# Patient Record
Sex: FEMALE | Race: WHITE | NOT HISPANIC OR LATINO | Employment: UNEMPLOYED | URBAN - METROPOLITAN AREA
[De-identification: names, ages, dates, MRNs, and addresses within clinical notes are randomized per-mention and may not be internally consistent; named-entity substitution may affect disease eponyms.]

---

## 2021-10-28 ENCOUNTER — TELEPHONE (OUTPATIENT)
Dept: PAIN MEDICINE | Facility: CLINIC | Age: 46
End: 2021-10-28

## 2021-11-01 NOTE — TELEPHONE ENCOUNTER
Patient states that her procedure on 10/21 has been cancelled.    Patient states that she has a surgery on 11/18 and would like the other procedure also cancelled.

## 2024-01-31 PROBLEM — N18.30 BENIGN HYPERTENSION WITH CHRONIC KIDNEY DISEASE, STAGE III (HCC): Status: ACTIVE | Noted: 2024-01-31

## 2024-01-31 PROBLEM — N18.4 CHRONIC RENAL DISEASE, STAGE IV (HCC): Status: RESOLVED | Noted: 2023-02-14 | Resolved: 2024-01-31

## 2024-01-31 PROBLEM — N18.5 CHRONIC RENAL DISEASE, STAGE V (HCC): Status: RESOLVED | Noted: 2023-09-18 | Resolved: 2024-01-31

## 2024-02-15 ENCOUNTER — PROCEDURE VISIT (OUTPATIENT)
Dept: UROLOGY | Facility: CLINIC | Age: 49
End: 2024-02-15
Payer: COMMERCIAL

## 2024-02-15 VITALS
SYSTOLIC BLOOD PRESSURE: 130 MMHG | HEIGHT: 61 IN | OXYGEN SATURATION: 100 % | BODY MASS INDEX: 39.46 KG/M2 | HEART RATE: 80 BPM | WEIGHT: 209 LBS | RESPIRATION RATE: 18 BRPM | DIASTOLIC BLOOD PRESSURE: 80 MMHG

## 2024-02-15 DIAGNOSIS — E11.65 UNCONTROLLED TYPE 2 DIABETES MELLITUS WITH HYPERGLYCEMIA (HCC): ICD-10-CM

## 2024-02-15 DIAGNOSIS — N31.9 NEUROGENIC BLADDER: ICD-10-CM

## 2024-02-15 DIAGNOSIS — R39.15 URGENCY OF URINATION: Primary | ICD-10-CM

## 2024-02-15 DIAGNOSIS — R10.9 ABDOMINAL PAIN, UNSPECIFIED ABDOMINAL LOCATION: ICD-10-CM

## 2024-02-15 DIAGNOSIS — N18.30 STAGE 3 CHRONIC KIDNEY DISEASE, UNSPECIFIED WHETHER STAGE 3A OR 3B CKD (HCC): ICD-10-CM

## 2024-02-15 LAB
SL AMB  POCT GLUCOSE, UA: NORMAL
SL AMB LEUKOCYTE ESTERASE,UA: NORMAL
SL AMB POCT BILIRUBIN,UA: NORMAL
SL AMB POCT BLOOD,UA: NORMAL
SL AMB POCT CLARITY,UA: CLEAR
SL AMB POCT COLOR,UA: YELLOW
SL AMB POCT KETONES,UA: NORMAL
SL AMB POCT NITRITE,UA: NORMAL
SL AMB POCT PH,UA: 5
SL AMB POCT SPECIFIC GRAVITY,UA: 1.01
SL AMB POCT URINE PROTEIN: NORMAL
SL AMB POCT UROBILINOGEN: 0.2

## 2024-02-15 PROCEDURE — 51728 CYSTOMETROGRAM W/VP: CPT

## 2024-02-15 PROCEDURE — 51784 ANAL/URINARY MUSCLE STUDY: CPT

## 2024-02-15 PROCEDURE — 81002 URINALYSIS NONAUTO W/O SCOPE: CPT

## 2024-02-15 PROCEDURE — 51797 INTRAABDOMINAL PRESSURE TEST: CPT

## 2024-02-15 NOTE — PROGRESS NOTES
"CC: \"I rarely have the urge to pee, until I'm really full then I get a pain up my side and that's how I know I have to go.  At times I get an urge to pee but can't go.  I go every 2 hours and it helps.  I also leak when I cough, sneeze and bend over\"    Voided scant amount prior to test, catheterized for <5 ml    CMG:       Position:  sitting           Fill sensation:    10 ml,  pdet -2 cmH2O       First urge:       150  ml,  pdet  -2 cmH2O        Normal urge:   180 ml,  pdet   1 cmH2O        Must urge:        not verbalized        Permission to void:               268  ml,     pdet 9  cmH2O       Max pdet during void     58 cm H2O (prior to voiding catheter out)       Voided volume:  269   ml    Bladder stability:   stable         Compliance:  normal        Sphincter function   Unable to provoke ANGELITO at 100, 200, and 268 ml    EMG activity:       Normal during filling,        normal during voiding    Comments:   Sensory urgency    At 90 ml, patient felt uncomfortable   100 ml c/o pressure pain  4.5 out of 10   Stopped fill at 268 ml due to c/o  pain in supra pubic area and bladder and request to stop fill, pain level was 6 out of 10   Once intructed to void, patient bent forward and was pushing, was unable to void until she pushed the catheter out, and then she was able to empty quickly with a flow time of 32 sec.    Urodynamics    Date/Time: 2/15/2024 1:00 PM    Performed by: Elo Cid RN  Authorized by: JONNY Domínguez  Universal Protocol:  Consent: Verbal consent obtained. Written consent obtained.  Risks and benefits: risks, benefits and alternatives were discussed  Consent given by: patient  Patient understanding: patient states understanding of the procedure being performed  Patient consent: the patient's understanding of the procedure matches consent given  Procedure consent: procedure consent matches procedure scheduled  Patient identity confirmed: verbally with patient  Procedure - " Urodynamics:  Procedure details: CMG and EMG      Voiding Pressure Study: Yes    Intra-abdominal Voiding Pressure Study: Yes    Post-procedure:     Patient tolerance: Patient tolerated procedure well with no immediate complications

## 2024-02-26 ENCOUNTER — OFFICE VISIT (OUTPATIENT)
Dept: UROLOGY | Facility: AMBULATORY SURGERY CENTER | Age: 49
End: 2024-02-26
Payer: COMMERCIAL

## 2024-02-26 VITALS
HEIGHT: 61 IN | BODY MASS INDEX: 39.46 KG/M2 | DIASTOLIC BLOOD PRESSURE: 82 MMHG | WEIGHT: 209 LBS | OXYGEN SATURATION: 100 % | HEART RATE: 80 BPM | SYSTOLIC BLOOD PRESSURE: 126 MMHG

## 2024-02-26 DIAGNOSIS — N31.9 NEUROGENIC BLADDER: Primary | ICD-10-CM

## 2024-02-26 LAB — POST-VOID RESIDUAL VOLUME, ML POC: 11 ML

## 2024-02-26 PROCEDURE — 99214 OFFICE O/P EST MOD 30 MIN: CPT | Performed by: UROLOGY

## 2024-02-26 PROCEDURE — 51798 US URINE CAPACITY MEASURE: CPT | Performed by: UROLOGY

## 2024-02-26 NOTE — PROGRESS NOTES
2/26/2024    Kinza CHAVEZ [facility]  1975  [id number]        Assessment  Neurogenic bladder    Plan  We reviewed her urodynamic testing results and her current symptoms.  She is able to empty her bladder well and she does not have signs of overactivity.  Considering her polyneuropathy, her bladder function is excellent.  She does have decreased bladder sensation, and continued timed voiding every 2 hours as well as double voiding are recommended for her.  We discussed the recommendations and potential progression of symptoms which could include urinary retention. Fortunately at this time she does not require medication or catheterization.    Follow up in 6 months with PVR and annually if stable.        History of Present Illness  Kinza is a 48 y.o. female with history of diabetic polyneuropathy and what she reports as possible ALS.  She has had surgical intervention for cauda equina in 2021. She reports since that time she has had worsening lower urinary tract symptoms of lack of sensory awareness with the need to urinate, suprapubic abdominal pain cramping in nature with radiation of pain down into the bladder as well as into low back. She denies recurrence of urinary tract infections. Patient reports since she has the lack of her urgency she typically has her self on a voiding schedule of every 2 hours.    Urodynamic study was performed.  She feels she is doing well overall.             Review of Systems  Review of Systems   Constitutional: Negative.    HENT: Negative.     Respiratory: Negative.     Cardiovascular: Negative.    Gastrointestinal: Negative.    Genitourinary:         As per HPI   Musculoskeletal: Negative.    Skin: Negative.    Neurological: Negative.    Hematological: Negative.          Past Medical History  Past Medical History:   Diagnosis Date    ADD (attention deficit disorder)     Allergic rhinitis     ALS (amyotrophic lateral sclerosis) ([facility]) 11/22    Per neurological surgeon    Anemia  2021    Anxiety     Arthritis     Bipolar disorder (HCC)     Cervical disc disorder with radiculopathy of cervical region     Chronic depression     Chronic kidney disease     Stage 1    Chronic pain disorder     Cluster headache     Colitis     Last assessed - 14    Colon polyp     Concussion 2018    Condyloma acuminatum     Last assessed - 3/19/14    CTS (carpal tunnel syndrome)     Depression     Diabetes mellitus (HCC)     Last assessed - 14    Diabetic neuropathy (HCC)     Difficulty walking     Fibromyalgia     Fibromyalgia, primary     GERD (gastroesophageal reflux disease)     Head injury 18    Fall    Headache(784.0) 1977    History of transfusion 2021    HTN (hypertension)     Last assessed - 14    Hyperlipidemia 2019    Hypertension     Intervertebral disc disorder with radiculopathy of lumbar region     Migraine     Miscarriage 1988,     Obesity 1980    Obesity, unspecified     Open wound of back 2023    Opioid abuse, in remission ([facility]) 2022    Only while in hospital and for about 10 days once home instead of one week.    Osteoporosis     Peripheral neuropathy     Postoperative wound infection 2021    Stop not healed    Preeclampsia     Last assessed - 14    Rheumatoid arthritis ([facility])     Wears dentures        Past Social History  Past Surgical History:   Procedure Laterality Date    APPENDECTOMY      Last assessed - 14    BARIATRIC SURGERY  2016    CARPAL TUNNEL RELEASE      CATARACT EXTRACTION      CERVICAL FUSION N/A 2019    Procedure: Anterior cervical discectomy w fusion C4-5, C5-6, C6-7; allograft and neuromonitoring;  Surgeon: Jose Gomez MD;  Location: [facility];  Service: Orthopedics     SECTION      Last assessed - 14    COLONOSCOPY      COLONOSCOPY      HEMORRHOID SURGERY      HERNIA REPAIR      Last assessed - 14    LUMBAR FUSION N/A 2021    Procedure: L1/2 laminectomy,  discectomy with L1/2 MIS posterior fixation using neuromonitoring and neuronavigation;  Surgeon: Suhas Akbar MD;  Location: [facility];  Service: Neurosurgery    LUMBAR WOUND EXPLORATION Bilateral 2021    Procedure: Lumbar wound washout, debridement and intraoperative cultures;  Surgeon: Suhas Akbar MD;  Location: [facility];  Service: Neurosurgery    MAMMO (HISTORICAL)  2016    NH DEBRIDEMENT BONE 1ST 20 SQ CM/< N/A 2023    Procedure: SURGICAL PREPARATION OF BACK  WOUND AND LOCAL FLAP, PREVENA PLACEMENT;  Surgeon: Ady Rocha MD;  Location:  MAIN OR;  Service: Plastics    WOOD-EN-Y PROCEDURE      ULNAR TUNNEL RELEASE      VAC DRESSING APPLICATION Bilateral 2021    Procedure: APPLICATION VAC DRESSING;  Surgeon: Elbert Hawley MD;  [facility]: [facility];  Service: General    VAC DRESSING APPLICATION N/A 2021    Procedure: APPLICATION VAC DRESSING ABDOMEN/TRUNK;  Surgeon: Mani Ross DO;  Location: [facility];  Service: General    VENTRAL HERNIA REPAIR         Past Family History  Family History   Problem Relation Age of Onset    Cervical cancer Mother     Kidney disease Mother     Cancer Mother         Cervical    Diabetes Mother     Hypertension Mother             Neuropathy Mother             Suicidality Father     Depression Father     Mental illness Father         Most likely,, but doesat age20 without diagnosis    Anxiety disorder Father     Completed Suicide  Father         1978    Psychiatric Illness Father          age 21    Suicide Attempts Father         4    No Known Problems Sister     Mental illness Daughter         By-polar Disorder    Mental illness Daughter         Borderline Personality Disorder    Colon cancer Maternal Grandmother             No Known Problems Maternal Grandfather     Uterine cancer Paternal Grandmother     No Known Problems Paternal Grandfather     No Known Problems Maternal Aunt     Ovarian cancer  Paternal Aunt     ADD / ADHD Cousin     ADD / ADHD Cousin     No Known Problems Family        Past Social history  Social History     Socioeconomic History    Marital status: /Civil Union     Spouse name: Not on file    Number of children: Not on file    Years of education: Not on file    Highest education level: Not on file   Occupational History    Not on file   Tobacco Use    Smoking status: Never     Passive exposure: Never    Smokeless tobacco: Never    Tobacco comments:     Never used tabaco, don’t care to   Vaping Use    Vaping status: Never Used   Substance and Sexual Activity    Alcohol use: Not Currently    Drug use: Yes     Types: Marijuana     Comment: Medicinal    Sexual activity: Not Currently     Partners: Male     Birth control/protection: I.U.D.     Comment: Mirena 4/5/2017   Other Topics Concern    Not on file   Social History Narrative    Daily caffeinated consumption     Exercise - Walking     Social Determinants of Health     Financial Resource Strain: Not on file   Food Insecurity: Not on file   Transportation Needs: Not on file   Physical Activity: Not on file   Stress: Not on file   Social Connections: Not on file   Intimate Partner Violence: Not on file   Housing Stability: Not on file     Social History     Tobacco Use   Smoking Status Never    Passive exposure: Never   Smokeless Tobacco Never   Tobacco Comments    Never used tabaco, don’t care to       Current Medications  Current Outpatient Medications   Medication Sig Dispense Refill    acetaZOLAMIDE (DIAMOX) 250 mg tablet Take 1 tablet (250 mg total) by mouth 2 (two) times a day 180 tablet 1    Ajovy 225 MG/1.5ML auto-injector INJECT 4.5 ML(675 MG TOTAL) UNDER THE SKIN EVERY 3 MONTHS (Patient taking differently: monthly) 4.5 mL 4    atorvastatin (LIPITOR) 10 mg tablet Take 1 tablet (10 mg total) by mouth daily at bedtime 90 tablet 0    BD Pen Needle Glory U/F 32G X 4 MM MISC Inject under the skin 2 (two) times a day Use as  directed 180 each 1    benzonatate (TESSALON) 200 MG capsule Take 1 capsule (200 mg total) by mouth 3 (three) times a day as needed for cough 20 capsule 0    Blood Glucose Monitoring Suppl (ONE TOUCH ULTRA 2) w/Device KIT Use 1 each 2 (two) times a day Use as instructed 1 kit 0    calcitriol (ROCALTROL) 0.25 mcg capsule TAKE 1 CAPSULE BY MOUTH daily 30 capsule 3    Cholecalciferol (Vitamin D3) 50 MCG (2000 UT) capsule Take 1 capsule (2,000 Units total) by mouth daily 90 capsule 3    clonazePAM (KlonoPIN) 0.5 mg tablet Take 1 tablet (0.5 mg total) by mouth 2 (two) times a day 180 tablet 0    Continuous Blood Gluc  (FreeStyle Filib[address]) LUCIANO Apply 1 application topically every 14 (fourteen) days 1 each 0    Continuous Blood Gluc Sensor (FreeStyle Filiberto 2 Sensor) MISC Use 1 application. every 14 (fourteen) days 6 each 0    cyanocobalamin (VITAMIN B-12) 1000 MCG tablet Take 1,000 mcg by mouth daily       cycloSPORINE, PF, (Cequa) 0.09 % SOLN Apply 1 drop to eye 2 (two) times a day      divalproex sodium (Depakote) 500 mg DR tablet Take 1 tablet (500 mg total) by mouth daily at bedtime 90 tablet 3    docusate sodium (COLACE) 100 mg capsule TAKE 1 CAPSULE BY MOUTH TWICE A DAY (Patient taking differently: Take 100 mg by mouth 2 (two) times a day as needed) 60 capsule 0    dulaglutide (Trulicity) 1.5 MG/0.5ML injection Inject 0.5 mL (1.5 mg total) under the skin every 7 days 6 mL 0    DULoxetine (CYMBALTA) 60 mg delayed release capsule Take 1 capsule (60 mg total) by mouth daily 90 capsule 0    ferrous sulfate 324 (65 Fe) mg Take 1 tablet (324 mg total) by mouth daily before breakfast 90 tablet 3    insulin aspart (NovoLOG FlexPen) 100 UNIT/ML injection pen Inject 10 Units under the skin 3 (three) times a day with meals 15 mL 4    Insulin Glargine Solostar (Lantus SoloStar) 100 UNIT/ML SOPN Inject 0.4 mL (40 Units total) under the skin daily at bedtime 15 mL 0    Lancets MISC Use 1 each 2 (two) times a day Use  as instructed      levonorgestrel (MIRENA) 20 MCG/24HR IUD Mirena 20 mcg/24 hr (5 years) intrauterine device   Vaginally for 5 years.      lisdexamfetamine (VYVANSE) 30 MG capsule Take 1 capsule (30 mg total) by mouth every morning Max Daily Amount: 30 mg 30 capsule 0    losartan-hydrochlorothiazide (HYZAAR) 100-12.5 MG per tablet Take 1 tablet by mouth daily 90 tablet 0    methocarbamol (Robaxin-750) 750 mg tablet Take 1 tablet (750 mg total) by mouth every 6 (six) hours as needed for muscle spasms (back pain) 30 tablet 3    metoprolol tartrate (LOPRESSOR) 100 mg tablet Take 1 tablet (100 mg total) by mouth every 12 (twelve) hours 180 tablet 3    Multiple Vitamins-Minerals (multivitamin with minerals) tablet Take 1 tablet by mouth daily      NON FORMULARY Botox injections for HA every 3months (LD 3/20/23)      ondansetron (ZOFRAN) 4 mg tablet Take 1 tablet (4 mg total) by mouth every 8 (eight) hours as needed for nausea or vomiting 120 tablet 0    Ophthalmic Irrigation Solution (OCUSOFT EYE WASH OP) Apply 1 Application to eye daily at bedtime      Polyethyl Glycol-Propyl Glycol (Systane) 0.4-0.3 % GEL Apply 1 drop to eye 2 (two) times a day Using Ivizia as an alternative      polyethylene glycol (MIRALAX) 17 g packet Take 17 g by mouth daily (Patient taking differently: Take 17 g by mouth daily as needed PRN) 1 each 0    pregabalin (LYRICA) 100 mg capsule Take 1 capsule (100 mg total) by mouth 3 (three) times a day 270 capsule 0    rimegepant sulfate (Nurtec) 75 mg TBDP Place one tab (75mg total) under the tongue as needed for migraine. 16 tablet 11    sodium bicarbonate 650 mg tablet Take 1 tablet (650 mg total) by mouth 2 (two) times a day 180 tablet 3    tiZANidine (ZANAFLEX) 4 mg tablet Take 1 tablet (4 mg total) by mouth 3 (three) times a day 360 tablet 0    verapamil (CALAN-SR) 120 mg CR tablet Take 1 tablet (120 mg total) by mouth daily at bedtime 90 tablet 3    cetirizine (ZyrTEC) oral solution Take 5 mL (5  "mg total) by mouth daily (Patient not taking: Reported on 2/26/2024) 450 mL 1    ciclopirox (PENLAC) 8 % solution Apply topically daily at bedtime (Patient not taking: Reported on 11/30/2023) 6.6 mL 1    cyproheptadine (PERIACTIN) 4 mg tablet Take 1 tablet (4 mg total) by mouth daily at bedtime (Patient not taking: Reported on 11/3/2023) 90 tablet 1    diphenhydrAMINE (BENADRYL) 25 mg tablet Take 1 tablet (25 mg total) by mouth every 6 (six) hours as needed for itching (Patient not taking: Reported on 11/3/2023) 30 tablet 0     No current facility-administered medications for this visit.       Allergies  Allergies   Allergen Reactions    Ace Inhibitors Cough    Pollen Extract Other (See Comments)     SNEEZING, COUGHING    Short Ragweed Pollen Ext Cough    Sodium Hyaluronate (Eron) Other (See Comments)     Edema at injection site  Edema at injection site    Levonorgestrel-Eth Estradiol [Levonorgestrel-Ethinyl Estrad] Other (See Comments)     burning    Latex Rash       Past Medical History, Social History, Family History, medications and allergies were reviewed.    Vitals  Vitals:    02/26/24 0850   BP: 126/82   BP Location: Left arm   Patient Position: Sitting   Cuff Size: Adult   Pulse: 80   SpO2: 100%   Weight: 94.8 kg (209 lb)   Height: 5' 1\" (1.549 m)       Physical Exam  Physical Exam  Vitals reviewed.   Constitutional:       Appearance: She is well-developed.   HENT:      Head: Normocephalic and atraumatic.   Eyes:      Conjunctiva/sclera: Conjunctivae normal.   Cardiovascular:      Rate and Rhythm: Normal rate.   Pulmonary:      Effort: Pulmonary effort is normal.   Abdominal:      Palpations: Abdomen is soft.   Genitourinary:     Comments: No CVAT.  Musculoskeletal:      Comments: Walking with a cane.   Skin:     General: Skin is warm and dry.   Neurological:      Mental Status: She is alert and oriented to person, place, and time.   Psychiatric:         Mood and Affect: Mood normal.           Results  No " "results found for: \"PSA\"  Lab Results   Component Value Date    GLUCOSE 330 (H) 10/28/2014    CALCIUM 8.3 (L) 12/14/2023     (L) 10/28/2014    K 3.5 12/14/2023    CO2 22 12/14/2023     12/14/2023    BUN 27 (H) 12/14/2023    CREATININE 2.17 (H) 12/14/2023     Lab Results   Component Value Date    WBC 2.84 (L) 12/14/2023    HGB 11.4 (L) 12/14/2023    HCT 34.9 12/14/2023    MCV 82 12/14/2023     (L) 12/14/2023           "

## 2024-02-27 DIAGNOSIS — Z00.6 ENCOUNTER FOR EXAMINATION FOR NORMAL COMPARISON OR CONTROL IN CLINICAL RESEARCH PROGRAM: ICD-10-CM

## 2024-03-18 ENCOUNTER — TELEPHONE (OUTPATIENT)
Dept: INTERNAL MEDICINE CLINIC | Facility: CLINIC | Age: 49
End: 2024-03-18

## 2024-03-18 NOTE — TELEPHONE ENCOUNTER
Called and left a voice message with patient to call back the office , she is due for a follow up visit with dr Norwood (diabetic foot exam )

## 2024-03-20 DIAGNOSIS — J04.10 TRACHEITIS: ICD-10-CM

## 2024-03-20 DIAGNOSIS — E11.65 UNCONTROLLED TYPE 2 DIABETES MELLITUS WITH HYPERGLYCEMIA (HCC): ICD-10-CM

## 2024-03-20 DIAGNOSIS — F90.0 ATTENTION DEFICIT HYPERACTIVITY DISORDER (ADHD), PREDOMINANTLY INATTENTIVE TYPE: ICD-10-CM

## 2024-03-20 DIAGNOSIS — F34.1 DYSTHYMIC DISORDER: ICD-10-CM

## 2024-03-20 DIAGNOSIS — M48.062 LUMBAR STENOSIS WITH NEUROGENIC CLAUDICATION: ICD-10-CM

## 2024-03-20 RX ORDER — BENZONATATE 200 MG/1
200 CAPSULE ORAL 3 TIMES DAILY PRN
Qty: 20 CAPSULE | Refills: 0 | Status: CANCELLED | OUTPATIENT
Start: 2024-03-20

## 2024-03-20 RX ORDER — PREGABALIN 100 MG/1
100 CAPSULE ORAL 3 TIMES DAILY
Qty: 270 CAPSULE | Refills: 0 | Status: SHIPPED | OUTPATIENT
Start: 2024-03-20

## 2024-03-20 RX ORDER — LISDEXAMFETAMINE DIMESYLATE CAPSULES 30 MG/1
30 CAPSULE ORAL EVERY MORNING
Qty: 30 CAPSULE | Refills: 0 | Status: SHIPPED | OUTPATIENT
Start: 2024-03-20

## 2024-03-20 RX ORDER — PEN NEEDLE, DIABETIC 32GX 5/32"
NEEDLE, DISPOSABLE MISCELLANEOUS 2 TIMES DAILY
Qty: 180 EACH | Refills: 0 | Status: SHIPPED | OUTPATIENT
Start: 2024-03-20

## 2024-03-20 RX ORDER — INSULIN GLARGINE 100 [IU]/ML
40 INJECTION, SOLUTION SUBCUTANEOUS
Qty: 15 ML | Refills: 0 | Status: SHIPPED | OUTPATIENT
Start: 2024-03-20

## 2024-03-20 RX ORDER — CLONAZEPAM 0.5 MG/1
0.5 TABLET ORAL 2 TIMES DAILY
Qty: 180 TABLET | Refills: 0 | Status: SHIPPED | OUTPATIENT
Start: 2024-03-20

## 2024-03-28 ENCOUNTER — LAB (OUTPATIENT)
Dept: LAB | Facility: CLINIC | Age: 49
End: 2024-03-28
Payer: COMMERCIAL

## 2024-03-28 ENCOUNTER — OFFICE VISIT (OUTPATIENT)
Dept: INTERNAL MEDICINE CLINIC | Facility: CLINIC | Age: 49
End: 2024-03-28
Payer: COMMERCIAL

## 2024-03-28 VITALS
HEIGHT: 61 IN | BODY MASS INDEX: 39.08 KG/M2 | DIASTOLIC BLOOD PRESSURE: 80 MMHG | HEART RATE: 66 BPM | SYSTOLIC BLOOD PRESSURE: 132 MMHG | TEMPERATURE: 98.3 F | WEIGHT: 207 LBS | OXYGEN SATURATION: 99 %

## 2024-03-28 DIAGNOSIS — K21.9 GASTROESOPHAGEAL REFLUX DISEASE WITHOUT ESOPHAGITIS: ICD-10-CM

## 2024-03-28 DIAGNOSIS — N31.9 NEUROGENIC BLADDER: ICD-10-CM

## 2024-03-28 DIAGNOSIS — E08.42 DIABETIC POLYNEUROPATHY ASSOCIATED WITH DIABETES MELLITUS DUE TO UNDERLYING CONDITION (HCC): ICD-10-CM

## 2024-03-28 DIAGNOSIS — Z00.6 ENCOUNTER FOR EXAMINATION FOR NORMAL COMPARISON OR CONTROL IN CLINICAL RESEARCH PROGRAM: ICD-10-CM

## 2024-03-28 DIAGNOSIS — G93.2 IIH (IDIOPATHIC INTRACRANIAL HYPERTENSION): ICD-10-CM

## 2024-03-28 DIAGNOSIS — G43.709 CHRONIC MIGRAINE WITHOUT AURA WITHOUT STATUS MIGRAINOSUS, NOT INTRACTABLE: ICD-10-CM

## 2024-03-28 DIAGNOSIS — M51.16 LUMBAR DISC HERNIATION WITH RADICULOPATHY: ICD-10-CM

## 2024-03-28 DIAGNOSIS — M79.7 FIBROMYALGIA: ICD-10-CM

## 2024-03-28 DIAGNOSIS — N18.32 STAGE 3B CHRONIC KIDNEY DISEASE (HCC): ICD-10-CM

## 2024-03-28 DIAGNOSIS — F11.20 CONTINUOUS OPIOID DEPENDENCE (HCC): ICD-10-CM

## 2024-03-28 DIAGNOSIS — E53.8 B12 DEFICIENCY: ICD-10-CM

## 2024-03-28 DIAGNOSIS — E11.65 UNCONTROLLED TYPE 2 DIABETES MELLITUS WITH HYPERGLYCEMIA (HCC): Primary | ICD-10-CM

## 2024-03-28 DIAGNOSIS — L03.113 CELLULITIS OF RIGHT SHOULDER: ICD-10-CM

## 2024-03-28 DIAGNOSIS — I10 PRIMARY HYPERTENSION: ICD-10-CM

## 2024-03-28 DIAGNOSIS — F90.0 ATTENTION DEFICIT HYPERACTIVITY DISORDER (ADHD), PREDOMINANTLY INATTENTIVE TYPE: ICD-10-CM

## 2024-03-28 DIAGNOSIS — Z01.00 DIABETIC EYE EXAM (HCC): ICD-10-CM

## 2024-03-28 DIAGNOSIS — G83.4 CAUDA EQUINA SYNDROME (HCC): ICD-10-CM

## 2024-03-28 DIAGNOSIS — E11.9 DIABETIC EYE EXAM (HCC): ICD-10-CM

## 2024-03-28 DIAGNOSIS — F31.11 BIPOLAR AFFECTIVE DISORDER, CURRENTLY MANIC, MILD (HCC): ICD-10-CM

## 2024-03-28 DIAGNOSIS — E66.01 MORBID OBESITY WITH BMI OF 40.0-44.9, ADULT (HCC): ICD-10-CM

## 2024-03-28 DIAGNOSIS — E78.2 MIXED HYPERLIPIDEMIA: ICD-10-CM

## 2024-03-28 DIAGNOSIS — M17.0 PRIMARY OSTEOARTHRITIS OF BOTH KNEES: ICD-10-CM

## 2024-03-28 DIAGNOSIS — E55.9 VITAMIN D DEFICIENCY: ICD-10-CM

## 2024-03-28 DIAGNOSIS — F34.1 DYSTHYMIC DISORDER: ICD-10-CM

## 2024-03-28 LAB
ANION GAP SERPL CALCULATED.3IONS-SCNC: 11 MMOL/L (ref 4–13)
BUN SERPL-MCNC: 40 MG/DL (ref 5–25)
CALCIUM SERPL-MCNC: 8.4 MG/DL (ref 8.4–10.2)
CHLORIDE SERPL-SCNC: 111 MMOL/L (ref 96–108)
CO2 SERPL-SCNC: 20 MMOL/L (ref 21–32)
CREAT SERPL-MCNC: 1.92 MG/DL (ref 0.6–1.3)
GFR SERPL CREATININE-BSD FRML MDRD: 29 ML/MIN/1.73SQ M
GLUCOSE SERPL-MCNC: 111 MG/DL (ref 65–140)
POTASSIUM SERPL-SCNC: 3.6 MMOL/L (ref 3.5–5.3)
SODIUM SERPL-SCNC: 142 MMOL/L (ref 135–147)

## 2024-03-28 PROCEDURE — 99214 OFFICE O/P EST MOD 30 MIN: CPT | Performed by: INTERNAL MEDICINE

## 2024-03-28 PROCEDURE — 80048 BASIC METABOLIC PNL TOTAL CA: CPT

## 2024-03-28 PROCEDURE — 36415 COLL VENOUS BLD VENIPUNCTURE: CPT

## 2024-03-28 RX ORDER — BLOOD-GLUCOSE SENSOR
1 EACH MISCELLANEOUS
Qty: 6 EACH | Refills: 3 | Status: SHIPPED | OUTPATIENT
Start: 2024-03-28

## 2024-03-28 RX ORDER — CEPHALEXIN 500 MG/1
500 CAPSULE ORAL EVERY 8 HOURS SCHEDULED
Qty: 30 CAPSULE | Refills: 0 | Status: SHIPPED | OUTPATIENT
Start: 2024-03-28 | End: 2024-04-07

## 2024-03-28 NOTE — PROGRESS NOTES
Diabetic Foot Exam    Patient's shoes and socks removed.    Right Foot/Ankle   Right Foot Inspection  Skin Exam: skin normal and skin intact. No dry skin, no warmth, no callus, no erythema, no maceration, no abnormal color, no pre-ulcer, no ulcer and no callus.     Toe Exam: No swelling, no tenderness, erythema and  no right toe deformity    Sensory   Monofilament testing: diminished    Vascular  Capillary refills: < 3 seconds  The right DP pulse is 2+. The right PT pulse is 2+.     Left Foot/Ankle  Left Foot Inspection  Skin Exam: skin normal and skin intact. No dry skin, no warmth, no erythema, no maceration, normal color, no pre-ulcer, no ulcer and no callus.     Toe Exam: No swelling, no tenderness, no erythema and no left toe deformity.     Sensory   Monofilament testing: diminished    Vascular  Capillary refills: < 3 seconds  The left DP pulse is 2+. The left PT pulse is 2+.     Assign Risk Category  No deformity present  Loss of protective sensation  No weak pulses  Risk: 1    Answers submitted by the patient for this visit:  Diabetes Questionnaire (Submitted on 3/25/2024)  Chief Complaint: Diabetes problem  Diabetes type: type 2  MedicAlert ID: [id number]  Disease duration: 27 Years  blurred vision: Yes  chest pain: Yes  fatigue: Yes  foot paresthesias: Yes  foot ulcerations: Yes  polydipsia: No  polyphagia: No  polyuria: No  visual change: No  weakness: Yes  weight loss: No  Symptom course: worsening  confusion: No  dizziness: No  headaches: Yes  hunger: No  mood changes: Yes  nervous/anxious: Yes  pallor: No  seizures: No  sleepiness: No  speech difficulty: No  sweats: No  tremors: No  blackouts: No  hospitalization: No  nocturnal hypoglycemia: No  required assistance: Yes  required glucagon: No  CVA: No  heart disease: No  impotence: Yes  nephropathy: Yes  peripheral neuropathy: Yes  PVD: Yes  retinopathy: Yes  CAD risks: hypertension, obesity, sedentary lifestyle, stress  Current treatments: diet, insulin  injections  Treatment compliance: most of the time  Dose schedule: pre-breakfast, pre-lunch, pre-dinner, at bedtime  Given by: patient  Injection sites: abdominal wall  Home blood tests: 3-4 x per day  Home urines: <1 x per month  Monitoring compliance: good  Blood glucose trend: increasing steadily  breakfast time: 5-6 am  breakfast glucose level: 70-90  lunch time: 12-1 pm  lunch glucose level: 110-130  dinner time: 5-6 pm  dinner glucose level: 110-130  High score: >200  Overall: 140-180  Weight trend: fluctuating minimally  Current diet: generally unhealthy  Meal planning: avoidance of concentrated sweets, carbohydrate counting  Exercise: intermittently  Dietitian visit: No  Eye exam current: Yes  Sees podiatrist: No

## 2024-03-28 NOTE — PROGRESS NOTES
Answers submitted by the patient for this visit:  Diabetes Questionnaire (Submitted on 3/25/2024)  Chief Complaint: Diabetes problem  Diabetes type: type 2  MedicAlert ID: [id number]  Disease duration: 27 Years  blurred vision: Yes  chest pain: Yes  fatigue: Yes  foot paresthesias: Yes  foot ulcerations: Yes  polydipsia: No  polyphagia: No  polyuria: No  visual change: No  weakness: Yes  weight loss: No  Symptom course: worsening  confusion: No  dizziness: No  headaches: Yes  hunger: No  mood changes: Yes  nervous/anxious: Yes  pallor: No  seizures: No  sleepiness: No  speech difficulty: No  sweats: No  tremors: No  blackouts: No  hospitalization: No  nocturnal hypoglycemia: No  required assistance: Yes  required glucagon: No  CVA: No  heart disease: No  impotence: Yes  nephropathy: Yes  peripheral neuropathy: Yes  PVD: Yes  retinopathy: Yes  CAD risks: hypertension, obesity, sedentary lifestyle, stress  Current treatments: diet, insulin injections  Treatment compliance: most of the time  Dose schedule: pre-breakfast, pre-lunch, pre-dinner, at bedtime  Given by: patient  Injection sites: abdominal wall  Home blood tests: 3-4 x per day  Home urines: <1 x per month  Monitoring compliance: good  Blood glucose trend: increasing steadily  breakfast time: 5-6 am  breakfast glucose level: 70-90  lunch time: 12-1 pm  lunch glucose level: 110-130  dinner time: 5-6 pm  dinner glucose level: 110-130  High score: >200  Overall: 140-180  Weight trend: fluctuating minimally  Current diet: generally unhealthy  Meal planning: avoidance of concentrated sweets, carbohydrate counting  Exercise: intermittently  Dietitian visit: No  Eye exam current: Yes  Sees podiatrist: No

## 2024-03-28 NOTE — PROGRESS NOTES
Assessment/Plan:             1. Uncontrolled type 2 diabetes mellitus with hyperglycemia ([facility])    2. Diabetic eye exam ([facility])    3. Chronic migraine without aura without status migrainosus, not intractable    4. Gastroesophageal reflux disease without esophagitis    5. Diabetic polyneuropathy associated with diabetes mellitus due to underlying condition ([facility])    6. Lumbar disc herniation with radiculopathy    7. IIH (idiopathic intracranial hypertension)    8. Primary osteoarthritis of both knees    9. Stage 3b chronic kidney disease ([facility])    10. Dysthymic disorder    11. Attention deficit hyperactivity disorder (ADHD), predominantly inattentive type    12. Fibromyalgia    13. Vitamin D deficiency    14. Morbid obesity with BMI of 40.0-44.9, adult ([facility])    15. Neurogenic bladder    16. Mixed hyperlipidemia    17. Cellulitis of right shoulder           Subjective:      Patient ID: [id number] Meza is a 48 y.o. female.    Follow-up on multiple medical problems to ensure they are stable on current medications    Diabetes  She has type 2 diabetes mellitus. No MedicAlert identification noted. The initial diagnosis of diabetes was made 27 years ago. Hypoglycemia symptoms include headaches, mood changes and nervousness/anxiousness. Pertinent negatives for hypoglycemia include no confusion, dizziness, hunger, pallor, seizures, sleepiness, speech difficulty, sweats or tremors. Associated symptoms include blurred vision, fatigue, foot paresthesias, foot ulcerations and weakness. Pertinent negatives for diabetes include no chest pain, no polydipsia, no polyphagia, no polyuria, no visual change and no weight loss. Hypoglycemia complications include required assistance. Pertinent negatives for hypoglycemia complications include no blackouts, no hospitalization, no nocturnal hypoglycemia and no required glucagon injection. Symptoms are worsening. Diabetic complications include impotence, nephropathy, peripheral neuropathy, PVD  and retinopathy. Pertinent negatives for diabetic complications include no CVA or heart disease. Risk factors for coronary artery disease include hypertension, obesity, sedentary lifestyle and stress. Current diabetic treatment includes diet and insulin injections. She is compliant with treatment most of the time. She is currently taking insulin pre-breakfast, pre-lunch, pre-dinner and at bedtime. Insulin injections are given by patient. Rotation sites for injection include the abdominal wall. Her weight is fluctuating minimally. She is following a generally unhealthy diet. Meal planning includes avoidance of concentrated sweets and carbohydrate counting. She has not had a previous visit with a dietitian. She participates in exercise intermittently. She monitors blood glucose at home 3-4 x per day. She monitors urine at home <1 x per month. Blood glucose monitoring compliance is good. Her home blood glucose trend is increasing steadily. Her breakfast blood glucose is taken between 5-6 am. Her breakfast blood glucose range is generally 70-90 mg/dl. Her lunch blood glucose is taken between 12-1 pm. Her lunch blood glucose range is generally 110-130 mg/dl. Her dinner blood glucose is taken between 5-6 pm. Her dinner blood glucose range is generally 110-130 mg/dl. Her overall blood glucose range is 140-180 mg/dl. She does not see a podiatrist.Eye exam is current.       The following portions of the patient's history were reviewed and updated as appropriate: She  has a past medical history of ADD (attention deficit disorder), Allergic rhinitis, ALS (amyotrophic lateral sclerosis) ([facility]) (11/22), Anemia (12/2021), Anxiety, Arthritis, Bipolar disorder ([facility]), Cervical disc disorder with radiculopathy of cervical region, Chronic depression, Chronic kidney disease, Chronic pain disorder, Cluster headache, Colitis, Colon polyp, Concussion (2018), Condyloma acuminatum, CTS (carpal tunnel syndrome) (2001), Depression, Diabetes  mellitus (HCC), Diabetic neuropathy ([facility]), Difficulty walking (07/22), Fibromyalgia, Fibromyalgia, primary, GERD (gastroesophageal reflux disease), Head injury (08/13/18), Headache(784.0) (1977), History of transfusion (12/2021), HTN (hypertension), Hyperlipidemia (07/22/2019), Hypertension, Intervertebral disc disorder with radiculopathy of lumbar region, Migraine, Miscarriage (1988), Obesity (1980), Obesity, unspecified, Open wound of back (05/30/2023), Opioid abuse, in remission ([facility]) (01/2022), Osteoporosis, Peripheral neuropathy, Postoperative wound infection (12/2021), Preeclampsia, Rheumatoid arthritis ([facility]), and Wears dentures.  She   Patient Active Problem List    Diagnosis Date Noted    Cellulitis of right shoulder 03/28/2024    Benign hypertension with chronic kidney disease, stage III ([facility]) 01/31/2024    Abnormal blood electrolyte level 10/30/2023    Thrombocytopenia ([facility]) 10/30/2023    Chest pain 10/29/2023    Metabolic acidosis 09/27/2023    Hyperphosphatemia 09/27/2023    Neurogenic bladder 09/26/2023    Bilateral carpal tunnel syndrome     IIH (idiopathic intracranial hypertension) 08/15/2023    Open wound of back 05/30/2023    Maceration of skin 02/14/2023    Cortical age-related cataract of left eye 01/04/2023    Bipolar affective disorder, currently manic, mild ([facility]) 03/30/2022    Seasonal allergic rhinitis 03/30/2022    Surgical wound, non healing 02/08/2022    Diabetic polyneuropathy associated with diabetes mellitus due to underlying condition ([facility]) 02/08/2022    Continuous opioid dependence ([facility]) 01/07/2022    Anemia 12/24/2021    Wound infection 12/09/2021    Cauda equina syndrome ([facility]) 12/08/2021    Depression, recurrent ([facility]) 10/28/2021    Annual physical exam 08/27/2021    Other gastritis without bleeding 08/11/2021    Obesity, unspecified     Diabetes mellitus ([facility])     HTN (hypertension)     Dizziness 12/07/2020    Secondary hyperparathyroidism of renal origin ([facility]) 11/16/2020     Controlled type 2 diabetes mellitus with stage 3 chronic kidney disease, with long-term current use of insulin (HCC)     Intractable migraine with status migrainosus     Type 2 diabetes mellitus with diabetic neuropathy (HCC) 09/11/2020    Ventral hernia without obstruction or gangrene 09/11/2020    Lumbar disc herniation with radiculopathy 09/11/2020    Concussion with moderate (1-24 hours) loss of consciousness 09/11/2020    Primary osteoarthritis of both knees 09/11/2020    Diabetic nephropathy associated with type 2 diabetes mellitus (HCC) 09/11/2020    Dysthymic disorder 09/11/2020    Acute on chronic kidney failure  07/13/2020    Hypokalemia 07/13/2020    Near syncope 07/13/2020    Weakness 07/13/2020    Status post gastric bypass for obesity 07/13/2020    Diarrhea 07/13/2020    Snores     Headache upon awakening     Chronic tension type headache 12/16/2019    Chronic migraine without aura without status migrainosus, not intractable 12/16/2019    Medical marijuana use 12/16/2019    Vitamin D deficiency 08/23/2019    Chronic kidney disease-mineral and bone disorder 08/20/2019    S/P cervical spinal fusion 07/25/2019    Hyperlipidemia, unspecified 07/22/2019    Cervical disc disorder with radiculopathy     Stage 3b chronic kidney disease (HCC) 05/07/2019    Persistent proteinuria 05/07/2019    Hypertensive kidney disease with stage 3b chronic kidney disease (HCC) 05/07/2019    Hyponatremia 05/07/2019    Intervertebral disc disorders with radiculopathy, lumbar region     Sacroiliitis (HCC)     Greater trochanteric bursitis of both hips     Herniated nucleus pulposus, L1-2 12/12/2017    Bilateral chronic knee pain 12/01/2017    Facet arthritis of lumbosacral region 12/01/2017    Fibromyalgia 12/01/2017    Lumbar stenosis with neurogenic claudication 12/01/2017    Attention deficit hyperactivity disorder (ADHD), predominantly inattentive type 08/22/2016    Uncontrolled type 2 diabetes mellitus with hyperglycemia  ([facility]) 2016    Gastroesophageal reflux disease without esophagitis 2016    Morbid obesity with BMI of 40.0-44.9, adult ([facility]) 2016    Chronic renal insufficiency 2014    Endometriosis 2012     She  has a past surgical history that includes Appendectomy;  section; Hernia repair; Cervical fusion (N/A, 2019); Colonoscopy; Carpal tunnel release; Hemorrhoid surgery; Ventral hernia repair; Shelby-en-y procedure; Ulnar tunnel release; Mammo (historical) (); Colonoscopy; Lumbar fusion (N/A, 2021); LUMBAR WOUND EXPLORATION (Bilateral, 2021); VAC DRESSING APPLICATION (Bilateral, 2021); VAC DRESSING APPLICATION (N/A, 2021); Bariatric Surgery (2016); Cataract extraction; and pr debridement bone 1st 20 sq cm/< (N/A, 2023).  Her family history includes ADD / ADHD in her cousin and cousin; Anxiety disorder in her father; Cancer in her mother; Cervical cancer in her mother; Colon cancer in her maternal grandmother; Completed Suicide  in her father; Depression in her father; Diabetes in her mother; Hypertension in her mother; Kidney disease in her mother; Mental illness in her daughter, daughter, and father; Neuropathy in her mother; No Known Problems in her family, maternal aunt, maternal grandfather, paternal grandfather, and sister; Ovarian cancer in her paternal aunt; Psychiatric Illness in her father; Suicidality in her father; Suicide Attempts in her father; Uterine cancer in her paternal grandmother.  She  reports that she has never smoked. She has never been exposed to tobacco smoke. She has never used smokeless tobacco. She reports that she does not currently use alcohol. She reports current drug use. Drug: Marijuana.  Current Outpatient Medications   Medication Sig Dispense Refill    acetaZOLAMIDE (DIAMOX) 250 mg tablet Take 1 tablet (250 mg total) by mouth 2 (two) times a day 180 tablet 1    Ajovy 225 MG/1.5ML auto-injector INJECT 4.5 ML(675 MG  TOTAL) UNDER THE SKIN EVERY 3 MONTHS (Patient taking differently: monthly) 4.5 mL 4    atorvastatin (LIPITOR) 10 mg tablet Take 1 tablet (10 mg total) by mouth daily at bedtime 90 tablet 0    BD Pen Needle Glory U/F 32G X 4 MM MISC Inject under the skin 2 (two) times a day Use as directed 180 each 0    Blood Glucose Monitoring Suppl (ONE TOUCH ULTRA 2) w/Device KIT Use 1 each 2 (two) times a day Use as instructed 1 kit 0    calcitriol (ROCALTROL) 0.25 mcg capsule TAKE 1 CAPSULE BY MOUTH daily 30 capsule 3    Cholecalciferol (Vitamin D3) 50 MCG (2000 UT) capsule Take 1 capsule (2,000 Units total) by mouth daily 90 capsule 3    ciclopirox (PENLAC) 8 % solution Apply topically daily at bedtime 6.6 mL 1    clonazePAM (KlonoPIN) 0.5 mg tablet Take 1 tablet (0.5 mg total) by mouth 2 (two) times a day 180 tablet 0    cyanocobalamin (VITAMIN B-12) 1000 MCG tablet Take 1,000 mcg by mouth daily       cycloSPORINE, PF, (Cequa) 0.09 % SOLN Apply 1 drop to eye 2 (two) times a day      divalproex sodium (Depakote) 500 mg DR tablet Take 1 tablet (500 mg total) by mouth daily at bedtime 90 tablet 3    docusate sodium (COLACE) 100 mg capsule TAKE 1 CAPSULE BY MOUTH TWICE A DAY (Patient taking differently: Take 100 mg by mouth 2 (two) times a day as needed) 60 capsule 0    dulaglutide (Trulicity) 1.5 MG/0.5ML injection Inject 0.5 mL (1.5 mg total) under the skin every 7 days 6 mL 0    DULoxetine (CYMBALTA) 60 mg delayed release capsule Take 1 capsule (60 mg total) by mouth daily 90 capsule 0    ferrous sulfate 324 (65 Fe) mg Take 1 tablet (324 mg total) by mouth daily before breakfast 90 tablet 3    insulin aspart (NovoLOG FlexPen) 100 UNIT/ML injection pen Inject 10 Units under the skin 3 (three) times a day with meals 15 mL 4    Insulin Glargine Solostar (Lantus SoloStar) 100 UNIT/ML SOPN Inject 0.4 mL (40 Units total) under the skin daily at bedtime 15 mL 0    Lancets MISC Use 1 each 2 (two) times a day Use as instructed       levonorgestrel (MIRENA) 20 MCG/24HR IUD Mirena 20 mcg/24 hr (5 years) intrauterine device   Vaginally for 5 years.      lisdexamfetamine (VYVANSE) 30 MG capsule Take 1 capsule (30 mg total) by mouth every morning Max Daily Amount: 30 mg 30 capsule 0    losartan-hydrochlorothiazide (HYZAAR) 100-12.5 MG per tablet Take 1 tablet by mouth daily 90 tablet 0    methocarbamol (Robaxin-750) 750 mg tablet Take 1 tablet (750 mg total) by mouth every 6 (six) hours as needed for muscle spasms (back pain) 30 tablet 3    metoprolol tartrate (LOPRESSOR) 100 mg tablet Take 1 tablet (100 mg total) by mouth every 12 (twelve) hours 180 tablet 3    Multiple Vitamins-Minerals (multivitamin with minerals) tablet Take 1 tablet by mouth daily      NON FORMULARY Botox injections for HA every 3months (LD 3/20/23)      ondansetron (ZOFRAN) 4 mg tablet Take 1 tablet (4 mg total) by mouth every 8 (eight) hours as needed for nausea or vomiting 120 tablet 0    Ophthalmic Irrigation Solution (OCUSOFT EYE WASH OP) Apply 1 Application to eye daily at bedtime      Polyethyl Glycol-Propyl Glycol (Systane) 0.4-0.3 % GEL Apply 1 drop to eye 2 (two) times a day Using Ivizia as an alternative      pregabalin (LYRICA) 100 mg capsule Take 1 capsule (100 mg total) by mouth 3 (three) times a day 270 capsule 0    rimegepant sulfate (Nurtec) 75 mg TBDP Place one tab (75mg total) under the tongue as needed for migraine. 16 tablet 11    sodium bicarbonate 650 mg tablet Take 1 tablet (650 mg total) by mouth 2 (two) times a day 180 tablet 3    tiZANidine (ZANAFLEX) 4 mg tablet Take 1 tablet (4 mg total) by mouth 3 (three) times a day 360 tablet 0    verapamil (CALAN-SR) 120 mg CR tablet Take 1 tablet (120 mg total) by mouth daily at bedtime 90 tablet 3    benzonatate (TESSALON) 200 MG capsule Take 1 capsule (200 mg total) by mouth 3 (three) times a day as needed for cough (Patient not taking: Reported on 3/28/2024) 20 capsule 0    cetirizine (ZyrTEC) oral solution  Take 5 mL (5 mg total) by mouth daily (Patient not taking: Reported on 3/28/2024) 450 mL 1    cyproheptadine (PERIACTIN) 4 mg tablet Take 1 tablet (4 mg total) by mouth daily at bedtime (Patient not taking: Reported on 11/3/2023) 90 tablet 1    diphenhydrAMINE (BENADRYL) 25 mg tablet Take 1 tablet (25 mg total) by mouth every 6 (six) hours as needed for itching (Patient not taking: Reported on 11/3/2023) 30 tablet 0    polyethylene glycol (MIRALAX) 17 g packet Take 17 g by mouth daily (Patient not taking: Reported on 3/28/2024) 1 each 0     No current facility-administered medications for this visit.     Current Outpatient Medications on File Prior to Visit   Medication Sig    acetaZOLAMIDE (DIAMOX) 250 mg tablet Take 1 tablet (250 mg total) by mouth 2 (two) times a day    Ajovy 225 MG/1.5ML auto-injector INJECT 4.5 ML(675 MG TOTAL) UNDER THE SKIN EVERY 3 MONTHS (Patient taking differently: monthly)    atorvastatin (LIPITOR) 10 mg tablet Take 1 tablet (10 mg total) by mouth daily at bedtime    BD Pen Needle Glory U/F 32G X 4 MM MISC Inject under the skin 2 (two) times a day Use as directed    Blood Glucose Monitoring Suppl (ONE TOUCH ULTRA 2) w/Device KIT Use 1 each 2 (two) times a day Use as instructed    calcitriol (ROCALTROL) 0.25 mcg capsule TAKE 1 CAPSULE BY MOUTH daily    Cholecalciferol (Vitamin D3) 50 MCG (2000 UT) capsule Take 1 capsule (2,000 Units total) by mouth daily    ciclopirox (PENLAC) 8 % solution Apply topically daily at bedtime    clonazePAM (KlonoPIN) 0.5 mg tablet Take 1 tablet (0.5 mg total) by mouth 2 (two) times a day    cyanocobalamin (VITAMIN B-12) 1000 MCG tablet Take 1,000 mcg by mouth daily     cycloSPORINE, PF, (Cequa) 0.09 % SOLN Apply 1 drop to eye 2 (two) times a day    divalproex sodium (Depakote) 500 mg DR tablet Take 1 tablet (500 mg total) by mouth daily at bedtime    docusate sodium (COLACE) 100 mg capsule TAKE 1 CAPSULE BY MOUTH TWICE A DAY (Patient taking differently: Take 100  mg by mouth 2 (two) times a day as needed)    dulaglutide (Trulicity) 1.5 MG/0.5ML injection Inject 0.5 mL (1.5 mg total) under the skin every 7 days    DULoxetine (CYMBALTA) 60 mg delayed release capsule Take 1 capsule (60 mg total) by mouth daily    ferrous sulfate 324 (65 Fe) mg Take 1 tablet (324 mg total) by mouth daily before breakfast    insulin aspart (NovoLOG FlexPen) 100 UNIT/ML injection pen Inject 10 Units under the skin 3 (three) times a day with meals    Insulin Glargine Solostar (Lantus SoloStar) 100 UNIT/ML SOPN Inject 0.4 mL (40 Units total) under the skin daily at bedtime    Lancets MISC Use 1 each 2 (two) times a day Use as instructed    levonorgestrel (MIRENA) 20 MCG/24HR IUD Mirena 20 mcg/24 hr (5 years) intrauterine device   Vaginally for 5 years.    lisdexamfetamine (VYVANSE) 30 MG capsule Take 1 capsule (30 mg total) by mouth every morning Max Daily Amount: 30 mg    losartan-hydrochlorothiazide (HYZAAR) 100-12.5 MG per tablet Take 1 tablet by mouth daily    methocarbamol (Robaxin-750) 750 mg tablet Take 1 tablet (750 mg total) by mouth every 6 (six) hours as needed for muscle spasms (back pain)    metoprolol tartrate (LOPRESSOR) 100 mg tablet Take 1 tablet (100 mg total) by mouth every 12 (twelve) hours    Multiple Vitamins-Minerals (multivitamin with minerals) tablet Take 1 tablet by mouth daily    NON FORMULARY Botox injections for HA every 3months (LD 3/20/23)    ondansetron (ZOFRAN) 4 mg tablet Take 1 tablet (4 mg total) by mouth every 8 (eight) hours as needed for nausea or vomiting    Ophthalmic Irrigation Solution (OCUSOFT EYE WASH OP) Apply 1 Application to eye daily at bedtime    Polyethyl Glycol-Propyl Glycol (Systane) 0.4-0.3 % GEL Apply 1 drop to eye 2 (two) times a day Using Ivizia as an alternative    pregabalin (LYRICA) 100 mg capsule Take 1 capsule (100 mg total) by mouth 3 (three) times a day    rimegepant sulfate (Nurtec) 75 mg TBDP Place one tab (75mg total) under the  tongue as needed for migraine.    sodium bicarbonate 650 mg tablet Take 1 tablet (650 mg total) by mouth 2 (two) times a day    tiZANidine (ZANAFLEX) 4 mg tablet Take 1 tablet (4 mg total) by mouth 3 (three) times a day    verapamil (CALAN-SR) 120 mg CR tablet Take 1 tablet (120 mg total) by mouth daily at bedtime    [DISCONTINUED] Continuous Blood Gluc  (FreeStyle Filiberto 2 [place]) LUCIANO Apply 1 application topically every 14 (fourteen) days    [DISCONTINUED] Continuous Blood Gluc Sensor (FreeStyle Filiberto 2 Sensor) MISC Use 1 application. every 14 (fourteen) days    benzonatate (TESSALON) 200 MG capsule Take 1 capsule (200 mg total) by mouth 3 (three) times a day as needed for cough (Patient not taking: Reported on 3/28/2024)    cetirizine (ZyrTEC) oral solution Take 5 mL (5 mg total) by mouth daily (Patient not taking: Reported on 3/28/2024)    cyproheptadine (PERIACTIN) 4 mg tablet Take 1 tablet (4 mg total) by mouth daily at bedtime (Patient not taking: Reported on 11/3/2023)    diphenhydrAMINE (BENADRYL) 25 mg tablet Take 1 tablet (25 mg total) by mouth every 6 (six) hours as needed for itching (Patient not taking: Reported on 11/3/2023)    polyethylene glycol (MIRALAX) 17 g packet Take 17 g by mouth daily (Patient not taking: Reported on 3/28/2024)     No current facility-administered medications on file prior to visit.     She is allergic to ace inhibitors, pollen extract, short ragweed pollen ext, sodium hyaluronate (yoav), levonorgestrel-eth estradiol [levonorgestrel-ethinyl estrad], and latex..    Review of Systems   Constitutional:  Positive for fatigue. Negative for chills, fever and weight loss.   HENT:  Negative for congestion, ear pain and sore throat.    Eyes:  Positive for blurred vision. Negative for pain.   Respiratory:  Negative for cough and shortness of breath.    Cardiovascular:  Negative for chest pain and leg swelling.   Gastrointestinal:  Negative for abdominal pain, nausea and  "vomiting.   Endocrine: Negative for polydipsia, polyphagia and polyuria.   Genitourinary:  Positive for impotence. Negative for difficulty urinating, frequency and urgency.   Musculoskeletal:  Positive for arthralgias and back pain.   Skin:  Negative for pallor and rash.   Neurological:  Positive for weakness and headaches. Negative for dizziness, tremors, seizures and speech difficulty.   Psychiatric/Behavioral:  Negative for confusion and sleep disturbance. The patient is nervous/anxious.          Objective:      /80 (BP Location: Left arm, Patient Position: Sitting, Cuff Size: Standard)   Pulse 66   Temp 98.3 °F (36.8 °C) (Temporal)   Ht 5' 1\" (1.549 m)   Wt 93.9 kg (207 lb)   SpO2 99%   BMI 39.11 kg/m²     Recent Results (from the past 1344 hour(s))   POCT urine dip    Collection Time: 02/15/24  1:17 PM   Result Value Ref Range    LEUKOCYTE ESTERASE,UA -     NITRITE,UA -     SL AMB POCT UROBILINOGEN 0.2     POCT URINE PROTEIN -      PH,UA 5.0     BLOOD,UA -     SPECIFIC GRAVITY,UA 1.015     KETONES,UA -     BILIRUBIN,UA -     GLUCOSE, UA -      COLOR,UA yellow     CLARITY,UA clear    POCT Measure PVR    Collection Time: 02/26/24  8:51 AM   Result Value Ref Range    POST-VOID RESIDUAL VOLUME, ML POC 11 mL        Physical Exam  Constitutional:       Appearance: Normal appearance.   HENT:      Head: Normocephalic.      Right Ear: Tympanic membrane, ear canal and external ear normal.      Left Ear: Tympanic membrane, ear canal and external ear normal.      Nose: Nose normal. No congestion.      Mouth/Throat:      Mouth: Mucous membranes are moist.      Pharynx: Oropharynx is clear. No oropharyngeal exudate or posterior oropharyngeal erythema.   Eyes:      Extraocular Movements: Extraocular movements intact.      Conjunctiva/sclera: Conjunctivae normal.   Cardiovascular:      Rate and Rhythm: Normal rate and regular rhythm.      Heart sounds: Normal heart sounds. No murmur heard.  Pulmonary:      Effort: " Pulmonary effort is normal.      Breath sounds: Normal breath sounds. No wheezing or rales.   Abdominal:      General: Abdomen is flat. There is no distension.      Palpations: Abdomen is soft.      Tenderness: There is no abdominal tenderness.   Musculoskeletal:         General: Normal range of motion.      Cervical back: Normal range of motion and neck supple.      Right lower leg: No edema.      Left lower leg: No edema.   Lymphadenopathy:      Cervical: No cervical adenopathy.   Skin:     General: Skin is warm.      Comments: Right shoulder exam-anteriorly skin-open wound present, 2 x 1 cm, no active discharge, surrounding skin redness present,   Neurological:      General: No focal deficit present.      Mental Status: She is alert and oriented to person, place, and time.

## 2024-03-29 ENCOUNTER — TELEPHONE (OUTPATIENT)
Dept: NEPHROLOGY | Facility: CLINIC | Age: 49
End: 2024-03-29

## 2024-03-29 NOTE — TELEPHONE ENCOUNTER
for Kinza, Per  renal function stable at creatinine 1.92. Bicarbonate level is slightly low at 20.  is asking to increase bicarbonate tablet from 2 times a day to now 3 tomes a day.      - I asked pt to call the office back to verify she go the message and understood medication change.  - if she need a new Rx we can have  call a new script in.

## 2024-03-29 NOTE — RESULT ENCOUNTER NOTE
Please inform patient that renal function is currently stable at creatinine 1.92 mg/dL.  Bicarbonate level slightly low at 20,   please ask her to increase sodium bicarbonate tablet to 3 times a day if she is currently taking it 2 times a day.  Please let me know and I will call in new prescription.  Thank you

## 2024-03-29 NOTE — TELEPHONE ENCOUNTER
----- Message from Garret Palacios MD sent at 3/29/2024 10:56 AM EDT -----  Please inform patient that renal function is currently stable at creatinine 1.92 mg/dL.  Bicarbonate level slightly low at 20,   please ask her to increase sodium bicarbonate tablet to 3 times a day if she is currently taking it 2 times a day.  Please let me know and I will call in new prescription.  Thank you

## 2024-04-01 DIAGNOSIS — E87.20 METABOLIC ACIDOSIS: ICD-10-CM

## 2024-04-01 RX ORDER — SODIUM BICARBONATE 650 MG/1
650 TABLET ORAL 3 TIMES DAILY
Qty: 360 TABLET | Refills: 3 | Status: SHIPPED | OUTPATIENT
Start: 2024-04-01

## 2024-04-01 NOTE — TELEPHONE ENCOUNTER
Patient called to confirm she has been taking the sodium bicarb 3x day. She wanted to know if she can get an updated prescription since she is going to be running out of this medication soon. Thank you.    Med refill   Sodium bicarbonate 650mg tablet

## 2024-04-03 ENCOUNTER — PROCEDURE VISIT (OUTPATIENT)
Dept: NEUROLOGY | Facility: CLINIC | Age: 49
End: 2024-04-03
Payer: COMMERCIAL

## 2024-04-03 VITALS — DIASTOLIC BLOOD PRESSURE: 58 MMHG | TEMPERATURE: 97.6 F | SYSTOLIC BLOOD PRESSURE: 125 MMHG | HEART RATE: 70 BPM

## 2024-04-03 DIAGNOSIS — G43.709 CHRONIC MIGRAINE WITHOUT AURA WITHOUT STATUS MIGRAINOSUS, NOT INTRACTABLE: Primary | ICD-10-CM

## 2024-04-03 PROCEDURE — 64615 CHEMODENERV MUSC MIGRAINE: CPT | Performed by: PHYSICIAN ASSISTANT

## 2024-04-03 NOTE — PROGRESS NOTES
"Universal Protocol   Consent: Verbal consent obtained. Written consent obtained.  Risks and benefits: risks, benefits and alternatives were discussed  Consent given by: patient  Time out: Immediately prior to procedure a \"time out\" was called to verify the correct patient, procedure, equipment, support staff and site/side marked as required.  Patient understanding: patient states understanding of the procedure being performed  Patient consent: the patient's understanding of the procedure matches consent given  Procedure consent: procedure consent matches procedure scheduled  Relevant documents: relevant documents present and verified  Patient identity confirmed: verbally with patient      Chemodenervation     Date/Time  4/3/2024 8:15 AM     Performed by  Desiree Grigsby PA-C   Authorized by  Desiree Grigsby PA-C     Pre-procedure details      Prepped With: Alcohol     Anesthesia  (see MAR for exact dosages):     Anesthesia method:  None   Procedure details      Position:  Upright   Botox      Botox Type:  Type A    Brand:  Botox    mL's of Botulinum Toxin:  200    Final Concentration per CC:  50 units    Needle Gauge:  30 G 2.5 inch   Procedures      Botox Procedures: chronic headache      Indications: migraines     Injection Location      Head / Face:  L superior cervical paraspinal, R superior cervical paraspinal, L , R , L frontalis, R frontalis, L medial occipitalis, R medial occipitalis, procerus, R temporalis, L temporalis, R superior trapezius and L superior trapezius    L  injection amount:  5 unit(s)    R  injection amount:  5 unit(s)    L lateral frontalis:  5 unit(s)    R lateral frontalis:  5 unit(s)    L medial frontalis:  5 unit(s)    R medial frontalis:  5 unit(s)    L temporalis injection amount:  20 unit(s)    R temporalis injection amount:  20 unit(s)    Procerus injection amount:  5 unit(s)    L medial occipitalis injection amount:  15 unit(s)    R medial " occipitalis injection amount:  15 unit(s)    L superior cervical paraspinal injection amount:  10 unit(s)    R superior cervical paraspinal injection amount:  10 unit(s)    L superior trapezius injection amount:  15 unit(s)    R superior trapezius injection amount:  15 unit(s)   Total Units      Total units used:  200    Total units discarded:  0   Post-procedure details      Chemodenervation:  Chronic migraine    Facial Nerve Location::  Bilateral facial nerve    Patient tolerance of procedure:  Tolerated well, no immediate complications   Comments       5 units trapezius bilaterally   5 units splenius capitis bilaterally   25 units occipitalis   All medically necessary

## 2024-04-07 PROCEDURE — 3066F NEPHROPATHY DOC TX: CPT | Performed by: PODIATRIST

## 2024-04-08 DIAGNOSIS — E55.9 VITAMIN D DEFICIENCY: ICD-10-CM

## 2024-04-08 RX ORDER — ACETAMINOPHEN 160 MG
TABLET,DISINTEGRATING ORAL
Qty: 90 CAPSULE | Refills: 1 | Status: SHIPPED | OUTPATIENT
Start: 2024-04-08

## 2024-04-12 ENCOUNTER — APPOINTMENT (OUTPATIENT)
Dept: LAB | Facility: CLINIC | Age: 49
End: 2024-04-12
Payer: COMMERCIAL

## 2024-04-12 DIAGNOSIS — I10 PRIMARY HYPERTENSION: ICD-10-CM

## 2024-04-12 DIAGNOSIS — E11.65 UNCONTROLLED TYPE 2 DIABETES MELLITUS WITH HYPERGLYCEMIA (HCC): ICD-10-CM

## 2024-04-12 DIAGNOSIS — E78.2 MIXED HYPERLIPIDEMIA: ICD-10-CM

## 2024-04-12 DIAGNOSIS — E53.8 B12 DEFICIENCY: ICD-10-CM

## 2024-04-12 LAB
ALBUMIN SERPL BCP-MCNC: 4.1 G/DL (ref 3.5–5)
ALP SERPL-CCNC: 76 U/L (ref 34–104)
ALT SERPL W P-5'-P-CCNC: 6 U/L (ref 7–52)
ANION GAP SERPL CALCULATED.3IONS-SCNC: 11 MMOL/L (ref 4–13)
AST SERPL W P-5'-P-CCNC: 8 U/L (ref 13–39)
BASOPHILS # BLD AUTO: 0.05 THOUSANDS/ÂΜL (ref 0–0.1)
BASOPHILS NFR BLD AUTO: 1 % (ref 0–1)
BILIRUB SERPL-MCNC: 0.45 MG/DL (ref 0.2–1)
BUN SERPL-MCNC: 37 MG/DL (ref 5–25)
CALCIUM SERPL-MCNC: 8.3 MG/DL (ref 8.4–10.2)
CHLORIDE SERPL-SCNC: 113 MMOL/L (ref 96–108)
CHOLEST SERPL-MCNC: 112 MG/DL
CO2 SERPL-SCNC: 20 MMOL/L (ref 21–32)
CREAT SERPL-MCNC: 1.74 MG/DL (ref 0.6–1.3)
EOSINOPHIL # BLD AUTO: 0.18 THOUSAND/ÂΜL (ref 0–0.61)
EOSINOPHIL NFR BLD AUTO: 3 % (ref 0–6)
ERYTHROCYTE [DISTWIDTH] IN BLOOD BY AUTOMATED COUNT: 16.4 % (ref 11.6–15.1)
EST. AVERAGE GLUCOSE BLD GHB EST-MCNC: 131 MG/DL
GFR SERPL CREATININE-BSD FRML MDRD: 33 ML/MIN/1.73SQ M
GLUCOSE P FAST SERPL-MCNC: 70 MG/DL (ref 65–99)
HBA1C MFR BLD: 6.2 %
HCT VFR BLD AUTO: 38.4 % (ref 34.8–46.1)
HDLC SERPL-MCNC: 42 MG/DL
HGB BLD-MCNC: 12 G/DL (ref 11.5–15.4)
IMM GRANULOCYTES # BLD AUTO: 0.03 THOUSAND/UL (ref 0–0.2)
IMM GRANULOCYTES NFR BLD AUTO: 1 % (ref 0–2)
LDLC SERPL CALC-MCNC: 49 MG/DL (ref 0–100)
LYMPHOCYTES # BLD AUTO: 1.65 THOUSANDS/ÂΜL (ref 0.6–4.47)
LYMPHOCYTES NFR BLD AUTO: 26 % (ref 14–44)
MCH RBC QN AUTO: 25.9 PG (ref 26.8–34.3)
MCHC RBC AUTO-ENTMCNC: 31.3 G/DL (ref 31.4–37.4)
MCV RBC AUTO: 83 FL (ref 82–98)
MONOCYTES # BLD AUTO: 0.33 THOUSAND/ÂΜL (ref 0.17–1.22)
MONOCYTES NFR BLD AUTO: 5 % (ref 4–12)
NEUTROPHILS # BLD AUTO: 4.03 THOUSANDS/ÂΜL (ref 1.85–7.62)
NEUTS SEG NFR BLD AUTO: 64 % (ref 43–75)
NRBC BLD AUTO-RTO: 0 /100 WBCS
PLATELET # BLD AUTO: 191 THOUSANDS/UL (ref 149–390)
PMV BLD AUTO: 12.5 FL (ref 8.9–12.7)
POTASSIUM SERPL-SCNC: 4 MMOL/L (ref 3.5–5.3)
PROT SERPL-MCNC: 6.5 G/DL (ref 6.4–8.4)
RBC # BLD AUTO: 4.63 MILLION/UL (ref 3.81–5.12)
SODIUM SERPL-SCNC: 144 MMOL/L (ref 135–147)
TRIGL SERPL-MCNC: 103 MG/DL
TSH SERPL DL<=0.05 MIU/L-ACNC: 2.27 UIU/ML (ref 0.45–4.5)
VIT B12 SERPL-MCNC: 196 PG/ML (ref 180–914)
WBC # BLD AUTO: 6.27 THOUSAND/UL (ref 4.31–10.16)

## 2024-04-12 PROCEDURE — 83036 HEMOGLOBIN GLYCOSYLATED A1C: CPT

## 2024-04-12 PROCEDURE — 85025 COMPLETE CBC W/AUTO DIFF WBC: CPT

## 2024-04-12 PROCEDURE — 80053 COMPREHEN METABOLIC PANEL: CPT

## 2024-04-12 PROCEDURE — 36415 COLL VENOUS BLD VENIPUNCTURE: CPT

## 2024-04-12 PROCEDURE — 84443 ASSAY THYROID STIM HORMONE: CPT

## 2024-04-12 PROCEDURE — 82607 VITAMIN B-12: CPT

## 2024-04-12 PROCEDURE — 80061 LIPID PANEL: CPT

## 2024-04-16 ENCOUNTER — APPOINTMENT (OUTPATIENT)
Dept: LAB | Facility: CLINIC | Age: 49
End: 2024-04-16
Payer: COMMERCIAL

## 2024-04-16 ENCOUNTER — TELEPHONE (OUTPATIENT)
Dept: INTERNAL MEDICINE CLINIC | Facility: CLINIC | Age: 49
End: 2024-04-16

## 2024-04-16 DIAGNOSIS — N18.32 STAGE 3B CHRONIC KIDNEY DISEASE (HCC): ICD-10-CM

## 2024-04-16 DIAGNOSIS — E11.65 UNCONTROLLED TYPE 2 DIABETES MELLITUS WITH HYPERGLYCEMIA (HCC): ICD-10-CM

## 2024-04-16 DIAGNOSIS — R80.1 PERSISTENT PROTEINURIA: ICD-10-CM

## 2024-04-16 LAB
CREAT UR-MCNC: 107.6 MG/DL
CREAT UR-MCNC: 107.6 MG/DL
MICROALBUMIN UR-MCNC: 11.3 MG/L
MICROALBUMIN/CREAT 24H UR: 11 MG/G CREATININE (ref 0–30)
PROT UR-MCNC: 6 MG/DL
PROT/CREAT UR: 0.06 MG/G{CREAT} (ref 0–0.1)

## 2024-04-16 PROCEDURE — 3061F NEG MICROALBUMINURIA REV: CPT | Performed by: PODIATRIST

## 2024-04-16 PROCEDURE — 84156 ASSAY OF PROTEIN URINE: CPT

## 2024-04-16 PROCEDURE — 82043 UR ALBUMIN QUANTITATIVE: CPT

## 2024-04-16 PROCEDURE — 82570 ASSAY OF URINE CREATININE: CPT

## 2024-04-16 NOTE — TELEPHONE ENCOUNTER
Patient called having a hard time getting the Mounjaro in 5mg  is not available.  She can get the Mounjaro, would like to know if the 10mg can be prescribed.     Please let patient know if the 10mg. Will be sent to pharmacy.

## 2024-04-23 ENCOUNTER — RA CDI HCC (OUTPATIENT)
Dept: OTHER | Facility: HOSPITAL | Age: 49
End: 2024-04-23

## 2024-04-23 NOTE — PROGRESS NOTES
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

## 2024-04-25 ENCOUNTER — OFFICE VISIT (OUTPATIENT)
Dept: INTERNAL MEDICINE CLINIC | Facility: CLINIC | Age: 49
End: 2024-04-25
Payer: COMMERCIAL

## 2024-04-25 VITALS
OXYGEN SATURATION: 98 % | BODY MASS INDEX: 37.38 KG/M2 | HEART RATE: 87 BPM | TEMPERATURE: 98.3 F | WEIGHT: 198 LBS | DIASTOLIC BLOOD PRESSURE: 78 MMHG | SYSTOLIC BLOOD PRESSURE: 122 MMHG | HEIGHT: 61 IN

## 2024-04-25 DIAGNOSIS — M17.0 PRIMARY OSTEOARTHRITIS OF BOTH KNEES: ICD-10-CM

## 2024-04-25 DIAGNOSIS — E11.9 DIABETIC EYE EXAM (HCC): ICD-10-CM

## 2024-04-25 DIAGNOSIS — E53.8 B12 DEFICIENCY: ICD-10-CM

## 2024-04-25 DIAGNOSIS — E78.2 MIXED HYPERLIPIDEMIA: ICD-10-CM

## 2024-04-25 DIAGNOSIS — I10 ESSENTIAL HYPERTENSION, BENIGN: ICD-10-CM

## 2024-04-25 DIAGNOSIS — E11.21 DIABETIC NEPHROPATHY ASSOCIATED WITH TYPE 2 DIABETES MELLITUS (HCC): ICD-10-CM

## 2024-04-25 DIAGNOSIS — E11.65 UNCONTROLLED TYPE 2 DIABETES MELLITUS WITH HYPERGLYCEMIA (HCC): Primary | ICD-10-CM

## 2024-04-25 DIAGNOSIS — M50.10 CERVICAL DISC DISORDER WITH RADICULOPATHY: ICD-10-CM

## 2024-04-25 DIAGNOSIS — E55.9 VITAMIN D DEFICIENCY: ICD-10-CM

## 2024-04-25 DIAGNOSIS — N18.32 STAGE 3B CHRONIC KIDNEY DISEASE (HCC): ICD-10-CM

## 2024-04-25 DIAGNOSIS — F34.1 DYSTHYMIC DISORDER: ICD-10-CM

## 2024-04-25 DIAGNOSIS — M51.16 LUMBAR DISC HERNIATION WITH RADICULOPATHY: ICD-10-CM

## 2024-04-25 DIAGNOSIS — Z01.00 DIABETIC EYE EXAM (HCC): ICD-10-CM

## 2024-04-25 DIAGNOSIS — F90.0 ATTENTION DEFICIT HYPERACTIVITY DISORDER (ADHD), PREDOMINANTLY INATTENTIVE TYPE: ICD-10-CM

## 2024-04-25 PROCEDURE — 99214 OFFICE O/P EST MOD 30 MIN: CPT | Performed by: INTERNAL MEDICINE

## 2024-04-25 PROCEDURE — 96372 THER/PROPH/DIAG INJ SC/IM: CPT | Performed by: INTERNAL MEDICINE

## 2024-04-25 RX ORDER — CYANOCOBALAMIN 1000 UG/ML
1000 INJECTION, SOLUTION INTRAMUSCULAR; SUBCUTANEOUS WEEKLY
Status: SHIPPED | OUTPATIENT
Start: 2024-04-25

## 2024-04-25 RX ORDER — MAGNESIUM 200 MG
1000 TABLET ORAL DAILY
Qty: 90 TABLET | Refills: 1 | Status: SHIPPED | OUTPATIENT
Start: 2024-04-25

## 2024-04-25 RX ADMIN — CYANOCOBALAMIN 1000 MCG: 1000 INJECTION, SOLUTION INTRAMUSCULAR; SUBCUTANEOUS at 16:57

## 2024-04-25 NOTE — PROGRESS NOTES
Assessment/Plan:             1. Uncontrolled type 2 diabetes mellitus with hyperglycemia ([facility])  Comments:  Stable, continue same medication    2. Diabetic eye exam ([facility])    3. Diabetic nephropathy associated with type 2 diabetes mellitus ([facility])    4. B12 deficiency  -     Cyanocobalamin (Vitamin B-12) 1000 MCG SUBL; Place 1 tablet (1,000 mcg total) under the tongue in the morning    5. Lumbar disc herniation with radiculopathy    6. Primary osteoarthritis of both knees    7. Cervical disc disorder with radiculopathy    8. Stage 3b chronic kidney disease ([facility])    9. Dysthymic disorder    10. Attention deficit hyperactivity disorder (ADHD), predominantly inattentive type    11. Vitamin D deficiency    12. Essential hypertension, benign  Comments:  Stable, continue same medication    13. Mixed hyperlipidemia           Subjective:      Patient ID: [id number] Meza is a 48 y.o. female.    Follow-up on blood test done on 4/12 and 4/16/2024 test discussed with her        The following portions of the patient's history were reviewed and updated as appropriate: She  has a past medical history of ADD (attention deficit disorder), Allergic rhinitis, ALS (amyotrophic lateral sclerosis) ([facility]) (11/22), Anemia (12/2021), Anxiety, Arthritis, Bipolar disorder ([facility]), Cervical disc disorder with radiculopathy of cervical region, Chronic depression, Chronic kidney disease, Chronic pain disorder, Cluster headache, Colitis, Colon polyp, Concussion (2018), Condyloma acuminatum, CTS (carpal tunnel syndrome) (2001), Depression, Diabetes mellitus ([facility]), Diabetic neuropathy ([facility]), Difficulty walking (07/22), Fibromyalgia, Fibromyalgia, primary, GERD (gastroesophageal reflux disease), Head injury (08/13/18), Headache(784.0) (1977), History of transfusion (12/2021), HTN (hypertension), Hyperlipidemia (07/22/2019), Hypertension, Intervertebral disc disorder with radiculopathy of lumbar region, Migraine, Miscarriage (1988), Obesity (1980),  Obesity, unspecified, Open wound of back (05/30/2023), Opioid abuse, in remission ([facility]) (01/2022), Osteoporosis, Peripheral neuropathy, Postoperative wound infection (12/2021), Preeclampsia, Rheumatoid arthritis ([facility]), and Wears dentures.  She   Patient Active Problem List    Diagnosis Date Noted    Cellulitis of right shoulder 03/28/2024    Benign hypertension with chronic kidney disease, stage III ([facility]) 01/31/2024    Abnormal blood electrolyte level 10/30/2023    Thrombocytopenia ([facility]) 10/30/2023    Chest pain 10/29/2023    Metabolic acidosis 09/27/2023    Hyperphosphatemia 09/27/2023    Neurogenic bladder 09/26/2023    Bilateral carpal tunnel syndrome     IIH (idiopathic intracranial hypertension) 08/15/2023    Open wound of back 05/30/2023    Maceration of skin 02/14/2023    Cortical age-related cataract of left eye 01/04/2023    Bipolar affective disorder, currently manic, mild ([facility]) 03/30/2022    Seasonal allergic rhinitis 03/30/2022    Surgical wound, non healing 02/08/2022    Diabetic polyneuropathy associated with diabetes mellitus due to underlying condition ([facility]) 02/08/2022    Continuous opioid dependence ([facility]) 01/07/2022    Anemia 12/24/2021    Wound infection 12/09/2021    Cauda equina syndrome ([facility]) 12/08/2021    Depression, recurrent ([facility]) 10/28/2021    Annual physical exam 08/27/2021    Other gastritis without bleeding 08/11/2021    Obesity, unspecified     Diabetes mellitus ([facility])     HTN (hypertension)     Dizziness 12/07/2020    Secondary hyperparathyroidism of renal origin ([facility]) 11/16/2020    Controlled type 2 diabetes mellitus with stage 3 chronic kidney disease, with long-term current use of insulin ([facility])     Intractable migraine with status migrainosus     Type 2 diabetes mellitus with diabetic neuropathy ([facility]) 09/11/2020    Ventral hernia without obstruction or gangrene 09/11/2020    Lumbar disc herniation with radiculopathy 09/11/2020    Concussion with moderate (1-24 hours) loss of  consciousness 2020    Primary osteoarthritis of both knees 2020    Diabetic nephropathy associated with type 2 diabetes mellitus ([facility]) 2020    Dysthymic disorder 2020    Acute on chronic kidney failure  ([facility]) 2020    Hypokalemia 2020    Near syncope 2020    Weakness 2020    Status post gastric bypass for obesity 2020    Diarrhea 2020    Snores     Headache upon awakening     Chronic tension type headache 2019    Chronic migraine without aura without status migrainosus, not intractable 2019    Medical marijuana use 2019    Vitamin D deficiency 2019    Chronic kidney disease-mineral and bone disorder 2019    S/P cervical spinal fusion 2019    Hyperlipidemia, unspecified 2019    Cervical disc disorder with radiculopathy     Stage 3b chronic kidney disease ([facility]) 2019    Persistent proteinuria 2019    Hypertensive kidney disease with stage 3b chronic kidney disease ([facility]) 2019    Hyponatremia 2019    Intervertebral disc disorders with radiculopathy, lumbar region     Sacroiliitis ([facility])     Greater trochanteric bursitis of both hips     Herniated nucleus pulposus, L1-2 2017    Bilateral chronic knee pain 2017    Facet arthritis of lumbosacral region 2017    Fibromyalgia 2017    Lumbar stenosis with neurogenic claudication 2017    Attention deficit hyperactivity disorder (ADHD), predominantly inattentive type 2016    Uncontrolled type 2 diabetes mellitus with hyperglycemia ([facility]) 2016    Gastroesophageal reflux disease without esophagitis 2016    Morbid obesity with BMI of 40.0-44.9, adult ([facility]) 2016    Chronic renal insufficiency 2014    Endometriosis 2012     She  has a past surgical history that includes Appendectomy;  section; Hernia repair; Cervical fusion (N/A, 2019); Colonoscopy; Carpal tunnel release; Hemorrhoid  surgery; Ventral hernia repair; Shelby-en-y procedure; Ulnar tunnel release; Mammo (historical) (2016); Colonoscopy; Lumbar fusion (N/A, 11/22/2021); LUMBAR WOUND EXPLORATION (Bilateral, 12/22/2021); VAC DRESSING APPLICATION (Bilateral, 12/22/2021); VAC DRESSING APPLICATION (N/A, 12/25/2021); Bariatric Surgery (08/2016); Cataract extraction; and pr debridement bone 1st 20 sq cm/< (N/A, 5/30/2023).  Her family history includes ADD / ADHD in her cousin and cousin; Anxiety disorder in her father; Cancer in her mother; Cervical cancer in her mother; Colon cancer in her maternal grandmother; Completed Suicide  in her father; Depression in her father; Diabetes in her mother; Hypertension in her mother; Kidney disease in her mother; Mental illness in her daughter, daughter, and father; Neuropathy in her mother; No Known Problems in her family, maternal aunt, maternal grandfather, paternal grandfather, and sister; Ovarian cancer in her paternal aunt; Psychiatric Illness in her father; Suicidality in her father; Suicide Attempts in her father; Uterine cancer in her paternal grandmother.  She  reports that she has never smoked. She has never been exposed to tobacco smoke. She has never used smokeless tobacco. She reports that she does not currently use alcohol. She reports current drug use. Drug: Marijuana.  Current Outpatient Medications   Medication Sig Dispense Refill    acetaZOLAMIDE (DIAMOX) 250 mg tablet Take 1 tablet (250 mg total) by mouth 2 (two) times a day 180 tablet 1    Ajovy 225 MG/1.5ML auto-injector INJECT 4.5 ML(675 MG TOTAL) UNDER THE SKIN EVERY 3 MONTHS (Patient taking differently: monthly) 4.5 mL 4    atorvastatin (LIPITOR) 10 mg tablet Take 1 tablet (10 mg total) by mouth daily at bedtime 90 tablet 0    BD Pen Needle Glory U/F 32G X 4 MM MISC Inject under the skin 2 (two) times a day Use as directed 180 each 0    Blood Glucose Monitoring Suppl (ONE TOUCH ULTRA 2) w/Device KIT Use 1 each 2 (two) times a day  Use as instructed 1 kit 0    calcitriol (ROCALTROL) 0.25 mcg capsule TAKE 1 CAPSULE BY MOUTH daily 30 capsule 3    Cholecalciferol (Vitamin D3) 50 MCG (2000 UT) CAPS TAKE 1 CAPSULE(2,000 UNITS TOTAL) BY MOUTH DAILY 90 capsule 1    clonazePAM (KlonoPIN) 0.5 mg tablet Take 1 tablet (0.5 mg total) by mouth 2 (two) times a day 180 tablet 0    Continuous Blood Gluc Sensor (FreeStyle Filiberto 3 Sensor) MISC Use 1 each every 14 (fourteen) days 6 each 3    Cyanocobalamin (Vitamin B-12) 1000 MCG SUBL Place 1 tablet (1,000 mcg total) under the tongue in the morning 90 tablet 1    cycloSPORINE, PF, (Cequa) 0.09 % SOLN Apply 1 drop to eye 2 (two) times a day      divalproex sodium (Depakote) 500 mg DR tablet Take 1 tablet (500 mg total) by mouth daily at bedtime 90 tablet 3    docusate sodium (COLACE) 100 mg capsule TAKE 1 CAPSULE BY MOUTH TWICE A DAY (Patient taking differently: Take 100 mg by mouth 2 (two) times a day as needed) 60 capsule 0    DULoxetine (CYMBALTA) 60 mg delayed release capsule Take 1 capsule (60 mg total) by mouth daily 90 capsule 0    ferrous sulfate 324 (65 Fe) mg Take 1 tablet (324 mg total) by mouth daily before breakfast 90 tablet 3    insulin aspart (NovoLOG FlexPen) 100 UNIT/ML injection pen Inject 10 Units under the skin 3 (three) times a day with meals 15 mL 4    Insulin Glargine Solostar (Lantus SoloStar) 100 UNIT/ML SOPN Inject 0.4 mL (40 Units total) under the skin daily at bedtime (Patient taking differently: Inject 36 Units under the skin daily at bedtime) 15 mL 0    Lancets MISC Use 1 each 2 (two) times a day Use as instructed      levonorgestrel (MIRENA) 20 MCG/24HR IUD Mirena 20 mcg/24 hr (5 years) intrauterine device   Vaginally for 5 years.      lisdexamfetamine (VYVANSE) 30 MG capsule Take 1 capsule (30 mg total) by mouth every morning Max Daily Amount: 30 mg 30 capsule 0    losartan-hydrochlorothiazide (HYZAAR) 100-12.5 MG per tablet Take 1 tablet by mouth daily 90 tablet 0     methocarbamol (Robaxin-750) 750 mg tablet Take 1 tablet (750 mg total) by mouth every 6 (six) hours as needed for muscle spasms (back pain) 30 tablet 3    metoprolol tartrate (LOPRESSOR) 100 mg tablet Take 1 tablet (100 mg total) by mouth every 12 (twelve) hours 180 tablet 3    Multiple Vitamins-Minerals (multivitamin with minerals) tablet Take 1 tablet by mouth daily      NON FORMULARY Botox injections for HA every 3months (LD 3/20/23)      ondansetron (ZOFRAN) 4 mg tablet Take 1 tablet (4 mg total) by mouth every 8 (eight) hours as needed for nausea or vomiting 120 tablet 0    Ophthalmic Irrigation Solution (OCUSOFT EYE WASH OP) Apply 1 Application to eye daily at bedtime      Polyethyl Glycol-Propyl Glycol (Systane) 0.4-0.3 % GEL Apply 1 drop to eye 2 (two) times a day Using Ivizia as an alternative      polyethylene glycol (MIRALAX) 17 g packet Take 17 g by mouth daily 1 each 0    pregabalin (LYRICA) 100 mg capsule Take 1 capsule (100 mg total) by mouth 3 (three) times a day 270 capsule 0    rimegepant sulfate (Nurtec) 75 mg TBDP Place one tab (75mg total) under the tongue as needed for migraine. 16 tablet 11    sodium bicarbonate 650 mg tablet Take 1 tablet (650 mg total) by mouth 3 (three) times a day 360 tablet 3    tirzepatide (Mounjaro) 10 MG/0.5ML Inject 0.5 mL (10 mg total) under the skin every 7 days 6 mL 0    tiZANidine (ZANAFLEX) 4 mg tablet Take 1 tablet (4 mg total) by mouth 3 (three) times a day 360 tablet 0    benzonatate (TESSALON) 200 MG capsule Take 1 capsule (200 mg total) by mouth 3 (three) times a day as needed for cough (Patient not taking: Reported on 3/28/2024) 20 capsule 0    cetirizine (ZyrTEC) oral solution Take 5 mL (5 mg total) by mouth daily (Patient not taking: Reported on 3/28/2024) 450 mL 1    ciclopirox (PENLAC) 8 % solution Apply topically daily at bedtime (Patient not taking: Reported on 4/25/2024) 6.6 mL 1    cyproheptadine (PERIACTIN) 4 mg tablet Take 1 tablet (4 mg total) by  mouth daily at bedtime (Patient not taking: Reported on 11/3/2023) 90 tablet 1    diphenhydrAMINE (BENADRYL) 25 mg tablet Take 1 tablet (25 mg total) by mouth every 6 (six) hours as needed for itching (Patient not taking: Reported on 11/3/2023) 30 tablet 0    verapamil (CALAN-SR) 120 mg CR tablet Take 1 tablet (120 mg total) by mouth daily at bedtime 90 tablet 3     No current facility-administered medications for this visit.     Current Outpatient Medications on File Prior to Visit   Medication Sig    acetaZOLAMIDE (DIAMOX) 250 mg tablet Take 1 tablet (250 mg total) by mouth 2 (two) times a day    Ajovy 225 MG/1.5ML auto-injector INJECT 4.5 ML(675 MG TOTAL) UNDER THE SKIN EVERY 3 MONTHS (Patient taking differently: monthly)    atorvastatin (LIPITOR) 10 mg tablet Take 1 tablet (10 mg total) by mouth daily at bedtime    BD Pen Needle Glory U/F 32G X 4 MM MISC Inject under the skin 2 (two) times a day Use as directed    Blood Glucose Monitoring Suppl (ONE TOUCH ULTRA 2) w/Device KIT Use 1 each 2 (two) times a day Use as instructed    calcitriol (ROCALTROL) 0.25 mcg capsule TAKE 1 CAPSULE BY MOUTH daily    Cholecalciferol (Vitamin D3) 50 MCG (2000 UT) CAPS TAKE 1 CAPSULE(2,000 UNITS TOTAL) BY MOUTH DAILY    clonazePAM (KlonoPIN) 0.5 mg tablet Take 1 tablet (0.5 mg total) by mouth 2 (two) times a day    Continuous Blood Gluc Sensor (FreeStyle Filiberto 3 Sensor) MISC Use 1 each every 14 (fourteen) days    cycloSPORINE, PF, (Cequa) 0.09 % SOLN Apply 1 drop to eye 2 (two) times a day    divalproex sodium (Depakote) 500 mg DR tablet Take 1 tablet (500 mg total) by mouth daily at bedtime    docusate sodium (COLACE) 100 mg capsule TAKE 1 CAPSULE BY MOUTH TWICE A DAY (Patient taking differently: Take 100 mg by mouth 2 (two) times a day as needed)    DULoxetine (CYMBALTA) 60 mg delayed release capsule Take 1 capsule (60 mg total) by mouth daily    ferrous sulfate 324 (65 Fe) mg Take 1 tablet (324 mg total) by mouth daily before  breakfast    insulin aspart (NovoLOG FlexPen) 100 UNIT/ML injection pen Inject 10 Units under the skin 3 (three) times a day with meals    Insulin Glargine Solostar (Lantus SoloStar) 100 UNIT/ML SOPN Inject 0.4 mL (40 Units total) under the skin daily at bedtime (Patient taking differently: Inject 36 Units under the skin daily at bedtime)    Lancets MISC Use 1 each 2 (two) times a day Use as instructed    levonorgestrel (MIRENA) 20 MCG/24HR IUD Mirena 20 mcg/24 hr (5 years) intrauterine device   Vaginally for 5 years.    lisdexamfetamine (VYVANSE) 30 MG capsule Take 1 capsule (30 mg total) by mouth every morning Max Daily Amount: 30 mg    losartan-hydrochlorothiazide (HYZAAR) 100-12.5 MG per tablet Take 1 tablet by mouth daily    methocarbamol (Robaxin-750) 750 mg tablet Take 1 tablet (750 mg total) by mouth every 6 (six) hours as needed for muscle spasms (back pain)    metoprolol tartrate (LOPRESSOR) 100 mg tablet Take 1 tablet (100 mg total) by mouth every 12 (twelve) hours    Multiple Vitamins-Minerals (multivitamin with minerals) tablet Take 1 tablet by mouth daily    NON FORMULARY Botox injections for HA every 3months (LD 3/20/23)    ondansetron (ZOFRAN) 4 mg tablet Take 1 tablet (4 mg total) by mouth every 8 (eight) hours as needed for nausea or vomiting    Ophthalmic Irrigation Solution (OCUSOFT EYE WASH OP) Apply 1 Application to eye daily at bedtime    Polyethyl Glycol-Propyl Glycol (Systane) 0.4-0.3 % GEL Apply 1 drop to eye 2 (two) times a day Using Ivizia as an alternative    polyethylene glycol (MIRALAX) 17 g packet Take 17 g by mouth daily    pregabalin (LYRICA) 100 mg capsule Take 1 capsule (100 mg total) by mouth 3 (three) times a day    rimegepant sulfate (Nurtec) 75 mg TBDP Place one tab (75mg total) under the tongue as needed for migraine.    sodium bicarbonate 650 mg tablet Take 1 tablet (650 mg total) by mouth 3 (three) times a day    tirzepatide (Mounjaro) 10 MG/0.5ML Inject 0.5 mL (10 mg  total) under the skin every 7 days    tiZANidine (ZANAFLEX) 4 mg tablet Take 1 tablet (4 mg total) by mouth 3 (three) times a day    [DISCONTINUED] cyanocobalamin (VITAMIN B-12) 1000 MCG tablet Take 1,000 mcg by mouth daily     benzonatate (TESSALON) 200 MG capsule Take 1 capsule (200 mg total) by mouth 3 (three) times a day as needed for cough (Patient not taking: Reported on 3/28/2024)    cetirizine (ZyrTEC) oral solution Take 5 mL (5 mg total) by mouth daily (Patient not taking: Reported on 3/28/2024)    ciclopirox (PENLAC) 8 % solution Apply topically daily at bedtime (Patient not taking: Reported on 4/25/2024)    cyproheptadine (PERIACTIN) 4 mg tablet Take 1 tablet (4 mg total) by mouth daily at bedtime (Patient not taking: Reported on 11/3/2023)    diphenhydrAMINE (BENADRYL) 25 mg tablet Take 1 tablet (25 mg total) by mouth every 6 (six) hours as needed for itching (Patient not taking: Reported on 11/3/2023)    verapamil (CALAN-SR) 120 mg CR tablet Take 1 tablet (120 mg total) by mouth daily at bedtime    [DISCONTINUED] dulaglutide (Trulicity) 1.5 MG/0.5ML injection Inject 0.5 mL (1.5 mg total) under the skin every 7 days (Patient not taking: Reported on 4/25/2024)     No current facility-administered medications on file prior to visit.     She is allergic to ace inhibitors, pollen extract, short ragweed pollen ext, sodium hyaluronate (yoav), levonorgestrel-eth estradiol [levonorgestrel-ethinyl estrad], and latex..    Review of Systems   Constitutional:  Negative for chills and fever.   HENT:  Negative for congestion, ear pain and sore throat.    Eyes:  Negative for pain.   Respiratory:  Negative for cough and shortness of breath.    Cardiovascular:  Negative for chest pain and leg swelling.   Gastrointestinal:  Negative for abdominal pain, nausea and vomiting.   Endocrine: Negative for polyuria.   Genitourinary:  Negative for difficulty urinating, frequency and urgency.   Musculoskeletal:  Positive for  "arthralgias and back pain.   Skin:  Negative for rash.   Neurological:  Negative for weakness and headaches.   Psychiatric/Behavioral:  Negative for sleep disturbance. The patient is not nervous/anxious.          Objective:      /78 (BP Location: Left arm, Patient Position: Sitting, Cuff Size: Standard)   Pulse 87   Temp 98.3 °F (36.8 °C) (Temporal)   Ht 5' 1\" (1.549 m)   Wt 89.8 kg (198 lb)   SpO2 98%   BMI 37.41 kg/m²     Recent Results (from the past 1344 hour(s))   Basic metabolic panel    Collection Time: 03/28/24  3:45 PM   Result Value Ref Range    Sodium 142 135 - 147 mmol/L    Potassium 3.6 3.5 - 5.3 mmol/L    Chloride 111 (H) 96 - 108 mmol/L    CO2 20 (L) 21 - 32 mmol/L    ANION GAP 11 4 - 13 mmol/L    BUN 40 (H) 5 - 25 mg/dL    Creatinine 1.92 (H) 0.60 - 1.30 mg/dL    Glucose 111 65 - 140 mg/dL    Calcium 8.4 8.4 - 10.2 mg/dL    eGFR 29 ml/min/1.73sq m   CBC and differential    Collection Time: 04/12/24  9:14 AM   Result Value Ref Range    WBC 6.27 4.31 - 10.16 Thousand/uL    RBC 4.63 3.81 - 5.12 Million/uL    Hemoglobin 12.0 11.5 - 15.4 g/dL    Hematocrit 38.4 34.8 - 46.1 %    MCV 83 82 - 98 fL    MCH 25.9 (L) 26.8 - 34.3 pg    MCHC 31.3 (L) 31.4 - 37.4 g/dL    RDW 16.4 (H) 11.6 - 15.1 %    MPV 12.5 8.9 - 12.7 fL    Platelets 191 149 - 390 Thousands/uL    nRBC 0 /100 WBCs    Segmented % 64 43 - 75 %    Immature Grans % 1 0 - 2 %    Lymphocytes % 26 14 - 44 %    Monocytes % 5 4 - 12 %    Eosinophils Relative 3 0 - 6 %    Basophils Relative 1 0 - 1 %    Absolute Neutrophils 4.03 1.85 - 7.62 Thousands/µL    Absolute Immature Grans 0.03 0.00 - 0.20 Thousand/uL    Absolute Lymphocytes 1.65 0.60 - 4.47 Thousands/µL    Absolute Monocytes 0.33 0.17 - 1.22 Thousand/µL    Eosinophils Absolute 0.18 0.00 - 0.61 Thousand/µL    Basophils Absolute 0.05 0.00 - 0.10 Thousands/µL   Comprehensive metabolic panel    Collection Time: 04/12/24  9:14 AM   Result Value Ref Range    Sodium 144 135 - 147 mmol/L    " Potassium 4.0 3.5 - 5.3 mmol/L    Chloride 113 (H) 96 - 108 mmol/L    CO2 20 (L) 21 - 32 mmol/L    ANION GAP 11 4 - 13 mmol/L    BUN 37 (H) 5 - 25 mg/dL    Creatinine 1.74 (H) 0.60 - 1.30 mg/dL    Glucose, Fasting 70 65 - 99 mg/dL    Calcium 8.3 (L) 8.4 - 10.2 mg/dL    AST 8 (L) 13 - 39 U/L    ALT 6 (L) 7 - 52 U/L    Alkaline Phosphatase 76 34 - 104 U/L    Total Protein 6.5 6.4 - 8.4 g/dL    Albumin 4.1 3.5 - 5.0 g/dL    Total Bilirubin 0.45 0.20 - 1.00 mg/dL    eGFR 33 ml/min/1.73sq m   Lipid Panel with Direct LDL reflex    Collection Time: 04/12/24  9:14 AM   Result Value Ref Range    Cholesterol 112 See Comment mg/dL    Triglycerides 103 See Comment mg/dL    HDL, Direct 42 (L) >=50 mg/dL    LDL Calculated 49 0 - 100 mg/dL   TSH, 3rd generation    Collection Time: 04/12/24  9:14 AM   Result Value Ref Range    TSH 3RD GENERATON 2.269 0.450 - 4.500 uIU/mL   Hemoglobin A1C    Collection Time: 04/12/24  9:14 AM   Result Value Ref Range    Hemoglobin A1C 6.2 (H) Normal 4.0-5.6%; PreDiabetic 5.7-6.4%; Diabetic >=6.5%; Glycemic control for adults with diabetes <7.0% %     mg/dl   Vitamin B12    Collection Time: 04/12/24  9:14 AM   Result Value Ref Range    Vitamin B-12 196 180 - 914 pg/mL   Albumin / creatinine urine ratio    Collection Time: 04/16/24  9:09 AM   Result Value Ref Range    Creatinine, Ur 107.6 Reference range not established. mg/dL    Albumin,U,Random 11.3 <20.0 mg/L    Albumin Creat Ratio 11 0 - 30 mg/g creatinine   Protein / creatinine ratio, urine    Collection Time: 04/16/24  9:09 AM   Result Value Ref Range    Creatinine, Ur 107.6 Reference range not established. mg/dL    Protein Urine Random 6 Reference range not established. mg/dL    Prot/Creat Ratio, Ur 0.06 0.00 - 0.10        Physical Exam  Constitutional:       Appearance: Normal appearance.   HENT:      Head: Normocephalic.      Right Ear: External ear normal.      Left Ear: External ear normal.      Nose: Nose normal. No congestion.       Mouth/Throat:      Mouth: Mucous membranes are moist.      Pharynx: Oropharynx is clear. No oropharyngeal exudate or posterior oropharyngeal erythema.   Eyes:      Extraocular Movements: Extraocular movements intact.      Conjunctiva/sclera: Conjunctivae normal.   Cardiovascular:      Rate and Rhythm: Normal rate and regular rhythm.      Heart sounds: Normal heart sounds. No murmur heard.  Pulmonary:      Effort: Pulmonary effort is normal.      Breath sounds: Normal breath sounds. No wheezing or rales.   Abdominal:      General: Abdomen is flat. There is no distension.      Palpations: Abdomen is soft.      Tenderness: There is no abdominal tenderness.   Musculoskeletal:      Cervical back: Normal range of motion and neck supple.      Right lower leg: No edema.      Left lower leg: No edema.   Lymphadenopathy:      Cervical: No cervical adenopathy.   Skin:     General: Skin is warm.   Neurological:      General: No focal deficit present.      Mental Status: She is alert and oriented to person, place, and time.

## 2024-04-26 DIAGNOSIS — E11.65 UNCONTROLLED TYPE 2 DIABETES MELLITUS WITH HYPERGLYCEMIA (HCC): ICD-10-CM

## 2024-04-26 DIAGNOSIS — F31.11 BIPOLAR AFFECTIVE DISORDER, CURRENTLY MANIC, MILD (HCC): ICD-10-CM

## 2024-04-27 RX ORDER — BLOOD-GLUCOSE SENSOR
1 EACH MISCELLANEOUS
Qty: 6 EACH | Refills: 1 | Status: SHIPPED | OUTPATIENT
Start: 2024-04-27

## 2024-04-27 RX ORDER — DULOXETIN HYDROCHLORIDE 60 MG/1
60 CAPSULE, DELAYED RELEASE ORAL DAILY
Qty: 90 CAPSULE | Refills: 1 | Status: SHIPPED | OUTPATIENT
Start: 2024-04-27

## 2024-05-01 ENCOUNTER — OFFICE VISIT (OUTPATIENT)
Dept: PODIATRY | Facility: CLINIC | Age: 49
End: 2024-05-01
Payer: COMMERCIAL

## 2024-05-01 VITALS
SYSTOLIC BLOOD PRESSURE: 130 MMHG | HEIGHT: 61 IN | WEIGHT: 198 LBS | DIASTOLIC BLOOD PRESSURE: 90 MMHG | BODY MASS INDEX: 37.38 KG/M2

## 2024-05-01 DIAGNOSIS — E11.65 UNCONTROLLED TYPE 2 DIABETES MELLITUS WITH HYPERGLYCEMIA (HCC): Primary | ICD-10-CM

## 2024-05-01 DIAGNOSIS — L60.3 NAIL DYSTROPHY: ICD-10-CM

## 2024-05-01 PROCEDURE — 3075F SYST BP GE 130 - 139MM HG: CPT | Performed by: PODIATRIST

## 2024-05-01 PROCEDURE — 99203 OFFICE O/P NEW LOW 30 MIN: CPT | Performed by: PODIATRIST

## 2024-05-01 PROCEDURE — 3080F DIAST BP >= 90 MM HG: CPT | Performed by: PODIATRIST

## 2024-05-01 NOTE — PROGRESS NOTES
"Assessment/Plan:       Diagnoses and all orders for this visit:    Uncontrolled type 2 diabetes mellitus with hyperglycemia (HCC)  -     Ambulatory Referral to Podiatry    Nail dystrophy        Diagnosis and options discussed with patient  Patient agreeable to the plan as stated below    -DM foot risk is low. Recommend at risk foot care.   -Discussed DM risk to lower extremities, proper shoe gear, and daily monitoring of feet.   -Discussed weight loss and suitable exercise regiment.   -Reviewed most recent PCP visit on 4/25/2024  -Educated on A1C and the risks of poorly controlled Diabetes. Reviewed recent A1C:  Lab Results   Component Value Date    HGBA1C 6.2 (H) 04/12/2024    HGBA1C 6.3 (H) 01/09/2019   .       Subjective:      Patient ID: Kinza Meza is a 48 y.o. female.    PAtient states she needs to have her feet looked at because she is diabetic. Her right great toenail doesn't grow well and often falls off. She recently removed the entire nail at home because it got loose. She reports numbness and tingling in her feet. She is on insulin and oral medication. She was diagnosed with DM2 in her early 20s. She does not smoke.         The following portions of the patient's history were reviewed and updated as appropriate: allergies, current medications, past family history, past medical history, past social history, past surgical history, and problem list.    Review of Systems    As stated in HPI, otherwise normal      Objective:      /90 (BP Location: Left arm, Patient Position: Sitting, Cuff Size: Standard)   Ht 5' 1\" (1.549 m)   Wt 89.8 kg (198 lb)   BMI 37.41 kg/m²          Physical Exam  Vitals reviewed.   Constitutional:       Appearance: She is not ill-appearing or diaphoretic.   Cardiovascular:      Rate and Rhythm: Normal rate.      Pulses: Normal pulses. no weak pulses.           Dorsalis pedis pulses are 2+ on the right side and 2+ on the left side.        Posterior tibial pulses are 2+ " on the right side and 2+ on the left side.   Pulmonary:      Effort: Pulmonary effort is normal. No respiratory distress.   Musculoskeletal:         General: No deformity.      Right foot: Normal range of motion. No deformity.      Left foot: Normal range of motion. No deformity.   Feet:      Right foot:      Protective Sensation: 10 sites tested.        Skin integrity: Skin integrity normal. No ulcer, skin breakdown, erythema, warmth, callus or dry skin.      Toenail Condition: Right toenails are abnormally thick.      Left foot:      Protective Sensation: 10 sites tested.  5 sites sensed.      Skin integrity: Skin integrity normal. No ulcer, skin breakdown, erythema, warmth, callus or dry skin.      Toenail Condition: Left toenails are abnormally thick.   Skin:     Capillary Refill: Capillary refill takes less than 2 seconds.      Findings: No erythema or rash.   Neurological:      Mental Status: She is alert and oriented to person, place, and time.      Sensory: Sensory deficit present.      Gait: Gait normal.   Psychiatric:         Mood and Affect: Mood normal.         Diabetic Foot Exam    Patient's shoes and socks removed.    Right Foot/Ankle   Right Foot Inspection  Skin Exam: skin normal and skin intact. No dry skin, no warmth, no callus, no erythema, no maceration, no abnormal color, no pre-ulcer, no ulcer and no callus.     Toe Exam: ROM and strength within normal limits.  no right toe deformity    Sensory   Vibration: absent  Monofilament testing: absent    Vascular  Capillary refills: < 3 seconds  The right DP pulse is 2+. The right PT pulse is 2+.     Left Foot/Ankle  Left Foot Inspection  Skin Exam: skin normal and skin intact. No dry skin, no warmth, no erythema, no maceration, normal color, no pre-ulcer, no ulcer and no callus.     Toe Exam: ROM and strength within normal limits. No left toe deformity.     Sensory   Vibration: absent  Monofilament testing: absent    Vascular  Capillary refills: < 3  seconds  The left DP pulse is 2+. The left PT pulse is 2+.     Assign Risk Category  No deformity present  Loss of protective sensation  No weak pulses  Risk: 1

## 2024-05-02 LAB
APOB+LDLR+PCSK9 GENE MUT ANL BLD/T: NOT DETECTED
BRCA1+BRCA2 DEL+DUP + FULL MUT ANL BLD/T: NOT DETECTED
MLH1+MSH2+MSH6+PMS2 GN DEL+DUP+FUL M: NOT DETECTED

## 2024-05-03 ENCOUNTER — CLINICAL SUPPORT (OUTPATIENT)
Dept: INTERNAL MEDICINE CLINIC | Facility: CLINIC | Age: 49
End: 2024-05-03
Payer: COMMERCIAL

## 2024-05-03 DIAGNOSIS — E53.9 VITAMIN B DEFICIENCY: Primary | ICD-10-CM

## 2024-05-03 PROCEDURE — 96372 THER/PROPH/DIAG INJ SC/IM: CPT | Performed by: INTERNAL MEDICINE

## 2024-05-03 RX ADMIN — CYANOCOBALAMIN 1000 MCG: 1000 INJECTION, SOLUTION INTRAMUSCULAR; SUBCUTANEOUS at 13:17

## 2024-05-06 ENCOUNTER — OFFICE VISIT (OUTPATIENT)
Age: 49
End: 2024-05-06
Payer: COMMERCIAL

## 2024-05-06 VITALS
WEIGHT: 198 LBS | HEART RATE: 70 BPM | SYSTOLIC BLOOD PRESSURE: 101 MMHG | BODY MASS INDEX: 37.38 KG/M2 | HEIGHT: 61 IN | DIASTOLIC BLOOD PRESSURE: 74 MMHG

## 2024-05-06 DIAGNOSIS — M62.838 MUSCLE SPASM: ICD-10-CM

## 2024-05-06 DIAGNOSIS — M99.04 SEGMENTAL DYSFUNCTION OF SACRAL REGION: ICD-10-CM

## 2024-05-06 DIAGNOSIS — M47.812 CERVICAL SPONDYLOSIS: ICD-10-CM

## 2024-05-06 DIAGNOSIS — M48.062 SPINAL STENOSIS OF LUMBAR REGION WITH NEUROGENIC CLAUDICATION: Primary | ICD-10-CM

## 2024-05-06 DIAGNOSIS — M99.02 SEGMENTAL DYSFUNCTION OF THORACIC REGION: ICD-10-CM

## 2024-05-06 DIAGNOSIS — M99.01 SEGMENTAL DYSFUNCTION OF CERVICAL REGION: ICD-10-CM

## 2024-05-06 DIAGNOSIS — M99.03 SEGMENTAL DYSFUNCTION OF LUMBAR REGION: ICD-10-CM

## 2024-05-06 PROCEDURE — 97110 THERAPEUTIC EXERCISES: CPT | Performed by: CHIROPRACTOR

## 2024-05-06 PROCEDURE — 98941 CHIROPRACT MANJ 3-4 REGIONS: CPT | Performed by: CHIROPRACTOR

## 2024-05-06 PROCEDURE — 99203 OFFICE O/P NEW LOW 30 MIN: CPT | Performed by: CHIROPRACTOR

## 2024-05-06 NOTE — PROGRESS NOTES
Initial date of service: 5/6/24    Diagnoses and all orders for this visit:    Spinal stenosis of lumbar region with neurogenic claudication    Segmental dysfunction of sacral region    Muscle spasm    Segmental dysfunction of lumbar region    Segmental dysfunction of thoracic region    Segmental dysfunction of cervical region    Cervical spondylosis    Pt's symptoms, imaging and exam findings consistent with neck/back pain in the setting of DJD/DDD/s/p fusions in c/s and l/s, exacerbated by postural stressors and core/glute deconditioning.   Pt responded well to flexion biased stretches and manual mobilization of the affected spinal and myofascial tissues with increased ROM; trial of conservative tx recommended consisting of IASTM, stretching, graded mobilization of the affected spinal and myofascial jt dysfunction, postural/ergonomic education and take home stretches/exercises.  If symptoms fail to improve with short trial of conservative care, appropriate imaging and referral will be coordinated.  Spent greater than 30 min c pt discussing hx, pe, ddx, tx options and reviewing notes/imaging    TREATMENT: [id number]  Fear avoidance behavior discussion; encouraged and reassured pt that natural course of condition is to improve over time with adherence to tx plan and home care strategies. Home care recommendations: avoid bed rest, walk (but avoid trails and uneven surfaces), gradual return to activity to tolerance (avoid anything that peripheralizes symptoms), call if symptoms peripheralize, worsen, or neurologic deficit progresses. Ther-ex: IASTM; discussed post procedure soreness and/or ecchymosis for up to 36 hrs, applied to affected mm hypertonicities; supine hamstring stretch, supine gluteal stretch, single knee to chest stretch, upper trap stretch, transitional mvmt education, abdominal bracing; greater than 15 min spent performing above mentioned ther-ex to improve ROM/flexibility. Cervical supine graded  mobilization and traction, Thoracic mobilization/manipulation: prone P-A mob; Lumbar mobilization/manipulation: diversified side laying graded HVLA, flexion-traction; SIJ Manipulation/Mobilization: R/L SIJ HVLA - long axis distraction, mederos drop table maneuver to affected SIJ    HPI:  Kinza Meza is a 48 y.o. female  Chief Complaint   Patient presents with    Neck Pain     Bilateral neck pain, constant but varies in severity, left side is worse, about a 5, tight. Self referral     Back Pain     Low back worse on the left and radiates down her left leg, constant but worse when getting up and walking, feels like it locks up, about a 7      Pt presents for eval and tx for exacerbation of chronic neck/back pain. Pt has had back and neck surgery. Pt was utilizing cane and now using walker occasionally. 2022 MRI demonstrates multilevel degenerative changes of postsurgical lumbar spine status post L1-L2 posterior spinal fixation with varying degrees of canal stenosis (moderate L4-L5) and foraminal narrowing (mild left L1-L2 and left L2-L3 ). 2024 fl/ext xrays unremarkable for instability. 2021 C/S MRI demonstrates s/p ACDF from C4 to C7. Multilevel degenerative spondylosis as described. Possible ossification of the posterior longitudinal ligament at C2-3 and C6-7    Neck Pain   This is a chronic problem. The current episode started more than 1 year ago. The problem occurs constantly. The problem has been waxing and waning. The pain is present in the left side, midline and right side. The quality of the pain is described as aching. The symptoms are aggravated by bending, position, stress and twisting. The pain is Worse during the day.   Back Pain  This is a chronic problem. The current episode started more than 1 year ago. The problem occurs constantly. The problem has been waxing and waning since onset. The pain is present in the gluteal, lumbar spine, sacro-iliac and thoracic spine. The quality of the pain is  described as aching, burning and stabbing. The pain radiates to the left thigh, left knee and right thigh. Pain scale: 5-9/10. The pain is Worse during the day. The symptoms are aggravated by standing, twisting and bending (walking, laying supine, transitional mvmts; palliative includes sitting, massage). Pertinent negatives include no bladder incontinence or bowel incontinence. Risk factors include lack of exercise.     Past Medical History:   Diagnosis Date    ADD (attention deficit disorder)     Allergic rhinitis     ALS (amyotrophic lateral sclerosis) ([facility]) 11/22    Per neurological surgeon    Anemia 12/2021    Anxiety     Arthritis     Bipolar disorder ([facility])     Cervical disc disorder with radiculopathy of cervical region     Chronic depression     Chronic kidney disease     Stage 1    Chronic pain disorder     Cluster headache     Colitis     Last assessed - 11/25/14    Colon polyp     Concussion 2018    Condyloma acuminatum     Last assessed - 3/19/14    CTS (carpal tunnel syndrome) 2001    Depression     Diabetes mellitus ([facility])     Last assessed - 11/25/14    Diabetic neuropathy ([facility])     Difficulty walking 07/22    Fibromyalgia     Fibromyalgia, primary     GERD (gastroesophageal reflux disease)     Head injury 08/13/18    Fall    Headache(784.0) 1977    History of transfusion 12/2021    HTN (hypertension)     Last assessed - 11/25/14    Hyperlipidemia 07/22/2019    Hypertension     Intervertebral disc disorder with radiculopathy of lumbar region     Migraine     Miscarriage 1988 2001, 2004    Obesity 1980    Obesity, unspecified     Open wound of back 05/30/2023    Opioid abuse, in remission ([facility]) 01/2022    Only while in hospital and for about 10 days once home instead of one week.    Osteoporosis     Peripheral neuropathy     Postoperative wound infection 12/2021    Stop not healed    Preeclampsia     Last assessed - 11/25/14    Rheumatoid arthritis ([facility])     Wears dentures       Past Surgical  History:   Procedure Laterality Date    APPENDECTOMY      Last assessed - 14    BARIATRIC SURGERY  2016    CARPAL TUNNEL RELEASE      CATARACT EXTRACTION      CERVICAL FUSION N/A 2019    Procedure: Anterior cervical discectomy w fusion C4-5, C5-6, C6-7; allograft and neuromonitoring;  Surgeon: Jose Gomez MD;  [facility];  Service: Orthopedics     SECTION      Last assessed - 14    COLONOSCOPY      COLONOSCOPY      HEMORRHOID SURGERY      HERNIA REPAIR      Last assessed - 14    LUMBAR FUSION N/A 2021    Procedure: L1/2 laminectomy, discectomy with L1/2 MIS posterior fixation using neuromonitoring and neuronavigation;  Surgeon: Suhas Akbar MD;  [facility];  Service: Neurosurgery    LUMBAR WOUND EXPLORATION Bilateral 2021    Procedure: Lumbar wound washout, debridement and intraoperative cultures;  Surgeon: Suhas Akbar MD;  [facility];  Service: Neurosurgery    MAMMO (HISTORICAL)      NE DEBRIDEMENT BONE 1ST 20 SQ CM/< N/A 2023    Procedure: SURGICAL PREPARATION OF BACK  WOUND AND LOCAL FLAP, PREVENA PLACEMENT;  Surgeon: Ady Rocha MD;  Location:  MAIN OR;  Service: Plastics    WOOD-EN-Y PROCEDURE      ULNAR TUNNEL RELEASE      VAC DRESSING APPLICATION Bilateral 2021    Procedure: APPLICATION VAC DRESSING;  Surgeon: Elbert Hawley MD;  [facility];  Service: General    VAC DRESSING APPLICATION N/A 2021    Procedure: APPLICATION VAC DRESSING ABDOMEN/TRUNK;  Surgeon: Mani Ross DO;  [facility];  Service: General    VENTRAL HERNIA REPAIR       The following portions of the patient's history were reviewed and updated as appropriate: allergies, past family history, past medical history, past social history, past surgical history, and problem list.  Review of Systems   Gastrointestinal:  Negative for bowel incontinence.   Genitourinary:  Negative for bladder incontinence.    Musculoskeletal:  Positive for back pain and neck pain.     Physical Exam  Eyes:      Extraocular Movements: Extraocular movements intact.   Neck:        Comments: Pnful and limited in Brot, Blf  Cardiovascular:      Pulses: Normal pulses.   Abdominal:      General: There is no distension.      Tenderness: There is no abdominal tenderness.   Musculoskeletal:      Cervical back: Pain with movement and muscular tenderness present. Decreased range of motion.      Thoracic back: Spasms and tenderness present. Decreased range of motion.      Lumbar back: Spasms and tenderness present. Decreased range of motion. Negative right straight leg raise test and negative left straight leg raise test.        Back:       Comments: Pnful and limited in Brot, Blf, Ext centralizes    Lymphadenopathy:      Cervical: No cervical adenopathy.   Skin:     General: Skin is warm and dry.   Neurological:      Mental Status: She is alert and oriented to person, place, and time.      Cranial Nerves: Cranial nerves 2-12 are intact.      Sensory: Sensation is intact.      Motor: Motor function is intact.      Coordination: Coordination is intact.      Gait: Gait is intact. Gait and tandem walk normal.      Deep Tendon Reflexes: Reflexes normal. Babinski sign absent on the right side. Babinski sign absent on the left side.      Reflex Scores:       Tricep reflexes are 2+ on the right side and 2+ on the left side.       Bicep reflexes are 2+ on the right side and 2+ on the left side.       Brachioradialis reflexes are 2+ on the right side and 2+ on the left side.       Patellar reflexes are 2+ on the right side and 2+ on the left side.       Achilles reflexes are 2+ on the right side and 2+ on the left side.  Psychiatric:         Mood and Affect: Mood and affect normal.         Behavior: Behavior normal.       SOFT TISSUE ASSESSMENT Hypertonicity and tenderness palpated B upper traps, B SCM, B T10-S1 erector spinae, glute med/min, QL, hamstring  JOINT RESTRICTIONS: C4/5, T1/2, T10-S1 and R/L SIJ ORTHO: SI jt point tenderness: +; Lydia unremarkable for centralization/peripheralization; simba's, iliac compression, thigh thrust elicit lbp in R/L SIJ; prone femoral nerve stretch neg for upper lumbar neural tension, elicits R/L SIJ stiffness; sitting root elicits lbp on R/L; slump test elicits neural tension R/L    Return in about 1 week (around 5/13/2024) for Next scheduled follow up.

## 2024-05-08 DIAGNOSIS — N25.81 SECONDARY HYPERPARATHYROIDISM OF RENAL ORIGIN (HCC): ICD-10-CM

## 2024-05-08 RX ORDER — CALCITRIOL 0.25 UG/1
CAPSULE, LIQUID FILLED ORAL
Qty: 30 CAPSULE | Refills: 5 | Status: SHIPPED | OUTPATIENT
Start: 2024-05-08

## 2024-05-09 ENCOUNTER — CLINICAL SUPPORT (OUTPATIENT)
Dept: INTERNAL MEDICINE CLINIC | Facility: CLINIC | Age: 49
End: 2024-05-09
Payer: COMMERCIAL

## 2024-05-09 DIAGNOSIS — E53.8 B12 DEFICIENCY: Primary | ICD-10-CM

## 2024-05-09 PROCEDURE — 96372 THER/PROPH/DIAG INJ SC/IM: CPT

## 2024-05-09 RX ADMIN — CYANOCOBALAMIN 1000 MCG: 1000 INJECTION, SOLUTION INTRAMUSCULAR; SUBCUTANEOUS at 14:01

## 2024-05-13 ENCOUNTER — PROCEDURE VISIT (OUTPATIENT)
Age: 49
End: 2024-05-13
Payer: COMMERCIAL

## 2024-05-13 VITALS
SYSTOLIC BLOOD PRESSURE: 143 MMHG | BODY MASS INDEX: 37.38 KG/M2 | DIASTOLIC BLOOD PRESSURE: 89 MMHG | HEART RATE: 80 BPM | HEIGHT: 61 IN | WEIGHT: 198 LBS

## 2024-05-13 DIAGNOSIS — M99.04 SEGMENTAL DYSFUNCTION OF SACRAL REGION: ICD-10-CM

## 2024-05-13 DIAGNOSIS — M62.838 MUSCLE SPASM: ICD-10-CM

## 2024-05-13 DIAGNOSIS — M99.01 SEGMENTAL DYSFUNCTION OF CERVICAL REGION: ICD-10-CM

## 2024-05-13 DIAGNOSIS — M47.812 CERVICAL SPONDYLOSIS: ICD-10-CM

## 2024-05-13 DIAGNOSIS — M99.03 SEGMENTAL DYSFUNCTION OF LUMBAR REGION: ICD-10-CM

## 2024-05-13 DIAGNOSIS — M99.02 SEGMENTAL DYSFUNCTION OF THORACIC REGION: ICD-10-CM

## 2024-05-13 DIAGNOSIS — M48.062 SPINAL STENOSIS OF LUMBAR REGION WITH NEUROGENIC CLAUDICATION: Primary | ICD-10-CM

## 2024-05-13 PROCEDURE — 97110 THERAPEUTIC EXERCISES: CPT | Performed by: CHIROPRACTOR

## 2024-05-13 PROCEDURE — 98941 CHIROPRACT MANJ 3-4 REGIONS: CPT | Performed by: CHIROPRACTOR

## 2024-05-13 NOTE — PROGRESS NOTES
Initial date of service: 5/6/24    Diagnoses and all orders for this visit:    Spinal stenosis of lumbar region with neurogenic claudication    Segmental dysfunction of sacral region    Muscle spasm    Segmental dysfunction of lumbar region    Segmental dysfunction of thoracic region    Segmental dysfunction of cervical region    Cervical spondylosis    Pt improved with reduced pain and increased ROM    TREATMENT: [id number]  Ther-ex: IASTM; discussed post procedure soreness and/or ecchymosis for up to 36 hrs, applied to affected mm hypertonicities; supine hamstring stretch, supine gluteal stretch, single knee to chest stretch, upper trap stretch, transitional mvmt education, abdominal bracing; greater than 15 min spent performing above mentioned ther-ex to improve ROM/flexibility. Cervical supine graded mobilization and traction, Thoracic mobilization/manipulation: prone P-A mob; Lumbar mobilization/manipulation: diversified side laying graded HVLA, flexion-traction; SIJ Manipulation/Mobilization: R/L SIJ HVLA - long axis distraction, mederos drop table maneuver to affected SIJ    HPI:  Kinza Meza is a 48 y.o. female  Chief Complaint   Patient presents with    Neck Pain     About a 5 more on right side limited range of motion but has been doing exercises     Back Pain     Low back about a more on the left side and down her leg      Pt presents for tx for exacerbation of chronic neck/back pain. Pt has had back and neck surgery. Pt was utilizing cane and now using walker occasionally. 2022 MRI demonstrates multilevel degenerative changes of postsurgical lumbar spine status post L1-L2 posterior spinal fixation with varying degrees of canal stenosis (moderate L4-L5) and foraminal narrowing (mild left L1-L2 and left L2-L3 ). 2024 fl/ext xrays unremarkable for instability. 2021 C/S MRI demonstrates s/p ACDF from C4 to C7. Multilevel degenerative spondylosis as described. Possible ossification of the posterior  longitudinal ligament at C2-3 and C6-7  5/13: Pt reports neck moving much better since last tx    Neck Pain   This is a chronic problem. The current episode started more than 1 year ago. The problem occurs constantly. The problem has been waxing and waning. The pain is present in the left side, midline and right side. The quality of the pain is described as aching. The symptoms are aggravated by bending, position, stress and twisting. The pain is Worse during the day.   Back Pain  This is a chronic problem. The current episode started more than 1 year ago. The problem occurs constantly. The problem has been waxing and waning since onset. The pain is present in the gluteal, lumbar spine, sacro-iliac and thoracic spine. The quality of the pain is described as aching, burning and stabbing. The pain radiates to the left thigh, left knee and right thigh. Pain scale: 4-9/10. The pain is Worse during the day. The symptoms are aggravated by standing, twisting and bending (walking, laying supine, transitional mvmts; palliative includes sitting, massage). Pertinent negatives include no bladder incontinence or bowel incontinence. Risk factors include lack of exercise.     Past Medical History:   Diagnosis Date    ADD (attention deficit disorder)     Allergic rhinitis     ALS (amyotrophic lateral sclerosis) ([facility]) 11/22    Per neurological surgeon    Anemia 12/2021    Anxiety     Arthritis     Bipolar disorder ([facility])     Cervical disc disorder with radiculopathy of cervical region     Chronic depression     Chronic kidney disease     Stage 1    Chronic pain disorder     Cluster headache     Colitis     Last assessed - 11/25/14    Colon polyp     Concussion 2018    Condyloma acuminatum     Last assessed - 3/19/14    CTS (carpal tunnel syndrome) 2001    Depression     Diabetes mellitus ([facility])     Last assessed - 11/25/14    Diabetic neuropathy ([facility])     Difficulty walking 07/22    Fibromyalgia     Fibromyalgia, primary     GERD  (gastroesophageal reflux disease)     Head injury 18    Fall    Headache(784.0) 1977    History of transfusion 2021    HTN (hypertension)     Last assessed - 14    Hyperlipidemia 2019    Hypertension     Intervertebral disc disorder with radiculopathy of lumbar region     Migraine     Miscarriage 1988    ,     Obesity 1980    Obesity, unspecified     Open wound of back 2023    Opioid abuse, in remission ([facility]) 2022    Only while in hospital and for about 10 days once home instead of one week.    Osteoporosis     Peripheral neuropathy     Postoperative wound infection 2021    Stop not healed    Preeclampsia     Last assessed - 14    Rheumatoid arthritis ([facility])     Wears dentures       Past Surgical History:   Procedure Laterality Date    APPENDECTOMY      Last assessed - 14    BARIATRIC SURGERY  2016    CARPAL TUNNEL RELEASE      CATARACT EXTRACTION      CERVICAL FUSION N/A 2019    Procedure: Anterior cervical discectomy w fusion C4-5, C5-6, C6-7; allograft and neuromonitoring;  Surgeon: Jose Gomez MD;  [facility];  Service: Orthopedics     SECTION      Last assessed - 14    COLONOSCOPY      COLONOSCOPY      HEMORRHOID SURGERY      HERNIA REPAIR      Last assessed - 14    LUMBAR FUSION N/A 2021    Procedure: L1/2 laminectomy, discectomy with L1/2 MIS posterior fixation using neuromonitoring and neuronavigation;  Surgeon: Suhas Akbar MD;  [facility];  Service: Neurosurgery    LUMBAR WOUND EXPLORATION Bilateral 2021    Procedure: Lumbar wound washout, debridement and intraoperative cultures;  Surgeon: Suhas Akbar MD;  [facility];  Service: Neurosurgery    MAMMO (HISTORICAL)  2016    HI DEBRIDEMENT BONE 1ST 20 SQ CM/< N/A 2023    Procedure: SURGICAL PREPARATION OF BACK  WOUND AND LOCAL FLAP, PREVENA PLACEMENT;  Surgeon: Ady Rocha MD;  Location:  MAIN OR;  Service: Plastics     WOOD-EN-Y PROCEDURE      ULNAR TUNNEL RELEASE      VAC DRESSING APPLICATION Bilateral 12/22/2021    Procedure: APPLICATION VAC DRESSING;  Surgeon: Elbert Hawley MD;  [facility];  Service: General    VAC DRESSING APPLICATION N/A 12/25/2021    Procedure: APPLICATION VAC DRESSING ABDOMEN/TRUNK;  Surgeon: Mani Ross DO;  [facility];  Service: General    VENTRAL HERNIA REPAIR       The following portions of the patient's history were reviewed and updated as appropriate: allergies, past family history, past medical history, past social history, past surgical history, and problem list.  Review of Systems   Gastrointestinal:  Negative for bowel incontinence.   Genitourinary:  Negative for bladder incontinence.   Musculoskeletal:  Positive for back pain and neck pain.     Physical Exam  Neck:        Comments: Pnful and limited in Brot, Blf  Musculoskeletal:      Cervical back: Pain with movement and muscular tenderness present. Decreased range of motion.      Thoracic back: Spasms and tenderness present. Decreased range of motion.      Lumbar back: Spasms and tenderness present. Decreased range of motion. Negative right straight leg raise test and negative left straight leg raise test.        Back:       Comments: Pnful and limited in Brot, Blf, Ext centralizes    Lymphadenopathy:      Cervical: No cervical adenopathy.   Skin:     General: Skin is warm and dry.   Neurological:      Mental Status: She is alert and oriented to person, place, and time.      Gait: Gait is intact.   Psychiatric:         Mood and Affect: Mood and affect normal.         Behavior: Behavior normal.       SOFT TISSUE ASSESSMENT Hypertonicity and tenderness palpated B upper traps, B SCM, B T10-S1 erector spinae, glute med/min, QL, hamstring JOINT RESTRICTIONS: C4/5, T1/2, T10-S1 and R/L SIJ    Return in about 1 week (around 5/20/2024) for Next scheduled follow up.

## 2024-05-16 ENCOUNTER — CLINICAL SUPPORT (OUTPATIENT)
Dept: INTERNAL MEDICINE CLINIC | Facility: CLINIC | Age: 49
End: 2024-05-16
Payer: COMMERCIAL

## 2024-05-16 DIAGNOSIS — E53.8 VITAMIN B 12 DEFICIENCY: Primary | ICD-10-CM

## 2024-05-16 PROCEDURE — 96372 THER/PROPH/DIAG INJ SC/IM: CPT

## 2024-05-16 RX ADMIN — CYANOCOBALAMIN 1000 MCG: 1000 INJECTION, SOLUTION INTRAMUSCULAR; SUBCUTANEOUS at 15:45

## 2024-05-19 DIAGNOSIS — F34.1 DYSTHYMIC DISORDER: ICD-10-CM

## 2024-05-19 DIAGNOSIS — M48.062 LUMBAR STENOSIS WITH NEUROGENIC CLAUDICATION: ICD-10-CM

## 2024-05-19 DIAGNOSIS — E11.65 UNCONTROLLED TYPE 2 DIABETES MELLITUS WITH HYPERGLYCEMIA (HCC): ICD-10-CM

## 2024-05-19 DIAGNOSIS — F90.0 ATTENTION DEFICIT HYPERACTIVITY DISORDER (ADHD), PREDOMINANTLY INATTENTIVE TYPE: ICD-10-CM

## 2024-05-20 ENCOUNTER — PROCEDURE VISIT (OUTPATIENT)
Age: 49
End: 2024-05-20
Payer: COMMERCIAL

## 2024-05-20 ENCOUNTER — APPOINTMENT (OUTPATIENT)
Dept: LAB | Facility: CLINIC | Age: 49
End: 2024-05-20
Payer: COMMERCIAL

## 2024-05-20 VITALS
SYSTOLIC BLOOD PRESSURE: 121 MMHG | DIASTOLIC BLOOD PRESSURE: 86 MMHG | RESPIRATION RATE: 17 BRPM | HEIGHT: 61 IN | WEIGHT: 198 LBS | BODY MASS INDEX: 37.38 KG/M2 | HEART RATE: 84 BPM

## 2024-05-20 DIAGNOSIS — E11.22 CONTROLLED TYPE 2 DIABETES MELLITUS WITH STAGE 3 CHRONIC KIDNEY DISEASE, WITH LONG-TERM CURRENT USE OF INSULIN (HCC): ICD-10-CM

## 2024-05-20 DIAGNOSIS — Z79.4 CONTROLLED TYPE 2 DIABETES MELLITUS WITH STAGE 3 CHRONIC KIDNEY DISEASE, WITH LONG-TERM CURRENT USE OF INSULIN (HCC): ICD-10-CM

## 2024-05-20 DIAGNOSIS — M99.03 SEGMENTAL DYSFUNCTION OF LUMBAR REGION: ICD-10-CM

## 2024-05-20 DIAGNOSIS — M62.838 MUSCLE SPASM: ICD-10-CM

## 2024-05-20 DIAGNOSIS — M48.062 SPINAL STENOSIS OF LUMBAR REGION WITH NEUROGENIC CLAUDICATION: Primary | ICD-10-CM

## 2024-05-20 DIAGNOSIS — N25.81 SECONDARY HYPERPARATHYROIDISM OF RENAL ORIGIN (HCC): ICD-10-CM

## 2024-05-20 DIAGNOSIS — I12.9 BENIGN HYPERTENSION WITH CHRONIC KIDNEY DISEASE, STAGE III (HCC): ICD-10-CM

## 2024-05-20 DIAGNOSIS — N18.30 CONTROLLED TYPE 2 DIABETES MELLITUS WITH STAGE 3 CHRONIC KIDNEY DISEASE, WITH LONG-TERM CURRENT USE OF INSULIN (HCC): ICD-10-CM

## 2024-05-20 DIAGNOSIS — M99.04 SEGMENTAL DYSFUNCTION OF SACRAL REGION: ICD-10-CM

## 2024-05-20 DIAGNOSIS — N18.30 BENIGN HYPERTENSION WITH CHRONIC KIDNEY DISEASE, STAGE III (HCC): ICD-10-CM

## 2024-05-20 DIAGNOSIS — D50.9 IRON DEFICIENCY ANEMIA, UNSPECIFIED IRON DEFICIENCY ANEMIA TYPE: ICD-10-CM

## 2024-05-20 DIAGNOSIS — M47.812 CERVICAL SPONDYLOSIS: ICD-10-CM

## 2024-05-20 DIAGNOSIS — M99.01 SEGMENTAL DYSFUNCTION OF CERVICAL REGION: ICD-10-CM

## 2024-05-20 DIAGNOSIS — N18.32 STAGE 3B CHRONIC KIDNEY DISEASE (HCC): ICD-10-CM

## 2024-05-20 DIAGNOSIS — M99.02 SEGMENTAL DYSFUNCTION OF THORACIC REGION: ICD-10-CM

## 2024-05-20 DIAGNOSIS — E87.20 METABOLIC ACIDOSIS: ICD-10-CM

## 2024-05-20 LAB
ANION GAP SERPL CALCULATED.3IONS-SCNC: 11 MMOL/L (ref 4–13)
BUN SERPL-MCNC: 36 MG/DL (ref 5–25)
CALCIUM SERPL-MCNC: 8.7 MG/DL (ref 8.4–10.2)
CHLORIDE SERPL-SCNC: 110 MMOL/L (ref 96–108)
CO2 SERPL-SCNC: 22 MMOL/L (ref 21–32)
CREAT SERPL-MCNC: 1.95 MG/DL (ref 0.6–1.3)
ERYTHROCYTE [DISTWIDTH] IN BLOOD BY AUTOMATED COUNT: 16.5 % (ref 11.6–15.1)
GFR SERPL CREATININE-BSD FRML MDRD: 29 ML/MIN/1.73SQ M
GLUCOSE P FAST SERPL-MCNC: 106 MG/DL (ref 65–99)
HCT VFR BLD AUTO: 40.5 % (ref 34.8–46.1)
HGB BLD-MCNC: 12.6 G/DL (ref 11.5–15.4)
MCH RBC QN AUTO: 26.3 PG (ref 26.8–34.3)
MCHC RBC AUTO-ENTMCNC: 31.1 G/DL (ref 31.4–37.4)
MCV RBC AUTO: 85 FL (ref 82–98)
PHOSPHATE SERPL-MCNC: 4.7 MG/DL (ref 2.7–4.5)
PLATELET # BLD AUTO: 150 THOUSANDS/UL (ref 149–390)
PMV BLD AUTO: 12.9 FL (ref 8.9–12.7)
POTASSIUM SERPL-SCNC: 3.5 MMOL/L (ref 3.5–5.3)
PTH-INTACT SERPL-MCNC: 148.2 PG/ML (ref 12–88)
RBC # BLD AUTO: 4.79 MILLION/UL (ref 3.81–5.12)
SODIUM SERPL-SCNC: 143 MMOL/L (ref 135–147)
WBC # BLD AUTO: 6.03 THOUSAND/UL (ref 4.31–10.16)

## 2024-05-20 PROCEDURE — 84100 ASSAY OF PHOSPHORUS: CPT

## 2024-05-20 PROCEDURE — 83970 ASSAY OF PARATHORMONE: CPT

## 2024-05-20 PROCEDURE — 80048 BASIC METABOLIC PNL TOTAL CA: CPT

## 2024-05-20 PROCEDURE — 85027 COMPLETE CBC AUTOMATED: CPT

## 2024-05-20 PROCEDURE — 97110 THERAPEUTIC EXERCISES: CPT | Performed by: CHIROPRACTOR

## 2024-05-20 PROCEDURE — 98941 CHIROPRACT MANJ 3-4 REGIONS: CPT | Performed by: CHIROPRACTOR

## 2024-05-20 PROCEDURE — 36415 COLL VENOUS BLD VENIPUNCTURE: CPT

## 2024-05-20 RX ORDER — CLONAZEPAM 0.5 MG/1
0.5 TABLET ORAL 2 TIMES DAILY
Qty: 180 TABLET | Refills: 0 | Status: SHIPPED | OUTPATIENT
Start: 2024-05-20

## 2024-05-20 RX ORDER — PREGABALIN 100 MG/1
100 CAPSULE ORAL 3 TIMES DAILY
Qty: 270 CAPSULE | Refills: 0 | Status: SHIPPED | OUTPATIENT
Start: 2024-05-20

## 2024-05-20 RX ORDER — LISDEXAMFETAMINE DIMESYLATE 30 MG/1
30 CAPSULE ORAL EVERY MORNING
Qty: 30 CAPSULE | Refills: 0 | Status: SHIPPED | OUTPATIENT
Start: 2024-05-20

## 2024-05-20 RX ORDER — INSULIN GLARGINE 100 [IU]/ML
40 INJECTION, SOLUTION SUBCUTANEOUS
Qty: 15 ML | Refills: 4 | Status: SHIPPED | OUTPATIENT
Start: 2024-05-20

## 2024-05-20 NOTE — PROGRESS NOTES
Initial date of service: 5/6/24    Diagnoses and all orders for this visit:    Spinal stenosis of lumbar region with neurogenic claudication    Segmental dysfunction of sacral region    Muscle spasm    Segmental dysfunction of lumbar region    Segmental dysfunction of thoracic region    Segmental dysfunction of cervical region    Cervical spondylosis    Pt improved with reduced pain and increased ROM    TREATMENT: [id number]  Ther-ex: IASTM; discussed post procedure soreness and/or ecchymosis for up to 36 hrs, applied to affected mm hypertonicities; supine hamstring stretch, supine gluteal stretch, single knee to chest stretch, upper trap stretch, transitional mvmt education, abdominal bracing; greater than 15 min spent performing above mentioned ther-ex to improve ROM/flexibility. Cervical supine graded mobilization and traction, Thoracic mobilization/manipulation: prone P-A mob; Lumbar mobilization/manipulation: diversified side laying graded HVLA, flexion-traction; SIJ Manipulation/Mobilization: R/L SIJ HVLA - long axis distraction, mederos drop table maneuver to affected SIJ    HPI:  Kinza Meza is a 48 y.o. female  Chief Complaint   Patient presents with    Back Pain     Lower right back   Patient notes her is doing better since last visit, she is able to move around better  Patient rates her pain at a 6    Neck Pain     Patient notes her neck is better since last visit  Patient doing exercises are helping  Patient rates her pain at a 4       Pt presents for tx for exacerbation of chronic neck/back pain. Pt has had back and neck surgery. Pt was utilizing cane and now using walker occasionally. 2022 MRI demonstrates multilevel degenerative changes of postsurgical lumbar spine status post L1-L2 posterior spinal fixation with varying degrees of canal stenosis (moderate L4-L5) and foraminal narrowing (mild left L1-L2 and left L2-L3 ). 2024 fl/ext xrays unremarkable for instability. 2021 C/S MRI demonstrates  s/p ACDF from C4 to C7. Multilevel degenerative spondylosis as described. Possible ossification of the posterior longitudinal ligament at C2-3 and C6-7      Neck Pain   This is a chronic problem. The current episode started more than 1 year ago. The problem occurs constantly. The problem has been waxing and waning. The pain is present in the left side, midline and right side. The quality of the pain is described as aching. The symptoms are aggravated by bending, position, stress and twisting. The pain is Worse during the day.   Back Pain  This is a chronic problem. The current episode started more than 1 year ago. The problem occurs constantly. The problem has been waxing and waning since onset. The pain is present in the gluteal, lumbar spine, sacro-iliac and thoracic spine. The quality of the pain is described as aching, burning and stabbing. The pain radiates to the left thigh, left knee and right thigh. Pain scale: 4-9/10. The pain is Worse during the day. The symptoms are aggravated by standing, twisting and bending (walking, laying supine, transitional mvmts; palliative includes sitting, massage). Pertinent negatives include no bladder incontinence or bowel incontinence. Risk factors include lack of exercise.     Past Medical History:   Diagnosis Date    ADD (attention deficit disorder)     Allergic rhinitis     ALS (amyotrophic lateral sclerosis) ([facility]) 11/22    Per neurological surgeon    Anemia 12/2021    Anxiety     Arthritis     Bipolar disorder ([facility])     Cervical disc disorder with radiculopathy of cervical region     Chronic depression     Chronic kidney disease     Stage 1    Chronic pain disorder     Cluster headache     Colitis     Last assessed - 11/25/14    Colon polyp     Concussion 2018    Condyloma acuminatum     Last assessed - 3/19/14    CTS (carpal tunnel syndrome) 2001    Depression     Diabetes mellitus ([facility])     Last assessed - 11/25/14    Diabetic neuropathy ([facility])     Difficulty walking      Fibromyalgia     Fibromyalgia, primary     GERD (gastroesophageal reflux disease)     Head injury 18    Fall    Headache(784.0) 1977    History of transfusion 2021    HTN (hypertension)     Last assessed - 14    Hyperlipidemia 2019    Hypertension     Intervertebral disc disorder with radiculopathy of lumbar region     Migraine     Miscarriage 1988,     Obesity 1980    Obesity, unspecified     Open wound of back 2023    Opioid abuse, in remission (HCC) 2022    Only while in hospital and for about 10 days once home instead of one week.    Osteoporosis     Peripheral neuropathy     Postoperative wound infection 2021    Stop not healed    Preeclampsia     Last assessed - 14    Rheumatoid arthritis (HCC)     Wears dentures       Past Surgical History:   Procedure Laterality Date    APPENDECTOMY      Last assessed - 14    BARIATRIC SURGERY  2016    CARPAL TUNNEL RELEASE      CATARACT EXTRACTION      CERVICAL FUSION N/A 2019    Procedure: Anterior cervical discectomy w fusion C4-5, C5-6, C6-7; allograft and neuromonitoring;  Surgeon: Jose Gomez MD;  Location: [facility];  Service: Orthopedics     SECTION      Last assessed - 14    COLONOSCOPY      COLONOSCOPY      HEMORRHOID SURGERY      HERNIA REPAIR      Last assessed - 14    LUMBAR FUSION N/A 2021    Procedure: L1/2 laminectomy, discectomy with L1/2 MIS posterior fixation using neuromonitoring and neuronavigation;  Surgeon: Suhas Akbar MD;  Location: [facility];  Service: Neurosurgery    LUMBAR WOUND EXPLORATION Bilateral 2021    Procedure: Lumbar wound washout, debridement and intraoperative cultures;  Surgeon: Suhas Akbar MD;  Location: [facility];  Service: Neurosurgery    MAMMO (HISTORICAL)      UT DEBRIDEMENT BONE 1ST 20 SQ CM/< N/A 2023    Procedure: SURGICAL PREPARATION OF BACK  WOUND AND LOCAL FLAP, PREVENA PLACEMENT;  Surgeon: Ady RYAN  MD Aj;  Location:  MAIN OR;  Service: Plastics    WOOD-EN-Y PROCEDURE      ULNAR TUNNEL RELEASE      VAC DRESSING APPLICATION Bilateral 12/22/2021    Procedure: APPLICATION VAC DRESSING;  Surgeon: Elbert Hawley MD;  Location: [facility];  Service: General    VAC DRESSING APPLICATION N/A 12/25/2021    Procedure: APPLICATION VAC DRESSING ABDOMEN/TRUNK;  Surgeon: Mani Ross DO;  Location: [facility];  Service: General    VENTRAL HERNIA REPAIR       The following portions of the patient's history were reviewed and updated as appropriate: allergies, past family history, past medical history, past social history, past surgical history, and problem list.  Review of Systems   Gastrointestinal:  Negative for bowel incontinence.   Genitourinary:  Negative for bladder incontinence.   Musculoskeletal:  Positive for back pain and neck pain.     Physical Exam  Neck:        Comments: Pnful and limited in Brot, Blf  Musculoskeletal:      Cervical back: Pain with movement and muscular tenderness present. Decreased range of motion.      Thoracic back: Spasms and tenderness present. Decreased range of motion.      Lumbar back: Spasms and tenderness present. Decreased range of motion. Negative right straight leg raise test and negative left straight leg raise test.        Back:       Comments: Pnful and limited in Brot, Blf, Ext centralizes    Lymphadenopathy:      Cervical: No cervical adenopathy.   Skin:     General: Skin is warm and dry.   Neurological:      Mental Status: She is alert and oriented to person, place, and time.      Gait: Gait is intact.   Psychiatric:         Mood and Affect: Mood and affect normal.         Behavior: Behavior normal.       SOFT TISSUE ASSESSMENT Hypertonicity and tenderness palpated B upper traps, B SCM, B T10-S1 erector spinae, glute med/min, QL, hamstring JOINT RESTRICTIONS: C4/5, T1/2, T10-S1 and R/L SIJ    Return in about 1 week (around 5/27/2024) for Next scheduled follow up.

## 2024-05-23 ENCOUNTER — OFFICE VISIT (OUTPATIENT)
Dept: NEPHROLOGY | Facility: CLINIC | Age: 49
End: 2024-05-23
Payer: COMMERCIAL

## 2024-05-23 VITALS
DIASTOLIC BLOOD PRESSURE: 84 MMHG | WEIGHT: 191 LBS | BODY MASS INDEX: 36.06 KG/M2 | SYSTOLIC BLOOD PRESSURE: 128 MMHG | HEIGHT: 61 IN | HEART RATE: 75 BPM

## 2024-05-23 DIAGNOSIS — N18.30 CONTROLLED TYPE 2 DIABETES MELLITUS WITH STAGE 3 CHRONIC KIDNEY DISEASE, WITH LONG-TERM CURRENT USE OF INSULIN (HCC): ICD-10-CM

## 2024-05-23 DIAGNOSIS — Z79.4 CONTROLLED TYPE 2 DIABETES MELLITUS WITH STAGE 3 CHRONIC KIDNEY DISEASE, WITH LONG-TERM CURRENT USE OF INSULIN (HCC): ICD-10-CM

## 2024-05-23 DIAGNOSIS — R80.1 PERSISTENT PROTEINURIA: ICD-10-CM

## 2024-05-23 DIAGNOSIS — E87.20 METABOLIC ACIDOSIS: ICD-10-CM

## 2024-05-23 DIAGNOSIS — D50.9 IRON DEFICIENCY ANEMIA, UNSPECIFIED IRON DEFICIENCY ANEMIA TYPE: ICD-10-CM

## 2024-05-23 DIAGNOSIS — E55.9 VITAMIN D DEFICIENCY: ICD-10-CM

## 2024-05-23 DIAGNOSIS — N18.4 BENIGN HYPERTENSION WITH CKD (CHRONIC KIDNEY DISEASE) STAGE IV (HCC): ICD-10-CM

## 2024-05-23 DIAGNOSIS — I12.9 BENIGN HYPERTENSION WITH CKD (CHRONIC KIDNEY DISEASE) STAGE IV (HCC): ICD-10-CM

## 2024-05-23 DIAGNOSIS — E83.39 HYPERPHOSPHATEMIA: ICD-10-CM

## 2024-05-23 DIAGNOSIS — N25.81 SECONDARY HYPERPARATHYROIDISM OF RENAL ORIGIN (HCC): ICD-10-CM

## 2024-05-23 DIAGNOSIS — N18.4 STAGE 4 CHRONIC KIDNEY DISEASE (HCC): Primary | ICD-10-CM

## 2024-05-23 DIAGNOSIS — E11.22 CONTROLLED TYPE 2 DIABETES MELLITUS WITH STAGE 3 CHRONIC KIDNEY DISEASE, WITH LONG-TERM CURRENT USE OF INSULIN (HCC): ICD-10-CM

## 2024-05-23 PROCEDURE — 99214 OFFICE O/P EST MOD 30 MIN: CPT | Performed by: INTERNAL MEDICINE

## 2024-05-23 NOTE — PROGRESS NOTES
NEPHROLOGY OUTPATIENT PROGRESS NOTE   Kinza Meza 48 y.o. female MRN: [id number]  DATE: 5/23/2024  Reason for visit:   Chief Complaint   Patient presents with    Follow-up    Chronic Kidney Disease       ASSESSMENT and PLAN:  Chronic kidney disease stage 4/3b  -recent baseline creatinine around 1.7 to 2.0 mg/dL with some CKD progression  -Past history of acute kidney injury due to urinary retention and renal function improved  --> Kidney ultrasound from September 2023 did not suggest any hydronephrosis.  Found to have significant residual volume in the urinary bladder, postvoid residual was 107 mL indicating urinary retention, she was referred to urology  -Etiology: Chronic kidney disease likely due to hypertensive nephrosclerosis as well as diabetic nephropathy.  past history of NSAIDs use  -SPEP and UPEP was negative for monoclonal bands, kappa lambda ratio was 1.7 in the setting of CKD.  SPEP was checked again in July 2021 and was found to be negative for monoclonal bands  -Plan:   Renal function stable creatinine 1.95 mg/dL, EGFR of 29 making it chronic kidney disease stage IV.  Continue to monitor renal function  LDL at goal less than 100, currently on statins  Avoid Nephrotoxins like NSAIDs and IV contrast.   Continue oral hydration  [facility] : referred in November 2020 .  Completed on phone in December 2020   Follow up - 5 months with labs      Primary Hypertension with cKD stage 4:   -Current medication:  metoprolol 100 mg bid, losartan-hctz (100-12.5 mg ), verapamil 120 mg qhs  .  -Current blood pressure: Stable and is at goal, continue same medications  -Recommend 2 g sodium diet.    -Recommend daily exercise and weight loss  -Discussed home monitoring of BP and maintaining a log of blood pressure.  -Recommend goal BP less than 130/80.     Persistent proteinuria  -likely due to diabetic nephropathy and hypertensive nephrosclerosis.    -Last UPC ratio indicates no proteinuria as UPC ratio was  0.06, proteinuria resolved  -SPEP/UPEP negative.  -Continue losartan. Continue to monitor last A1c slightly trended up to 6.2 but still at target range     Metabolic acidosis: Bicarb level currently at normal range at 22  -on diamox which could be contribute  -On sodium bicarbonate tablet 650 mg bid.  Continue current dose of sodium bicarbonate      Secondary hyperparathyroidism of renal origin/ Vitamin D deficiency  -PTH level improving gradually to 148.2 from previous level of 161.4.  Currently on 0.25 mcg calcitriol daily, continue same dose and monitor PTH level before the next office visit   -continue vitamin-D 2000 units daily, last level was  55.7     hyperphosphatemia: Phosphorus level again elevated at 4.7, recommend low phosphorus diet  -decrease intake of cheese    Anemia, iron deficiency  -Hemoglobin improved to 12.6 g/dL prior iron saturation from December was around 4%  -CT ab with spleenomegaly  -WBC count improved to normal range.  Continue Oral iron supplement- c/o constipation . Can decrease to EOD    Diabetes Mellitus Type 2 with CKD stage 3:  -recommend goal A1c less than 7.0 if possible  -stressed on diabetic diet and daily exercise as well as weight loss  -continue monitoring of A1c per PCP, management per PCP and follow-up with PCP  -discussed about risk of CKD progression if diabetes is not controlled well.  -currently on:insulin- Mounjaro , insulin  -Last A1c: 6.2    H/o Urinary retention/neurogenic bladder  -CT abdomen in October 2023 showed unremarkable bladder, mild splenomegaly. GB stones  -stressed on frequent bladder emptying  -Reviewed last urology note from February 2024, was found to have decreased bladder sensation and was told to continue timed voiding every 2 hours     Chronic migraine headaches- following with neurology.  Status post Botox injection. Headaches better           Patient Instructions   -Renal Function is stable   -You have Chronic Kidney Disease Stage 4   -Avoid  NSAIDs like Ibuprofen/Motrin, Naproxen/Aleve, Celebrex, meloxicam/Mobic, Diclofenac and other NSAIDs.  -Okay to take Acetaminophen/Tylenol if you do not have any liver problems  -Avoid IV contrast used for CT scan and cardiac catheterization.    -If plan for any study with IV contrast, please let me know so we could hydrate with fluids before and after IV contrast  -Dosage  of certain medications may need to be adjusted depending on Kidney function.     Blood pressure is stable    -Recommend 2 g sodium diet.    -Recommend daily exercise and weight loss  -Discussed home monitoring of BP and maintaining a log of blood pressure.  -Recommend goal BP less than 130/80. Please inform me if SBP below 110 or above 140's persistently.     Follow up: 5  months with repeat Lab work within a week of the scheduled office visit. Will discuss the results of the previsit Labs during the office visit.    Diagnoses and all orders for this visit:    Stage 4 chronic kidney disease (HCC)  -     Basic metabolic panel; Future  -     Protein / creatinine ratio, urine; Future  -     PTH, intact; Future  -     Urinalysis with microscopic; Future  -     Phosphorus; Future  -     Magnesium; Future  -     CBC; Future    Benign hypertension with CKD (chronic kidney disease) stage IV ([facility])  -     Basic metabolic panel; Future    Persistent proteinuria  -     Protein / creatinine ratio, urine; Future    Hyperphosphatemia  -     Phosphorus; Future    Metabolic acidosis  -     Basic metabolic panel; Future    Secondary hyperparathyroidism of renal origin ([facility])  -     PTH, intact; Future    Vitamin D deficiency  -     Vitamin D 25 hydroxy; Future    Iron deficiency anemia, unspecified iron deficiency anemia type  -     CBC; Future  -     Iron Panel (Includes Ferritin, Iron Sat%, Iron, and TIBC); Future    Controlled type 2 diabetes mellitus with stage 3 chronic kidney disease, with long-term current use of insulin ([facility])  -     Basic metabolic panel;  Future  -     Protein / creatinine ratio, urine; Future                SUBJECTIVE / HPI:  Kinza Meza is a 48 y.o.  female with history of diabetes mellitus type 2, hypertension, C6-C7 disc herniation presenting for follow-up of chronic kidney disease stage 3. .  She underwent anterior cervical diskectomy with fusion C4-C5, C5-C6, C6-C7 on January 22nd 2019. SPEP and UPEP negative for monoclonal protein in May 2019 with kappa lambda ratio 1.7      Patient had renal function worsened to creatinine 4.16 on 9/12/2023 but improved to creatinine 1.87 on 9/19/2023 and was stable at creatinine 1.6-1.7 in October 2023      Since December 2023, creatinine has been around 1.7 to 2.0 mg/dL    Last blood work from May 20, 2024 showed renal function at creatinine 1.95 mg/dL with stable electrolytes, bicarb improved to 22, phosphorus is elevated at 4.7.  Hemoglobin 12.6 g/dL PTH is elevated but improved to 148.2, A1c was 6.2.  No proteinuria with UPC ratio 0.06    -> No new complaints.  No chest pain or shortness of breath    Reviewed PCP note from 4/25/24  On and off backache and no UTI symptoms     REVIEW OF SYSTEMS:    Review of Systems   Constitutional:  Negative for activity change, appetite change, chills, diaphoresis, fatigue and fever.   HENT:  Negative for congestion, facial swelling and nosebleeds.    Eyes:  Negative for pain and visual disturbance.   Respiratory:  Negative for cough, chest tightness and shortness of breath.    Cardiovascular:  Negative for chest pain and palpitations.   Gastrointestinal:  Negative for abdominal distention, abdominal pain, diarrhea, nausea and vomiting.   Genitourinary:  Negative for difficulty urinating, dysuria, flank pain, frequency and hematuria.   Musculoskeletal:  Negative for arthralgias, back pain and joint swelling.   Skin:  Negative for rash.   Neurological:  Negative for dizziness, seizures, syncope, weakness and headaches.   Psychiatric/Behavioral:  Negative for  agitation and confusion. The patient is not nervous/anxious.        More than 10 point review of systems were obtained and discussed in length with the patient. Complete review of systems were negative / unremarkable except mentioned above.       PAST MEDICAL HISTORY:  Past Medical History:   Diagnosis Date    ADD (attention deficit disorder)     Allergic rhinitis     ALS (amyotrophic lateral sclerosis) ([facility]) 11/22    Per neurological surgeon    Anemia 12/2021    Anxiety     Arthritis     Bipolar disorder ([facility])     Cervical disc disorder with radiculopathy of cervical region     Chronic depression     Chronic kidney disease     Stage 1    Chronic pain disorder     Cluster headache     Colitis     Last assessed - 11/25/14    Colon polyp     Concussion 2018    Condyloma acuminatum     Last assessed - 3/19/14    CTS (carpal tunnel syndrome) 2001    Depression     Diabetes mellitus ([facility])     Last assessed - 11/25/14    Diabetic nephropathy ([facility]) 2001    Diabetic neuropathy ([facility])     Diabetic retinopathy ([facility]) 2001    Difficulty walking 07/22    Fibromyalgia     Fibromyalgia, primary     GERD (gastroesophageal reflux disease)     Head injury 08/13/18    Fall    Headache(784.0) 1977    History of transfusion 12/2021    HTN (hypertension)     Last assessed - 11/25/14    Hyperlipidemia 07/22/2019    Hypertension     Intervertebral disc disorder with radiculopathy of lumbar region     Lupus ([facility]) 2003    Rhuematologist-Dr Rashid, [place], [place]    Migraine     Miscarriage 1988    2001, 2004    Obesity 1980    Obesity, unspecified     Open wound of back 05/30/2023    Opioid abuse, in remission ([facility]) 01/2022    Only while in hospital and for about 10 days once home instead of one week.    Osteoporosis     Peripheral neuropathy     Postoperative wound infection 12/2021    Stop not healed    Preeclampsia     Last assessed - 11/25/14    Rheumatoid arthritis ([facility])     Wears dentures        PAST SURGICAL HISTORY:  Past Surgical  History:   Procedure Laterality Date    APPENDECTOMY      Last assessed - 14    BARIATRIC SURGERY  2016    CARPAL TUNNEL RELEASE      CATARACT EXTRACTION      CERVICAL FUSION N/A 2019    Procedure: Anterior cervical discectomy w fusion C4-5, C5-6, C6-7; allograft and neuromonitoring;  Surgeon: Jose Gomez MD;  Location: [facility];  Service: Orthopedics     SECTION      Last assessed - 14    COLONOSCOPY      COLONOSCOPY      HEMORRHOID SURGERY      HERNIA REPAIR      Last assessed - 14    LUMBAR FUSION N/A 2021    Procedure: L1/2 laminectomy, discectomy with L1/2 MIS posterior fixation using neuromonitoring and neuronavigation;  Surgeon: Suhas Akbar MD;  Location: [facility];  Service: Neurosurgery    LUMBAR WOUND EXPLORATION Bilateral 2021    Procedure: Lumbar wound washout, debridement and intraoperative cultures;  Surgeon: Suhas Akbar MD;  Location: [facility];  Service: Neurosurgery    MAMMO (HISTORICAL)      CO DEBRIDEMENT BONE 1ST 20 SQ CM/< N/A 2023    Procedure: SURGICAL PREPARATION OF BACK  WOUND AND LOCAL FLAP, PREVENA PLACEMENT;  Surgeon: Ady Rocha MD;  Location:  MAIN OR;  Service: Plastics    WOOD-EN-Y PROCEDURE      ULNAR TUNNEL RELEASE      VAC DRESSING APPLICATION Bilateral 2021    Procedure: APPLICATION VAC DRESSING;  Surgeon: Elbert Hawley MD;  Location: [facility];  Service: General    VAC DRESSING APPLICATION N/A 2021    Procedure: APPLICATION VAC DRESSING ABDOMEN/TRUNK;  Surgeon: Mani Ross DO;  Location: [facility];  Service: General    VENTRAL HERNIA REPAIR         SOCIAL HISTORY:  Social History     Substance and Sexual Activity   Alcohol Use Not Currently     Social History     Substance and Sexual Activity   Drug Use Yes    Types: Marijuana    Comment: Medicinal     Social History     Tobacco Use   Smoking Status Never    Passive exposure: Never   Smokeless Tobacco Never   Tobacco Comments     Never used tabaco, don’t care to       FAMILY HISTORY:  Family History   Problem Relation Age of Onset    Cervical cancer Mother     Kidney disease Mother     Cancer Mother         Cervical    Diabetes Mother     Hypertension Mother             Neuropathy Mother             Suicidality Father     Depression Father     Mental illness Father         Most likely,, but doesat age22 without diagnosis    Anxiety disorder Father     Completed Suicide  Father         1978    Psychiatric Illness Father          age 21    Suicide Attempts Father         4    No Known Problems Sister     Mental illness Daughter         By-polar Disorder    Mental illness Daughter         Borderline Personality Disorder    Colon cancer Maternal Grandmother             Anemia Maternal Grandmother     No Known Problems Maternal Grandfather     Uterine cancer Paternal Grandmother     No Known Problems Paternal Grandfather     No Known Problems Maternal Aunt     Ovarian cancer Paternal Aunt     ADD / ADHD Cousin     ADD / ADHD Cousin     No Known Problems Family        MEDICATIONS:    Current Outpatient Medications:     acetaZOLAMIDE (DIAMOX) 250 mg tablet, Take 1 tablet (250 mg total) by mouth 2 (two) times a day, Disp: 180 tablet, Rfl: 1    Ajovy 225 MG/1.5ML auto-injector, INJECT 4.5 ML(675 MG TOTAL) UNDER THE SKIN EVERY 3 MONTHS (Patient taking differently: monthly), Disp: 4.5 mL, Rfl: 4    atorvastatin (LIPITOR) 10 mg tablet, Take 1 tablet (10 mg total) by mouth daily at bedtime, Disp: 90 tablet, Rfl: 0    BD Pen Needle Glory U/F 32G X 4 MM MISC, Inject under the skin 2 (two) times a day Use as directed, Disp: 180 each, Rfl: 0    Blood Glucose Monitoring Suppl (ONE TOUCH ULTRA 2) w/Device KIT, Use 1 each 2 (two) times a day Use as instructed, Disp: 1 kit, Rfl: 0    calcitriol (ROCALTROL) 0.25 mcg capsule, TAKE 1 CAPSULE BY MOUTH daily, Disp: 30 capsule, Rfl: 5    Cholecalciferol (Vitamin D3) 50 MCG ( UT)  CAPS, TAKE 1 CAPSULE(2,000 UNITS TOTAL) BY MOUTH DAILY, Disp: 90 capsule, Rfl: 1    clonazePAM (KlonoPIN) 0.5 mg tablet, Take 1 tablet (0.5 mg total) by mouth 2 (two) times a day, Disp: 180 tablet, Rfl: 0    Continuous Blood Gluc Sensor (FreeStyle Filiberto 3 Sensor) MISC, Use 1 each every 14 (fourteen) days, Disp: 6 each, Rfl: 1    Cyanocobalamin (Vitamin B-12) 1000 MCG SUBL, Place 1 tablet (1,000 mcg total) under the tongue in the morning, Disp: 90 tablet, Rfl: 1    cycloSPORINE, PF, (Cequa) 0.09 % SOLN, Apply 1 drop to eye 2 (two) times a day, Disp: , Rfl:     divalproex sodium (Depakote) 500 mg DR tablet, Take 1 tablet (500 mg total) by mouth daily at bedtime, Disp: 90 tablet, Rfl: 3    docusate sodium (COLACE) 100 mg capsule, TAKE 1 CAPSULE BY MOUTH TWICE A DAY (Patient taking differently: Take 100 mg by mouth 2 (two) times a day as needed), Disp: 60 capsule, Rfl: 0    DULoxetine (CYMBALTA) 60 mg delayed release capsule, Take 1 capsule (60 mg total) by mouth daily, Disp: 90 capsule, Rfl: 1    ferrous sulfate 324 (65 Fe) mg, Take 1 tablet (324 mg total) by mouth daily before breakfast, Disp: 90 tablet, Rfl: 3    insulin aspart (NovoLOG FlexPen) 100 UNIT/ML injection pen, Inject 10 Units under the skin 3 (three) times a day with meals, Disp: 15 mL, Rfl: 4    Insulin Glargine Solostar (Lantus SoloStar) 100 UNIT/ML SOPN, Inject 0.4 mL (40 Units total) under the skin daily at bedtime, Disp: 15 mL, Rfl: 4    Lancets MISC, Use 1 each 2 (two) times a day Use as instructed, Disp: , Rfl:     levonorgestrel (MIRENA) 20 MCG/24HR IUD, Mirena 20 mcg/24 hr (5 years) intrauterine device  Vaginally for 5 years., Disp: , Rfl:     lisdexamfetamine (VYVANSE) 30 MG capsule, Take 1 capsule (30 mg total) by mouth every morning Max Daily Amount: 30 mg, Disp: 30 capsule, Rfl: 0    losartan-hydrochlorothiazide (HYZAAR) 100-12.5 MG per tablet, Take 1 tablet by mouth daily, Disp: 90 tablet, Rfl: 0    methocarbamol (Robaxin-750) 750 mg tablet,  Take 1 tablet (750 mg total) by mouth every 6 (six) hours as needed for muscle spasms (back pain), Disp: 30 tablet, Rfl: 3    metoprolol tartrate (LOPRESSOR) 100 mg tablet, Take 1 tablet (100 mg total) by mouth every 12 (twelve) hours, Disp: 180 tablet, Rfl: 3    Multiple Vitamins-Minerals (multivitamin with minerals) tablet, Take 1 tablet by mouth daily, Disp: , Rfl:     NON FORMULARY, Botox injections for HA every 3months (LD 3/20/23), Disp: , Rfl:     ondansetron (ZOFRAN) 4 mg tablet, Take 1 tablet (4 mg total) by mouth every 8 (eight) hours as needed for nausea or vomiting, Disp: 120 tablet, Rfl: 0    Ophthalmic Irrigation Solution (OCUSOFT EYE WASH OP), Apply 1 Application to eye daily at bedtime, Disp: , Rfl:     Polyethyl Glycol-Propyl Glycol (Systane) 0.4-0.3 % GEL, Apply 1 drop to eye 2 (two) times a day Using Ivizia as an alternative, Disp: , Rfl:     polyethylene glycol (MIRALAX) 17 g packet, Take 17 g by mouth daily, Disp: 1 each, Rfl: 0    pregabalin (LYRICA) 100 mg capsule, Take 1 capsule (100 mg total) by mouth 3 (three) times a day, Disp: 270 capsule, Rfl: 0    rimegepant sulfate (Nurtec) 75 mg TBDP, Place one tab (75mg total) under the tongue as needed for migraine., Disp: 16 tablet, Rfl: 11    sodium bicarbonate 650 mg tablet, Take 1 tablet (650 mg total) by mouth 3 (three) times a day, Disp: 360 tablet, Rfl: 3    tirzepatide (Mounjaro) 10 MG/0.5ML, Inject 0.5 mL (10 mg total) under the skin every 7 days, Disp: 6 mL, Rfl: 0    tiZANidine (ZANAFLEX) 4 mg tablet, Take 1 tablet (4 mg total) by mouth 3 (three) times a day, Disp: 360 tablet, Rfl: 0    verapamil (CALAN-SR) 120 mg CR tablet, Take 1 tablet (120 mg total) by mouth daily at bedtime, Disp: 90 tablet, Rfl: 3    benzonatate (TESSALON) 200 MG capsule, Take 1 capsule (200 mg total) by mouth 3 (three) times a day as needed for cough (Patient not taking: Reported on 3/28/2024), Disp: 20 capsule, Rfl: 0    cetirizine (ZyrTEC) oral solution, Take 5  "mL (5 mg total) by mouth daily (Patient not taking: Reported on 3/28/2024), Disp: 450 mL, Rfl: 1    ciclopirox (PENLAC) 8 % solution, Apply topically daily at bedtime (Patient not taking: Reported on 4/25/2024), Disp: 6.6 mL, Rfl: 1    cyproheptadine (PERIACTIN) 4 mg tablet, Take 1 tablet (4 mg total) by mouth daily at bedtime (Patient not taking: Reported on 11/3/2023), Disp: 90 tablet, Rfl: 1    diphenhydrAMINE (BENADRYL) 25 mg tablet, Take 1 tablet (25 mg total) by mouth every 6 (six) hours as needed for itching (Patient not taking: Reported on 11/3/2023), Disp: 30 tablet, Rfl: 0    Current Facility-Administered Medications:     cyanocobalamin injection 1,000 mcg, 1,000 mcg, Intramuscular, Weekly, Myla Norwood MD, 1,000 mcg at 05/16/24 1545      PHYSICAL EXAM:  Vitals:    05/23/24 1303   BP: 128/84   BP Location: Left arm   Patient Position: Sitting   Cuff Size: Standard   Pulse: 75   Weight: 86.6 kg (191 lb)   Height: 5' 1\" (1.549 m)         Body mass index is 36.09 kg/m².    Physical Exam  Constitutional:       General: She is not in acute distress.     Appearance: Normal appearance. She is well-developed.   HENT:      Head: Normocephalic and atraumatic.      Nose: Nose normal.      Mouth/Throat:      Mouth: Mucous membranes are moist.   Eyes:      General: No scleral icterus.     Conjunctiva/sclera: Conjunctivae normal.      Pupils: Pupils are equal, round, and reactive to light.   Neck:      Thyroid: No thyromegaly.      Vascular: No JVD.   Cardiovascular:      Rate and Rhythm: Normal rate and regular rhythm.      Heart sounds: Normal heart sounds. No murmur heard.     No friction rub.   Pulmonary:      Effort: Pulmonary effort is normal. No respiratory distress.      Breath sounds: Normal breath sounds. No wheezing or rales.   Abdominal:      General: Bowel sounds are normal. There is no distension.      Palpations: Abdomen is soft.      Tenderness: There is no abdominal tenderness.   Musculoskeletal:       "   General: No deformity.      Cervical back: Neck supple.      Right lower leg: No edema.      Left lower leg: No edema.   Skin:     General: Skin is warm and dry.      Findings: No rash.   Neurological:      Mental Status: She is alert and oriented to person, place, and time.   Psychiatric:         Mood and Affect: Mood normal.         Behavior: Behavior normal.         Thought Content: Thought content normal.         Lab Results:   Results for orders placed or performed in visit on 05/20/24   Basic metabolic panel   Result Value Ref Range    Sodium 143 135 - 147 mmol/L    Potassium 3.5 3.5 - 5.3 mmol/L    Chloride 110 (H) 96 - 108 mmol/L    CO2 22 21 - 32 mmol/L    ANION GAP 11 4 - 13 mmol/L    BUN 36 (H) 5 - 25 mg/dL    Creatinine 1.95 (H) 0.60 - 1.30 mg/dL    Glucose, Fasting 106 (H) 65 - 99 mg/dL    Calcium 8.7 8.4 - 10.2 mg/dL    eGFR 29 ml/min/1.73sq m   CBC   Result Value Ref Range    WBC 6.03 4.31 - 10.16 Thousand/uL    RBC 4.79 3.81 - 5.12 Million/uL    Hemoglobin 12.6 11.5 - 15.4 g/dL    Hematocrit 40.5 34.8 - 46.1 %    MCV 85 82 - 98 fL    MCH 26.3 (L) 26.8 - 34.3 pg    MCHC 31.1 (L) 31.4 - 37.4 g/dL    RDW 16.5 (H) 11.6 - 15.1 %    Platelets 150 149 - 390 Thousands/uL    MPV 12.9 (H) 8.9 - 12.7 fL   Phosphorus   Result Value Ref Range    Phosphorus 4.7 (H) 2.7 - 4.5 mg/dL   PTH, intact   Result Value Ref Range    .2 (H) 12.0 - 88.0 pg/mL     *Note: Due to a large number of results and/or encounters for the requested time period, some results have not been displayed. A complete set of results can be found in Results Review.     Lab Results:   Results from last 7 days   Lab Units 05/20/24  0927   WBC Thousand/uL 6.03   HEMOGLOBIN g/dL 12.6   HEMATOCRIT % 40.5   PLATELETS Thousands/uL 150   POTASSIUM mmol/L 3.5   CHLORIDE mmol/L 110*   CO2 mmol/L 22   BUN mg/dL 36*   CREATININE mg/dL 1.95*   CALCIUM mg/dL 8.7   PHOSPHORUS mg/dL 4.7*       Laboratory Results:  Lab Results   Component Value Date     "WBC 6.03 05/20/2024    HGB 12.6 05/20/2024    HCT 40.5 05/20/2024    MCV 85 05/20/2024     05/20/2024     Lab Results   Component Value Date    SODIUM 143 05/20/2024    K 3.5 05/20/2024     (H) 05/20/2024    CO2 22 05/20/2024    BUN 36 (H) 05/20/2024    CREATININE 1.95 (H) 05/20/2024    GLUC 111 03/28/2024    CALCIUM 8.7 05/20/2024     Lab Results   Component Value Date    CALCIUM 8.7 05/20/2024    PHOS 4.7 (H) 05/20/2024     No results found for: \"LABPROT\"  [unfilled]  Lab Results   Component Value Date    .2 (H) 05/20/2024    CALCIUM 8.7 05/20/2024    PHOS 4.7 (H) 05/20/2024     [unfilled]  "

## 2024-05-23 NOTE — PATIENT INSTRUCTIONS
-Renal Function is stable   -You have Chronic Kidney Disease Stage 4   -Avoid NSAIDs like Ibuprofen/Motrin, Naproxen/Aleve, Celebrex, meloxicam/Mobic, Diclofenac and other NSAIDs.  -Okay to take Acetaminophen/Tylenol if you do not have any liver problems  -Avoid IV contrast used for CT scan and cardiac catheterization.    -If plan for any study with IV contrast, please let me know so we could hydrate with fluids before and after IV contrast  -Dosage  of certain medications may need to be adjusted depending on Kidney function.     Blood pressure is stable    -Recommend 2 g sodium diet.    -Recommend daily exercise and weight loss  -Discussed home monitoring of BP and maintaining a log of blood pressure.  -Recommend goal BP less than 130/80. Please inform me if SBP below 110 or above 140's persistently.     Follow up: 5  months with repeat Lab work within a week of the scheduled office visit. Will discuss the results of the previsit Labs during the office visit.

## 2024-05-29 ENCOUNTER — PROCEDURE VISIT (OUTPATIENT)
Age: 49
End: 2024-05-29
Payer: COMMERCIAL

## 2024-05-29 VITALS
DIASTOLIC BLOOD PRESSURE: 80 MMHG | HEIGHT: 61 IN | SYSTOLIC BLOOD PRESSURE: 112 MMHG | HEART RATE: 68 BPM | BODY MASS INDEX: 36.06 KG/M2 | WEIGHT: 191 LBS

## 2024-05-29 DIAGNOSIS — M99.02 SEGMENTAL DYSFUNCTION OF THORACIC REGION: ICD-10-CM

## 2024-05-29 DIAGNOSIS — M99.03 SEGMENTAL DYSFUNCTION OF LUMBAR REGION: ICD-10-CM

## 2024-05-29 DIAGNOSIS — M99.01 SEGMENTAL DYSFUNCTION OF CERVICAL REGION: ICD-10-CM

## 2024-05-29 DIAGNOSIS — M48.062 SPINAL STENOSIS OF LUMBAR REGION WITH NEUROGENIC CLAUDICATION: Primary | ICD-10-CM

## 2024-05-29 DIAGNOSIS — M62.838 MUSCLE SPASM: ICD-10-CM

## 2024-05-29 DIAGNOSIS — M99.04 SEGMENTAL DYSFUNCTION OF SACRAL REGION: ICD-10-CM

## 2024-05-29 DIAGNOSIS — M47.812 CERVICAL SPONDYLOSIS: ICD-10-CM

## 2024-05-29 PROCEDURE — 98941 CHIROPRACT MANJ 3-4 REGIONS: CPT | Performed by: CHIROPRACTOR

## 2024-05-29 PROCEDURE — 97110 THERAPEUTIC EXERCISES: CPT | Performed by: CHIROPRACTOR

## 2024-05-29 NOTE — PROGRESS NOTES
Initial date of service: 5/6/24    Diagnoses and all orders for this visit:    Spinal stenosis of lumbar region with neurogenic claudication    Segmental dysfunction of sacral region    Muscle spasm    Segmental dysfunction of lumbar region    Segmental dysfunction of thoracic region    Segmental dysfunction of cervical region    Cervical spondylosis    Pt suffered mild exacerbation but responded to tx with reduced pain and increased ROM    TREATMENT: [id number]  Ther-ex: IASTM; discussed post procedure soreness and/or ecchymosis for up to 36 hrs, applied to affected mm hypertonicities; supine hamstring stretch, supine gluteal stretch, single knee to chest stretch, upper trap stretch, transitional mvmt education, abdominal bracing; greater than 15 min spent performing above mentioned ther-ex to improve ROM/flexibility. Cervical supine graded mobilization and traction, Thoracic mobilization/manipulation: prone P-A mob; Lumbar mobilization/manipulation: diversified side laying graded HVLA, flexion-traction; SIJ Manipulation/Mobilization: R/L SIJ HVLA - long axis distraction, mederos drop table maneuver to affected SIJ    HPI:  Kinza Meza is a 48 y.o. female  Chief Complaint   Patient presents with    Neck Pain     Neck pain left side 4     Back Pain     Low back 4 left side      Pt presents for tx for exacerbation of chronic neck/back pain. Pt has had back and neck surgery. Pt was utilizing cane and now using walker occasionally. 2022 MRI demonstrates multilevel degenerative changes of postsurgical lumbar spine status post L1-L2 posterior spinal fixation with varying degrees of canal stenosis (moderate L4-L5) and foraminal narrowing (mild left L1-L2 and left L2-L3 ). 2024 fl/ext xrays unremarkable for instability. 2021 C/S MRI demonstrates s/p ACDF from C4 to C7. Multilevel degenerative spondylosis as described. Possible ossification of the posterior longitudinal ligament at C2-3 and C6-7      Neck Pain    This is a chronic problem. The current episode started more than 1 year ago. The problem occurs constantly. The problem has been waxing and waning. The pain is present in the left side, midline and right side. The quality of the pain is described as aching. The symptoms are aggravated by bending, position, stress and twisting. The pain is Worse during the day.   Back Pain  This is a chronic problem. The current episode started more than 1 year ago. The problem occurs constantly. The problem has been waxing and waning since onset. The pain is present in the gluteal, lumbar spine, sacro-iliac and thoracic spine. The quality of the pain is described as aching, burning and stabbing. The pain radiates to the left thigh, left knee and right thigh. Pain scale: 4-9/10. The pain is Worse during the day. The symptoms are aggravated by standing, twisting and bending (walking, laying supine, transitional mvmts; palliative includes sitting, massage). Pertinent negatives include no bladder incontinence or bowel incontinence. Risk factors include lack of exercise.     Past Medical History:   Diagnosis Date    ADD (attention deficit disorder)     Allergic rhinitis     ALS (amyotrophic lateral sclerosis) ([facility]) 11/22    Per neurological surgeon    Anemia 12/2021    Anxiety     Arthritis     Bipolar disorder ([facility])     Cervical disc disorder with radiculopathy of cervical region     Chronic depression     Chronic kidney disease     Stage 1    Chronic pain disorder     Cluster headache     Colitis     Last assessed - 11/25/14    Colon polyp     Concussion 2018    Condyloma acuminatum     Last assessed - 3/19/14    CTS (carpal tunnel syndrome) 2001    Depression     Diabetes mellitus ([facility])     Last assessed - 11/25/14    Diabetic nephropathy ([facility]) 2001    Diabetic neuropathy ([facility])     Diabetic retinopathy ([facility]) 2001    Difficulty walking 07/22    Fibromyalgia     Fibromyalgia, primary     GERD (gastroesophageal reflux disease)     Head  injury 18    Fall    Headache(784.0) 1977    History of transfusion 2021    HTN (hypertension)     Last assessed - 14    Hyperlipidemia 2019    Hypertension     Intervertebral disc disorder with radiculopathy of lumbar region     Lupus (HCC)     Rhuematologist-Sheela Dasilva PA    Migraine     Miscarriage 1988    ,     Obesity 1980    Obesity, unspecified     Open wound of back 2023    Opioid abuse, in remission (HCC) 2022    Only while in hospital and for about 10 days once home instead of one week.    Osteoporosis     Peripheral neuropathy     Postoperative wound infection 2021    Stop not healed    Preeclampsia     Last assessed - 14    Rheumatoid arthritis (HCC)     Wears dentures       Past Surgical History:   Procedure Laterality Date    APPENDECTOMY      Last assessed - 14    BARIATRIC SURGERY  2016    CARPAL TUNNEL RELEASE      CATARACT EXTRACTION      CERVICAL FUSION N/A 2019    Procedure: Anterior cervical discectomy w fusion C4-5, C5-6, C6-7; allograft and neuromonitoring;  Surgeon: Jose Gomez MD;  [facility];  Service: Orthopedics     SECTION      Last assessed - 14    COLONOSCOPY      COLONOSCOPY      HEMORRHOID SURGERY      HERNIA REPAIR      Last assessed - 14    LUMBAR FUSION N/A 2021    Procedure: L1/2 laminectomy, discectomy with L1/2 MIS posterior fixation using neuromonitoring and neuronavigation;  Surgeon: Suhas Akbar MD;  [facility];  Service: Neurosurgery    LUMBAR WOUND EXPLORATION Bilateral 2021    Procedure: Lumbar wound washout, debridement and intraoperative cultures;  Surgeon: Suhas Akbar MD;  [facility];  Service: Neurosurgery    MAMMO (HISTORICAL)      NH DEBRIDEMENT BONE 1ST 20 SQ CM/< N/A 2023    Procedure: SURGICAL PREPARATION OF BACK  WOUND AND LOCAL FLAP, PREVENA PLACEMENT;  Surgeon: Ady Rocha MD;  Location:  MAIN OR;   Service: Plastics    WOOD-EN-Y PROCEDURE      ULNAR TUNNEL RELEASE      VAC DRESSING APPLICATION Bilateral 12/22/2021    Procedure: APPLICATION VAC DRESSING;  Surgeon: Elbert Hawley MD;  [facility];  Service: General    VAC DRESSING APPLICATION N/A 12/25/2021    Procedure: APPLICATION VAC DRESSING ABDOMEN/TRUNK;  Surgeon: Mani Ross DO;  [facility];  Service: General    VENTRAL HERNIA REPAIR       The following portions of the patient's history were reviewed and updated as appropriate: allergies, past family history, past medical history, past social history, past surgical history, and problem list.  Review of Systems   Gastrointestinal:  Negative for bowel incontinence.   Genitourinary:  Negative for bladder incontinence.   Musculoskeletal:  Positive for back pain and neck pain.     Physical Exam  Neck:        Comments: Pnful and limited in Brot, Blf  Musculoskeletal:      Cervical back: Pain with movement and muscular tenderness present. Decreased range of motion.      Thoracic back: Spasms and tenderness present. Decreased range of motion.      Lumbar back: Spasms and tenderness present. Decreased range of motion. Negative right straight leg raise test and negative left straight leg raise test.        Back:       Comments: Pnful and limited in Brot, Blf, Ext centralizes    Lymphadenopathy:      Cervical: No cervical adenopathy.   Skin:     General: Skin is warm and dry.   Neurological:      Mental Status: She is alert and oriented to person, place, and time.      Gait: Gait is intact.   Psychiatric:         Mood and Affect: Mood and affect normal.         Behavior: Behavior normal.       SOFT TISSUE ASSESSMENT Hypertonicity and tenderness palpated B upper traps, B SCM, B T10-S1 erector spinae, glute med/min, QL, hamstring JOINT RESTRICTIONS: C4/5, T1/2, T10-S1 and R/L SIJ    Return in about 1 week (around 6/5/2024) for Next scheduled follow up.

## 2024-06-03 DIAGNOSIS — E11.65 UNCONTROLLED TYPE 2 DIABETES MELLITUS WITH HYPERGLYCEMIA (HCC): ICD-10-CM

## 2024-06-05 ENCOUNTER — PROCEDURE VISIT (OUTPATIENT)
Age: 49
End: 2024-06-05
Payer: COMMERCIAL

## 2024-06-05 VITALS — BODY MASS INDEX: 36.06 KG/M2 | HEIGHT: 61 IN | WEIGHT: 191 LBS

## 2024-06-05 DIAGNOSIS — M99.02 SEGMENTAL DYSFUNCTION OF THORACIC REGION: ICD-10-CM

## 2024-06-05 DIAGNOSIS — M99.04 SEGMENTAL DYSFUNCTION OF SACRAL REGION: ICD-10-CM

## 2024-06-05 DIAGNOSIS — M48.062 SPINAL STENOSIS OF LUMBAR REGION WITH NEUROGENIC CLAUDICATION: Primary | ICD-10-CM

## 2024-06-05 DIAGNOSIS — M99.01 SEGMENTAL DYSFUNCTION OF CERVICAL REGION: ICD-10-CM

## 2024-06-05 DIAGNOSIS — M62.838 MUSCLE SPASM: ICD-10-CM

## 2024-06-05 DIAGNOSIS — M47.812 CERVICAL SPONDYLOSIS: ICD-10-CM

## 2024-06-05 DIAGNOSIS — M99.03 SEGMENTAL DYSFUNCTION OF LUMBAR REGION: ICD-10-CM

## 2024-06-05 PROCEDURE — 98941 CHIROPRACT MANJ 3-4 REGIONS: CPT | Performed by: CHIROPRACTOR

## 2024-06-05 PROCEDURE — 97110 THERAPEUTIC EXERCISES: CPT | Performed by: CHIROPRACTOR

## 2024-06-06 NOTE — PROGRESS NOTES
Initial date of service: 5/6/24    Diagnoses and all orders for this visit:    Spinal stenosis of lumbar region with neurogenic claudication    Segmental dysfunction of sacral region    Muscle spasm    Segmental dysfunction of lumbar region    Segmental dysfunction of thoracic region    Segmental dysfunction of cervical region    Cervical spondylosis    Pt suffered mild exacerbation but responded to tx with reduced pain and increased ROM    TREATMENT: [id number]  Ther-ex: IASTM; discussed post procedure soreness and/or ecchymosis for up to 36 hrs, applied to affected mm hypertonicities; supine hamstring stretch, supine gluteal stretch, single knee to chest stretch, upper trap stretch, transitional mvmt education, abdominal bracing; greater than 15 min spent performing above mentioned ther-ex to improve ROM/flexibility. Cervical supine graded mobilization and traction, Thoracic mobilization/manipulation: prone P-A mob; Lumbar mobilization/manipulation: diversified side laying graded HVLA, flexion-traction; SIJ Manipulation/Mobilization: R/L SIJ HVLA - long axis distraction, mederos drop table maneuver to affected SIJ    HPI:  Kinza Meza is a 48 y.o. female  Chief Complaint   Patient presents with    Back Pain     Low back hurts the worst today 5 left side     Neck Pain     About a 3 in the center      Pt presents for tx for exacerbation of chronic neck/back pain. Pt has had back and neck surgery. Pt was utilizing cane and now using walker occasionally. 2022 MRI demonstrates multilevel degenerative changes of postsurgical lumbar spine status post L1-L2 posterior spinal fixation with varying degrees of canal stenosis (moderate L4-L5) and foraminal narrowing (mild left L1-L2 and left L2-L3 ). 2024 fl/ext xrays unremarkable for instability. 2021 C/S MRI demonstrates s/p ACDF from C4 to C7. Multilevel degenerative spondylosis as described. Possible ossification of the posterior longitudinal ligament at C2-3 and  C6-7      Neck Pain   This is a chronic problem. The current episode started more than 1 year ago. The problem occurs constantly. The problem has been waxing and waning. The pain is present in the left side, midline and right side. The quality of the pain is described as aching. The symptoms are aggravated by bending, position, stress and twisting. The pain is Worse during the day.   Back Pain  This is a chronic problem. The current episode started more than 1 year ago. The problem occurs constantly. The problem has been waxing and waning since onset. The pain is present in the gluteal, lumbar spine, sacro-iliac and thoracic spine. The quality of the pain is described as aching, burning and stabbing. The pain radiates to the left thigh, left knee and right thigh. Pain scale: 4-9/10. The pain is Worse during the day. The symptoms are aggravated by standing, twisting and bending (walking, laying supine, transitional mvmts; palliative includes sitting, massage). Pertinent negatives include no bladder incontinence or bowel incontinence. Risk factors include lack of exercise.     Past Medical History:   Diagnosis Date    ADD (attention deficit disorder)     Allergic rhinitis     ALS (amyotrophic lateral sclerosis) ([facility]) 11/22    Per neurological surgeon    Anemia 12/2021    Anxiety     Arthritis     Bipolar disorder ([facility])     Cervical disc disorder with radiculopathy of cervical region     Chronic depression     Chronic kidney disease     Stage 1    Chronic pain disorder     Cluster headache     Colitis     Last assessed - 11/25/14    Colon polyp     Concussion 2018    Condyloma acuminatum     Last assessed - 3/19/14    CTS (carpal tunnel syndrome) 2001    Depression     Diabetes mellitus ([facility])     Last assessed - 11/25/14    Diabetic nephropathy ([facility]) 2001    Diabetic neuropathy ([facility])     Diabetic retinopathy ([facility]) 2001    Difficulty walking 07/22    Fibromyalgia     Fibromyalgia, primary     GERD (gastroesophageal  reflux disease)     Head injury 18    Fall    Headache(784.0) 1977    History of transfusion 2021    HTN (hypertension)     Last assessed - 14    Hyperlipidemia 2019    Hypertension     Intervertebral disc disorder with radiculopathy of lumbar region     Lupus (HCC)     Rhuematologist-Sheela Dasilva PA    Migraine     Miscarriage 1988    ,     Obesity 1980    Obesity, unspecified     Open wound of back 2023    Opioid abuse, in remission (HCC) 2022    Only while in hospital and for about 10 days once home instead of one week.    Osteoporosis     Peripheral neuropathy     Postoperative wound infection 2021    Stop not healed    Preeclampsia     Last assessed - 14    Rheumatoid arthritis (HCC)     Wears dentures       Past Surgical History:   Procedure Laterality Date    APPENDECTOMY      Last assessed - 14    BARIATRIC SURGERY  2016    CARPAL TUNNEL RELEASE      CATARACT EXTRACTION      CERVICAL FUSION N/A 2019    Procedure: Anterior cervical discectomy w fusion C4-5, C5-6, C6-7; allograft and neuromonitoring;  Surgeon: Jose Gomez MD;  [facility];  Service: Orthopedics     SECTION      Last assessed - 14    COLONOSCOPY      COLONOSCOPY      HEMORRHOID SURGERY      HERNIA REPAIR      Last assessed - 14    LUMBAR FUSION N/A 2021    Procedure: L1/2 laminectomy, discectomy with L1/2 MIS posterior fixation using neuromonitoring and neuronavigation;  Surgeon: Suhas Akbar MD;  [facility];  Service: Neurosurgery    LUMBAR WOUND EXPLORATION Bilateral 2021    Procedure: Lumbar wound washout, debridement and intraoperative cultures;  Surgeon: Suhas Akbar MD;  [facility];  Service: Neurosurgery    MAMMO (HISTORICAL)      NC DEBRIDEMENT BONE 1ST 20 SQ CM/< N/A 2023    Procedure: SURGICAL PREPARATION OF BACK  WOUND AND LOCAL FLAP, PREVENA PLACEMENT;  Surgeon: Ady Rocha MD;   [facility];  Service: Plastics    WOOD-EN-Y PROCEDURE      ULNAR TUNNEL RELEASE      VAC DRESSING APPLICATION Bilateral 12/22/2021    Procedure: APPLICATION VAC DRESSING;  Surgeon: Elbert Hawley MD;  [facility];  Service: General    VAC DRESSING APPLICATION N/A 12/25/2021    Procedure: APPLICATION VAC DRESSING ABDOMEN/TRUNK;  Surgeon: Mani Ross DO;  [facility];  Service: General    VENTRAL HERNIA REPAIR       The following portions of the patient's history were reviewed and updated as appropriate: allergies, past family history, past medical history, past social history, past surgical history, and problem list.  Review of Systems   Gastrointestinal:  Negative for bowel incontinence.   Genitourinary:  Negative for bladder incontinence.   Musculoskeletal:  Positive for back pain and neck pain.     Physical Exam  Neck:        Comments: Pnful and limited in Brot, Blf  Musculoskeletal:      Cervical back: Pain with movement and muscular tenderness present. Decreased range of motion.      Thoracic back: Spasms and tenderness present. Decreased range of motion.      Lumbar back: Spasms and tenderness present. Decreased range of motion. Negative right straight leg raise test and negative left straight leg raise test.        Back:       Comments: Pnful and limited in Brot, Blf, Ext centralizes    Lymphadenopathy:      Cervical: No cervical adenopathy.   Skin:     General: Skin is warm and dry.   Neurological:      Mental Status: She is alert and oriented to person, place, and time.      Gait: Gait is intact.   Psychiatric:         Mood and Affect: Mood and affect normal.         Behavior: Behavior normal.       SOFT TISSUE ASSESSMENT Hypertonicity and tenderness palpated B upper traps, B SCM, B T10-S1 erector spinae, glute med/min, QL, hamstring JOINT RESTRICTIONS: C4/5, T1/2, T10-S1 and R/L SIJ    Return in about 1 week (around 6/12/2024) for Next scheduled follow up.

## 2024-06-08 DIAGNOSIS — G93.2 IIH (IDIOPATHIC INTRACRANIAL HYPERTENSION): ICD-10-CM

## 2024-06-10 RX ORDER — ACETAZOLAMIDE 250 MG/1
TABLET ORAL
Qty: 180 TABLET | Refills: 1 | Status: SHIPPED | OUTPATIENT
Start: 2024-06-10

## 2024-06-12 ENCOUNTER — PROCEDURE VISIT (OUTPATIENT)
Age: 49
End: 2024-06-12
Payer: COMMERCIAL

## 2024-06-12 VITALS
DIASTOLIC BLOOD PRESSURE: 83 MMHG | HEART RATE: 80 BPM | HEIGHT: 61 IN | BODY MASS INDEX: 36.06 KG/M2 | SYSTOLIC BLOOD PRESSURE: 123 MMHG | WEIGHT: 191 LBS

## 2024-06-12 DIAGNOSIS — M62.838 MUSCLE SPASM: ICD-10-CM

## 2024-06-12 DIAGNOSIS — M48.062 SPINAL STENOSIS OF LUMBAR REGION WITH NEUROGENIC CLAUDICATION: Primary | ICD-10-CM

## 2024-06-12 DIAGNOSIS — M99.03 SEGMENTAL DYSFUNCTION OF LUMBAR REGION: ICD-10-CM

## 2024-06-12 DIAGNOSIS — M99.04 SEGMENTAL DYSFUNCTION OF SACRAL REGION: ICD-10-CM

## 2024-06-12 DIAGNOSIS — M99.01 SEGMENTAL DYSFUNCTION OF CERVICAL REGION: ICD-10-CM

## 2024-06-12 DIAGNOSIS — M99.02 SEGMENTAL DYSFUNCTION OF THORACIC REGION: ICD-10-CM

## 2024-06-12 DIAGNOSIS — M47.812 CERVICAL SPONDYLOSIS: ICD-10-CM

## 2024-06-12 PROCEDURE — 98941 CHIROPRACT MANJ 3-4 REGIONS: CPT | Performed by: CHIROPRACTOR

## 2024-06-12 PROCEDURE — 97110 THERAPEUTIC EXERCISES: CPT | Performed by: CHIROPRACTOR

## 2024-06-13 DIAGNOSIS — I12.9 BENIGN HYPERTENSION WITH CHRONIC KIDNEY DISEASE, STAGE III (HCC): ICD-10-CM

## 2024-06-13 DIAGNOSIS — G43.009 MIGRAINE WITHOUT AURA AND WITHOUT STATUS MIGRAINOSUS, NOT INTRACTABLE: ICD-10-CM

## 2024-06-13 DIAGNOSIS — E78.5 HYPERLIPIDEMIA, UNSPECIFIED: ICD-10-CM

## 2024-06-13 DIAGNOSIS — I10 ESSENTIAL HYPERTENSION, BENIGN: ICD-10-CM

## 2024-06-13 DIAGNOSIS — N18.30 BENIGN HYPERTENSION WITH CHRONIC KIDNEY DISEASE, STAGE III (HCC): ICD-10-CM

## 2024-06-13 RX ORDER — ATORVASTATIN CALCIUM 10 MG/1
10 TABLET, FILM COATED ORAL
Qty: 90 TABLET | Refills: 0 | Status: SHIPPED | OUTPATIENT
Start: 2024-06-13

## 2024-06-13 RX ORDER — RIMEGEPANT SULFATE 75 MG/75MG
TABLET, ORALLY DISINTEGRATING ORAL
Qty: 16 TABLET | Refills: 11 | Status: SHIPPED | OUTPATIENT
Start: 2024-06-13

## 2024-06-13 RX ORDER — METOPROLOL TARTRATE 100 MG/1
100 TABLET ORAL EVERY 12 HOURS SCHEDULED
Qty: 180 TABLET | Refills: 0 | Status: SHIPPED | OUTPATIENT
Start: 2024-06-13

## 2024-06-14 RX ORDER — LOSARTAN POTASSIUM AND HYDROCHLOROTHIAZIDE 12.5; 1 MG/1; MG/1
1 TABLET ORAL DAILY
Qty: 90 TABLET | Refills: 0 | Status: SHIPPED | OUTPATIENT
Start: 2024-06-14

## 2024-06-24 ENCOUNTER — PROCEDURE VISIT (OUTPATIENT)
Age: 49
End: 2024-06-24
Payer: COMMERCIAL

## 2024-06-24 VITALS
DIASTOLIC BLOOD PRESSURE: 78 MMHG | HEIGHT: 61 IN | WEIGHT: 191 LBS | SYSTOLIC BLOOD PRESSURE: 109 MMHG | BODY MASS INDEX: 36.06 KG/M2 | HEART RATE: 73 BPM

## 2024-06-24 DIAGNOSIS — M99.01 SEGMENTAL DYSFUNCTION OF CERVICAL REGION: ICD-10-CM

## 2024-06-24 DIAGNOSIS — M99.04 SEGMENTAL DYSFUNCTION OF SACRAL REGION: ICD-10-CM

## 2024-06-24 DIAGNOSIS — M99.02 SEGMENTAL DYSFUNCTION OF THORACIC REGION: ICD-10-CM

## 2024-06-24 DIAGNOSIS — M47.812 CERVICAL SPONDYLOSIS: ICD-10-CM

## 2024-06-24 DIAGNOSIS — M99.03 SEGMENTAL DYSFUNCTION OF LUMBAR REGION: ICD-10-CM

## 2024-06-24 DIAGNOSIS — M48.062 SPINAL STENOSIS OF LUMBAR REGION WITH NEUROGENIC CLAUDICATION: Primary | ICD-10-CM

## 2024-06-24 DIAGNOSIS — M62.838 MUSCLE SPASM: ICD-10-CM

## 2024-06-24 PROCEDURE — 98941 CHIROPRACT MANJ 3-4 REGIONS: CPT | Performed by: CHIROPRACTOR

## 2024-06-24 PROCEDURE — 97110 THERAPEUTIC EXERCISES: CPT | Performed by: CHIROPRACTOR

## 2024-06-24 NOTE — PROGRESS NOTES
Initial date of service: 5/6/24    Diagnoses and all orders for this visit:    Spinal stenosis of lumbar region with neurogenic claudication    Segmental dysfunction of sacral region    Muscle spasm    Cervical spondylosis    Segmental dysfunction of lumbar region    Segmental dysfunction of thoracic region    Segmental dysfunction of cervical region    Pt suffered mild exacerbation but responded to tx with reduced pain and increased ROM. Overall improving with more mobility    TREATMENT: [id number]  Ther-ex: IASTM; discussed post procedure soreness and/or ecchymosis for up to 36 hrs, applied to affected mm hypertonicities; supine hamstring stretch, supine gluteal stretch, single knee to chest stretch, upper trap stretch, transitional mvmt education, abdominal bracing; greater than 15 min spent performing above mentioned ther-ex to improve ROM/flexibility. Cervical supine graded mobilization and traction, Thoracic mobilization/manipulation: prone P-A mob; Lumbar mobilization/manipulation: diversified side laying graded HVLA, flexion-traction; SIJ Manipulation/Mobilization: R/L SIJ HVLA - long axis distraction, mederos drop table maneuver to affected SIJ    HPI:  Kinza Meza is a 48 y.o. female  Chief Complaint   Patient presents with    Neck Pain     About a 4 bilateral     Back Pain     Low back about a 3 left side is worse      Pt presents for tx for exacerbation of chronic neck/back pain. Pt has had back and neck surgery. Pt was utilizing cane and now using walker occasionally. 2022 MRI demonstrates multilevel degenerative changes of postsurgical lumbar spine status post L1-L2 posterior spinal fixation with varying degrees of canal stenosis (moderate L4-L5) and foraminal narrowing (mild left L1-L2 and left L2-L3 ). 2024 fl/ext xrays unremarkable for instability. 2021 C/S MRI demonstrates s/p ACDF from C4 to C7. Multilevel degenerative spondylosis as described. Possible ossification of the posterior  longitudinal ligament at C2-3 and C6-7      Neck Pain   This is a chronic problem. The current episode started more than 1 year ago. The problem occurs constantly. The problem has been waxing and waning. The pain is present in the left side, midline and right side. The quality of the pain is described as aching. The symptoms are aggravated by bending, position, stress and twisting. The pain is Worse during the day.   Back Pain  This is a chronic problem. The current episode started more than 1 year ago. The problem occurs constantly. The problem has been waxing and waning since onset. The pain is present in the gluteal, lumbar spine, sacro-iliac and thoracic spine. The quality of the pain is described as aching, burning and stabbing. The pain radiates to the left thigh, left knee and right thigh. Pain scale: 4-9/10. The pain is Worse during the day. The symptoms are aggravated by standing, twisting and bending (walking, laying supine, transitional mvmts; palliative includes sitting, massage). Pertinent negatives include no bladder incontinence or bowel incontinence. Risk factors include lack of exercise.     Past Medical History:   Diagnosis Date    ADD (attention deficit disorder)     Allergic rhinitis     ALS (amyotrophic lateral sclerosis) ([facility]) 11/22    Per neurological surgeon    Anemia 12/2021    Anxiety     Arthritis     Bipolar disorder ([facility])     Cervical disc disorder with radiculopathy of cervical region     Chronic depression     Chronic kidney disease     Stage 1    Chronic pain disorder     Cluster headache     Colitis     Last assessed - 11/25/14    Colon polyp     Concussion 2018    Condyloma acuminatum     Last assessed - 3/19/14    CTS (carpal tunnel syndrome) 2001    Depression     Diabetes mellitus ([facility])     Last assessed - 11/25/14    Diabetic nephropathy ([facility]) 2001    Diabetic neuropathy ([facility])     Diabetic retinopathy ([facility]) 2001    Difficulty walking 07/22    Fibromyalgia     Fibromyalgia,  primary     GERD (gastroesophageal reflux disease)     Head injury 18    Fall    Headache(784.0) 1977    History of transfusion 2021    HTN (hypertension)     Last assessed - 14    Hyperlipidemia 2019    Hypertension     Intervertebral disc disorder with radiculopathy of lumbar region     Lupus (HCC)     Rhuematologist-Sheela Dasilva, PA    Migraine     Miscarriage 1988    ,     Obesity 1980    Obesity, unspecified     Open wound of back 2023    Opioid abuse, in remission (HCC) 2022    Only while in hospital and for about 10 days once home instead of one week.    Osteoporosis     Peripheral neuropathy     Postoperative wound infection 2021    Stop not healed    Preeclampsia     Last assessed - 14    Rheumatoid arthritis (HCC)     Wears dentures       Past Surgical History:   Procedure Laterality Date    APPENDECTOMY      Last assessed - 14    BARIATRIC SURGERY  2016    CARPAL TUNNEL RELEASE      CATARACT EXTRACTION      CERVICAL FUSION N/A 2019    Procedure: Anterior cervical discectomy w fusion C4-5, C5-6, C6-7; allograft and neuromonitoring;  Surgeon: Jose Gomez MD;  [facility];  Service: Orthopedics     SECTION      Last assessed - 14    COLONOSCOPY      COLONOSCOPY      HEMORRHOID SURGERY      HERNIA REPAIR      Last assessed - 14    LUMBAR FUSION N/A 2021    Procedure: L1/2 laminectomy, discectomy with L1/2 MIS posterior fixation using neuromonitoring and neuronavigation;  Surgeon: Suhas Akbar MD;  [facility];  Service: Neurosurgery    LUMBAR WOUND EXPLORATION Bilateral 2021    Procedure: Lumbar wound washout, debridement and intraoperative cultures;  Surgeon: Suhas Akbar MD;  [facility];  Service: Neurosurgery    MAMMO (HISTORICAL)  2016    MO DEBRIDEMENT BONE 1ST 20 SQ CM/< N/A 2023    Procedure: SURGICAL PREPARATION OF BACK  WOUND AND LOCAL FLAP, PREVENA PLACEMENT;   Surgeon: Ady Rocha MD;  Location:  MAIN OR;  Service: Plastics    WOOD-EN-Y PROCEDURE      ULNAR TUNNEL RELEASE      VAC DRESSING APPLICATION Bilateral 12/22/2021    Procedure: APPLICATION VAC DRESSING;  Surgeon: Elbert Hawley MD;  [facility];  Service: General    VAC DRESSING APPLICATION N/A 12/25/2021    Procedure: APPLICATION VAC DRESSING ABDOMEN/TRUNK;  Surgeon: Mani Ross DO;  [facility];  Service: General    VENTRAL HERNIA REPAIR       The following portions of the patient's history were reviewed and updated as appropriate: allergies, past family history, past medical history, past social history, past surgical history, and problem list.  Review of Systems   Gastrointestinal:  Negative for bowel incontinence.   Genitourinary:  Negative for bladder incontinence.   Musculoskeletal:  Positive for back pain and neck pain.     Physical Exam  Neck:        Comments: Pnful and limited in Brot, Blf  Musculoskeletal:      Cervical back: Pain with movement and muscular tenderness present. Decreased range of motion.      Thoracic back: Spasms and tenderness present. Decreased range of motion.      Lumbar back: Spasms and tenderness present. Decreased range of motion. Negative right straight leg raise test and negative left straight leg raise test.        Back:       Comments: Pnful and limited in Brot, Blf, Ext centralizes    Lymphadenopathy:      Cervical: No cervical adenopathy.   Skin:     General: Skin is warm and dry.   Neurological:      Mental Status: She is alert and oriented to person, place, and time.      Gait: Gait is intact.   Psychiatric:         Mood and Affect: Mood and affect normal.         Behavior: Behavior normal.       SOFT TISSUE ASSESSMENT Hypertonicity and tenderness palpated B upper traps, B SCM, B T10-S1 erector spinae, glute med/min, QL, hamstring JOINT RESTRICTIONS: C4/5, T1/2, T10-S1 and R/L SIJ    Return in about 1 week (around 7/1/2024) for Next  scheduled follow up.

## 2024-07-01 ENCOUNTER — OFFICE VISIT (OUTPATIENT)
Dept: INTERNAL MEDICINE CLINIC | Facility: CLINIC | Age: 49
End: 2024-07-01
Payer: COMMERCIAL

## 2024-07-01 VITALS
TEMPERATURE: 98.1 F | BODY MASS INDEX: 33.99 KG/M2 | DIASTOLIC BLOOD PRESSURE: 82 MMHG | SYSTOLIC BLOOD PRESSURE: 122 MMHG | HEART RATE: 85 BPM | OXYGEN SATURATION: 99 % | WEIGHT: 180 LBS | HEIGHT: 61 IN

## 2024-07-01 DIAGNOSIS — F11.20 CONTINUOUS OPIOID DEPENDENCE (HCC): ICD-10-CM

## 2024-07-01 DIAGNOSIS — M51.16 INTERVERTEBRAL DISC DISORDERS WITH RADICULOPATHY, LUMBAR REGION: ICD-10-CM

## 2024-07-01 DIAGNOSIS — N18.32 STAGE 3B CHRONIC KIDNEY DISEASE (HCC): ICD-10-CM

## 2024-07-01 DIAGNOSIS — F90.0 ATTENTION DEFICIT HYPERACTIVITY DISORDER (ADHD), PREDOMINANTLY INATTENTIVE TYPE: ICD-10-CM

## 2024-07-01 DIAGNOSIS — E78.2 MIXED HYPERLIPIDEMIA: ICD-10-CM

## 2024-07-01 DIAGNOSIS — N25.81 SECONDARY HYPERPARATHYROIDISM OF RENAL ORIGIN (HCC): ICD-10-CM

## 2024-07-01 DIAGNOSIS — E53.8 B12 DEFICIENCY: ICD-10-CM

## 2024-07-01 DIAGNOSIS — F34.1 DYSTHYMIC DISORDER: ICD-10-CM

## 2024-07-01 DIAGNOSIS — E11.65 UNCONTROLLED TYPE 2 DIABETES MELLITUS WITH HYPERGLYCEMIA (HCC): Primary | ICD-10-CM

## 2024-07-01 DIAGNOSIS — Z01.00 DIABETIC EYE EXAM (HCC): ICD-10-CM

## 2024-07-01 DIAGNOSIS — Z00.00 ROUTINE GENERAL MEDICAL EXAMINATION AT A HEALTH CARE FACILITY: ICD-10-CM

## 2024-07-01 DIAGNOSIS — E11.21 DIABETIC NEPHROPATHY ASSOCIATED WITH TYPE 2 DIABETES MELLITUS (HCC): ICD-10-CM

## 2024-07-01 DIAGNOSIS — F33.9 DEPRESSION, RECURRENT (HCC): ICD-10-CM

## 2024-07-01 DIAGNOSIS — E11.9 DIABETIC EYE EXAM (HCC): ICD-10-CM

## 2024-07-01 DIAGNOSIS — G43.709 CHRONIC MIGRAINE WITHOUT AURA WITHOUT STATUS MIGRAINOSUS, NOT INTRACTABLE: ICD-10-CM

## 2024-07-01 DIAGNOSIS — S06.0X9A CONCUSSION WITH MODERATE (1-24 HOURS) LOSS OF CONSCIOUSNESS: ICD-10-CM

## 2024-07-01 DIAGNOSIS — L03.031 CELLULITIS OF RIGHT TOE: ICD-10-CM

## 2024-07-01 DIAGNOSIS — G83.4 CAUDA EQUINA SYNDROME (HCC): ICD-10-CM

## 2024-07-01 DIAGNOSIS — I10 PRIMARY HYPERTENSION: ICD-10-CM

## 2024-07-01 DIAGNOSIS — M50.10 CERVICAL DISC DISORDER WITH RADICULOPATHY: ICD-10-CM

## 2024-07-01 PROCEDURE — 99396 PREV VISIT EST AGE 40-64: CPT | Performed by: INTERNAL MEDICINE

## 2024-07-01 PROCEDURE — 99214 OFFICE O/P EST MOD 30 MIN: CPT | Performed by: INTERNAL MEDICINE

## 2024-07-01 PROCEDURE — 96372 THER/PROPH/DIAG INJ SC/IM: CPT

## 2024-07-01 RX ORDER — CYANOCOBALAMIN 1000 UG/ML
1000 INJECTION, SOLUTION INTRAMUSCULAR; SUBCUTANEOUS WEEKLY
Qty: 4 ML | Refills: 3 | Status: SHIPPED | OUTPATIENT
Start: 2024-07-01

## 2024-07-01 RX ORDER — CEPHALEXIN 500 MG/1
500 CAPSULE ORAL EVERY 8 HOURS SCHEDULED
Qty: 21 CAPSULE | Refills: 0 | Status: SHIPPED | OUTPATIENT
Start: 2024-07-01 | End: 2024-07-08

## 2024-07-01 RX ADMIN — CYANOCOBALAMIN 1000 MCG: 1000 INJECTION, SOLUTION INTRAMUSCULAR; SUBCUTANEOUS at 10:07

## 2024-07-01 NOTE — PROGRESS NOTES
"Adult Annual Physical  Name: Kinza Meza      : 1975      MRN: [id number]  Encounter Provider: Myla Norwood MD  Encounter Date: 2024   Encounter department: [facility] INTERNAL MEDICINE [facility]    Assessment & Plan   1. Uncontrolled type 2 diabetes mellitus with hyperglycemia (HCC)  Comments:  Continue same medication  Orders:  -     Albumin / creatinine urine ratio; Future  -     CBC and differential; Future  -     Comprehensive metabolic panel; Future  -     Lipid Panel with Direct LDL reflex; Future  -     TSH, 3rd generation; Future  -     Hemoglobin A1C; Future  2. Cellulitis of right toe  -     cephalexin (KEFLEX) 500 mg capsule; Take 1 capsule (500 mg total) by mouth every 8 (eight) hours for 7 days  3. Chronic migraine without aura without status migrainosus, not intractable  Comments:  Continue same medication  4. Primary hypertension  Comments:  Continue same medication  5. Diabetic nephropathy associated with type 2 diabetes mellitus (HCC)  6. Diabetic eye exam (HCC)  -      Diabetes Eye Exam  7. Secondary hyperparathyroidism of renal origin (HCC)  8. Intervertebral disc disorders with radiculopathy, lumbar region  9. Concussion with moderate (1-24 hours) loss of consciousness  10. Cervical disc disorder with radiculopathy  11. Stage 3b chronic kidney disease (HCC)  12. Dysthymic disorder  13. Depression, recurrent (HCC)  14. Attention deficit hyperactivity disorder (ADHD), predominantly inattentive type  15. Mixed hyperlipidemia  16. Cauda equina syndrome (HCC)  17. Continuous opioid dependence (HCC)  18. Routine general medical examination at a health care facility  19. B12 deficiency  -     Vitamin B12; Future  -     cyanocobalamin 1,000 mcg/mL; Inject 1 mL (1,000 mcg total) into a muscle once a week  -     Syringe/Needle, Disp, (SYRINGE 3CC/23MJ0-4/2\") 25G X 1-1/2\" 3 ML MISC; Use once a week    Immunizations and preventive care screenings were discussed with " patient today. Appropriate education was printed on patient's after visit summary.    Counseling:  Exercise: the importance of regular exercise/physical activity was discussed. Recommend exercise 3-5 times per week for at least 30 minutes.          History of Present Illness     Adult Annual Physical:  Patient presents for annual physical. physical.     Diet and Physical Activity:  - Diet/Nutrition: diabetic diet.  - Exercise: moderate cardiovascular exercise.    General Health:  - Sleep: sleeps well.  - Hearing: normal hearing bilateral ears.  - Vision: vision problems. seeing eye dr  - Dental: regular dental visits.    /GYN Health:  - Follows with GYN: yes.   - History of STDs: no    Advanced Care Planning:  - Has an advanced directive?: yes    - Has a durable medical POA?: yes    - ACP document given to patient?: no      Review of Systems   Constitutional:  Positive for fatigue. Negative for chills and fever.   HENT:  Negative for congestion, ear pain and sore throat.    Eyes:  Negative for pain.   Respiratory:  Negative for cough and shortness of breath.    Cardiovascular:  Negative for chest pain and leg swelling.   Gastrointestinal:  Negative for abdominal pain, nausea and vomiting.   Endocrine: Negative for polydipsia, polyphagia and polyuria.   Genitourinary:  Negative for difficulty urinating, frequency and urgency.   Musculoskeletal:  Negative for arthralgias and back pain.   Skin:  Positive for rash. Negative for pallor.   Neurological:  Positive for weakness and headaches. Negative for dizziness, tremors, seizures and speech difficulty.   Psychiatric/Behavioral:  Negative for confusion and sleep disturbance. The patient is not nervous/anxious.    Answers submitted by the patient for this visit:  Diabetes Questionnaire (Submitted on 7/1/2024)  Chief Complaint: Diabetes problem  Diabetes type: type 2  MedicAlert ID: No  Disease duration: 27 Years  blurred vision: No  foot paresthesias: Yes  foot  ulcerations: Yes  visual change: No  weight loss: No  Symptom course: stable  hunger: No  mood changes: No  sleepiness: No  sweats: No  blackouts: No  hospitalization: No  nocturnal hypoglycemia: No  required assistance: No  required glucagon: No  CVA: No  heart disease: No  impotence: No  nephropathy: Yes  peripheral neuropathy: Yes  PVD: Yes  retinopathy: Yes  CAD risks: family history, hypertension, obesity, sedentary lifestyle, stress  Current treatments: insulin injections  Treatment compliance: most of the time  Dose schedule: pre-breakfast, pre-lunch, pre-dinner, at bedtime  Given by: patient  Injection sites: abdominal wall  Home blood tests: 5+ x per day  Home urines: <1 x per month  Monitoring compliance: good  Blood glucose trend: no change  breakfast time: 7-8 am  breakfast glucose level: 70-90  lunch time: 11-12 pm  lunch glucose level:   dinner time: 6-7 pm  dinner glucose level:   Bedtime: 9-10 pm  Bedtime glucose level: 110-130  High score: 180-200  Overall:   Weight trend: fluctuating minimally  Current diet: diabetic  Meal planning: carbohydrate counting  Exercise: intermittently  Dietitian visit: No  Eye exam current: Yes  Sees podiatrist: Yes    Medical History Reviewed by provider this encounter:  Meds       Past Medical History   Past Medical History:   Diagnosis Date    ADD (attention deficit disorder)     Allergic rhinitis     ALS (amyotrophic lateral sclerosis) ([facility]) 11/22    Per neurological surgeon    Anemia 12/2021    Anxiety     Arthritis     Bipolar disorder ([facility])     Cervical disc disorder with radiculopathy of cervical region     Chronic depression     Chronic kidney disease     Stage 1    Chronic pain disorder     Cluster headache     Colitis     Last assessed - 11/25/14    Colon polyp     Concussion 2018    Condyloma acuminatum     Last assessed - 3/19/14    CTS (carpal tunnel syndrome) 2001    Depression     Diabetes mellitus ([facility])     Last assessed - 11/25/14     Diabetic nephropathy (HCC)     Diabetic neuropathy ([facility])     Diabetic retinopathy ([facility])     Difficulty walking     Fibromyalgia     Fibromyalgia, primary     GERD (gastroesophageal reflux disease)     Head injury 18    Fall    Headache(784.0) 1977    History of transfusion 2021    HTN (hypertension)     Last assessed - 14    Hyperlipidemia 2019    Hypertension     Intervertebral disc disorder with radiculopathy of lumbar region     Lupus ([facility])     Rhuematologist-Sheela Dasilva, PA    Migraine     Miscarriage 1988    ,     Obesity 1980    Obesity, unspecified     Open wound of back 2023    Opioid abuse, in remission ([facility]) 2022    Only while in hospital and for about 10 days once home instead of one week.    Osteoporosis     Peripheral neuropathy     Postoperative wound infection 2021    Stop not healed    Preeclampsia     Last assessed - 14    Rheumatoid arthritis ([facility])     Wears dentures      Past Surgical History:   Procedure Laterality Date    APPENDECTOMY      Last assessed - 14    BARIATRIC SURGERY  2016    CARPAL TUNNEL RELEASE      CATARACT EXTRACTION      CERVICAL FUSION N/A 2019    Procedure: Anterior cervical discectomy w fusion C4-5, C5-6, C6-7; allograft and neuromonitoring;  Surgeon: Jose Gomez MD;  Location: [facility];  Service: Orthopedics     SECTION      Last assessed - 14    COLONOSCOPY      COLONOSCOPY      HEMORRHOID SURGERY      HERNIA REPAIR      Last assessed - 14    LUMBAR FUSION N/A 2021    Procedure: L1/2 laminectomy, discectomy with L1/2 MIS posterior fixation using neuromonitoring and neuronavigation;  Surgeon: Suhas Akbar MD;  Location: [facility];  Service: Neurosurgery    LUMBAR WOUND EXPLORATION Bilateral 2021    Procedure: Lumbar wound washout, debridement and intraoperative cultures;  Surgeon: Suhas Akbar MD;  Location: [facility];  Service: Neurosurgery     MAMMO (HISTORICAL)  2016    UT DEBRIDEMENT BONE 1ST 20 SQ CM/< N/A 2023    Procedure: SURGICAL PREPARATION OF BACK  WOUND AND LOCAL FLAP, PREVENA PLACEMENT;  Surgeon: Ady Rocha MD;  Location: [facility];  Service: Plastics    WOOD-EN-Y PROCEDURE      ULNAR TUNNEL RELEASE      VAC DRESSING APPLICATION Bilateral 2021    Procedure: APPLICATION VAC DRESSING;  Surgeon: Elbert Hawley MD;  Location: [facility];  Service: General    VAC DRESSING APPLICATION N/A 2021    Procedure: APPLICATION VAC DRESSING ABDOMEN/TRUNK;  Surgeon: Mani Ross DO;  Location: [facility];  Service: General    VENTRAL HERNIA REPAIR       Family History   Problem Relation Age of Onset    Cervical cancer Mother     Kidney disease Mother     Cancer Mother         Cervical    Diabetes Mother     Hypertension Mother             Neuropathy Mother             Suicidality Father     Depression Father     Mental illness Father         Most likely,, but doesat age20 without diagnosis    Anxiety disorder Father     Completed Suicide  Father         1978    Psychiatric Illness Father          age 21    Suicide Attempts Father         4    No Known Problems Sister     Mental illness Daughter         By-polar Disorder    Mental illness Daughter         Borderline Personality Disorder    Colon cancer Maternal Grandmother             Anemia Maternal Grandmother     No Known Problems Maternal Grandfather     Uterine cancer Paternal Grandmother     No Known Problems Paternal Grandfather     No Known Problems Maternal Aunt     Ovarian cancer Paternal Aunt     ADD / ADHD Cousin     ADD / ADHD Cousin     No Known Problems Family      Current Outpatient Medications on File Prior to Visit   Medication Sig Dispense Refill    acetaZOLAMIDE (DIAMOX) 250 mg tablet TAKE 1 TABLET(250 MG) BY MOUTH TWICE DAILY 180 tablet 1    Ajovy 225 MG/1.5ML auto-injector INJECT 4.5 ML(675 MG TOTAL) UNDER THE SKIN EVERY  3 MONTHS (Patient taking differently: monthly) 4.5 mL 4    atorvastatin (LIPITOR) 10 mg tablet Take 1 tablet (10 mg total) by mouth daily at bedtime 90 tablet 0    BD Pen Needle Glory U/F 32G X 4 MM MISC Inject under the skin 2 (two) times a day Use as directed 180 each 0    Blood Glucose Monitoring Suppl (ONE TOUCH ULTRA 2) w/Device KIT Use 1 each 2 (two) times a day Use as instructed 1 kit 0    calcitriol (ROCALTROL) 0.25 mcg capsule TAKE 1 CAPSULE BY MOUTH daily 30 capsule 5    Cholecalciferol (Vitamin D3) 50 MCG (2000 UT) CAPS TAKE 1 CAPSULE(2,000 UNITS TOTAL) BY MOUTH DAILY 90 capsule 1    clonazePAM (KlonoPIN) 0.5 mg tablet Take 1 tablet (0.5 mg total) by mouth 2 (two) times a day 180 tablet 0    Continuous Blood Gluc Sensor (FreeStyle Filiberto 3 Sensor) MISC Use 1 each every 14 (fourteen) days 6 each 1    Cyanocobalamin (Vitamin B-12) 1000 MCG SUBL Place 1 tablet (1,000 mcg total) under the tongue in the morning 90 tablet 1    cycloSPORINE, PF, (Cequa) 0.09 % SOLN Apply 1 drop to eye 2 (two) times a day      divalproex sodium (Depakote) 500 mg DR tablet Take 1 tablet (500 mg total) by mouth daily at bedtime 90 tablet 3    docusate sodium (COLACE) 100 mg capsule TAKE 1 CAPSULE BY MOUTH TWICE A DAY (Patient taking differently: Take 100 mg by mouth 2 (two) times a day as needed) 60 capsule 0    DULoxetine (CYMBALTA) 60 mg delayed release capsule Take 1 capsule (60 mg total) by mouth daily 90 capsule 1    ferrous sulfate 324 (65 Fe) mg Take 1 tablet (324 mg total) by mouth daily before breakfast 90 tablet 3    insulin aspart (NovoLOG FlexPen) 100 UNIT/ML injection pen Inject 10 Units under the skin 3 (three) times a day with meals 15 mL 4    Insulin Glargine Solostar (Lantus SoloStar) 100 UNIT/ML SOPN Inject 0.4 mL (40 Units total) under the skin daily at bedtime 15 mL 4    Lancets MISC Use 1 each 2 (two) times a day Use as instructed      levonorgestrel (MIRENA) 20 MCG/24HR IUD Mirena 20 mcg/24 hr (5 years)  intrauterine device   Vaginally for 5 years.      lisdexamfetamine (VYVANSE) 30 MG capsule Take 1 capsule (30 mg total) by mouth every morning Max Daily Amount: 30 mg 30 capsule 0    losartan-hydrochlorothiazide (HYZAAR) 100-12.5 MG per tablet TAKE ONE TABLET BY MOUTH ONE TIME DAILY 90 tablet 0    methocarbamol (Robaxin-750) 750 mg tablet Take 1 tablet (750 mg total) by mouth every 6 (six) hours as needed for muscle spasms (back pain) 30 tablet 3    metoprolol tartrate (LOPRESSOR) 100 mg tablet Take 1 tablet (100 mg total) by mouth every 12 (twelve) hours 180 tablet 0    Multiple Vitamins-Minerals (multivitamin with minerals) tablet Take 1 tablet by mouth daily      NON FORMULARY Botox injections for HA every 3months (LD 3/20/23)      ondansetron (ZOFRAN) 4 mg tablet Take 1 tablet (4 mg total) by mouth every 8 (eight) hours as needed for nausea or vomiting 120 tablet 0    Ophthalmic Irrigation Solution (OCUSOFT EYE WASH OP) Apply 1 Application to eye daily at bedtime      Polyethyl Glycol-Propyl Glycol (Systane) 0.4-0.3 % GEL Apply 1 drop to eye 2 (two) times a day Using Ivizia as an alternative      polyethylene glycol (MIRALAX) 17 g packet Take 17 g by mouth daily 1 each 0    pregabalin (LYRICA) 100 mg capsule Take 1 capsule (100 mg total) by mouth 3 (three) times a day 270 capsule 0    rimegepant sulfate (Nurtec) 75 mg TBDP Place one tab (75mg total) under the tongue as needed for migraine. 16 tablet 11    sodium bicarbonate 650 mg tablet Take 1 tablet (650 mg total) by mouth 3 (three) times a day (Patient taking differently: Take 650 mg by mouth 2 (two) times a day) 360 tablet 3    tirzepatide (Mounjaro) 15 MG/0.5ML Inject 0.5 mL (15 mg total) under the skin every 7 days 6 mL 0    tiZANidine (ZANAFLEX) 4 mg tablet Take 1 tablet (4 mg total) by mouth 3 (three) times a day 360 tablet 0    verapamil (CALAN-SR) 120 mg CR tablet Take 1 tablet (120 mg total) by mouth daily at bedtime 90 tablet 3    benzonatate  (TESSALON) 200 MG capsule Take 1 capsule (200 mg total) by mouth 3 (three) times a day as needed for cough (Patient not taking: Reported on 3/28/2024) 20 capsule 0    cetirizine (ZyrTEC) oral solution Take 5 mL (5 mg total) by mouth daily (Patient not taking: Reported on 3/28/2024) 450 mL 1    ciclopirox (PENLAC) 8 % solution Apply topically daily at bedtime (Patient not taking: Reported on 4/25/2024) 6.6 mL 1    cyproheptadine (PERIACTIN) 4 mg tablet Take 1 tablet (4 mg total) by mouth daily at bedtime (Patient not taking: Reported on 11/3/2023) 90 tablet 1    diphenhydrAMINE (BENADRYL) 25 mg tablet Take 1 tablet (25 mg total) by mouth every 6 (six) hours as needed for itching (Patient not taking: Reported on 11/3/2023) 30 tablet 0     Current Facility-Administered Medications on File Prior to Visit   Medication Dose Route Frequency Provider Last Rate Last Admin    cyanocobalamin injection 1,000 mcg  1,000 mcg Intramuscular Weekly Myla Norwood MD   1,000 mcg at 07/01/24 1007     Allergies   Allergen Reactions    Ace Inhibitors Cough    Pollen Extract Other (See Comments)     SNEEZING, COUGHING    Short Ragweed Pollen Ext Cough    Sodium Hyaluronate (Eron) Other (See Comments)     Edema at injection site  Edema at injection site    Levonorgestrel-Eth Estradiol [Levonorgestrel-Ethinyl Estrad] Other (See Comments)     burning    Latex Rash      Current Outpatient Medications on File Prior to Visit   Medication Sig Dispense Refill    acetaZOLAMIDE (DIAMOX) 250 mg tablet TAKE 1 TABLET(250 MG) BY MOUTH TWICE DAILY 180 tablet 1    Ajovy 225 MG/1.5ML auto-injector INJECT 4.5 ML(675 MG TOTAL) UNDER THE SKIN EVERY 3 MONTHS (Patient taking differently: monthly) 4.5 mL 4    atorvastatin (LIPITOR) 10 mg tablet Take 1 tablet (10 mg total) by mouth daily at bedtime 90 tablet 0    BD Pen Needle Glory U/F 32G X 4 MM MISC Inject under the skin 2 (two) times a day Use as directed 180 each 0    Blood Glucose Monitoring Suppl (ONE  TOUCH ULTRA 2) w/Device KIT Use 1 each 2 (two) times a day Use as instructed 1 kit 0    calcitriol (ROCALTROL) 0.25 mcg capsule TAKE 1 CAPSULE BY MOUTH daily 30 capsule 5    Cholecalciferol (Vitamin D3) 50 MCG (2000 UT) CAPS TAKE 1 CAPSULE(2,000 UNITS TOTAL) BY MOUTH DAILY 90 capsule 1    clonazePAM (KlonoPIN) 0.5 mg tablet Take 1 tablet (0.5 mg total) by mouth 2 (two) times a day 180 tablet 0    Continuous Blood Gluc Sensor (FreeStyle Filiberto 3 Sensor) MISC Use 1 each every 14 (fourteen) days 6 each 1    Cyanocobalamin (Vitamin B-12) 1000 MCG SUBL Place 1 tablet (1,000 mcg total) under the tongue in the morning 90 tablet 1    cycloSPORINE, PF, (Cequa) 0.09 % SOLN Apply 1 drop to eye 2 (two) times a day      divalproex sodium (Depakote) 500 mg DR tablet Take 1 tablet (500 mg total) by mouth daily at bedtime 90 tablet 3    docusate sodium (COLACE) 100 mg capsule TAKE 1 CAPSULE BY MOUTH TWICE A DAY (Patient taking differently: Take 100 mg by mouth 2 (two) times a day as needed) 60 capsule 0    DULoxetine (CYMBALTA) 60 mg delayed release capsule Take 1 capsule (60 mg total) by mouth daily 90 capsule 1    ferrous sulfate 324 (65 Fe) mg Take 1 tablet (324 mg total) by mouth daily before breakfast 90 tablet 3    insulin aspart (NovoLOG FlexPen) 100 UNIT/ML injection pen Inject 10 Units under the skin 3 (three) times a day with meals 15 mL 4    Insulin Glargine Solostar (Lantus SoloStar) 100 UNIT/ML SOPN Inject 0.4 mL (40 Units total) under the skin daily at bedtime 15 mL 4    Lancets MISC Use 1 each 2 (two) times a day Use as instructed      levonorgestrel (MIRENA) 20 MCG/24HR IUD Mirena 20 mcg/24 hr (5 years) intrauterine device   Vaginally for 5 years.      lisdexamfetamine (VYVANSE) 30 MG capsule Take 1 capsule (30 mg total) by mouth every morning Max Daily Amount: 30 mg 30 capsule 0    losartan-hydrochlorothiazide (HYZAAR) 100-12.5 MG per tablet TAKE ONE TABLET BY MOUTH ONE TIME DAILY 90 tablet 0    methocarbamol  (Robaxin-750) 750 mg tablet Take 1 tablet (750 mg total) by mouth every 6 (six) hours as needed for muscle spasms (back pain) 30 tablet 3    metoprolol tartrate (LOPRESSOR) 100 mg tablet Take 1 tablet (100 mg total) by mouth every 12 (twelve) hours 180 tablet 0    Multiple Vitamins-Minerals (multivitamin with minerals) tablet Take 1 tablet by mouth daily      NON FORMULARY Botox injections for HA every 3months (LD 3/20/23)      ondansetron (ZOFRAN) 4 mg tablet Take 1 tablet (4 mg total) by mouth every 8 (eight) hours as needed for nausea or vomiting 120 tablet 0    Ophthalmic Irrigation Solution (OCUSOFT EYE WASH OP) Apply 1 Application to eye daily at bedtime      Polyethyl Glycol-Propyl Glycol (Systane) 0.4-0.3 % GEL Apply 1 drop to eye 2 (two) times a day Using Ivizia as an alternative      polyethylene glycol (MIRALAX) 17 g packet Take 17 g by mouth daily 1 each 0    pregabalin (LYRICA) 100 mg capsule Take 1 capsule (100 mg total) by mouth 3 (three) times a day 270 capsule 0    rimegepant sulfate (Nurtec) 75 mg TBDP Place one tab (75mg total) under the tongue as needed for migraine. 16 tablet 11    sodium bicarbonate 650 mg tablet Take 1 tablet (650 mg total) by mouth 3 (three) times a day (Patient taking differently: Take 650 mg by mouth 2 (two) times a day) 360 tablet 3    tirzepatide (Mounjaro) 15 MG/0.5ML Inject 0.5 mL (15 mg total) under the skin every 7 days 6 mL 0    tiZANidine (ZANAFLEX) 4 mg tablet Take 1 tablet (4 mg total) by mouth 3 (three) times a day 360 tablet 0    verapamil (CALAN-SR) 120 mg CR tablet Take 1 tablet (120 mg total) by mouth daily at bedtime 90 tablet 3    benzonatate (TESSALON) 200 MG capsule Take 1 capsule (200 mg total) by mouth 3 (three) times a day as needed for cough (Patient not taking: Reported on 3/28/2024) 20 capsule 0    cetirizine (ZyrTEC) oral solution Take 5 mL (5 mg total) by mouth daily (Patient not taking: Reported on 3/28/2024) 450 mL 1    ciclopirox (PENLAC) 8 %  "solution Apply topically daily at bedtime (Patient not taking: Reported on 4/25/2024) 6.6 mL 1    cyproheptadine (PERIACTIN) 4 mg tablet Take 1 tablet (4 mg total) by mouth daily at bedtime (Patient not taking: Reported on 11/3/2023) 90 tablet 1    diphenhydrAMINE (BENADRYL) 25 mg tablet Take 1 tablet (25 mg total) by mouth every 6 (six) hours as needed for itching (Patient not taking: Reported on 11/3/2023) 30 tablet 0     Current Facility-Administered Medications on File Prior to Visit   Medication Dose Route Frequency Provider Last Rate Last Admin    cyanocobalamin injection 1,000 mcg  1,000 mcg Intramuscular Weekly Myla Norwood MD   1,000 mcg at 07/01/24 1007      Social History     Tobacco Use    Smoking status: Never     Passive exposure: Never    Smokeless tobacco: Never    Tobacco comments:     Never used tabaco, don’t care to   Vaping Use    Vaping status: Never Used   Substance and Sexual Activity    Alcohol use: Not Currently    Drug use: Yes     Types: Marijuana     Comment: Medicinal    Sexual activity: Yes     Partners: Male     Birth control/protection: I.U.D.     Comment: Mirena 4/5/2017       Objective     /82 (BP Location: Left arm, Patient Position: Sitting, Cuff Size: Standard)   Pulse 85   Temp 98.1 °F (36.7 °C) (Temporal)   Ht 5' 1\" (1.549 m)   Wt 81.6 kg (180 lb)   SpO2 99%   BMI 34.01 kg/m²     Physical Exam  Vitals and nursing note reviewed.   Constitutional:       General: She is not in acute distress.     Appearance: She is well-developed.   HENT:      Head: Normocephalic and atraumatic.   Eyes:      Conjunctiva/sclera: Conjunctivae normal.   Cardiovascular:      Rate and Rhythm: Normal rate and regular rhythm.      Heart sounds: No murmur heard.  Pulmonary:      Effort: Pulmonary effort is normal. No respiratory distress.      Breath sounds: Normal breath sounds.   Abdominal:      Palpations: Abdomen is soft.      Tenderness: There is no abdominal tenderness. "   Musculoskeletal:         General: No swelling.      Cervical back: Neck supple.   Skin:     General: Skin is warm and dry.      Capillary Refill: Capillary refill takes less than 2 seconds.   Neurological:      Mental Status: She is alert.   Psychiatric:         Mood and Affect: Mood normal.       Administrative Statements   I have spent a total time of 20 minutes in caring for this patient on the day of the visit/encounter including Risks and benefits of tx options, Instructions for management, Patient and family education, Importance of tx compliance, Risk factor reductions, Impressions, Counseling / Coordination of care, Documenting in the medical record, Reviewing / ordering tests, medicine, procedures  , and Obtaining or reviewing history  . Topics discussed with the patient

## 2024-07-01 NOTE — PROGRESS NOTES
Assessment/Plan:             1. Uncontrolled type 2 diabetes mellitus with hyperglycemia (HCC)  Comments:  Continue same medication  2. Cellulitis of right toe  -     cephalexin (KEFLEX) 500 mg capsule; Take 1 capsule (500 mg total) by mouth every 8 (eight) hours for 7 days  3. Chronic migraine without aura without status migrainosus, not intractable  Comments:  Continue same medication  4. Primary hypertension  Comments:  Continue same medication  5. Diabetic nephropathy associated with type 2 diabetes mellitus ([facility])  6. Diabetic eye exam ([facility])  -      Diabetes Eye Exam  7. Secondary hyperparathyroidism of renal origin ([facility])  8. Intervertebral disc disorders with radiculopathy, lumbar region  9. Concussion with moderate (1-24 hours) loss of consciousness  10. Cervical disc disorder with radiculopathy  11. Stage 3b chronic kidney disease ([facility])  12. Dysthymic disorder  13. Depression, recurrent ([facility])  14. Attention deficit hyperactivity disorder (ADHD), predominantly inattentive type  15. Mixed hyperlipidemia  16. Cauda equina syndrome (HCC)  17. Continuous opioid dependence ([facility])  18. Routine general medical examination at a health care facility         Subjective:      Patient ID: [id number] Meza is a 48 y.o. female.    Follow-up on multiple medical problems to ensure they are stable on current medication, also physical    Toe Pain     Diabetes  She has type 2 diabetes mellitus. No MedicAlert identification noted. The initial diagnosis of diabetes was made 27 years ago. Hypoglycemia symptoms include headaches. Pertinent negatives for hypoglycemia include no confusion, dizziness, hunger, mood changes, nervousness/anxiousness, pallor, seizures, sleepiness, speech difficulty, sweats or tremors. Associated symptoms include fatigue, foot paresthesias, foot ulcerations and weakness. Pertinent negatives for diabetes include no blurred vision, no chest pain, no polydipsia, no polyphagia, no polyuria, no visual change  and no weight loss. Pertinent negatives for hypoglycemia complications include no blackouts, no hospitalization, no nocturnal hypoglycemia, no required assistance and no required glucagon injection. Symptoms are stable. Diabetic complications include nephropathy, peripheral neuropathy, PVD and retinopathy. Pertinent negatives for diabetic complications include no CVA, heart disease or impotence. Risk factors for coronary artery disease include family history, hypertension, obesity, sedentary lifestyle and stress. Current diabetic treatment includes insulin injections. She is compliant with treatment most of the time. She is currently taking insulin pre-breakfast, pre-lunch, pre-dinner and at bedtime. Insulin injections are given by patient. Rotation sites for injection include the abdominal wall. Her weight is fluctuating minimally. She is following a diabetic diet. Meal planning includes carbohydrate counting. She has not had a previous visit with a dietitian. She participates in exercise intermittently. She monitors blood glucose at home 5+ x per day. She monitors urine at home <1 x per month. Blood glucose monitoring compliance is good. There is no change in her home blood glucose trend. Her breakfast blood glucose is taken between 7-8 am. Her breakfast blood glucose range is generally 70-90 mg/dl. Her lunch blood glucose is taken between 11-12 pm. Her lunch blood glucose range is generally  mg/dl. Her dinner blood glucose is taken between 6-7 pm. Her dinner blood glucose range is generally  mg/dl. Her bedtime blood glucose is taken between 9-10 pm. Her bedtime blood glucose range is generally 110-130 mg/dl. Her overall blood glucose range is  mg/dl. She sees a podiatrist.Eye exam is current.       The following portions of the patient's history were reviewed and updated as appropriate: She  has a past medical history of ADD (attention deficit disorder), Allergic rhinitis, ALS (amyotrophic  lateral sclerosis) ([facility]) (11/22), Anemia (12/2021), Anxiety, Arthritis, Bipolar disorder ([facility]), Cervical disc disorder with radiculopathy of cervical region, Chronic depression, Chronic kidney disease, Chronic pain disorder, Cluster headache, Colitis, Colon polyp, Concussion (2018), Condyloma acuminatum, CTS (carpal tunnel syndrome) (2001), Depression, Diabetes mellitus ([facility]), Diabetic nephropathy ([facility]) (2001), Diabetic neuropathy ([facility]), Diabetic retinopathy ([facility]) (2001), Difficulty walking (07/22), Fibromyalgia, Fibromyalgia, primary, GERD (gastroesophageal reflux disease), Head injury (08/13/18), Headache(784.0) (1977), History of transfusion (12/2021), HTN (hypertension), Hyperlipidemia (07/22/2019), Hypertension, Intervertebral disc disorder with radiculopathy of lumbar region, Lupus ([facility]) (2003), Migraine, Miscarriage (1988), Obesity (1980), Obesity, unspecified, Open wound of back (05/30/2023), Opioid abuse, in remission ([facility]) (01/2022), Osteoporosis, Peripheral neuropathy, Postoperative wound infection (12/2021), Preeclampsia, Rheumatoid arthritis ([facility]), and Wears dentures.  She   Patient Active Problem List    Diagnosis Date Noted    Cellulitis of right toe 07/01/2024    Cellulitis of right shoulder 03/28/2024    Benign hypertension with chronic kidney disease, stage III ([facility]) 01/31/2024    Abnormal blood electrolyte level 10/30/2023    Thrombocytopenia ([facility]) 10/30/2023    Chest pain 10/29/2023    Metabolic acidosis 09/27/2023    Hyperphosphatemia 09/27/2023    Neurogenic bladder 09/26/2023    Bilateral carpal tunnel syndrome     IIH (idiopathic intracranial hypertension) 08/15/2023    Open wound of back 05/30/2023    Benign hypertension with CKD (chronic kidney disease) stage IV ([facility]) 02/14/2023    Maceration of skin 02/14/2023    Cortical age-related cataract of left eye 01/04/2023    Bipolar affective disorder, currently manic, mild ([facility]) 03/30/2022    Seasonal allergic rhinitis 03/30/2022    Surgical  wound, non healing 02/08/2022    Diabetic polyneuropathy associated with diabetes mellitus due to underlying condition ([facility]) 02/08/2022    Continuous opioid dependence ([facility]) 01/07/2022    Anemia 12/24/2021    Wound infection 12/09/2021    Cauda equina syndrome ([facility]) 12/08/2021    Depression, recurrent ([facility]) 10/28/2021    Annual physical exam 08/27/2021    Other gastritis without bleeding 08/11/2021    Obesity, unspecified     Diabetes mellitus ([facility])     Primary hypertension     Dizziness 12/07/2020    Secondary hyperparathyroidism of renal origin ([facility]) 11/16/2020    Controlled type 2 diabetes mellitus with stage 3 chronic kidney disease, with long-term current use of insulin ([facility])     Intractable migraine with status migrainosus     Type 2 diabetes mellitus with diabetic neuropathy ([facility]) 09/11/2020    Ventral hernia without obstruction or gangrene 09/11/2020    Lumbar disc herniation with radiculopathy 09/11/2020    Concussion with moderate (1-24 hours) loss of consciousness 09/11/2020    Primary osteoarthritis of both knees 09/11/2020    Diabetic nephropathy associated with type 2 diabetes mellitus ([facility]) 09/11/2020    Dysthymic disorder 09/11/2020    Acute on chronic kidney failure  ([facility]) 07/13/2020    Hypokalemia 07/13/2020    Near syncope 07/13/2020    Weakness 07/13/2020    Status post gastric bypass for obesity 07/13/2020    Diarrhea 07/13/2020    Snores     Headache upon awakening     Chronic tension type headache 12/16/2019    Chronic migraine without aura without status migrainosus, not intractable 12/16/2019    Medical marijuana use 12/16/2019    Vitamin D deficiency 08/23/2019    Chronic kidney disease-mineral and bone disorder 08/20/2019    S/P cervical spinal fusion 07/25/2019    Mixed hyperlipidemia 07/22/2019    Cervical disc disorder with radiculopathy     Stage 3b chronic kidney disease (HCC) 05/07/2019    Persistent proteinuria 05/07/2019    Hypertensive kidney disease with stage 3b chronic kidney  disease ([facility]) 2019    Hyponatremia 2019    Intervertebral disc disorders with radiculopathy, lumbar region     Sacroiliitis ([facility])     Greater trochanteric bursitis of both hips     Herniated nucleus pulposus, L1-2 2017    Bilateral chronic knee pain 2017    Facet arthritis of lumbosacral region 2017    Fibromyalgia 2017    Lumbar stenosis with neurogenic claudication 2017    Attention deficit hyperactivity disorder (ADHD), predominantly inattentive type 2016    Uncontrolled type 2 diabetes mellitus with hyperglycemia ([facility]) 2016    Gastroesophageal reflux disease without esophagitis 2016    Morbid obesity with BMI of 40.0-44.9, adult ([facility]) 2016    Chronic renal insufficiency 2014    Endometriosis 2012     She  has a past surgical history that includes Appendectomy;  section; Hernia repair; Cervical fusion (N/A, 2019); Colonoscopy; Carpal tunnel release; Hemorrhoid surgery; Ventral hernia repair; Shelby-en-y procedure; Ulnar tunnel release; Mammo (historical) (); Colonoscopy; Lumbar fusion (N/A, 2021); LUMBAR WOUND EXPLORATION (Bilateral, 2021); VAC DRESSING APPLICATION (Bilateral, 2021); VAC DRESSING APPLICATION (N/A, 2021); Bariatric Surgery (2016); Cataract extraction; and pr debridement bone 1st 20 sq cm/< (N/A, 2023).  Her family history includes ADD / ADHD in her cousin and cousin; Anemia in her maternal grandmother; Anxiety disorder in her father; Cancer in her mother; Cervical cancer in her mother; Colon cancer in her maternal grandmother; Completed Suicide  in her father; Depression in her father; Diabetes in her mother; Hypertension in her mother; Kidney disease in her mother; Mental illness in her daughter, daughter, and father; Neuropathy in her mother; No Known Problems in her family, maternal aunt, maternal grandfather, paternal grandfather, and sister; Ovarian cancer in her  paternal aunt; Psychiatric Illness in her father; Suicidality in her father; Suicide Attempts in her father; Uterine cancer in her paternal grandmother.  She  reports that she has never smoked. She has never been exposed to tobacco smoke. She has never used smokeless tobacco. She reports that she does not currently use alcohol. She reports current drug use. Drug: Marijuana.  Current Outpatient Medications   Medication Sig Dispense Refill    acetaZOLAMIDE (DIAMOX) 250 mg tablet TAKE 1 TABLET(250 MG) BY MOUTH TWICE DAILY 180 tablet 1    Ajovy 225 MG/1.5ML auto-injector INJECT 4.5 ML(675 MG TOTAL) UNDER THE SKIN EVERY 3 MONTHS (Patient taking differently: monthly) 4.5 mL 4    atorvastatin (LIPITOR) 10 mg tablet Take 1 tablet (10 mg total) by mouth daily at bedtime 90 tablet 0    BD Pen Needle Glory U/F 32G X 4 MM MISC Inject under the skin 2 (two) times a day Use as directed 180 each 0    Blood Glucose Monitoring Suppl (ONE TOUCH ULTRA 2) w/Device KIT Use 1 each 2 (two) times a day Use as instructed 1 kit 0    calcitriol (ROCALTROL) 0.25 mcg capsule TAKE 1 CAPSULE BY MOUTH daily 30 capsule 5    cephalexin (KEFLEX) 500 mg capsule Take 1 capsule (500 mg total) by mouth every 8 (eight) hours for 7 days 21 capsule 0    Cholecalciferol (Vitamin D3) 50 MCG (2000 UT) CAPS TAKE 1 CAPSULE(2,000 UNITS TOTAL) BY MOUTH DAILY 90 capsule 1    clonazePAM (KlonoPIN) 0.5 mg tablet Take 1 tablet (0.5 mg total) by mouth 2 (two) times a day 180 tablet 0    Continuous Blood Gluc Sensor (FreeStyle Filiberto 3 Sensor) MISC Use 1 each every 14 (fourteen) days 6 each 1    Cyanocobalamin (Vitamin B-12) 1000 MCG SUBL Place 1 tablet (1,000 mcg total) under the tongue in the morning 90 tablet 1    cycloSPORINE, PF, (Cequa) 0.09 % SOLN Apply 1 drop to eye 2 (two) times a day      divalproex sodium (Depakote) 500 mg DR tablet Take 1 tablet (500 mg total) by mouth daily at bedtime 90 tablet 3    docusate sodium (COLACE) 100 mg capsule TAKE 1 CAPSULE BY  MOUTH TWICE A DAY (Patient taking differently: Take 100 mg by mouth 2 (two) times a day as needed) 60 capsule 0    DULoxetine (CYMBALTA) 60 mg delayed release capsule Take 1 capsule (60 mg total) by mouth daily 90 capsule 1    ferrous sulfate 324 (65 Fe) mg Take 1 tablet (324 mg total) by mouth daily before breakfast 90 tablet 3    insulin aspart (NovoLOG FlexPen) 100 UNIT/ML injection pen Inject 10 Units under the skin 3 (three) times a day with meals 15 mL 4    Insulin Glargine Solostar (Lantus SoloStar) 100 UNIT/ML SOPN Inject 0.4 mL (40 Units total) under the skin daily at bedtime 15 mL 4    Lancets MISC Use 1 each 2 (two) times a day Use as instructed      levonorgestrel (MIRENA) 20 MCG/24HR IUD Mirena 20 mcg/24 hr (5 years) intrauterine device   Vaginally for 5 years.      lisdexamfetamine (VYVANSE) 30 MG capsule Take 1 capsule (30 mg total) by mouth every morning Max Daily Amount: 30 mg 30 capsule 0    losartan-hydrochlorothiazide (HYZAAR) 100-12.5 MG per tablet TAKE ONE TABLET BY MOUTH ONE TIME DAILY 90 tablet 0    methocarbamol (Robaxin-750) 750 mg tablet Take 1 tablet (750 mg total) by mouth every 6 (six) hours as needed for muscle spasms (back pain) 30 tablet 3    metoprolol tartrate (LOPRESSOR) 100 mg tablet Take 1 tablet (100 mg total) by mouth every 12 (twelve) hours 180 tablet 0    Multiple Vitamins-Minerals (multivitamin with minerals) tablet Take 1 tablet by mouth daily      NON FORMULARY Botox injections for HA every 3months (LD 3/20/23)      ondansetron (ZOFRAN) 4 mg tablet Take 1 tablet (4 mg total) by mouth every 8 (eight) hours as needed for nausea or vomiting 120 tablet 0    Ophthalmic Irrigation Solution (OCUSOFT EYE WASH OP) Apply 1 Application to eye daily at bedtime      Polyethyl Glycol-Propyl Glycol (Systane) 0.4-0.3 % GEL Apply 1 drop to eye 2 (two) times a day Using Ivizia as an alternative      polyethylene glycol (MIRALAX) 17 g packet Take 17 g by mouth daily 1 each 0    pregabalin  (LYRICA) 100 mg capsule Take 1 capsule (100 mg total) by mouth 3 (three) times a day 270 capsule 0    rimegepant sulfate (Nurtec) 75 mg TBDP Place one tab (75mg total) under the tongue as needed for migraine. 16 tablet 11    sodium bicarbonate 650 mg tablet Take 1 tablet (650 mg total) by mouth 3 (three) times a day (Patient taking differently: Take 650 mg by mouth 2 (two) times a day) 360 tablet 3    tirzepatide (Mounjaro) 15 MG/0.5ML Inject 0.5 mL (15 mg total) under the skin every 7 days 6 mL 0    tiZANidine (ZANAFLEX) 4 mg tablet Take 1 tablet (4 mg total) by mouth 3 (three) times a day 360 tablet 0    verapamil (CALAN-SR) 120 mg CR tablet Take 1 tablet (120 mg total) by mouth daily at bedtime 90 tablet 3    benzonatate (TESSALON) 200 MG capsule Take 1 capsule (200 mg total) by mouth 3 (three) times a day as needed for cough (Patient not taking: Reported on 3/28/2024) 20 capsule 0    cetirizine (ZyrTEC) oral solution Take 5 mL (5 mg total) by mouth daily (Patient not taking: Reported on 3/28/2024) 450 mL 1    ciclopirox (PENLAC) 8 % solution Apply topically daily at bedtime (Patient not taking: Reported on 4/25/2024) 6.6 mL 1    cyproheptadine (PERIACTIN) 4 mg tablet Take 1 tablet (4 mg total) by mouth daily at bedtime (Patient not taking: Reported on 11/3/2023) 90 tablet 1    diphenhydrAMINE (BENADRYL) 25 mg tablet Take 1 tablet (25 mg total) by mouth every 6 (six) hours as needed for itching (Patient not taking: Reported on 11/3/2023) 30 tablet 0     Current Facility-Administered Medications   Medication Dose Route Frequency Provider Last Rate Last Admin    cyanocobalamin injection 1,000 mcg  1,000 mcg Intramuscular Weekly Myla Norwood MD   1,000 mcg at 07/01/24 1007     Current Outpatient Medications on File Prior to Visit   Medication Sig    acetaZOLAMIDE (DIAMOX) 250 mg tablet TAKE 1 TABLET(250 MG) BY MOUTH TWICE DAILY    Ajovy 225 MG/1.5ML auto-injector INJECT 4.5 ML(675 MG TOTAL) UNDER THE SKIN EVERY 3  MONTHS (Patient taking differently: monthly)    atorvastatin (LIPITOR) 10 mg tablet Take 1 tablet (10 mg total) by mouth daily at bedtime    BD Pen Needle Glory U/F 32G X 4 MM MISC Inject under the skin 2 (two) times a day Use as directed    Blood Glucose Monitoring Suppl (ONE TOUCH ULTRA 2) w/Device KIT Use 1 each 2 (two) times a day Use as instructed    calcitriol (ROCALTROL) 0.25 mcg capsule TAKE 1 CAPSULE BY MOUTH daily    Cholecalciferol (Vitamin D3) 50 MCG (2000 UT) CAPS TAKE 1 CAPSULE(2,000 UNITS TOTAL) BY MOUTH DAILY    clonazePAM (KlonoPIN) 0.5 mg tablet Take 1 tablet (0.5 mg total) by mouth 2 (two) times a day    Continuous Blood Gluc Sensor (FreeStyle Filiberto 3 Sensor) MISC Use 1 each every 14 (fourteen) days    Cyanocobalamin (Vitamin B-12) 1000 MCG SUBL Place 1 tablet (1,000 mcg total) under the tongue in the morning    cycloSPORINE, PF, (Cequa) 0.09 % SOLN Apply 1 drop to eye 2 (two) times a day    divalproex sodium (Depakote) 500 mg DR tablet Take 1 tablet (500 mg total) by mouth daily at bedtime    docusate sodium (COLACE) 100 mg capsule TAKE 1 CAPSULE BY MOUTH TWICE A DAY (Patient taking differently: Take 100 mg by mouth 2 (two) times a day as needed)    DULoxetine (CYMBALTA) 60 mg delayed release capsule Take 1 capsule (60 mg total) by mouth daily    ferrous sulfate 324 (65 Fe) mg Take 1 tablet (324 mg total) by mouth daily before breakfast    insulin aspart (NovoLOG FlexPen) 100 UNIT/ML injection pen Inject 10 Units under the skin 3 (three) times a day with meals    Insulin Glargine Solostar (Lantus SoloStar) 100 UNIT/ML SOPN Inject 0.4 mL (40 Units total) under the skin daily at bedtime    Lancets MISC Use 1 each 2 (two) times a day Use as instructed    levonorgestrel (MIRENA) 20 MCG/24HR IUD Mirena 20 mcg/24 hr (5 years) intrauterine device   Vaginally for 5 years.    lisdexamfetamine (VYVANSE) 30 MG capsule Take 1 capsule (30 mg total) by mouth every morning Max Daily Amount: 30 mg     losartan-hydrochlorothiazide (HYZAAR) 100-12.5 MG per tablet TAKE ONE TABLET BY MOUTH ONE TIME DAILY    methocarbamol (Robaxin-750) 750 mg tablet Take 1 tablet (750 mg total) by mouth every 6 (six) hours as needed for muscle spasms (back pain)    metoprolol tartrate (LOPRESSOR) 100 mg tablet Take 1 tablet (100 mg total) by mouth every 12 (twelve) hours    Multiple Vitamins-Minerals (multivitamin with minerals) tablet Take 1 tablet by mouth daily    NON FORMULARY Botox injections for HA every 3months (LD 3/20/23)    ondansetron (ZOFRAN) 4 mg tablet Take 1 tablet (4 mg total) by mouth every 8 (eight) hours as needed for nausea or vomiting    Ophthalmic Irrigation Solution (OCUSOFT EYE WASH OP) Apply 1 Application to eye daily at bedtime    Polyethyl Glycol-Propyl Glycol (Systane) 0.4-0.3 % GEL Apply 1 drop to eye 2 (two) times a day Using Ivizia as an alternative    polyethylene glycol (MIRALAX) 17 g packet Take 17 g by mouth daily    pregabalin (LYRICA) 100 mg capsule Take 1 capsule (100 mg total) by mouth 3 (three) times a day    rimegepant sulfate (Nurtec) 75 mg TBDP Place one tab (75mg total) under the tongue as needed for migraine.    sodium bicarbonate 650 mg tablet Take 1 tablet (650 mg total) by mouth 3 (three) times a day (Patient taking differently: Take 650 mg by mouth 2 (two) times a day)    tirzepatide (Mounjaro) 15 MG/0.5ML Inject 0.5 mL (15 mg total) under the skin every 7 days    tiZANidine (ZANAFLEX) 4 mg tablet Take 1 tablet (4 mg total) by mouth 3 (three) times a day    verapamil (CALAN-SR) 120 mg CR tablet Take 1 tablet (120 mg total) by mouth daily at bedtime    benzonatate (TESSALON) 200 MG capsule Take 1 capsule (200 mg total) by mouth 3 (three) times a day as needed for cough (Patient not taking: Reported on 3/28/2024)    cetirizine (ZyrTEC) oral solution Take 5 mL (5 mg total) by mouth daily (Patient not taking: Reported on 3/28/2024)    ciclopirox (PENLAC) 8 % solution Apply topically daily  "at bedtime (Patient not taking: Reported on 4/25/2024)    cyproheptadine (PERIACTIN) 4 mg tablet Take 1 tablet (4 mg total) by mouth daily at bedtime (Patient not taking: Reported on 11/3/2023)    diphenhydrAMINE (BENADRYL) 25 mg tablet Take 1 tablet (25 mg total) by mouth every 6 (six) hours as needed for itching (Patient not taking: Reported on 11/3/2023)     Current Facility-Administered Medications on File Prior to Visit   Medication    cyanocobalamin injection 1,000 mcg     She is allergic to ace inhibitors, pollen extract, short ragweed pollen ext, sodium hyaluronate (yoav), levonorgestrel-eth estradiol [levonorgestrel-ethinyl estrad], and latex..    Review of Systems   Constitutional:  Positive for fatigue. Negative for chills, fever and weight loss.   HENT:  Negative for congestion, ear pain and sore throat.    Eyes:  Negative for blurred vision and pain.   Respiratory:  Negative for cough and shortness of breath.    Cardiovascular:  Negative for chest pain and leg swelling.   Gastrointestinal:  Negative for abdominal pain, nausea and vomiting.   Endocrine: Negative for polydipsia, polyphagia and polyuria.   Genitourinary:  Negative for difficulty urinating, frequency, impotence and urgency.   Musculoskeletal:  Negative for arthralgias and back pain.   Skin:  Positive for rash. Negative for pallor.   Neurological:  Positive for weakness and headaches. Negative for dizziness, tremors, seizures and speech difficulty.   Psychiatric/Behavioral:  Negative for confusion and sleep disturbance. The patient is not nervous/anxious.          Objective:      /82 (BP Location: Left arm, Patient Position: Sitting, Cuff Size: Standard)   Pulse 85   Temp 98.1 °F (36.7 °C) (Temporal)   Ht 5' 1\" (1.549 m)   Wt 81.6 kg (180 lb)   SpO2 99%   BMI 34.01 kg/m²     Recent Results (from the past 1344 hour(s))   Basic metabolic panel    Collection Time: 05/20/24  9:27 AM   Result Value Ref Range    Sodium 143 135 - 147 " mmol/L    Potassium 3.5 3.5 - 5.3 mmol/L    Chloride 110 (H) 96 - 108 mmol/L    CO2 22 21 - 32 mmol/L    ANION GAP 11 4 - 13 mmol/L    BUN 36 (H) 5 - 25 mg/dL    Creatinine 1.95 (H) 0.60 - 1.30 mg/dL    Glucose, Fasting 106 (H) 65 - 99 mg/dL    Calcium 8.7 8.4 - 10.2 mg/dL    eGFR 29 ml/min/1.73sq m   CBC    Collection Time: 05/20/24  9:27 AM   Result Value Ref Range    WBC 6.03 4.31 - 10.16 Thousand/uL    RBC 4.79 3.81 - 5.12 Million/uL    Hemoglobin 12.6 11.5 - 15.4 g/dL    Hematocrit 40.5 34.8 - 46.1 %    MCV 85 82 - 98 fL    MCH 26.3 (L) 26.8 - 34.3 pg    MCHC 31.1 (L) 31.4 - 37.4 g/dL    RDW 16.5 (H) 11.6 - 15.1 %    Platelets 150 149 - 390 Thousands/uL    MPV 12.9 (H) 8.9 - 12.7 fL   Phosphorus    Collection Time: 05/20/24  9:27 AM   Result Value Ref Range    Phosphorus 4.7 (H) 2.7 - 4.5 mg/dL   PTH, intact    Collection Time: 05/20/24  9:27 AM   Result Value Ref Range    .2 (H) 12.0 - 88.0 pg/mL        Physical Exam  Constitutional:       Appearance: Normal appearance.   HENT:      Head: Normocephalic.      Right Ear: External ear normal.      Left Ear: External ear normal.      Nose: Nose normal. No congestion.      Mouth/Throat:      Mouth: Mucous membranes are moist.      Pharynx: Oropharynx is clear. No oropharyngeal exudate or posterior oropharyngeal erythema.   Eyes:      Extraocular Movements: Extraocular movements intact.      Conjunctiva/sclera: Conjunctivae normal.   Cardiovascular:      Rate and Rhythm: Normal rate and regular rhythm.      Heart sounds: Normal heart sounds. No murmur heard.  Pulmonary:      Effort: Pulmonary effort is normal.      Breath sounds: Normal breath sounds. No wheezing or rales.   Abdominal:      General: Abdomen is flat. Bowel sounds are normal. There is no distension.      Palpations: Abdomen is soft.      Tenderness: There is no abdominal tenderness.   Musculoskeletal:         General: Normal range of motion.      Cervical back: Normal range of motion and neck  supple.      Right lower leg: No edema.      Left lower leg: No edema.   Lymphadenopathy:      Cervical: No cervical adenopathy.   Skin:     Comments: Right second toe skin redness present, swelling present, no active discharge,   Neurological:      General: No focal deficit present.      Mental Status: She is alert and oriented to person, place, and time.

## 2024-07-03 ENCOUNTER — PROCEDURE VISIT (OUTPATIENT)
Dept: NEUROLOGY | Facility: CLINIC | Age: 49
End: 2024-07-03
Payer: COMMERCIAL

## 2024-07-03 VITALS — DIASTOLIC BLOOD PRESSURE: 76 MMHG | SYSTOLIC BLOOD PRESSURE: 118 MMHG | TEMPERATURE: 98.1 F | HEART RATE: 82 BPM

## 2024-07-03 DIAGNOSIS — G43.709 CHRONIC MIGRAINE WITHOUT AURA WITHOUT STATUS MIGRAINOSUS, NOT INTRACTABLE: Primary | ICD-10-CM

## 2024-07-03 PROCEDURE — 64615 CHEMODENERV MUSC MIGRAINE: CPT | Performed by: PHYSICIAN ASSISTANT

## 2024-07-03 NOTE — PROGRESS NOTES
"Universal Protocol   Consent: Verbal consent obtained. Written consent obtained.  Risks and benefits: risks, benefits and alternatives were discussed  Consent given by: patient  Time out: Immediately prior to procedure a \"time out\" was called to verify the correct patient, procedure, equipment, support staff and site/side marked as required.  Patient understanding: patient states understanding of the procedure being performed  Patient consent: the patient's understanding of the procedure matches consent given  Procedure consent: procedure consent matches procedure scheduled  Relevant documents: relevant documents present and verified  Patient identity confirmed: verbally with patient      Chemodenervation     Date/Time  7/3/2024 3:30 PM     Performed by  Desiree Grigsby PA-C   Authorized by  Desiree Grigsby PA-C     Pre-procedure details      Prepped With: Alcohol     Anesthesia  (see MAR for exact dosages):     Anesthesia method:  None   Procedure details      Position:  Upright   Botox      Botox Type:  Type A    Brand:  Botox    mL's of Botulinum Toxin:  200    Final Concentration per CC:  50 units    Needle Gauge:  30 G 2.5 inch   Procedures      Botox Procedures: chronic headache      Indications: migraines     Injection Location      Head / Face:  L superior cervical paraspinal, R superior cervical paraspinal, L , R , L frontalis, R frontalis, L medial occipitalis, R medial occipitalis, procerus, R temporalis, L temporalis, R superior trapezius and L superior trapezius    L  injection amount:  5 unit(s)    R  injection amount:  5 unit(s)    L lateral frontalis:  5 unit(s)    R lateral frontalis:  5 unit(s)    L medial frontalis:  5 unit(s)    R medial frontalis:  5 unit(s)    L temporalis injection amount:  20 unit(s)    R temporalis injection amount:  20 unit(s)    Procerus injection amount:  5 unit(s)    L medial occipitalis injection amount:  15 unit(s)    R medial " occipitalis injection amount:  15 unit(s)    L superior cervical paraspinal injection amount:  10 unit(s)    R superior cervical paraspinal injection amount:  10 unit(s)    L superior trapezius injection amount:  15 unit(s)    R superior trapezius injection amount:  15 unit(s)   Total Units      Total units used:  200    Total units discarded:  0   Post-procedure details      Chemodenervation:  Chronic migraine    Facial Nerve Location::  Bilateral facial nerve    Patient tolerance of procedure:  Tolerated well, no immediate complications   Comments       5 units trapezius bilaterally   5 units splenius capitis bilaterally   25 units occipitalis   All medically necessary

## 2024-07-10 ENCOUNTER — PROCEDURE VISIT (OUTPATIENT)
Age: 49
End: 2024-07-10
Payer: COMMERCIAL

## 2024-07-10 VITALS
WEIGHT: 180 LBS | HEART RATE: 82 BPM | HEIGHT: 61 IN | DIASTOLIC BLOOD PRESSURE: 76 MMHG | SYSTOLIC BLOOD PRESSURE: 98 MMHG | BODY MASS INDEX: 33.99 KG/M2

## 2024-07-10 DIAGNOSIS — M99.01 SEGMENTAL DYSFUNCTION OF CERVICAL REGION: ICD-10-CM

## 2024-07-10 DIAGNOSIS — M62.838 MUSCLE SPASM: ICD-10-CM

## 2024-07-10 DIAGNOSIS — M48.062 SPINAL STENOSIS OF LUMBAR REGION WITH NEUROGENIC CLAUDICATION: Primary | ICD-10-CM

## 2024-07-10 DIAGNOSIS — M99.04 SEGMENTAL DYSFUNCTION OF SACRAL REGION: ICD-10-CM

## 2024-07-10 DIAGNOSIS — M47.812 CERVICAL SPONDYLOSIS: ICD-10-CM

## 2024-07-10 DIAGNOSIS — M99.02 SEGMENTAL DYSFUNCTION OF THORACIC REGION: ICD-10-CM

## 2024-07-10 DIAGNOSIS — M99.03 SEGMENTAL DYSFUNCTION OF LUMBAR REGION: ICD-10-CM

## 2024-07-10 PROCEDURE — 98941 CHIROPRACT MANJ 3-4 REGIONS: CPT | Performed by: CHIROPRACTOR

## 2024-07-10 PROCEDURE — 97110 THERAPEUTIC EXERCISES: CPT | Performed by: CHIROPRACTOR

## 2024-07-10 NOTE — PROGRESS NOTES
Initial date of service: 5/6/24    Diagnoses and all orders for this visit:    Spinal stenosis of lumbar region with neurogenic claudication    Segmental dysfunction of sacral region    Muscle spasm    Cervical spondylosis    Segmental dysfunction of lumbar region    Segmental dysfunction of thoracic region    Segmental dysfunction of cervical region    Pt suffered mild exacerbation but responded to tx with reduced pain and increased ROM. Overall improving with more mobility    TREATMENT: [id number]  Ther-ex: IASTM; discussed post procedure soreness and/or ecchymosis for up to 36 hrs, applied to affected mm hypertonicities; supine hamstring stretch, supine gluteal stretch, single knee to chest stretch, upper trap stretch, transitional mvmt education, abdominal bracing; greater than 15 min spent performing above mentioned ther-ex to improve ROM/flexibility. Cervical supine graded mobilization and traction, Thoracic mobilization/manipulation: prone P-A mob; Lumbar mobilization/manipulation: diversified side laying graded HVLA, flexion-traction; SIJ Manipulation/Mobilization: R/L SIJ HVLA - long axis distraction, mederos drop table maneuver to affected SIJ    HPI:  Kinza Meza is a 48 y.o. female  Chief Complaint   Patient presents with    Neck Pain     About a 4 left side     Back Pain     Low back left side 3      Pt presents for tx for exacerbation of chronic neck/back pain. Pt has had back and neck surgery. Pt was utilizing cane and now using walker occasionally. 2022 MRI demonstrates multilevel degenerative changes of postsurgical lumbar spine status post L1-L2 posterior spinal fixation with varying degrees of canal stenosis (moderate L4-L5) and foraminal narrowing (mild left L1-L2 and left L2-L3 ). 2024 fl/ext xrays unremarkable for instability. 2021 C/S MRI demonstrates s/p ACDF from C4 to C7. Multilevel degenerative spondylosis as described. Possible ossification of the posterior longitudinal ligament  at C2-3 and C6-7      Neck Pain   This is a chronic problem. The current episode started more than 1 year ago. The problem occurs constantly. The problem has been waxing and waning. The pain is present in the left side, midline and right side. The quality of the pain is described as aching. The symptoms are aggravated by bending, position, stress and twisting. The pain is Worse during the day.   Back Pain  This is a chronic problem. The current episode started more than 1 year ago. The problem occurs constantly. The problem has been waxing and waning since onset. The pain is present in the gluteal, lumbar spine, sacro-iliac and thoracic spine. The quality of the pain is described as aching, burning and stabbing. The pain radiates to the left thigh, left knee and right thigh. Pain scale: 4-9/10. The pain is Worse during the day. The symptoms are aggravated by standing, twisting and bending (walking, laying supine, transitional mvmts; palliative includes sitting, massage). Pertinent negatives include no bladder incontinence or bowel incontinence. Risk factors include lack of exercise.     Past Medical History:   Diagnosis Date    ADD (attention deficit disorder)     Allergic rhinitis     ALS (amyotrophic lateral sclerosis) ([facility]) 11/22    Per neurological surgeon    Anemia 12/2021    Anxiety     Arthritis     Bipolar disorder ([facility])     Cervical disc disorder with radiculopathy of cervical region     Chronic depression     Chronic kidney disease     Stage 1    Chronic pain disorder     Cluster headache     Colitis     Last assessed - 11/25/14    Colon polyp     Concussion 2018    Condyloma acuminatum     Last assessed - 3/19/14    CTS (carpal tunnel syndrome) 2001    Depression     Diabetes mellitus ([facility])     Last assessed - 11/25/14    Diabetic nephropathy ([facility]) 2001    Diabetic neuropathy ([facility])     Diabetic retinopathy ([facility]) 2001    Difficulty walking 07/22    Fibromyalgia     Fibromyalgia, primary     GERD  (gastroesophageal reflux disease)     Head injury 18    Fall    Headache(784.0) 1977    History of transfusion 2021    HTN (hypertension)     Last assessed - 14    Hyperlipidemia 2019    Hypertension     Intervertebral disc disorder with radiculopathy of lumbar region     Lupus (HCC)     Rhuematologist-Sheela Dasilva, PA    Migraine     Miscarriage 1988    ,     Obesity 1980    Obesity, unspecified     Open wound of back 2023    Opioid abuse, in remission (HCC) 2022    Only while in hospital and for about 10 days once home instead of one week.    Osteoporosis     Peripheral neuropathy     Postoperative wound infection 2021    Stop not healed    Preeclampsia     Last assessed - 14    Rheumatoid arthritis ([facility])     Wears dentures       Past Surgical History:   Procedure Laterality Date    APPENDECTOMY      Last assessed - 14    BARIATRIC SURGERY  2016    CARPAL TUNNEL RELEASE      CATARACT EXTRACTION      CERVICAL FUSION N/A 2019    Procedure: Anterior cervical discectomy w fusion C4-5, C5-6, C6-7; allograft and neuromonitoring;  Surgeon: Jose Gomez MD;  Location: [facility];  Service: Orthopedics     SECTION      Last assessed - 14    COLONOSCOPY      COLONOSCOPY      HEMORRHOID SURGERY      HERNIA REPAIR      Last assessed - 14    LUMBAR FUSION N/A 2021    Procedure: L1/2 laminectomy, discectomy with L1/2 MIS posterior fixation using neuromonitoring and neuronavigation;  Surgeon: Suhas Akbar MD;  Location: [facility];  Service: Neurosurgery    LUMBAR WOUND EXPLORATION Bilateral 2021    Procedure: Lumbar wound washout, debridement and intraoperative cultures;  Surgeon: Suhas Akbar MD;  Location: [facility];  Service: Neurosurgery    MAMMO (HISTORICAL)      ME DEBRIDEMENT BONE 1ST 20 SQ CM/< N/A 2023    Procedure: SURGICAL PREPARATION OF BACK  WOUND AND LOCAL FLAP, PREVENA PLACEMENT;  Surgeon:  Ady Rocha MD;  Location:  MAIN OR;  Service: Plastics    WOOD-EN-Y PROCEDURE      ULNAR TUNNEL RELEASE      VAC DRESSING APPLICATION Bilateral 12/22/2021    Procedure: APPLICATION VAC DRESSING;  Surgeon: Elbert Hawley MD;  Location: [facility];  Service: General    VAC DRESSING APPLICATION N/A 12/25/2021    Procedure: APPLICATION VAC DRESSING ABDOMEN/TRUNK;  Surgeon: Mani Ross DO;  Location: [facility];  Service: General    VENTRAL HERNIA REPAIR       The following portions of the patient's history were reviewed and updated as appropriate: allergies, past family history, past medical history, past social history, past surgical history, and problem list.  Review of Systems   Gastrointestinal:  Negative for bowel incontinence.   Genitourinary:  Negative for bladder incontinence.   Musculoskeletal:  Positive for back pain and neck pain.     Physical Exam  Neck:        Comments: Pnful and limited in Brot, Blf  Musculoskeletal:      Cervical back: Pain with movement and muscular tenderness present. Decreased range of motion.      Thoracic back: Spasms and tenderness present. Decreased range of motion.      Lumbar back: Spasms and tenderness present. Decreased range of motion. Negative right straight leg raise test and negative left straight leg raise test.        Back:       Comments: Pnful and limited in Brot, Blf, Ext centralizes    Lymphadenopathy:      Cervical: No cervical adenopathy.   Skin:     General: Skin is warm and dry.   Neurological:      Mental Status: She is alert and oriented to person, place, and time.      Gait: Gait is intact.   Psychiatric:         Mood and Affect: Mood and affect normal.         Behavior: Behavior normal.       SOFT TISSUE ASSESSMENT Hypertonicity and tenderness palpated B upper traps, B SCM, B T10-S1 erector spinae, glute med/min, QL, hamstring JOINT RESTRICTIONS: C4/5, T1/2, T10-S1 and R/L SIJ    Return in about 2 weeks (around 7/24/2024) for Next scheduled  follow up.

## 2024-07-21 DIAGNOSIS — J30.2 SEASONAL ALLERGIC RHINITIS, UNSPECIFIED TRIGGER: ICD-10-CM

## 2024-07-21 RX ORDER — MONTELUKAST SODIUM 10 MG/1
10 TABLET ORAL EVERY EVENING
Qty: 90 TABLET | Refills: 1 | Status: SHIPPED | OUTPATIENT
Start: 2024-07-21

## 2024-07-26 ENCOUNTER — APPOINTMENT (OUTPATIENT)
Dept: LAB | Facility: CLINIC | Age: 49
DRG: 638 | End: 2024-07-26
Payer: COMMERCIAL

## 2024-07-26 ENCOUNTER — NURSE TRIAGE (OUTPATIENT)
Age: 49
End: 2024-07-26

## 2024-07-26 ENCOUNTER — HOSPITAL ENCOUNTER (INPATIENT)
Facility: HOSPITAL | Age: 49
LOS: 5 days | Discharge: HOME HEALTH | DRG: 638 | End: 2024-07-31
Attending: EMERGENCY MEDICINE | Admitting: INTERNAL MEDICINE
Payer: COMMERCIAL

## 2024-07-26 ENCOUNTER — OFFICE VISIT (OUTPATIENT)
Dept: INTERNAL MEDICINE CLINIC | Facility: CLINIC | Age: 49
End: 2024-07-26
Payer: COMMERCIAL

## 2024-07-26 VITALS
BODY MASS INDEX: 31.53 KG/M2 | WEIGHT: 167 LBS | HEIGHT: 61 IN | OXYGEN SATURATION: 98 % | DIASTOLIC BLOOD PRESSURE: 98 MMHG | SYSTOLIC BLOOD PRESSURE: 126 MMHG | TEMPERATURE: 97.5 F | HEART RATE: 95 BPM

## 2024-07-26 DIAGNOSIS — E11.65 UNCONTROLLED TYPE 2 DIABETES MELLITUS WITH HYPERGLYCEMIA (HCC): ICD-10-CM

## 2024-07-26 DIAGNOSIS — E11.21 TYPE 2 DIABETES MELLITUS WITH NEPHROPATHY (HCC): ICD-10-CM

## 2024-07-26 DIAGNOSIS — R19.7 DIARRHEA: ICD-10-CM

## 2024-07-26 DIAGNOSIS — E83.9 CHRONIC KIDNEY DISEASE-MINERAL AND BONE DISORDER: ICD-10-CM

## 2024-07-26 DIAGNOSIS — N18.32 STAGE 3B CHRONIC KIDNEY DISEASE (HCC): Primary | ICD-10-CM

## 2024-07-26 DIAGNOSIS — R19.7 DIARRHEA, UNSPECIFIED TYPE: ICD-10-CM

## 2024-07-26 DIAGNOSIS — E86.0 DEHYDRATION: ICD-10-CM

## 2024-07-26 DIAGNOSIS — N18.32 STAGE 3B CHRONIC KIDNEY DISEASE (HCC): ICD-10-CM

## 2024-07-26 DIAGNOSIS — M89.9 CHRONIC KIDNEY DISEASE-MINERAL AND BONE DISORDER: ICD-10-CM

## 2024-07-26 DIAGNOSIS — E11.9 DM (DIABETES MELLITUS) (HCC): ICD-10-CM

## 2024-07-26 DIAGNOSIS — N18.9 CHRONIC KIDNEY DISEASE-MINERAL AND BONE DISORDER: ICD-10-CM

## 2024-07-26 DIAGNOSIS — R10.9 FLANK PAIN: Primary | ICD-10-CM

## 2024-07-26 DIAGNOSIS — R11.2 NAUSEA & VOMITING: ICD-10-CM

## 2024-07-26 DIAGNOSIS — E11.10 DIABETIC KETOACIDOSIS WITHOUT COMA ASSOCIATED WITH TYPE 2 DIABETES MELLITUS (HCC): ICD-10-CM

## 2024-07-26 DIAGNOSIS — E11.10 DKA (DIABETIC KETOACIDOSIS) (HCC): ICD-10-CM

## 2024-07-26 DIAGNOSIS — K29.60 OTHER GASTRITIS WITHOUT BLEEDING: ICD-10-CM

## 2024-07-26 PROBLEM — F32.A DEPRESSION: Status: ACTIVE | Noted: 2021-10-28

## 2024-07-26 LAB
ALBUMIN SERPL BCG-MCNC: 4.4 G/DL (ref 3.5–5)
ALP SERPL-CCNC: 68 U/L (ref 34–104)
ALT SERPL W P-5'-P-CCNC: 6 U/L (ref 7–52)
ANION GAP SERPL CALCULATED.3IONS-SCNC: 17 MMOL/L (ref 4–13)
ANION GAP SERPL CALCULATED.3IONS-SCNC: 18 MMOL/L (ref 4–13)
ANION GAP SERPL CALCULATED.3IONS-SCNC: 20 MMOL/L (ref 4–13)
AST SERPL W P-5'-P-CCNC: 10 U/L (ref 13–39)
B-OH-BUTYR SERPL-MCNC: 3.45 MMOL/L (ref 0.02–0.27)
B-OH-BUTYR SERPL-MCNC: 4.84 MMOL/L (ref 0.02–0.27)
BASE EX.OXY STD BLDV CALC-SCNC: 34.5 % (ref 60–80)
BASE EXCESS BLDV CALC-SCNC: -6.8 MMOL/L
BASOPHILS # BLD AUTO: 0.06 THOUSANDS/ÂΜL (ref 0–0.1)
BASOPHILS NFR BLD AUTO: 1 % (ref 0–1)
BILIRUB SERPL-MCNC: 0.82 MG/DL (ref 0.2–1)
BUN SERPL-MCNC: 24 MG/DL (ref 5–25)
BUN SERPL-MCNC: 26 MG/DL (ref 5–25)
BUN SERPL-MCNC: 27 MG/DL (ref 5–25)
CALCIUM SERPL-MCNC: 8.8 MG/DL (ref 8.4–10.2)
CALCIUM SERPL-MCNC: 9.4 MG/DL (ref 8.4–10.2)
CALCIUM SERPL-MCNC: 9.4 MG/DL (ref 8.4–10.2)
CHLORIDE SERPL-SCNC: 104 MMOL/L (ref 96–108)
CHLORIDE SERPL-SCNC: 105 MMOL/L (ref 96–108)
CHLORIDE SERPL-SCNC: 105 MMOL/L (ref 96–108)
CO2 SERPL-SCNC: 18 MMOL/L (ref 21–32)
CO2 SERPL-SCNC: 19 MMOL/L (ref 21–32)
CO2 SERPL-SCNC: 20 MMOL/L (ref 21–32)
CREAT SERPL-MCNC: 1.85 MG/DL (ref 0.6–1.3)
CREAT SERPL-MCNC: 1.89 MG/DL (ref 0.6–1.3)
CREAT SERPL-MCNC: 1.91 MG/DL (ref 0.6–1.3)
EOSINOPHIL # BLD AUTO: 0.01 THOUSAND/ÂΜL (ref 0–0.61)
EOSINOPHIL NFR BLD AUTO: 0 % (ref 0–6)
ERYTHROCYTE [DISTWIDTH] IN BLOOD BY AUTOMATED COUNT: 14.4 % (ref 11.6–15.1)
GFR SERPL CREATININE-BSD FRML MDRD: 30 ML/MIN/1.73SQ M
GFR SERPL CREATININE-BSD FRML MDRD: 30 ML/MIN/1.73SQ M
GFR SERPL CREATININE-BSD FRML MDRD: 31 ML/MIN/1.73SQ M
GLUCOSE P FAST SERPL-MCNC: 81 MG/DL (ref 65–99)
GLUCOSE SERPL-MCNC: 113 MG/DL (ref 65–140)
GLUCOSE SERPL-MCNC: 118 MG/DL (ref 65–140)
GLUCOSE SERPL-MCNC: 129 MG/DL (ref 65–140)
GLUCOSE SERPL-MCNC: 81 MG/DL (ref 65–140)
GLUCOSE SERPL-MCNC: 86 MG/DL (ref 65–140)
HCO3 BLDV-SCNC: 19.6 MMOL/L (ref 24–30)
HCT VFR BLD AUTO: 43.6 % (ref 34.8–46.1)
HGB BLD-MCNC: 14.3 G/DL (ref 11.5–15.4)
IMM GRANULOCYTES # BLD AUTO: 0.06 THOUSAND/UL (ref 0–0.2)
IMM GRANULOCYTES NFR BLD AUTO: 1 % (ref 0–2)
LACTATE SERPL-SCNC: 1 MMOL/L (ref 0.5–2)
LIPASE SERPL-CCNC: 30 U/L (ref 11–82)
LYMPHOCYTES # BLD AUTO: 1.18 THOUSANDS/ÂΜL (ref 0.6–4.47)
LYMPHOCYTES NFR BLD AUTO: 17 % (ref 14–44)
MAGNESIUM SERPL-MCNC: 1.8 MG/DL (ref 1.9–2.7)
MCH RBC QN AUTO: 28 PG (ref 26.8–34.3)
MCHC RBC AUTO-ENTMCNC: 32.8 G/DL (ref 31.4–37.4)
MCV RBC AUTO: 86 FL (ref 82–98)
MONOCYTES # BLD AUTO: 0.35 THOUSAND/ÂΜL (ref 0.17–1.22)
MONOCYTES NFR BLD AUTO: 5 % (ref 4–12)
NEUTROPHILS # BLD AUTO: 5.25 THOUSANDS/ÂΜL (ref 1.85–7.62)
NEUTS SEG NFR BLD AUTO: 76 % (ref 43–75)
NRBC BLD AUTO-RTO: 0 /100 WBCS
O2 CT BLDV-SCNC: 7.2 ML/DL
PCO2 BLDV: 42.2 MM HG (ref 42–50)
PH BLDV: 7.28 [PH] (ref 7.3–7.4)
PHOSPHATE SERPL-MCNC: 4 MG/DL (ref 2.7–4.5)
PLATELET # BLD AUTO: 204 THOUSANDS/UL (ref 149–390)
PMV BLD AUTO: 11.1 FL (ref 8.9–12.7)
PO2 BLDV: 20 MM HG (ref 35–45)
POTASSIUM SERPL-SCNC: 3.3 MMOL/L (ref 3.5–5.3)
POTASSIUM SERPL-SCNC: 3.4 MMOL/L (ref 3.5–5.3)
POTASSIUM SERPL-SCNC: 3.9 MMOL/L (ref 3.5–5.3)
PROT SERPL-MCNC: 7.2 G/DL (ref 6.4–8.4)
RBC # BLD AUTO: 5.1 MILLION/UL (ref 3.81–5.12)
SL AMB  POCT GLUCOSE, UA: NORMAL
SL AMB LEUKOCYTE ESTERASE,UA: 15
SL AMB POCT BILIRUBIN,UA: NORMAL
SL AMB POCT CLARITY,UA: NORMAL
SL AMB POCT COLOR,UA: YELLOW
SL AMB POCT KETONES,UA: NORMAL
SL AMB POCT NITRITE,UA: NORMAL
SL AMB POCT PH,UA: NORMAL
SL AMB POCT SPECIFIC GRAVITY,UA: 1.03
SL AMB POCT URINE PROTEIN: NORMAL
SL AMB POCT UROBILINOGEN: NORMAL
SODIUM SERPL-SCNC: 141 MMOL/L (ref 135–147)
SODIUM SERPL-SCNC: 142 MMOL/L (ref 135–147)
SODIUM SERPL-SCNC: 143 MMOL/L (ref 135–147)
WBC # BLD AUTO: 6.91 THOUSAND/UL (ref 4.31–10.16)

## 2024-07-26 PROCEDURE — 83735 ASSAY OF MAGNESIUM: CPT

## 2024-07-26 PROCEDURE — 99213 OFFICE O/P EST LOW 20 MIN: CPT | Performed by: INTERNAL MEDICINE

## 2024-07-26 PROCEDURE — 96375 TX/PRO/DX INJ NEW DRUG ADDON: CPT

## 2024-07-26 PROCEDURE — 82010 KETONE BODYS QUAN: CPT

## 2024-07-26 PROCEDURE — 84100 ASSAY OF PHOSPHORUS: CPT

## 2024-07-26 PROCEDURE — 82805 BLOOD GASES W/O2 SATURATION: CPT

## 2024-07-26 PROCEDURE — 80053 COMPREHEN METABOLIC PANEL: CPT

## 2024-07-26 PROCEDURE — 82948 REAGENT STRIP/BLOOD GLUCOSE: CPT

## 2024-07-26 PROCEDURE — 80048 BASIC METABOLIC PNL TOTAL CA: CPT

## 2024-07-26 PROCEDURE — 81003 URINALYSIS AUTO W/O SCOPE: CPT | Performed by: INTERNAL MEDICINE

## 2024-07-26 PROCEDURE — 83690 ASSAY OF LIPASE: CPT

## 2024-07-26 PROCEDURE — 85025 COMPLETE CBC W/AUTO DIFF WBC: CPT

## 2024-07-26 PROCEDURE — 83605 ASSAY OF LACTIC ACID: CPT

## 2024-07-26 PROCEDURE — 99223 1ST HOSP IP/OBS HIGH 75: CPT | Performed by: INTERNAL MEDICINE

## 2024-07-26 PROCEDURE — 99285 EMERGENCY DEPT VISIT HI MDM: CPT | Performed by: EMERGENCY MEDICINE

## 2024-07-26 PROCEDURE — 96365 THER/PROPH/DIAG IV INF INIT: CPT

## 2024-07-26 PROCEDURE — 93005 ELECTROCARDIOGRAM TRACING: CPT

## 2024-07-26 PROCEDURE — 36415 COLL VENOUS BLD VENIPUNCTURE: CPT

## 2024-07-26 PROCEDURE — 99284 EMERGENCY DEPT VISIT MOD MDM: CPT

## 2024-07-26 RX ORDER — CYANOCOBALAMIN 1000 UG/ML
1000 INJECTION, SOLUTION INTRAMUSCULAR; SUBCUTANEOUS WEEKLY
Status: DISCONTINUED | OUTPATIENT
Start: 2024-07-28 | End: 2024-07-31 | Stop reason: HOSPADM

## 2024-07-26 RX ORDER — LOSARTAN POTASSIUM 50 MG/1
100 TABLET ORAL DAILY
Status: DISCONTINUED | OUTPATIENT
Start: 2024-07-27 | End: 2024-07-27

## 2024-07-26 RX ORDER — TIZANIDINE 4 MG/1
4 TABLET ORAL 3 TIMES DAILY
Status: DISCONTINUED | OUTPATIENT
Start: 2024-07-26 | End: 2024-07-27

## 2024-07-26 RX ORDER — HYDROCHLOROTHIAZIDE 12.5 MG/1
12.5 TABLET ORAL DAILY
Status: DISCONTINUED | OUTPATIENT
Start: 2024-07-27 | End: 2024-07-27

## 2024-07-26 RX ORDER — ONDANSETRON 4 MG/1
4 TABLET, FILM COATED ORAL EVERY 8 HOURS PRN
Qty: 120 TABLET | Refills: 0 | Status: SHIPPED | OUTPATIENT
Start: 2024-07-26

## 2024-07-26 RX ORDER — SODIUM BICARBONATE 650 MG/1
650 TABLET ORAL 2 TIMES DAILY
Status: DISCONTINUED | OUTPATIENT
Start: 2024-07-27 | End: 2024-07-27

## 2024-07-26 RX ORDER — ACETAZOLAMIDE 250 MG/1
250 TABLET ORAL 2 TIMES DAILY
Status: DISCONTINUED | OUTPATIENT
Start: 2024-07-27 | End: 2024-07-27

## 2024-07-26 RX ORDER — FERROUS SULFATE 325(65) MG
325 TABLET ORAL
Status: DISCONTINUED | OUTPATIENT
Start: 2024-07-27 | End: 2024-07-31 | Stop reason: HOSPADM

## 2024-07-26 RX ORDER — ATORVASTATIN CALCIUM 10 MG/1
10 TABLET, FILM COATED ORAL
Status: DISCONTINUED | OUTPATIENT
Start: 2024-07-26 | End: 2024-07-31 | Stop reason: HOSPADM

## 2024-07-26 RX ORDER — POTASSIUM CHLORIDE 20 MEQ/1
80 TABLET, EXTENDED RELEASE ORAL ONCE
Status: COMPLETED | OUTPATIENT
Start: 2024-07-26 | End: 2024-07-27

## 2024-07-26 RX ORDER — CALCITRIOL 0.25 UG/1
0.25 CAPSULE, LIQUID FILLED ORAL DAILY
Status: DISCONTINUED | OUTPATIENT
Start: 2024-07-27 | End: 2024-07-31 | Stop reason: HOSPADM

## 2024-07-26 RX ORDER — MAGNESIUM SULFATE HEPTAHYDRATE 40 MG/ML
2 INJECTION, SOLUTION INTRAVENOUS ONCE
Status: COMPLETED | OUTPATIENT
Start: 2024-07-26 | End: 2024-07-27

## 2024-07-26 RX ORDER — DIVALPROEX SODIUM 500 MG/1
500 TABLET, DELAYED RELEASE ORAL
Status: DISCONTINUED | OUTPATIENT
Start: 2024-07-26 | End: 2024-07-31 | Stop reason: HOSPADM

## 2024-07-26 RX ORDER — PREGABALIN 100 MG/1
100 CAPSULE ORAL 3 TIMES DAILY
Status: DISCONTINUED | OUTPATIENT
Start: 2024-07-26 | End: 2024-07-31 | Stop reason: HOSPADM

## 2024-07-26 RX ORDER — METHOCARBAMOL 750 MG/1
750 TABLET, FILM COATED ORAL EVERY 6 HOURS PRN
Status: DISCONTINUED | OUTPATIENT
Start: 2024-07-26 | End: 2024-07-27

## 2024-07-26 RX ORDER — DULOXETIN HYDROCHLORIDE 60 MG/1
60 CAPSULE, DELAYED RELEASE ORAL DAILY
Status: DISCONTINUED | OUTPATIENT
Start: 2024-07-27 | End: 2024-07-31 | Stop reason: HOSPADM

## 2024-07-26 RX ORDER — METOPROLOL TARTRATE 50 MG/1
100 TABLET, FILM COATED ORAL EVERY 12 HOURS SCHEDULED
Status: DISCONTINUED | OUTPATIENT
Start: 2024-07-26 | End: 2024-07-27

## 2024-07-26 RX ORDER — ONDANSETRON 4 MG/1
4 TABLET, ORALLY DISINTEGRATING ORAL EVERY 8 HOURS PRN
Status: DISCONTINUED | OUTPATIENT
Start: 2024-07-26 | End: 2024-07-31 | Stop reason: HOSPADM

## 2024-07-26 RX ORDER — CLONAZEPAM 0.5 MG/1
0.5 TABLET ORAL 2 TIMES DAILY
Status: DISCONTINUED | OUTPATIENT
Start: 2024-07-27 | End: 2024-07-27

## 2024-07-26 RX ORDER — ONDANSETRON 2 MG/ML
4 INJECTION INTRAMUSCULAR; INTRAVENOUS ONCE
Status: COMPLETED | OUTPATIENT
Start: 2024-07-26 | End: 2024-07-26

## 2024-07-26 RX ORDER — ENOXAPARIN SODIUM 100 MG/ML
40 INJECTION SUBCUTANEOUS DAILY
Status: DISCONTINUED | OUTPATIENT
Start: 2024-07-27 | End: 2024-07-27

## 2024-07-26 RX ORDER — DEXTROSE MONOHYDRATE AND SODIUM CHLORIDE 5; .45 G/100ML; G/100ML
500 INJECTION, SOLUTION INTRAVENOUS CONTINUOUS
Status: DISCONTINUED | OUTPATIENT
Start: 2024-07-26 | End: 2024-07-27

## 2024-07-26 RX ADMIN — ONDANSETRON 4 MG: 2 INJECTION INTRAMUSCULAR; INTRAVENOUS at 20:29

## 2024-07-26 RX ADMIN — SODIUM CHLORIDE, SODIUM LACTATE, POTASSIUM CHLORIDE, AND CALCIUM CHLORIDE 1000 ML: .6; .31; .03; .02 INJECTION, SOLUTION INTRAVENOUS at 20:29

## 2024-07-26 NOTE — TELEPHONE ENCOUNTER
"Pt has been dealing with diarrhea since this morning, abdominal cramping since Tuesday off and on and nausea. Pt states the cramping is a 7/10. Pt is worried about dehydration but does report still voiding and trying to increase water intake.     Recommended to be seen in office. Pt agreed. Scheduled for today.           Reason for Disposition   MODERATE diarrhea (e.g., 4-6 times / day more than normal) and present > 48 hours (2 days)    Answer Assessment - Initial Assessment Questions  1. DIARRHEA SEVERITY: \"How bad is the diarrhea?\" \"How many extra stools have you had in the past 24 hours than normal?\"     - NO DIARRHEA (SCALE 0)    - MILD (SCALE 1-3): Few loose or mushy BMs; increase of 1-3 stools over normal daily number of stools; mild increase in ostomy output.    -  MODERATE (SCALE 4-7): Increase of 4-6 stools daily over normal; moderate increase in ostomy output.  * SEVERE (SCALE 8-10; OR 'WORST POSSIBLE'): Increase of 7 or more stools daily over normal; moderate increase in ostomy output; incontinence.      Moderate 4-7   2. ONSET: \"When did the diarrhea begin?\"       Early this morning   3. BM CONSISTENCY: \"How loose or watery is the diarrhea?\"       Now its loose but tarry. Pt takes an iron supplement. Tarry part is normal pt said. But its very loose.   4. VOMITING: \"Are you also vomiting?\" If Yes, ask: \"How many times in the past 24 hours?\"       Has not vomited in the last 24 hours but just feels nauseous   5. ABDOMINAL PAIN: \"Are you having any abdominal pain?\" If Yes, ask: \"What does it feel like?\" (e.g., crampy, dull, intermittent, constant)       Top of stomach near chest or near bottom when have to go to the bathroom. Has a lot of gas. Cramping,   6. ABDOMINAL PAIN SEVERITY: If present, ask: \"How bad is the pain?\"  (e.g., Scale 1-10; mild, moderate, or severe)    - MILD (1-3): doesn't interfere with normal activities, abdomen soft and not tender to touch     - MODERATE (4-7): interferes with normal " "activities or awakens from sleep, tender to touch     - SEVERE (8-10): excruciating pain, doubled over, unable to do any normal activities        7/10   7. ORAL INTAKE: If vomiting, \"Have you been able to drink liquids?\" \"How much fluids have you had in the past 24 hours?\"      Drinking water and Gatorade to balance sugar.   8. HYDRATION: \"Any signs of dehydration?\" (e.g., dry mouth [not just dry lips], too weak to stand, dizziness, new weight loss) \"When did you last urinate?\"      Nothing that makes pt concerned right now. She is still voiding.   9. EXPOSURE: \"Have you traveled to a foreign country recently?\" \"Have you been exposed to anyone with diarrhea?\" \"Could you have eaten any food that was spoiled?\"      Denies   10. ANTIBIOTIC USE: \"Are you taking antibiotics now or have you taken antibiotics in the past 2 months?\"        Yes pt was on something for a toe infection   11. OTHER SYMPTOMS: \"Do you have any other symptoms?\" (e.g., fever, blood in stool)        Denies    Protocols used: Diarrhea-ADULT-[place]    "

## 2024-07-26 NOTE — ED PROVIDER NOTES
History  Chief Complaint   Patient presents with    Flank Pain     Pt was sent over for kidney disease. Pt states she thinks she could be in DKA. Pt has been having abdominal pain     Patient is a 48-year-old female past medical history diabetes CKD presenting for evaluation of flank pain abdominal pain nausea vomiting diarrhea.  Patient reports her symptoms been ongoing for several days.  Has had multiple episodes of nonbloody nonbilious vomiting and nonbloody diarrhea.  States that she feels dehydrated like she is not been able to drink enough to keep up with her fluid losses.  States that the flank pain is bilateral low back pain when she has generalized cramping abdominal pain that is associated with diarrhea.  No fevers recent antibiotics recent travel.  States that she had outpatient lab work today and was called by PCP and told to present to the hospital for abnormal kidney function.  Patient also concerned that she is in DKA although she is never been in DKA before.  Denying any other complaints at this time          Prior to Admission Medications   Prescriptions Last Dose Informant Patient Reported? Taking?   Ajovy 225 MG/1.5ML auto-injector  Self No No   Sig: INJECT 4.5 ML(675 MG TOTAL) UNDER THE SKIN EVERY 3 MONTHS   Patient taking differently: monthly   BD Pen Needle Glory U/F 32G X 4 MM MISC  Self No No   Sig: Inject under the skin 2 (two) times a day Use as directed   Blood Glucose Monitoring Suppl (ONE TOUCH ULTRA 2) w/Device KIT  Self No No   Sig: Use 1 each 2 (two) times a day Use as instructed   Cholecalciferol (Vitamin D3) 50 MCG (2000 UT) CAPS  Self No No   Sig: TAKE 1 CAPSULE(2,000 UNITS TOTAL) BY MOUTH DAILY   Continuous Blood Gluc Sensor (FreeStyle Filiberot 3 Sensor) MISC  Self No No   Sig: Use 1 each every 14 (fourteen) days   Cyanocobalamin (Vitamin B-12) 1000 MCG SUBL  Self No No   Sig: Place 1 tablet (1,000 mcg total) under the tongue in the morning   DULoxetine (CYMBALTA) 60 mg delayed  "release capsule  Self No No   Sig: Take 1 capsule (60 mg total) by mouth daily   Insulin Glargine Solostar (Lantus SoloStar) 100 UNIT/ML SOPN  Self No No   Sig: Inject 0.4 mL (40 Units total) under the skin daily at bedtime   Lancets MISC  Self Yes No   Sig: Use 1 each 2 (two) times a day Use as instructed   Multiple Vitamins-Minerals (multivitamin with minerals) tablet  Self Yes No   Sig: Take 1 tablet by mouth daily   NON FORMULARY  Self Yes No   Sig: Botox injections for HA every 3months (LD 3/20/23)   Ophthalmic Irrigation Solution (OCUSOFT EYE WASH OP)  Self Yes No   Sig: Apply 1 Application to eye daily at bedtime   Polyethyl Glycol-Propyl Glycol (Systane) 0.4-0.3 % GEL  Self Yes No   Sig: Apply 1 drop to eye 2 (two) times a day Using Ivizia as an alternative   Syringe/Needle, Disp, (SYRINGE 3CC/89XQ3-6/2\") 25G X 1-1/2\" 3 ML MISC   No No   Sig: Use once a week   acetaZOLAMIDE (DIAMOX) 250 mg tablet   No No   Sig: TAKE 1 TABLET(250 MG) BY MOUTH TWICE DAILY   atorvastatin (LIPITOR) 10 mg tablet   No No   Sig: Take 1 tablet (10 mg total) by mouth daily at bedtime   benzonatate (TESSALON) 200 MG capsule  Self No No   Sig: Take 1 capsule (200 mg total) by mouth 3 (three) times a day as needed for cough   Patient not taking: Reported on 3/28/2024   calcitriol (ROCALTROL) 0.25 mcg capsule  Self No No   Sig: TAKE 1 CAPSULE BY MOUTH daily   cetirizine (ZyrTEC) oral solution  Self No No   Sig: Take 5 mL (5 mg total) by mouth daily   Patient not taking: Reported on 3/28/2024   ciclopirox (PENLAC) 8 % solution  Self No No   Sig: Apply topically daily at bedtime   Patient not taking: Reported on 4/25/2024   clonazePAM (KlonoPIN) 0.5 mg tablet   No No   Sig: Take 1 tablet (0.5 mg total) by mouth 2 (two) times a day   cyanocobalamin 1,000 mcg/mL   No No   Sig: Inject 1 mL (1,000 mcg total) into a muscle once a week   cycloSPORINE, PF, (Cequa) 0.09 % SOLN  Self Yes No   Sig: Apply 1 drop to eye 2 (two) times a day "   cyproheptadine (PERIACTIN) 4 mg tablet  Self No No   Sig: Take 1 tablet (4 mg total) by mouth daily at bedtime   Patient not taking: Reported on 11/3/2023   diphenhydrAMINE (BENADRYL) 25 mg tablet  Self No No   Sig: Take 1 tablet (25 mg total) by mouth every 6 (six) hours as needed for itching   Patient not taking: Reported on 11/3/2023   divalproex sodium (Depakote) 500 mg DR tablet  Self No No   Sig: Take 1 tablet (500 mg total) by mouth daily at bedtime   docusate sodium (COLACE) 100 mg capsule  Self No No   Sig: TAKE 1 CAPSULE BY MOUTH TWICE A DAY   Patient taking differently: Take 100 mg by mouth 2 (two) times a day as needed   ferrous sulfate 324 (65 Fe) mg  Self No No   Sig: Take 1 tablet (324 mg total) by mouth daily before breakfast   insulin aspart (NovoLOG FlexPen) 100 UNIT/ML injection pen  Self No No   Sig: Inject 10 Units under the skin 3 (three) times a day with meals   levonorgestrel (MIRENA) 20 MCG/24HR IUD  Self Yes No   Sig: Mirena 20 mcg/24 hr (5 years) intrauterine device   Vaginally for 5 years.   lisdexamfetamine (VYVANSE) 30 MG capsule   No No   Sig: Take 1 capsule (30 mg total) by mouth every morning Max Daily Amount: 30 mg   losartan-hydrochlorothiazide (HYZAAR) 100-12.5 MG per tablet   No No   Sig: TAKE ONE TABLET BY MOUTH ONE TIME DAILY   methocarbamol (Robaxin-750) 750 mg tablet  Self No No   Sig: Take 1 tablet (750 mg total) by mouth every 6 (six) hours as needed for muscle spasms (back pain)   metoprolol tartrate (LOPRESSOR) 100 mg tablet   No No   Sig: Take 1 tablet (100 mg total) by mouth every 12 (twelve) hours   montelukast (SINGULAIR) 10 mg tablet   No No   Sig: TAKE ONE TABLET BY MOUTH NIGHTLY   ondansetron (ZOFRAN) 4 mg tablet   No No   Sig: Take 1 tablet (4 mg total) by mouth every 8 (eight) hours as needed for nausea or vomiting   polyethylene glycol (MIRALAX) 17 g packet  Self No No   Sig: Take 17 g by mouth daily   pregabalin (LYRICA) 100 mg capsule   No No   Sig: Take 1  capsule (100 mg total) by mouth 3 (three) times a day   rimegepant sulfate (Nurtec) 75 mg TBDP   No No   Sig: Place one tab (75mg total) under the tongue as needed for migraine.   sodium bicarbonate 650 mg tablet  Self No No   Sig: Take 1 tablet (650 mg total) by mouth 3 (three) times a day   Patient taking differently: Take 650 mg by mouth 2 (two) times a day   tiZANidine (ZANAFLEX) 4 mg tablet  Self No No   Sig: Take 1 tablet (4 mg total) by mouth 3 (three) times a day   tirzepatide (Mounjaro) 15 MG/0.5ML   No No   Sig: Inject 0.5 mL (15 mg total) under the skin every 7 days   verapamil (CALAN-SR) 120 mg CR tablet  Self No No   Sig: Take 1 tablet (120 mg total) by mouth daily at bedtime      Facility-Administered Medications Last Administration Doses Remaining   cyanocobalamin injection 1,000 mcg 7/1/2024 10:07 AM           Past Medical History:   Diagnosis Date    ADD (attention deficit disorder)     Allergic rhinitis     ALS (amyotrophic lateral sclerosis) ([facility]) 11/22    Per neurological surgeon    Anemia 12/2021    Anxiety     Arthritis     Bipolar disorder ([facility])     Cervical disc disorder with radiculopathy of cervical region     Chronic depression     Chronic kidney disease     Stage 1    Chronic pain disorder     Cluster headache     Colitis     Last assessed - 11/25/14    Colon polyp     Concussion 2018    Condyloma acuminatum     Last assessed - 3/19/14    CTS (carpal tunnel syndrome) 2001    Depression     Diabetes mellitus (HCC)     Last assessed - 11/25/14    Diabetic nephropathy ([facility]) 2001    Diabetic neuropathy ([facility])     Diabetic retinopathy ([facility]) 2001    Difficulty walking 07/22    Fibromyalgia     Fibromyalgia, primary     GERD (gastroesophageal reflux disease)     Head injury 08/13/18    Fall    Headache(784.0) 1977    History of transfusion 12/2021    HTN (hypertension)     Last assessed - 11/25/14    Hyperlipidemia 07/22/2019    Hypertension     Intervertebral disc disorder with radiculopathy of  lumbar region     Lupus (HCC)     Rhuematologist-Sheela Dasilva, PA    Migraine     Miscarriage 1988    ,     Obesity 1980    Obesity, unspecified     Open wound of back 2023    Opioid abuse, in remission (HCC) 2022    Only while in hospital and for about 10 days once home instead of one week.    Osteoporosis     Peripheral neuropathy     Postoperative wound infection 2021    Stop not healed    Preeclampsia     Last assessed - 14    Rheumatoid arthritis (HCC)     Wears dentures        Past Surgical History:   Procedure Laterality Date    APPENDECTOMY      Last assessed - 14    BARIATRIC SURGERY  2016    CARPAL TUNNEL RELEASE      CATARACT EXTRACTION      CERVICAL FUSION N/A 2019    Procedure: Anterior cervical discectomy w fusion C4-5, C5-6, C6-7; allograft and neuromonitoring;  Surgeon: Jose Gomez MD;  Location:  MAIN OR;  Service: Orthopedics     SECTION      Last assessed - 14    COLONOSCOPY      COLONOSCOPY      HEMORRHOID SURGERY      HERNIA REPAIR      Last assessed - 14    LUMBAR FUSION N/A 2021    Procedure: L1/2 laminectomy, discectomy with L1/2 MIS posterior fixation using neuromonitoring and neuronavigation;  Surgeon: Suhas Akbar MD;  Location: [facility];  Service: Neurosurgery    LUMBAR WOUND EXPLORATION Bilateral 2021    Procedure: Lumbar wound washout, debridement and intraoperative cultures;  Surgeon: Suhas Akbar MD;  Location:  MAIN OR;  Service: Neurosurgery    MAMMO (HISTORICAL)      MD DEBRIDEMENT BONE 1ST 20 SQ CM/< N/A 2023    Procedure: SURGICAL PREPARATION OF BACK  WOUND AND LOCAL FLAP, PREVENA PLACEMENT;  Surgeon: Ady Rocha MD;  Location:  MAIN OR;  Service: Plastics    WOOD-EN-Y PROCEDURE      ULNAR TUNNEL RELEASE      VAC DRESSING APPLICATION Bilateral 2021    Procedure: APPLICATION VAC DRESSING;  Surgeon: Elbert Hawley MD;  Location: [facility];  Service: General    VAC  DRESSING APPLICATION N/A 2021    Procedure: APPLICATION VAC DRESSING ABDOMEN/TRUNK;  Surgeon: Mani Ross DO;  Location: [facility];  Service: General    VENTRAL HERNIA REPAIR         Family History   Problem Relation Age of Onset    Cervical cancer Mother     Kidney disease Mother     Cancer Mother         Cervical    Diabetes Mother     Hypertension Mother             Neuropathy Mother             Suicidality Father     Depression Father     Mental illness Father         Most likely,, but doesat age22 without diagnosis    Anxiety disorder Father     Completed Suicide  Father         1978    Psychiatric Illness Father          age 21    Suicide Attempts Father         4    No Known Problems Sister     Mental illness Daughter         By-polar Disorder    Mental illness Daughter         Borderline Personality Disorder    Colon cancer Maternal Grandmother             Anemia Maternal Grandmother     No Known Problems Maternal Grandfather     Uterine cancer Paternal Grandmother     No Known Problems Paternal Grandfather     No Known Problems Maternal Aunt     Ovarian cancer Paternal Aunt     ADD / ADHD Cousin     ADD / ADHD Cousin     No Known Problems Family      I have reviewed and agree with the history as documented.    E-Cigarette/Vaping    E-Cigarette Use Never User      E-Cigarette/Vaping Substances    Nicotine No     THC No     CBD No     Flavoring No     Other No     Unknown No      Social History     Tobacco Use    Smoking status: Never     Passive exposure: Never    Smokeless tobacco: Never    Tobacco comments:     Never used tabaco, don’t care to   Vaping Use    Vaping status: Never Used   Substance Use Topics    Alcohol use: Not Currently    Drug use: Yes     Types: Marijuana     Comment: Medicinal        Review of Systems   Constitutional:  Negative for chills, fatigue and fever.   HENT:  Negative for ear pain and sore throat.    Eyes:  Negative for  pain and visual disturbance.   Respiratory:  Negative for cough and shortness of breath.    Cardiovascular:  Negative for chest pain and palpitations.   Gastrointestinal:  Positive for abdominal pain, diarrhea, nausea and vomiting. Negative for blood in stool and constipation.   Genitourinary:  Positive for flank pain. Negative for decreased urine volume, difficulty urinating, dysuria, frequency, hematuria, vaginal bleeding and vaginal discharge.   Musculoskeletal:  Negative for arthralgias, back pain and neck pain.   Skin:  Negative for color change and rash.   Neurological:  Negative for dizziness, seizures, syncope, weakness, light-headedness, numbness and headaches.   All other systems reviewed and are negative.      Physical Exam  ED Triage Vitals [07/26/24 1747]   Temperature Pulse Respirations Blood Pressure SpO2   97.6 °F (36.4 °C) 93 20 (!) 159/104 98 %      Temp Source Heart Rate Source Patient Position - Orthostatic VS BP Location FiO2 (%)   Temporal Monitor -- Left arm --      Pain Score       --             Orthostatic Vital Signs  Vitals:    07/26/24 1747   BP: (!) 159/104   Pulse: 93       Physical Exam  Vitals and nursing note reviewed.   Constitutional:       General: She is not in acute distress.     Appearance: She is well-developed. She is not ill-appearing or diaphoretic.   HENT:      Head: Normocephalic and atraumatic.      Mouth/Throat:      Mouth: Mucous membranes are dry.   Eyes:      Conjunctiva/sclera: Conjunctivae normal.   Cardiovascular:      Rate and Rhythm: Normal rate and regular rhythm.      Heart sounds: No murmur heard.  Pulmonary:      Effort: Pulmonary effort is normal. No respiratory distress.      Breath sounds: Normal breath sounds.   Abdominal:      Palpations: Abdomen is soft.      Tenderness: There is abdominal tenderness (Mild, generalized). There is no right CVA tenderness, left CVA tenderness, guarding or rebound.   Musculoskeletal:         General: Normal range of  motion.      Cervical back: Normal range of motion and neck supple.      Right lower leg: No edema.      Left lower leg: No edema.   Skin:     General: Skin is warm and dry.      Capillary Refill: Capillary refill takes less than 2 seconds.   Neurological:      General: No focal deficit present.      Mental Status: She is alert.   Psychiatric:         Mood and Affect: Mood normal.         ED Medications  Medications   lactated ringers bolus 1,000 mL (1,000 mL Intravenous New Bag 7/26/24 2029)   ondansetron (ZOFRAN) injection 4 mg (4 mg Intravenous Given 7/26/24 2029)       Diagnostic Studies  Results Reviewed       Procedure Component Value Units Date/Time    Blood gas, venous [[id number]]  (Abnormal) Collected: 07/26/24 2001    Lab Status: Final result Specimen: Blood from Arm, Left Updated: 07/26/24 2049     pH, Berry 7.284     pCO2, Berry 42.2 mm Hg      pO2, Berry 20.0 mm Hg      HCO3, Berry 19.6 mmol/L      Base Excess, Berry -6.8 mmol/L      O2 Content, Berry 7.2 ml/dL      O2 HGB, VENOUS 34.5 %     Comprehensive metabolic panel [[id number]]  (Abnormal) Collected: 07/26/24 2001    Lab Status: Final result Specimen: Blood from Arm, Left Updated: 07/26/24 2047     Sodium 143 mmol/L      Potassium 3.9 mmol/L      Chloride 105 mmol/L      CO2 20 mmol/L      ANION GAP 18 mmol/L      BUN 27 mg/dL      Creatinine 1.91 mg/dL      Glucose 118 mg/dL      Calcium 9.4 mg/dL      AST 10 U/L      ALT 6 U/L      Alkaline Phosphatase 68 U/L      Total Protein 7.2 g/dL      Albumin 4.4 g/dL      Total Bilirubin 0.82 mg/dL      eGFR 30 ml/min/1.73sq m     Narrative:      National Kid[employer] Disease Foundation guidelines for Chronic Kidney Disease (CKD):     Stage 1 with normal or high GFR (GFR > 90 mL/min/1.73 square meters)    Stage 2 Mild CKD (GFR = 60-89 mL/min/1.73 square meters)    Stage 3A Moderate CKD (GFR = 45-59 mL/min/1.73 square meters)    Stage 3B Moderate CKD (GFR = 30-44 mL/min/1.73 square meters)    Stage 4 Severe CKD (GFR = 15-29  mL/min/1.73 square meters)    Stage 5 End Stage CKD (GFR <15 mL/min/1.73 square meters)  Note: GFR calculation is accurate only with a steady state creatinine    Beta Hydroxybutyrate [[id number]]  (Abnormal) Collected: 07/26/24 2001    Lab Status: Final result Specimen: Blood from Arm, Left Updated: 07/26/24 2047     Beta- Hydroxybutyrate 3.45 mmol/L     Magnesium [[id number]]  (Abnormal) Collected: 07/26/24 2001    Lab Status: Final result Specimen: Blood from Arm, Left Updated: 07/26/24 2047     Magnesium 1.8 mg/dL     Phosphorus [[id number]]  (Normal) Collected: 07/26/24 2001    Lab Status: Final result Specimen: Blood from Arm, Left Updated: 07/26/24 2047     Phosphorus 4.0 mg/dL     Lactic acid, plasma (w/reflex if result > 2.0) [[id number]]  (Normal) Collected: 07/26/24 2001    Lab Status: Final result Specimen: Blood from Arm, Left Updated: 07/26/24 2045     LACTIC ACID 1.0 mmol/L     Narrative:      Result may be elevated if tourniquet was used during collection.    CBC and differential [[id number]]  (Abnormal) Collected: 07/26/24 2001    Lab Status: Final result Specimen: Blood from Arm, Left Updated: 07/26/24 2026     WBC 6.91 Thousand/uL      RBC 5.10 Million/uL      Hemoglobin 14.3 g/dL      Hematocrit 43.6 %      MCV 86 fL      MCH 28.0 pg      MCHC 32.8 g/dL      RDW 14.4 %      MPV 11.1 fL      Platelets 204 Thousands/uL      nRBC 0 /100 WBCs      Segmented % 76 %      Immature Grans % 1 %      Lymphocytes % 17 %      Monocytes % 5 %      Eosinophils Relative 0 %      Basophils Relative 1 %      Absolute Neutrophils 5.25 Thousands/µL      Absolute Immature Grans 0.06 Thousand/uL      Absolute Lymphocytes 1.18 Thousands/µL      Absolute Monocytes 0.35 Thousand/µL      Eosinophils Absolute 0.01 Thousand/µL      Basophils Absolute 0.06 Thousands/µL     POCT urinalysis dipstick [[id number]]     Lab Status: No result Specimen: Urine     Fingerstick Glucose (POCT) [[id number]]  (Normal) Collected: 07/26/24 3444     Lab Status: Final result Specimen: Blood Updated: 07/26/24 1750     POC Glucose 113 mg/dl                    No orders to display         Procedures  Procedures      ED Course  ED Course as of 07/26/24 2146 Fri Jul 26, 2024 2053 ECG 12 lead  EKG NSR 92 no LINDA STD or ischemic changes                                        Medical Decision Making  Patient is a 48-year-old female presenting for evaluation of abdominal pain nausea vomiting diarrhea    Differential: GI versus GERD versus pancreatitis versus cholecystitis.  Doubt nephrolithiasis or pyelonephritis.  Could be UTI.  Doubt cardiac cause, ACS    Plan: Labs fluids antiemetics, monitor and reassess.  Benign abdominal exam no need for imaging at this time. Final dispo pending    See ED course for EKG. Labs notable for mild elevation of beta hydroxybutyrate 3.45.  Mildly acidotic on metabolic panel bicarb of 20 with a gap of 18.  This is likely starvation ketoacidosis secondary to patient's dehydration from fluid losses.  Electrolytes within normal limits.  Sugar is normal not on any SGLT2 inhibitors to suggest euglycemic DKA.  Patient receiving fluids.  Given comorbidities and ongoing fluid losses will admit.  Discussed with medicine service who accepts patient for admission.  Patient hemodynamically stable at the time of admission    Amount and/or Complexity of Data Reviewed  Labs: ordered.  ECG/medicine tests:  Decision-making details documented in ED Course.    Risk  Prescription drug management.  Decision regarding hospitalization.          Disposition  Final diagnoses:   Flank pain   Nausea & vomiting   Diarrhea   DM (diabetes mellitus) (HCC)   Dehydration     Time reflects when diagnosis was documented in both MDM as applicable and the Disposition within this note       Time User Action Codes Description Comment    7/26/2024  8:50 PM Berhane Park Add [R10.9] Flank pain     7/26/2024  8:50 PM Berhane Park Add [R11.2] Nausea & vomiting      7/26/2024  8:50 PM Berhane Park [R19.7] Diarrhea     7/26/2024  8:51 PM Berhane Park [E11.9] DM (diabetes mellitus) ([facility])     7/26/2024  8:51 PM Berhane Park [E86.0] Dehydration           ED Disposition       ED Disposition   Admit    Condition   Stable    Date/Time   Fri Jul 26, 2024  8:50 PM    Comment                  Follow-up Information    None         Patient's Medications   Discharge Prescriptions    No medications on file     No discharge procedures on file.    PDMP Review         Value Time User    PDMP Reviewed  Yes 5/20/2024 12:15 PM Myla Norwood MD             ED Provider  Attending physically available and evaluated Kinza Meza. I managed the patient along with the ED Attending.    Electronically Signed by           Berhane Park DO  07/27/24 5630

## 2024-07-26 NOTE — PATIENT INSTRUCTIONS
1.  Skip your mealtime insulin until you can establish adequate p.o. intake.  2.  You have been normally prescribed Lantus 40 units at bedtime.  Recommended that you take half the dose which is 20 units at bedtime, until you establish good p.o. intake.  3.  Watch your blood sugars 3-4 times per day.  Goal blood sugars between 100-200 at any time.  4.  Drink plenty of fluids, take small frequent liquid to soft food through the day.

## 2024-07-26 NOTE — PROGRESS NOTES
Assessment/Plan:    Diagnoses and all orders for this visit:    Stage 3b chronic kidney disease (HCC)  -     Cancel: Basic metabolic panel; Future  -     Basic metabolic panel; Future  -     Beta Hydroxybutyrate; Future    Other gastritis without bleeding  -     ondansetron (ZOFRAN) 4 mg tablet; Take 1 tablet (4 mg total) by mouth every 8 (eight) hours as needed for nausea or vomiting    Uncontrolled type 2 diabetes mellitus with hyperglycemia (HCC)  -     Basic metabolic panel; Future  -     Beta Hydroxybutyrate; Future    Diarrhea, unspecified type  -     Basic metabolic panel; Future  -     Beta Hydroxybutyrate; Future       Complains of abdominal cramping since her Tuesday, 4-6 loose bowel movements since this a.m., nausea without vomiting, poor p.o. intake, but able to hydrate herself with good urine output so far.  Denies any fever chills  Patient just returned from a beachside vacation    Reports a.m. blood sugars of 50 today without any associated symptoms, has not been wearing CGM for the last few days.    His random blood sugar in the office was 130, urine with 1+ leukocytes negative nitrite, 2+ ketones, negative glucose.  Will check a stat BMP and beta hydroxybutyrate.  Patient may need to go to ER pending above blood work.  Stat labs  received, patient seems to be in diabetic ketoacidosis.  Patient was called and requested to go to the ER, she is agreeable                 Patient Instructions   1.  Skip your mealtime insulin until you can establish adequate p.o. intake.  2.  You have been normally prescribed Lantus 40 units at bedtime.  Recommended that you take half the dose which is 20 units at bedtime, until you establish good p.o. intake.  3.  Watch your blood sugars 3-4 times per day.  Goal blood sugars between 100-200 at any time.  4.  Drink plenty of fluids, take small frequent liquid to soft food through the day.    Subjective:      Patient ID: Kinza Meza is a 48 y.o.  female    HPI      Current Outpatient Medications:     acetaZOLAMIDE (DIAMOX) 250 mg tablet, TAKE 1 TABLET(250 MG) BY MOUTH TWICE DAILY, Disp: 180 tablet, Rfl: 1    Ajovy 225 MG/1.5ML auto-injector, INJECT 4.5 ML(675 MG TOTAL) UNDER THE SKIN EVERY 3 MONTHS (Patient taking differently: monthly), Disp: 4.5 mL, Rfl: 4    atorvastatin (LIPITOR) 10 mg tablet, Take 1 tablet (10 mg total) by mouth daily at bedtime, Disp: 90 tablet, Rfl: 0    BD Pen Needle Glory U/F 32G X 4 MM MISC, Inject under the skin 2 (two) times a day Use as directed, Disp: 180 each, Rfl: 0    Blood Glucose Monitoring Suppl (ONE TOUCH ULTRA 2) w/Device KIT, Use 1 each 2 (two) times a day Use as instructed, Disp: 1 kit, Rfl: 0    calcitriol (ROCALTROL) 0.25 mcg capsule, TAKE 1 CAPSULE BY MOUTH daily, Disp: 30 capsule, Rfl: 5    Cholecalciferol (Vitamin D3) 50 MCG (2000 UT) CAPS, TAKE 1 CAPSULE(2,000 UNITS TOTAL) BY MOUTH DAILY, Disp: 90 capsule, Rfl: 1    clonazePAM (KlonoPIN) 0.5 mg tablet, Take 1 tablet (0.5 mg total) by mouth 2 (two) times a day, Disp: 180 tablet, Rfl: 0    Continuous Blood Gluc Sensor (FreeStyle Filiberto 3 Sensor) MISC, Use 1 each every 14 (fourteen) days, Disp: 6 each, Rfl: 1    Cyanocobalamin (Vitamin B-12) 1000 MCG SUBL, Place 1 tablet (1,000 mcg total) under the tongue in the morning, Disp: 90 tablet, Rfl: 1    cyanocobalamin 1,000 mcg/mL, Inject 1 mL (1,000 mcg total) into a muscle once a week, Disp: 4 mL, Rfl: 3    cycloSPORINE, PF, (Cequa) 0.09 % SOLN, Apply 1 drop to eye 2 (two) times a day, Disp: , Rfl:     divalproex sodium (Depakote) 500 mg DR tablet, Take 1 tablet (500 mg total) by mouth daily at bedtime, Disp: 90 tablet, Rfl: 3    docusate sodium (COLACE) 100 mg capsule, TAKE 1 CAPSULE BY MOUTH TWICE A DAY (Patient taking differently: Take 100 mg by mouth 2 (two) times a day as needed), Disp: 60 capsule, Rfl: 0    DULoxetine (CYMBALTA) 60 mg delayed release capsule, Take 1 capsule (60 mg total) by mouth daily, Disp: 90  capsule, Rfl: 1    ferrous sulfate 324 (65 Fe) mg, Take 1 tablet (324 mg total) by mouth daily before breakfast, Disp: 90 tablet, Rfl: 3    insulin aspart (NovoLOG FlexPen) 100 UNIT/ML injection pen, Inject 10 Units under the skin 3 (three) times a day with meals, Disp: 15 mL, Rfl: 4    Insulin Glargine Solostar (Lantus SoloStar) 100 UNIT/ML SOPN, Inject 0.4 mL (40 Units total) under the skin daily at bedtime, Disp: 15 mL, Rfl: 4    Lancets MISC, Use 1 each 2 (two) times a day Use as instructed, Disp: , Rfl:     levonorgestrel (MIRENA) 20 MCG/24HR IUD, Mirena 20 mcg/24 hr (5 years) intrauterine device  Vaginally for 5 years., Disp: , Rfl:     lisdexamfetamine (VYVANSE) 30 MG capsule, Take 1 capsule (30 mg total) by mouth every morning Max Daily Amount: 30 mg, Disp: 30 capsule, Rfl: 0    losartan-hydrochlorothiazide (HYZAAR) 100-12.5 MG per tablet, TAKE ONE TABLET BY MOUTH ONE TIME DAILY, Disp: 90 tablet, Rfl: 0    methocarbamol (Robaxin-750) 750 mg tablet, Take 1 tablet (750 mg total) by mouth every 6 (six) hours as needed for muscle spasms (back pain), Disp: 30 tablet, Rfl: 3    metoprolol tartrate (LOPRESSOR) 100 mg tablet, Take 1 tablet (100 mg total) by mouth every 12 (twelve) hours, Disp: 180 tablet, Rfl: 0    montelukast (SINGULAIR) 10 mg tablet, TAKE ONE TABLET BY MOUTH NIGHTLY, Disp: 90 tablet, Rfl: 1    Multiple Vitamins-Minerals (multivitamin with minerals) tablet, Take 1 tablet by mouth daily, Disp: , Rfl:     NON FORMULARY, Botox injections for HA every 3months (LD 3/20/23), Disp: , Rfl:     ondansetron (ZOFRAN) 4 mg tablet, Take 1 tablet (4 mg total) by mouth every 8 (eight) hours as needed for nausea or vomiting, Disp: 120 tablet, Rfl: 0    Ophthalmic Irrigation Solution (OCUSOFT EYE WASH OP), Apply 1 Application to eye daily at bedtime, Disp: , Rfl:     Polyethyl Glycol-Propyl Glycol (Systane) 0.4-0.3 % GEL, Apply 1 drop to eye 2 (two) times a day Using Ivizia as an alternative, Disp: , Rfl:      "polyethylene glycol (MIRALAX) 17 g packet, Take 17 g by mouth daily, Disp: 1 each, Rfl: 0    pregabalin (LYRICA) 100 mg capsule, Take 1 capsule (100 mg total) by mouth 3 (three) times a day, Disp: 270 capsule, Rfl: 0    rimegepant sulfate (Nurtec) 75 mg TBDP, Place one tab (75mg total) under the tongue as needed for migraine., Disp: 16 tablet, Rfl: 11    sodium bicarbonate 650 mg tablet, Take 1 tablet (650 mg total) by mouth 3 (three) times a day (Patient taking differently: Take 650 mg by mouth 2 (two) times a day), Disp: 360 tablet, Rfl: 3    Syringe/Needle, Disp, (SYRINGE 3CC/73BT6-8/2\") 25G X 1-1/2\" 3 ML MISC, Use once a week, Disp: 4 each, Rfl: 3    tirzepatide (Mounjaro) 15 MG/0.5ML, Inject 0.5 mL (15 mg total) under the skin every 7 days, Disp: 6 mL, Rfl: 0    tiZANidine (ZANAFLEX) 4 mg tablet, Take 1 tablet (4 mg total) by mouth 3 (three) times a day, Disp: 360 tablet, Rfl: 0    verapamil (CALAN-SR) 120 mg CR tablet, Take 1 tablet (120 mg total) by mouth daily at bedtime, Disp: 90 tablet, Rfl: 3    benzonatate (TESSALON) 200 MG capsule, Take 1 capsule (200 mg total) by mouth 3 (three) times a day as needed for cough (Patient not taking: Reported on 3/28/2024), Disp: 20 capsule, Rfl: 0    cetirizine (ZyrTEC) oral solution, Take 5 mL (5 mg total) by mouth daily (Patient not taking: Reported on 3/28/2024), Disp: 450 mL, Rfl: 1    ciclopirox (PENLAC) 8 % solution, Apply topically daily at bedtime (Patient not taking: Reported on 4/25/2024), Disp: 6.6 mL, Rfl: 1    cyproheptadine (PERIACTIN) 4 mg tablet, Take 1 tablet (4 mg total) by mouth daily at bedtime (Patient not taking: Reported on 11/3/2023), Disp: 90 tablet, Rfl: 1    diphenhydrAMINE (BENADRYL) 25 mg tablet, Take 1 tablet (25 mg total) by mouth every 6 (six) hours as needed for itching (Patient not taking: Reported on 11/3/2023), Disp: 30 tablet, Rfl: 0    Current Facility-Administered Medications:     cyanocobalamin injection 1,000 mcg, 1,000 mcg, " Intramuscular, Weekly, Myla Norwood MD, 1,000 mcg at 07/01/24 1007     Past Medical History:   Diagnosis Date    ADD (attention deficit disorder)     Allergic rhinitis     ALS (amyotrophic lateral sclerosis) ([facility]) 11/22    Per neurological surgeon    Anemia 12/2021    Anxiety     Arthritis     Bipolar disorder ([facility])     Cervical disc disorder with radiculopathy of cervical region     Chronic depression     Chronic kidney disease     Stage 1    Chronic pain disorder     Cluster headache     Colitis     Last assessed - 11/25/14    Colon polyp     Concussion 2018    Condyloma acuminatum     Last assessed - 3/19/14    CTS (carpal tunnel syndrome) 2001    Depression     Diabetes mellitus ([facility])     Last assessed - 11/25/14    Diabetic nephropathy ([facility]) 2001    Diabetic neuropathy ([facility])     Diabetic retinopathy ([facility]) 2001    Difficulty walking 07/22    Fibromyalgia     Fibromyalgia, primary     GERD (gastroesophageal reflux disease)     Head injury 08/13/18    Fall    Headache(784.0) 1977    History of transfusion 12/2021    HTN (hypertension)     Last assessed - 11/25/14    Hyperlipidemia 07/22/2019    Hypertension     Intervertebral disc disorder with radiculopathy of lumbar region     Lupus ([facility]) 2003    Rhuematologist-Sheela Dasilva, [place]    Migraine     Miscarriage 1988    2001, 2004    Obesity 1980    Obesity, unspecified     Open wound of back 05/30/2023    Opioid abuse, in remission ([facility]) 01/2022    Only while in hospital and for about 10 days once home instead of one week.    Osteoporosis     Peripheral neuropathy     Postoperative wound infection 12/2021    Stop not healed    Preeclampsia     Last assessed - 11/25/14    Rheumatoid arthritis ([facility])     Wears dentures          Past Surgical History:   Procedure Laterality Date    APPENDECTOMY      Last assessed - 11/25/14    BARIATRIC SURGERY  08/2016    CARPAL TUNNEL RELEASE      CATARACT EXTRACTION      CERVICAL FUSION N/A 07/23/2019    Procedure: Anterior  cervical discectomy w fusion C4-5, C5-6, C6-7; allograft and neuromonitoring;  Surgeon: Jose Gomez MD;  Location: [facility];  Service: Orthopedics     SECTION      Last assessed - 14    COLONOSCOPY      COLONOSCOPY      HEMORRHOID SURGERY      HERNIA REPAIR      Last assessed - 14    LUMBAR FUSION N/A 2021    Procedure: L1/2 laminectomy, discectomy with L1/2 MIS posterior fixation using neuromonitoring and neuronavigation;  Surgeon: Suhas Akbar MD;  Location: [facility];  Service: Neurosurgery    LUMBAR WOUND EXPLORATION Bilateral 2021    Procedure: Lumbar wound washout, debridement and intraoperative cultures;  Surgeon: Suhas Akbar MD;  Location: [facility];  Service: Neurosurgery    MAMMO (HISTORICAL)  2016    AZ DEBRIDEMENT BONE 1ST 20 SQ CM/< N/A 2023    Procedure: SURGICAL PREPARATION OF BACK  WOUND AND LOCAL FLAP, PREVENA PLACEMENT;  Surgeon: Ady Rocha MD;  Location:  MAIN OR;  Service: Plastics    WOOD-EN-Y PROCEDURE      ULNAR TUNNEL RELEASE      VAC DRESSING APPLICATION Bilateral 2021    Procedure: APPLICATION VAC DRESSING;  Surgeon: Elbert Hawley MD;  Location: [facility];  Service: General    VAC DRESSING APPLICATION N/A 2021    Procedure: APPLICATION VAC DRESSING ABDOMEN/TRUNK;  Surgeon: Mani Ross DO;  Location: [facility];  Service: General    VENTRAL HERNIA REPAIR           Allergies   Allergen Reactions    Ace Inhibitors Cough    Pollen Extract Other (See Comments)     SNEEZING, COUGHING    Short Ragweed Pollen Ext Cough    Sodium Hyaluronate (Eron) Other (See Comments)     Edema at injection site  Edema at injection site    Levonorgestrel-Eth Estradiol [Levonorgestrel-Ethinyl Estrad] Other (See Comments)     burning    Latex Rash       No results found for this or any previous visit (from the past 1008 hour(s)).    The following portions of the patient's history were reviewed and updated as appropriate: allergies,  current medications, past family history, past medical history, past social history, past surgical history and problem list.     Review of Systems   Constitutional:  Negative for appetite change, chills, diaphoresis, fatigue, fever and unexpected weight change.   Respiratory:  Negative for apnea, cough, choking, chest tightness, shortness of breath, wheezing and stridor.    Cardiovascular:  Negative for chest pain, palpitations and leg swelling.   Gastrointestinal:  Positive for nausea and vomiting. Negative for abdominal distention, abdominal pain, anal bleeding, blood in stool, constipation and diarrhea.   Genitourinary:  Negative for decreased urine volume, difficulty urinating, dysuria, enuresis, flank pain, frequency and urgency.   Musculoskeletal:  Negative for arthralgias, back pain and myalgias.   Neurological:  Negative for dizziness, light-headedness, numbness and headaches.         Objective:      Vitals:    07/26/24 1346   BP: 126/98   Pulse: 95   Temp: 97.5 °F (36.4 °C)   SpO2: 98%          Physical Exam  Vitals reviewed.   Constitutional:       General: She is not in acute distress.     Appearance: Normal appearance. She is not ill-appearing, toxic-appearing or diaphoretic.   HENT:      Mouth/Throat:      Mouth: Mucous membranes are dry.   Cardiovascular:      Rate and Rhythm: Normal rate and regular rhythm.      Pulses: Normal pulses.      Heart sounds: Normal heart sounds. No murmur heard.     No friction rub. No gallop.   Pulmonary:      Effort: Pulmonary effort is normal. No respiratory distress.      Breath sounds: Normal breath sounds. No stridor. No wheezing, rhonchi or rales.   Chest:      Chest wall: No tenderness.   Abdominal:      General: There is no distension.      Palpations: Abdomen is soft.      Tenderness: There is no abdominal tenderness. There is no guarding.   Musculoskeletal:      Right lower leg: No edema.      Left lower leg: No edema.   Skin:     General: Skin is warm and dry.       Findings: No lesion or rash.   Neurological:      Mental Status: She is alert and oriented to person, place, and time.

## 2024-07-26 NOTE — ED ATTENDING ATTESTATION
7/26/2024  I, Phani Coon MD, saw and evaluated the patient. I have discussed the patient with the resident/non-physician practitioner and agree with the resident's/non-physician practitioner's findings, Plan of Care, and MDM as documented in the resident's/non-physician practitioner's note, except where noted. All available labs and Radiology studies were reviewed.  I was present for key portions of any procedure(s) performed by the resident/non-physician practitioner and I was immediately available to provide assistance.       At this point I agree with the current assessment done in the Emergency Department.  I have conducted an independent evaluation of this patient a history and physical is as follows:  Patient presents for evaluation of nonbilious nonbloody vomiting with a lot of nausea she is also had multiple loose stools with no blood  And came back from a vacation at the beach no unusual food intake had been on Keflex for toe infection about 3 weeks ago  She has abdominal cramping  Seen by her family doctor today had outpatient lab workup which showed a bicarb of 18 with an anion gap of 20 her blood sugar was normal at 123 hydroxybutyrate was elevated  Past surgical history appendectomy  Gastric bypass in 2016  Exam the patient's mucous membranes are dry sclera are anicteric lungs clear heart regular abdomen soft nondistended positive bowel sounds no tenderness  Extremities no edema  Impression dehydration   secondary to gastrointestinal illness  Mildly increased creatinine from baseline  Will repeat labs will give lactated Ringer's bolus give Zofran check EKG  Likely admission  ED Course         Critical Care Time  Procedures

## 2024-07-27 ENCOUNTER — APPOINTMENT (INPATIENT)
Dept: RADIOLOGY | Facility: HOSPITAL | Age: 49
DRG: 638 | End: 2024-07-27
Payer: COMMERCIAL

## 2024-07-27 LAB
2HR DELTA HS TROPONIN: -2 NG/L
4HR DELTA HS TROPONIN: 11 NG/L
ALBUMIN SERPL BCG-MCNC: 3.4 G/DL (ref 3.5–5)
ALP SERPL-CCNC: 54 U/L (ref 34–104)
ALT SERPL W P-5'-P-CCNC: 7 U/L (ref 7–52)
AMPHETAMINES SERPL QL SCN: POSITIVE
ANION GAP SERPL CALCULATED.3IONS-SCNC: 10 MMOL/L (ref 4–13)
ANION GAP SERPL CALCULATED.3IONS-SCNC: 14 MMOL/L (ref 4–13)
ANION GAP SERPL CALCULATED.3IONS-SCNC: 19 MMOL/L (ref 4–13)
ANION GAP SERPL CALCULATED.3IONS-SCNC: 6 MMOL/L (ref 4–13)
ANION GAP SERPL CALCULATED.3IONS-SCNC: 7 MMOL/L (ref 4–13)
ANION GAP SERPL CALCULATED.3IONS-SCNC: 7 MMOL/L (ref 4–13)
ANION GAP SERPL CALCULATED.3IONS-SCNC: 8 MMOL/L (ref 4–13)
ARTERIAL PATENCY WRIST A: NO
ARTERIAL PATENCY WRIST A: NO
AST SERPL W P-5'-P-CCNC: 10 U/L (ref 13–39)
BACTERIA UR QL AUTO: ABNORMAL /HPF
BARBITURATES UR QL: NEGATIVE
BASE EX.OXY STD BLDV CALC-SCNC: 26.6 % (ref 60–80)
BASE EX.OXY STD BLDV CALC-SCNC: 46.9 % (ref 60–80)
BASE EX.OXY STD BLDV CALC-SCNC: 53.5 % (ref 60–80)
BASE EX.OXY STD BLDV CALC-SCNC: 71 % (ref 60–80)
BASE EXCESS BLDA CALC-SCNC: -7 MMOL/L (ref -2–3)
BASE EXCESS BLDA CALC-SCNC: -8 MMOL/L (ref -2–3)
BASE EXCESS BLDV CALC-SCNC: -16.7 MMOL/L
BASE EXCESS BLDV CALC-SCNC: -5.6 MMOL/L
BASE EXCESS BLDV CALC-SCNC: -5.8 MMOL/L
BASE EXCESS BLDV CALC-SCNC: -6.9 MMOL/L
BENZODIAZ UR QL: NEGATIVE
BILIRUB DIRECT SERPL-MCNC: 0.18 MG/DL (ref 0–0.2)
BILIRUB SERPL-MCNC: 0.6 MG/DL (ref 0.2–1)
BILIRUB UR QL STRIP: ABNORMAL
BUN SERPL-MCNC: 16 MG/DL (ref 5–25)
BUN SERPL-MCNC: 16 MG/DL (ref 5–25)
BUN SERPL-MCNC: 20 MG/DL (ref 5–25)
BUN SERPL-MCNC: 21 MG/DL (ref 5–25)
BUN SERPL-MCNC: 23 MG/DL (ref 5–25)
BUN SERPL-MCNC: 25 MG/DL (ref 5–25)
BUN SERPL-MCNC: 25 MG/DL (ref 5–25)
CA-I BLD-SCNC: 0.56 MMOL/L (ref 1.12–1.32)
CA-I BLD-SCNC: 1.15 MMOL/L (ref 1.12–1.32)
CA-I BLD-SCNC: 1.18 MMOL/L (ref 1.12–1.32)
CA-I BLD-SCNC: 1.26 MMOL/L (ref 1.12–1.32)
CALCIUM SERPL-MCNC: 7.4 MG/DL (ref 8.4–10.2)
CALCIUM SERPL-MCNC: 7.7 MG/DL (ref 8.4–10.2)
CALCIUM SERPL-MCNC: 7.8 MG/DL (ref 8.4–10.2)
CALCIUM SERPL-MCNC: 8 MG/DL (ref 8.4–10.2)
CALCIUM SERPL-MCNC: 8.1 MG/DL (ref 8.4–10.2)
CALCIUM SERPL-MCNC: 8.3 MG/DL (ref 8.4–10.2)
CALCIUM SERPL-MCNC: 9 MG/DL (ref 8.4–10.2)
CARDIAC TROPONIN I PNL SERPL HS: 21 NG/L
CARDIAC TROPONIN I PNL SERPL HS: 23 NG/L
CARDIAC TROPONIN I PNL SERPL HS: 34 NG/L
CHLORIDE SERPL-SCNC: 104 MMOL/L (ref 96–108)
CHLORIDE SERPL-SCNC: 106 MMOL/L (ref 96–108)
CHLORIDE SERPL-SCNC: 109 MMOL/L (ref 96–108)
CHLORIDE SERPL-SCNC: 111 MMOL/L (ref 96–108)
CHLORIDE SERPL-SCNC: 112 MMOL/L (ref 96–108)
CHLORIDE SERPL-SCNC: 114 MMOL/L (ref 96–108)
CHLORIDE SERPL-SCNC: 115 MMOL/L (ref 96–108)
CLARITY UR: CLEAR
CO2 SERPL-SCNC: 18 MMOL/L (ref 21–32)
CO2 SERPL-SCNC: 18 MMOL/L (ref 21–32)
CO2 SERPL-SCNC: 19 MMOL/L (ref 21–32)
CO2 SERPL-SCNC: 20 MMOL/L (ref 21–32)
CO2 SERPL-SCNC: 21 MMOL/L (ref 21–32)
CO2 SERPL-SCNC: 21 MMOL/L (ref 21–32)
CO2 SERPL-SCNC: 23 MMOL/L (ref 21–32)
COCAINE UR QL: NEGATIVE
COLOR UR: ABNORMAL
CREAT SERPL-MCNC: 1.61 MG/DL (ref 0.6–1.3)
CREAT SERPL-MCNC: 1.62 MG/DL (ref 0.6–1.3)
CREAT SERPL-MCNC: 1.86 MG/DL (ref 0.6–1.3)
CREAT SERPL-MCNC: 1.88 MG/DL (ref 0.6–1.3)
CREAT SERPL-MCNC: 1.92 MG/DL (ref 0.6–1.3)
CREAT SERPL-MCNC: 1.96 MG/DL (ref 0.6–1.3)
CREAT SERPL-MCNC: 2.06 MG/DL (ref 0.6–1.3)
ERYTHROCYTE [DISTWIDTH] IN BLOOD BY AUTOMATED COUNT: 14.6 % (ref 11.6–15.1)
ERYTHROCYTE [DISTWIDTH] IN BLOOD BY AUTOMATED COUNT: 15.1 % (ref 11.6–15.1)
EST. AVERAGE GLUCOSE BLD GHB EST-MCNC: 100 MG/DL
FENTANYL UR QL SCN: NEGATIVE
FIO2 GAS DIL.REBREATH: 21 L
GFR SERPL CREATININE-BSD FRML MDRD: 27 ML/MIN/1.73SQ M
GFR SERPL CREATININE-BSD FRML MDRD: 29 ML/MIN/1.73SQ M
GFR SERPL CREATININE-BSD FRML MDRD: 29 ML/MIN/1.73SQ M
GFR SERPL CREATININE-BSD FRML MDRD: 30 ML/MIN/1.73SQ M
GFR SERPL CREATININE-BSD FRML MDRD: 31 ML/MIN/1.73SQ M
GFR SERPL CREATININE-BSD FRML MDRD: 36 ML/MIN/1.73SQ M
GFR SERPL CREATININE-BSD FRML MDRD: 37 ML/MIN/1.73SQ M
GLUCOSE SERPL-MCNC: 103 MG/DL (ref 65–140)
GLUCOSE SERPL-MCNC: 103 MG/DL (ref 65–140)
GLUCOSE SERPL-MCNC: 109 MG/DL (ref 65–140)
GLUCOSE SERPL-MCNC: 109 MG/DL (ref 65–140)
GLUCOSE SERPL-MCNC: 122 MG/DL (ref 65–140)
GLUCOSE SERPL-MCNC: 125 MG/DL (ref 65–140)
GLUCOSE SERPL-MCNC: 129 MG/DL (ref 65–140)
GLUCOSE SERPL-MCNC: 132 MG/DL (ref 65–140)
GLUCOSE SERPL-MCNC: 138 MG/DL (ref 65–140)
GLUCOSE SERPL-MCNC: 140 MG/DL (ref 65–140)
GLUCOSE SERPL-MCNC: 140 MG/DL (ref 65–140)
GLUCOSE SERPL-MCNC: 145 MG/DL (ref 65–140)
GLUCOSE SERPL-MCNC: 158 MG/DL (ref 65–140)
GLUCOSE SERPL-MCNC: 163 MG/DL (ref 65–140)
GLUCOSE SERPL-MCNC: 170 MG/DL (ref 65–140)
GLUCOSE SERPL-MCNC: 177 MG/DL (ref 65–140)
GLUCOSE SERPL-MCNC: 202 MG/DL (ref 65–140)
GLUCOSE SERPL-MCNC: 203 MG/DL (ref 65–140)
GLUCOSE SERPL-MCNC: 234 MG/DL (ref 65–140)
GLUCOSE SERPL-MCNC: 271 MG/DL (ref 65–140)
GLUCOSE SERPL-MCNC: 276 MG/DL (ref 65–140)
GLUCOSE SERPL-MCNC: 38 MG/DL (ref 65–140)
GLUCOSE SERPL-MCNC: 53 MG/DL (ref 65–140)
GLUCOSE SERPL-MCNC: 64 MG/DL (ref 65–140)
GLUCOSE SERPL-MCNC: 69 MG/DL (ref 65–140)
GLUCOSE SERPL-MCNC: 72 MG/DL (ref 65–140)
GLUCOSE SERPL-MCNC: 72 MG/DL (ref 65–140)
GLUCOSE SERPL-MCNC: 75 MG/DL (ref 65–140)
GLUCOSE SERPL-MCNC: 77 MG/DL (ref 65–140)
GLUCOSE SERPL-MCNC: 80 MG/DL (ref 65–140)
GLUCOSE SERPL-MCNC: 90 MG/DL (ref 65–140)
GLUCOSE SERPL-MCNC: 94 MG/DL (ref 65–140)
GLUCOSE SERPL-MCNC: 97 MG/DL (ref 65–140)
GLUCOSE UR STRIP-MCNC: NEGATIVE MG/DL
HBA1C MFR BLD: 5.1 %
HCO3 BLDA-SCNC: 16.3 MMOL/L (ref 22–28)
HCO3 BLDA-SCNC: 20.2 MMOL/L (ref 24–30)
HCO3 BLDV-SCNC: 19.9 MMOL/L (ref 24–30)
HCO3 BLDV-SCNC: 20.4 MMOL/L (ref 24–30)
HCO3 BLDV-SCNC: 21 MMOL/L (ref 24–30)
HCO3 BLDV-SCNC: 9.3 MMOL/L (ref 24–30)
HCT VFR BLD AUTO: 31.8 % (ref 34.8–46.1)
HCT VFR BLD AUTO: 32 % (ref 34.8–46.1)
HCT VFR BLD CALC: 23 % (ref 34.8–46.1)
HCT VFR BLD CALC: 33 % (ref 34.8–46.1)
HCT VFR BLD CALC: <15 % (ref 34.8–46.1)
HGB BLD-MCNC: 10 G/DL (ref 11.5–15.4)
HGB BLD-MCNC: 10.4 G/DL (ref 11.5–15.4)
HGB BLDA-MCNC: 11.2 G/DL (ref 11.5–15.4)
HGB BLDA-MCNC: 7.8 G/DL (ref 11.5–15.4)
HGB UR QL STRIP.AUTO: NEGATIVE
HYALINE CASTS #/AREA URNS LPF: ABNORMAL /LPF
HYDROCODONE UR QL SCN: NEGATIVE
IPAP: 12
KETONES UR STRIP-MCNC: ABNORMAL MG/DL
LACTATE SERPL-SCNC: 1.9 MMOL/L (ref 0.5–2)
LACTATE SERPL-SCNC: 2.4 MMOL/L (ref 0.5–2)
LEUKOCYTE ESTERASE UR QL STRIP: ABNORMAL
MAGNESIUM SERPL-MCNC: 1.7 MG/DL (ref 1.9–2.7)
MAGNESIUM SERPL-MCNC: 2.4 MG/DL (ref 1.9–2.7)
MAGNESIUM SERPL-MCNC: 2.5 MG/DL (ref 1.9–2.7)
MAGNESIUM SERPL-MCNC: 2.6 MG/DL (ref 1.9–2.7)
MAGNESIUM SERPL-MCNC: 2.8 MG/DL (ref 1.9–2.7)
MCH RBC QN AUTO: 28 PG (ref 26.8–34.3)
MCH RBC QN AUTO: 28.3 PG (ref 26.8–34.3)
MCHC RBC AUTO-ENTMCNC: 31.4 G/DL (ref 31.4–37.4)
MCHC RBC AUTO-ENTMCNC: 32.5 G/DL (ref 31.4–37.4)
MCV RBC AUTO: 87 FL (ref 82–98)
MCV RBC AUTO: 89 FL (ref 82–98)
METHADONE UR QL: NEGATIVE
MUCOUS THREADS UR QL AUTO: ABNORMAL
NITRITE UR QL STRIP: NEGATIVE
NON VENT- BIPAP: ABNORMAL
NON VENT- BIPAP: ABNORMAL
NON-SQ EPI CELLS URNS QL MICRO: ABNORMAL /HPF
O2 CT BLDV-SCNC: 12.4 ML/DL
O2 CT BLDV-SCNC: 4 ML/DL
O2 CT BLDV-SCNC: 4.6 ML/DL
O2 CT BLDV-SCNC: 7.1 ML/DL
OPIATES UR QL SCN: NEGATIVE
OSMOLALITY UR/SERPL-RTO: 349 MMOL/KG (ref 282–298)
OXYCODONE+OXYMORPHONE UR QL SCN: NEGATIVE
PCO2 BLD: 17 MMOL/L (ref 21–32)
PCO2 BLD: 22 MMOL/L (ref 21–32)
PCO2 BLD: 29.2 MM HG (ref 36–44)
PCO2 BLD: 45.9 MM HG (ref 42–50)
PCO2 BLD: <17 MM HG (ref 42–50)
PCO2 BLDV: 22.2 MM HG (ref 42–50)
PCO2 BLDV: 42.9 MM HG (ref 42–50)
PCO2 BLDV: 45 MM HG (ref 42–50)
PCO2 BLDV: 45.4 MM HG (ref 42–50)
PCP UR QL: NEGATIVE
PEEP MAX SETTING VENT: 6 CM[H2O]
PH BLD: 7.25 [PH] (ref 7.3–7.4)
PH BLD: 7.33 [PH] (ref 7.3–7.4)
PH BLD: 7.36 [PH] (ref 7.35–7.45)
PH BLDV: 7.24 [PH] (ref 7.3–7.4)
PH BLDV: 7.26 [PH] (ref 7.3–7.4)
PH BLDV: 7.29 [PH] (ref 7.3–7.4)
PH BLDV: 7.3 [PH] (ref 7.3–7.4)
PH UR STRIP.AUTO: 5.5 [PH] (ref 4.5–8)
PHOSPHATE SERPL-MCNC: 1.2 MG/DL (ref 2.7–4.5)
PHOSPHATE SERPL-MCNC: 1.5 MG/DL (ref 2.7–4.5)
PHOSPHATE SERPL-MCNC: 1.6 MG/DL (ref 2.7–4.5)
PHOSPHATE SERPL-MCNC: 1.7 MG/DL (ref 2.7–4.5)
PLATELET # BLD AUTO: 160 THOUSANDS/UL (ref 149–390)
PLATELET # BLD AUTO: 162 THOUSANDS/UL (ref 149–390)
PMV BLD AUTO: 11.4 FL (ref 8.9–12.7)
PMV BLD AUTO: 12.6 FL (ref 8.9–12.7)
PO2 BLD: 16 MM HG (ref 35–45)
PO2 BLD: 64 MM HG (ref 35–45)
PO2 BLD: 91 MM HG (ref 75–129)
PO2 BLDV: 16.9 MM HG (ref 35–45)
PO2 BLDV: 26 MM HG (ref 35–45)
PO2 BLDV: 27.7 MM HG (ref 35–45)
PO2 BLDV: 36.2 MM HG (ref 35–45)
POTASSIUM BLD-SCNC: 2.6 MMOL/L (ref 3.5–5.3)
POTASSIUM BLD-SCNC: 3.8 MMOL/L (ref 3.5–5.3)
POTASSIUM BLD-SCNC: 4.1 MMOL/L (ref 3.5–5.3)
POTASSIUM SERPL-SCNC: 3.5 MMOL/L (ref 3.5–5.3)
POTASSIUM SERPL-SCNC: 3.7 MMOL/L (ref 3.5–5.3)
POTASSIUM SERPL-SCNC: 3.7 MMOL/L (ref 3.5–5.3)
POTASSIUM SERPL-SCNC: 4.2 MMOL/L (ref 3.5–5.3)
POTASSIUM SERPL-SCNC: 4.3 MMOL/L (ref 3.5–5.3)
POTASSIUM SERPL-SCNC: 4.8 MMOL/L (ref 3.5–5.3)
POTASSIUM SERPL-SCNC: 4.8 MMOL/L (ref 3.5–5.3)
PROT SERPL-MCNC: 5.6 G/DL (ref 6.4–8.4)
PROT UR STRIP-MCNC: ABNORMAL MG/DL
RBC # BLD AUTO: 3.57 MILLION/UL (ref 3.81–5.12)
RBC # BLD AUTO: 3.67 MILLION/UL (ref 3.81–5.12)
RBC #/AREA URNS AUTO: ABNORMAL /HPF
SAO2 % BLD FROM PO2: 16 % (ref 60–85)
SAO2 % BLD FROM PO2: 97 % (ref 60–85)
SODIUM BLD-SCNC: 140 MMOL/L (ref 136–145)
SODIUM BLD-SCNC: 142 MMOL/L (ref 136–145)
SODIUM BLD-SCNC: 149 MMOL/L (ref 136–145)
SODIUM SERPL-SCNC: 139 MMOL/L (ref 135–147)
SODIUM SERPL-SCNC: 140 MMOL/L (ref 135–147)
SODIUM SERPL-SCNC: 141 MMOL/L (ref 135–147)
SP GR UR STRIP.AUTO: 1.02 (ref 1–1.03)
SPECIMEN SOURCE: ABNORMAL
THC UR QL: POSITIVE
TSH SERPL DL<=0.05 MIU/L-ACNC: 0.47 UIU/ML (ref 0.45–4.5)
UROBILINOGEN UR QL STRIP.AUTO: 2 E.U./DL
VENT BIPAP FIO2: 21 %
WBC # BLD AUTO: 4 THOUSAND/UL (ref 4.31–10.16)
WBC # BLD AUTO: 5.5 THOUSAND/UL (ref 4.31–10.16)
WBC #/AREA URNS AUTO: ABNORMAL /HPF

## 2024-07-27 PROCEDURE — 84132 ASSAY OF SERUM POTASSIUM: CPT

## 2024-07-27 PROCEDURE — 83735 ASSAY OF MAGNESIUM: CPT

## 2024-07-27 PROCEDURE — 94760 N-INVAS EAR/PLS OXIMETRY 1: CPT

## 2024-07-27 PROCEDURE — 82803 BLOOD GASES ANY COMBINATION: CPT

## 2024-07-27 PROCEDURE — 83605 ASSAY OF LACTIC ACID: CPT

## 2024-07-27 PROCEDURE — 93005 ELECTROCARDIOGRAM TRACING: CPT

## 2024-07-27 PROCEDURE — 82948 REAGENT STRIP/BLOOD GLUCOSE: CPT

## 2024-07-27 PROCEDURE — 81001 URINALYSIS AUTO W/SCOPE: CPT

## 2024-07-27 PROCEDURE — 84100 ASSAY OF PHOSPHORUS: CPT | Performed by: PHYSICIAN ASSISTANT

## 2024-07-27 PROCEDURE — 99291 CRITICAL CARE FIRST HOUR: CPT | Performed by: INTERNAL MEDICINE

## 2024-07-27 PROCEDURE — 83930 ASSAY OF BLOOD OSMOLALITY: CPT

## 2024-07-27 PROCEDURE — 84100 ASSAY OF PHOSPHORUS: CPT | Performed by: STUDENT IN AN ORGANIZED HEALTH CARE EDUCATION/TRAINING PROGRAM

## 2024-07-27 PROCEDURE — 82805 BLOOD GASES W/O2 SATURATION: CPT

## 2024-07-27 PROCEDURE — 80076 HEPATIC FUNCTION PANEL: CPT

## 2024-07-27 PROCEDURE — 82805 BLOOD GASES W/O2 SATURATION: CPT | Performed by: INTERNAL MEDICINE

## 2024-07-27 PROCEDURE — 85014 HEMATOCRIT: CPT

## 2024-07-27 PROCEDURE — 84484 ASSAY OF TROPONIN QUANT: CPT | Performed by: PHYSICIAN ASSISTANT

## 2024-07-27 PROCEDURE — 84295 ASSAY OF SERUM SODIUM: CPT

## 2024-07-27 PROCEDURE — 99232 SBSQ HOSP IP/OBS MODERATE 35: CPT | Performed by: INTERNAL MEDICINE

## 2024-07-27 PROCEDURE — 83036 HEMOGLOBIN GLYCOSYLATED A1C: CPT | Performed by: STUDENT IN AN ORGANIZED HEALTH CARE EDUCATION/TRAINING PROGRAM

## 2024-07-27 PROCEDURE — 85027 COMPLETE CBC AUTOMATED: CPT

## 2024-07-27 PROCEDURE — 82330 ASSAY OF CALCIUM: CPT

## 2024-07-27 PROCEDURE — 83735 ASSAY OF MAGNESIUM: CPT | Performed by: PHYSICIAN ASSISTANT

## 2024-07-27 PROCEDURE — 82330 ASSAY OF CALCIUM: CPT | Performed by: INTERNAL MEDICINE

## 2024-07-27 PROCEDURE — NC001 PR NO CHARGE: Performed by: INTERNAL MEDICINE

## 2024-07-27 PROCEDURE — 70450 CT HEAD/BRAIN W/O DYE: CPT

## 2024-07-27 PROCEDURE — 82947 ASSAY GLUCOSE BLOOD QUANT: CPT

## 2024-07-27 PROCEDURE — 82330 ASSAY OF CALCIUM: CPT | Performed by: STUDENT IN AN ORGANIZED HEALTH CARE EDUCATION/TRAINING PROGRAM

## 2024-07-27 PROCEDURE — 83519 RIA NONANTIBODY: CPT

## 2024-07-27 PROCEDURE — 85027 COMPLETE CBC AUTOMATED: CPT | Performed by: PHYSICIAN ASSISTANT

## 2024-07-27 PROCEDURE — 71045 X-RAY EXAM CHEST 1 VIEW: CPT

## 2024-07-27 PROCEDURE — 84443 ASSAY THYROID STIM HORMONE: CPT | Performed by: STUDENT IN AN ORGANIZED HEALTH CARE EDUCATION/TRAINING PROGRAM

## 2024-07-27 PROCEDURE — 83735 ASSAY OF MAGNESIUM: CPT | Performed by: STUDENT IN AN ORGANIZED HEALTH CARE EDUCATION/TRAINING PROGRAM

## 2024-07-27 PROCEDURE — 80307 DRUG TEST PRSMV CHEM ANLYZR: CPT | Performed by: STUDENT IN AN ORGANIZED HEALTH CARE EDUCATION/TRAINING PROGRAM

## 2024-07-27 PROCEDURE — 87040 BLOOD CULTURE FOR BACTERIA: CPT | Performed by: STUDENT IN AN ORGANIZED HEALTH CARE EDUCATION/TRAINING PROGRAM

## 2024-07-27 PROCEDURE — 80048 BASIC METABOLIC PNL TOTAL CA: CPT | Performed by: STUDENT IN AN ORGANIZED HEALTH CARE EDUCATION/TRAINING PROGRAM

## 2024-07-27 PROCEDURE — 3044F HG A1C LEVEL LT 7.0%: CPT | Performed by: INTERNAL MEDICINE

## 2024-07-27 PROCEDURE — 36415 COLL VENOUS BLD VENIPUNCTURE: CPT

## 2024-07-27 PROCEDURE — 83605 ASSAY OF LACTIC ACID: CPT | Performed by: PHYSICIAN ASSISTANT

## 2024-07-27 PROCEDURE — 80048 BASIC METABOLIC PNL TOTAL CA: CPT | Performed by: PHYSICIAN ASSISTANT

## 2024-07-27 PROCEDURE — 94002 VENT MGMT INPAT INIT DAY: CPT

## 2024-07-27 PROCEDURE — 80048 BASIC METABOLIC PNL TOTAL CA: CPT

## 2024-07-27 RX ORDER — DEXTROSE MONOHYDRATE AND SODIUM CHLORIDE 5; .9 G/100ML; G/100ML
250 INJECTION, SOLUTION INTRAVENOUS CONTINUOUS
Status: DISCONTINUED | OUTPATIENT
Start: 2024-07-27 | End: 2024-07-28

## 2024-07-27 RX ORDER — SODIUM CHLORIDE, SODIUM GLUCONATE, SODIUM ACETATE, POTASSIUM CHLORIDE, MAGNESIUM CHLORIDE, SODIUM PHOSPHATE, DIBASIC, AND POTASSIUM PHOSPHATE .53; .5; .37; .037; .03; .012; .00082 G/100ML; G/100ML; G/100ML; G/100ML; G/100ML; G/100ML; G/100ML
250 INJECTION, SOLUTION INTRAVENOUS CONTINUOUS
Status: DISPENSED | OUTPATIENT
Start: 2024-07-27 | End: 2024-07-27

## 2024-07-27 RX ORDER — INSULIN LISPRO 100 [IU]/ML
23 INJECTION, SOLUTION INTRAVENOUS; SUBCUTANEOUS ONCE
Status: DISCONTINUED | OUTPATIENT
Start: 2024-07-27 | End: 2024-07-27

## 2024-07-27 RX ORDER — DEXTROSE MONOHYDRATE 25 G/50ML
INJECTION, SOLUTION INTRAVENOUS
Status: COMPLETED
Start: 2024-07-27 | End: 2024-07-27

## 2024-07-27 RX ORDER — NALOXONE HYDROCHLORIDE 0.4 MG/ML
INJECTION, SOLUTION INTRAMUSCULAR; INTRAVENOUS; SUBCUTANEOUS
Status: DISPENSED
Start: 2024-07-27 | End: 2024-07-27

## 2024-07-27 RX ORDER — SODIUM CHLORIDE, SODIUM GLUCONATE, SODIUM ACETATE, POTASSIUM CHLORIDE, MAGNESIUM CHLORIDE, SODIUM PHOSPHATE, DIBASIC, AND POTASSIUM PHOSPHATE .53; .5; .37; .037; .03; .012; .00082 G/100ML; G/100ML; G/100ML; G/100ML; G/100ML; G/100ML; G/100ML
125 INJECTION, SOLUTION INTRAVENOUS CONTINUOUS
Status: DISCONTINUED | OUTPATIENT
Start: 2024-07-27 | End: 2024-07-27

## 2024-07-27 RX ORDER — HYDROCHLOROTHIAZIDE 12.5 MG/1
12.5 TABLET ORAL DAILY
Status: DISCONTINUED | OUTPATIENT
Start: 2024-07-27 | End: 2024-07-27

## 2024-07-27 RX ORDER — SODIUM CHLORIDE, SODIUM GLUCONATE, SODIUM ACETATE, POTASSIUM CHLORIDE, MAGNESIUM CHLORIDE, SODIUM PHOSPHATE, DIBASIC, AND POTASSIUM PHOSPHATE .53; .5; .37; .037; .03; .012; .00082 G/100ML; G/100ML; G/100ML; G/100ML; G/100ML; G/100ML; G/100ML
1000 INJECTION, SOLUTION INTRAVENOUS ONCE
Status: COMPLETED | OUTPATIENT
Start: 2024-07-27 | End: 2024-07-27

## 2024-07-27 RX ORDER — ACETAMINOPHEN 325 MG/1
650 TABLET ORAL EVERY 6 HOURS PRN
Status: DISCONTINUED | OUTPATIENT
Start: 2024-07-27 | End: 2024-07-31 | Stop reason: HOSPADM

## 2024-07-27 RX ORDER — POTASSIUM CHLORIDE 14.9 MG/ML
20 INJECTION INTRAVENOUS
Status: COMPLETED | OUTPATIENT
Start: 2024-07-27 | End: 2024-07-27

## 2024-07-27 RX ORDER — SODIUM CHLORIDE, SODIUM GLUCONATE, SODIUM ACETATE, POTASSIUM CHLORIDE, MAGNESIUM CHLORIDE, SODIUM PHOSPHATE, DIBASIC, AND POTASSIUM PHOSPHATE .53; .5; .37; .037; .03; .012; .00082 G/100ML; G/100ML; G/100ML; G/100ML; G/100ML; G/100ML; G/100ML
500 INJECTION, SOLUTION INTRAVENOUS CONTINUOUS
Status: DISPENSED | OUTPATIENT
Start: 2024-07-27 | End: 2024-07-27

## 2024-07-27 RX ORDER — POTASSIUM CHLORIDE 20 MEQ/1
40 TABLET, EXTENDED RELEASE ORAL
Status: DISCONTINUED | OUTPATIENT
Start: 2024-07-27 | End: 2024-07-27

## 2024-07-27 RX ORDER — DEXTROSE MONOHYDRATE 25 G/50ML
50 INJECTION, SOLUTION INTRAVENOUS ONCE
Status: COMPLETED | OUTPATIENT
Start: 2024-07-27 | End: 2024-07-27

## 2024-07-27 RX ORDER — INSULIN LISPRO 100 [IU]/ML
8 INJECTION, SOLUTION INTRAVENOUS; SUBCUTANEOUS ONCE
Status: COMPLETED | OUTPATIENT
Start: 2024-07-27 | End: 2024-07-27

## 2024-07-27 RX ORDER — INSULIN LISPRO 100 [IU]/ML
15 INJECTION, SOLUTION INTRAVENOUS; SUBCUTANEOUS ONCE
Status: COMPLETED | OUTPATIENT
Start: 2024-07-27 | End: 2024-07-27

## 2024-07-27 RX ORDER — INSULIN LISPRO 100 [IU]/ML
1-6 INJECTION, SOLUTION INTRAVENOUS; SUBCUTANEOUS
Status: DISCONTINUED | OUTPATIENT
Start: 2024-07-27 | End: 2024-07-27

## 2024-07-27 RX ORDER — POTASSIUM CHLORIDE 29.8 MG/ML
40 INJECTION INTRAVENOUS ONCE
Status: DISCONTINUED | OUTPATIENT
Start: 2024-07-27 | End: 2024-07-27 | Stop reason: SDUPTHER

## 2024-07-27 RX ORDER — HEPARIN SODIUM 5000 [USP'U]/ML
5000 INJECTION, SOLUTION INTRAVENOUS; SUBCUTANEOUS EVERY 8 HOURS SCHEDULED
Status: DISCONTINUED | OUTPATIENT
Start: 2024-07-27 | End: 2024-07-31 | Stop reason: HOSPADM

## 2024-07-27 RX ORDER — POTASSIUM CHLORIDE 14.9 MG/ML
20 INJECTION INTRAVENOUS ONCE
Status: COMPLETED | OUTPATIENT
Start: 2024-07-27 | End: 2024-07-27

## 2024-07-27 RX ORDER — POTASSIUM CHLORIDE 20 MEQ/1
40 TABLET, EXTENDED RELEASE ORAL ONCE
Status: COMPLETED | OUTPATIENT
Start: 2024-07-27 | End: 2024-07-27

## 2024-07-27 RX ORDER — CLONAZEPAM 0.5 MG/1
0.25 TABLET ORAL 2 TIMES DAILY
Status: DISCONTINUED | OUTPATIENT
Start: 2024-07-27 | End: 2024-07-31 | Stop reason: HOSPADM

## 2024-07-27 RX ORDER — POTASSIUM CHLORIDE 20 MEQ/1
20 TABLET, EXTENDED RELEASE ORAL ONCE
Status: COMPLETED | OUTPATIENT
Start: 2024-07-27 | End: 2024-07-28

## 2024-07-27 RX ORDER — LOSARTAN POTASSIUM 50 MG/1
100 TABLET ORAL DAILY
Status: DISCONTINUED | OUTPATIENT
Start: 2024-07-27 | End: 2024-07-27

## 2024-07-27 RX ORDER — DEXTROSE MONOHYDRATE AND SODIUM CHLORIDE 5; .9 G/100ML; G/100ML
1000 INJECTION, SOLUTION INTRAVENOUS CONTINUOUS
Status: DISCONTINUED | OUTPATIENT
Start: 2024-07-27 | End: 2024-07-27

## 2024-07-27 RX ORDER — DEXTROSE, SODIUM CHLORIDE, SODIUM LACTATE, POTASSIUM CHLORIDE, AND CALCIUM CHLORIDE 5; .6; .31; .03; .02 G/100ML; G/100ML; G/100ML; G/100ML; G/100ML
75 INJECTION, SOLUTION INTRAVENOUS CONTINUOUS
Status: DISCONTINUED | OUTPATIENT
Start: 2024-07-27 | End: 2024-07-28

## 2024-07-27 RX ORDER — INSULIN GLARGINE 100 [IU]/ML
20 INJECTION, SOLUTION SUBCUTANEOUS
Status: DISCONTINUED | OUTPATIENT
Start: 2024-07-27 | End: 2024-07-27

## 2024-07-27 RX ORDER — MAGNESIUM SULFATE HEPTAHYDRATE 40 MG/ML
2 INJECTION, SOLUTION INTRAVENOUS ONCE
Status: COMPLETED | OUTPATIENT
Start: 2024-07-27 | End: 2024-07-27

## 2024-07-27 RX ADMIN — INSULIN LISPRO 15 UNITS: 100 INJECTION, SOLUTION INTRAVENOUS; SUBCUTANEOUS at 04:52

## 2024-07-27 RX ADMIN — MAGNESIUM SULFATE HEPTAHYDRATE 2 G: 40 INJECTION, SOLUTION INTRAVENOUS at 00:21

## 2024-07-27 RX ADMIN — METOPROLOL TARTRATE 100 MG: 50 TABLET, FILM COATED ORAL at 00:08

## 2024-07-27 RX ADMIN — DEXTROSE AND SODIUM CHLORIDE 250 ML/HR: 5; .45 INJECTION, SOLUTION INTRAVENOUS at 01:50

## 2024-07-27 RX ADMIN — DEXTROSE MONOHYDRATE 50 ML: 25 INJECTION, SOLUTION INTRAVENOUS at 11:55

## 2024-07-27 RX ADMIN — TIZANIDINE 4 MG: 4 TABLET ORAL at 00:12

## 2024-07-27 RX ADMIN — GLYCERIN 1 DROP: .002; .002; .01 SOLUTION/ DROPS OPHTHALMIC at 18:26

## 2024-07-27 RX ADMIN — POTASSIUM CHLORIDE 20 MEQ: 14.9 INJECTION, SOLUTION INTRAVENOUS at 15:36

## 2024-07-27 RX ADMIN — POTASSIUM CHLORIDE 20 MEQ: 14.9 INJECTION, SOLUTION INTRAVENOUS at 13:14

## 2024-07-27 RX ADMIN — SODIUM CHLORIDE 0.5 UNITS/HR: 9 INJECTION, SOLUTION INTRAVENOUS at 13:34

## 2024-07-27 RX ADMIN — DEXTROSE MONOHYDRATE 50 ML: 25 INJECTION, SOLUTION INTRAVENOUS at 15:10

## 2024-07-27 RX ADMIN — DEXTROSE, SODIUM CHLORIDE, SODIUM LACTATE, POTASSIUM CHLORIDE, AND CALCIUM CHLORIDE 75 ML/HR: 5; .6; .31; .03; .02 INJECTION, SOLUTION INTRAVENOUS at 16:03

## 2024-07-27 RX ADMIN — SODIUM CHLORIDE, SODIUM GLUCONATE, SODIUM ACETATE, POTASSIUM CHLORIDE, MAGNESIUM CHLORIDE, SODIUM PHOSPHATE, DIBASIC, AND POTASSIUM PHOSPHATE 250 ML/HR: .53; .5; .37; .037; .03; .012; .00082 INJECTION, SOLUTION INTRAVENOUS at 13:14

## 2024-07-27 RX ADMIN — GLYCERIN 1 DROP: .002; .002; .01 SOLUTION/ DROPS OPHTHALMIC at 22:26

## 2024-07-27 RX ADMIN — POTASSIUM CHLORIDE 20 MEQ: 14.9 INJECTION, SOLUTION INTRAVENOUS at 01:22

## 2024-07-27 RX ADMIN — POTASSIUM CHLORIDE 40 MEQ: 1500 TABLET, EXTENDED RELEASE ORAL at 05:44

## 2024-07-27 RX ADMIN — NOREPINEPHRINE BITARTRATE 6 MCG/MIN: 1 INJECTION, SOLUTION, CONCENTRATE INTRAVENOUS at 11:02

## 2024-07-27 RX ADMIN — DIVALPROEX SODIUM 500 MG: 500 TABLET, DELAYED RELEASE ORAL at 22:26

## 2024-07-27 RX ADMIN — ONDANSETRON 4 MG: 4 TABLET, ORALLY DISINTEGRATING ORAL at 09:26

## 2024-07-27 RX ADMIN — ATORVASTATIN CALCIUM 10 MG: 10 TABLET, FILM COATED ORAL at 22:26

## 2024-07-27 RX ADMIN — DEXTROSE AND SODIUM CHLORIDE 1000 ML/HR: 5; .9 INJECTION, SOLUTION INTRAVENOUS at 04:45

## 2024-07-27 RX ADMIN — METHOCARBAMOL 750 MG: 750 TABLET ORAL at 01:33

## 2024-07-27 RX ADMIN — Medication 2000 UNITS: at 09:18

## 2024-07-27 RX ADMIN — PREGABALIN 100 MG: 100 CAPSULE ORAL at 09:17

## 2024-07-27 RX ADMIN — VERAPAMIL HYDROCHLORIDE 120 MG: 120 TABLET, FILM COATED, EXTENDED RELEASE ORAL at 00:12

## 2024-07-27 RX ADMIN — HEPARIN SODIUM 5000 UNITS: 5000 INJECTION INTRAVENOUS; SUBCUTANEOUS at 22:26

## 2024-07-27 RX ADMIN — DEXTROSE MONOHYDRATE 50 ML: 25 INJECTION, SOLUTION INTRAVENOUS at 11:59

## 2024-07-27 RX ADMIN — DIVALPROEX SODIUM 500 MG: 500 TABLET, DELAYED RELEASE ORAL at 00:12

## 2024-07-27 RX ADMIN — FERROUS SULFATE TAB 325 MG (65 MG ELEMENTAL FE) 325 MG: 325 (65 FE) TAB at 09:27

## 2024-07-27 RX ADMIN — CLONAZEPAM 0.25 MG: 0.5 TABLET ORAL at 17:10

## 2024-07-27 RX ADMIN — CALCITRIOL CAPSULES 0.25 MCG 0.25 MCG: 0.25 CAPSULE ORAL at 09:17

## 2024-07-27 RX ADMIN — SODIUM CHLORIDE, SODIUM LACTATE, POTASSIUM CHLORIDE, AND CALCIUM CHLORIDE 1000 ML: .6; .31; .03; .02 INJECTION, SOLUTION INTRAVENOUS at 04:28

## 2024-07-27 RX ADMIN — POTASSIUM CHLORIDE 40 MEQ: 1500 TABLET, EXTENDED RELEASE ORAL at 09:22

## 2024-07-27 RX ADMIN — CLONAZEPAM 0.5 MG: 0.5 TABLET ORAL at 09:17

## 2024-07-27 RX ADMIN — PREGABALIN 100 MG: 100 CAPSULE ORAL at 22:26

## 2024-07-27 RX ADMIN — DULOXETINE HYDROCHLORIDE 60 MG: 60 CAPSULE, DELAYED RELEASE ORAL at 09:17

## 2024-07-27 RX ADMIN — Medication 0.04 MG: at 11:20

## 2024-07-27 RX ADMIN — SODIUM CHLORIDE, SODIUM GLUCONATE, SODIUM ACETATE, POTASSIUM CHLORIDE, MAGNESIUM CHLORIDE, SODIUM PHOSPHATE, DIBASIC, AND POTASSIUM PHOSPHATE 1000 ML: .53; .5; .37; .037; .03; .012; .00082 INJECTION, SOLUTION INTRAVENOUS at 14:22

## 2024-07-27 RX ADMIN — NOREPINEPHRINE BITARTRATE 2 MCG/MIN: 1 SOLUTION INTRAVENOUS at 12:00

## 2024-07-27 RX ADMIN — INSULIN LISPRO 15 UNITS: 100 INJECTION, SOLUTION INTRAVENOUS; SUBCUTANEOUS at 06:44

## 2024-07-27 RX ADMIN — SODIUM CHLORIDE, SODIUM GLUCONATE, SODIUM ACETATE, POTASSIUM CHLORIDE, MAGNESIUM CHLORIDE, SODIUM PHOSPHATE, DIBASIC, AND POTASSIUM PHOSPHATE 125 ML/HR: .53; .5; .37; .037; .03; .012; .00082 INJECTION, SOLUTION INTRAVENOUS at 09:29

## 2024-07-27 RX ADMIN — INSULIN LISPRO 8 UNITS: 100 INJECTION, SOLUTION INTRAVENOUS; SUBCUTANEOUS at 05:15

## 2024-07-27 RX ADMIN — DEXTROSE AND SODIUM CHLORIDE 250 ML/HR: 5; .9 INJECTION, SOLUTION INTRAVENOUS at 06:45

## 2024-07-27 RX ADMIN — ATORVASTATIN CALCIUM 10 MG: 10 TABLET, FILM COATED ORAL at 00:08

## 2024-07-27 RX ADMIN — SODIUM BICARBONATE 650 MG TABLET 650 MG: at 09:17

## 2024-07-27 RX ADMIN — SODIUM CHLORIDE, SODIUM GLUCONATE, SODIUM ACETATE, POTASSIUM CHLORIDE, MAGNESIUM CHLORIDE, SODIUM PHOSPHATE, DIBASIC, AND POTASSIUM PHOSPHATE 500 ML/HR: .53; .5; .37; .037; .03; .012; .00082 INJECTION, SOLUTION INTRAVENOUS at 11:58

## 2024-07-27 RX ADMIN — POTASSIUM CHLORIDE 80 MEQ: 1500 TABLET, EXTENDED RELEASE ORAL at 00:08

## 2024-07-27 RX ADMIN — PREGABALIN 100 MG: 100 CAPSULE ORAL at 00:09

## 2024-07-27 RX ADMIN — SODIUM CHLORIDE, SODIUM LACTATE, POTASSIUM CHLORIDE, AND CALCIUM CHLORIDE 1000 ML: .6; .31; .03; .02 INJECTION, SOLUTION INTRAVENOUS at 03:33

## 2024-07-27 RX ADMIN — HEPARIN SODIUM 5000 UNITS: 5000 INJECTION INTRAVENOUS; SUBCUTANEOUS at 13:14

## 2024-07-27 RX ADMIN — HEPARIN SODIUM 5000 UNITS: 5000 INJECTION INTRAVENOUS; SUBCUTANEOUS at 01:30

## 2024-07-27 RX ADMIN — MAGNESIUM SULFATE HEPTAHYDRATE 2 G: 40 INJECTION, SOLUTION INTRAVENOUS at 03:51

## 2024-07-27 RX ADMIN — TIZANIDINE 4 MG: 4 TABLET ORAL at 09:18

## 2024-07-27 NOTE — H&P
INTERNAL MEDICINE RESIDENCY ADMISSION H&P     Name: Kinza Meza   Age & Sex: 48 y.o. female   MRN: [id number]  Unit/Bed#: [facility] 525-01   Encounter: [id number]  Primary Care Provider: Myla Norwood MD    Code Status: Level 1 - Full Code  Admission Status: INPATIENT   Disposition: Patient requires Level 2 Step Down     Admit to team: SOD Team B     ASSESSMENT/PLAN     Principal Problem:    DKA (diabetic ketoacidosis) ([facility])  Active Problems:    ADHD    Stage 3b chronic kidney disease (HCC)    Vitamin D deficiency    Chronic migraine w/o aura w/o status migrainosus, not intractable    HTN (hypertension)    Depression      * DKA (diabetic ketoacidosis) ([facility])  Assessment & Plan  Lab Results   Component Value Date    HGBA1C 6.2 (H) 04/12/2024       Recent Labs     07/26/24  1749 07/26/24  2313 07/27/24  0026   POCGLU 113 86 109       Blood Sugar Average: Last 72 hrs:  (P) [id number]    Insulin-dependent diabetes.  She takes 26 units of Lantus and 2-4 units of mealtime insulin at home.   Upon admission: elevated AG of 18, normal potassium 3.9 low bicarb 20, normal glucose 118, and elevated B-hydroxy 3.45; VBG pH 7.28. Euglycemic metabolic acidosis most likely due to DKA 2/2 stopping basal insulin on Tues and mealtime insulin 2 weeks ago. Patient has been having epigastric abdominal pain with N/V/D since Tuesday; but also consider the role of Diamox which she takes for migraine, her last dose was Friday.        Plan:  - Currently NPO  - D5 1/2 NS  - Insulin gtt  - DKA protocol  - Continue to monitor K, MG, and Phos; replete as needed  - Continue home med Lyrica for diabetic neuropathy    Depression  Assessment & Plan  History of depression anxiety.  Take Cymbalta 60 mg daily and Klonopin 0.5 mg twice daily    Plan:  -Continue Cymbalta and Klonopin    HTN (hypertension)  Assessment & Plan  Home meds include HCTZ 12.5 mg, Cozaar 100 mg daily, Lopressor 100 mg twice daily, and verapamil 120 mg daily at  bedtime.  Blood pressure 159/104 upon arrival to the ED. denies headache, dizziness, shortness of breath, chest pain or chest discomfort, and heart palpitations.    Plan:  -Continue Cozaar, HCTZ, Lopressor, and verapamil  -Continue to monitor blood pressure    Chronic migraine w/o aura w/o status migrainosus, not intractable  Assessment & Plan  Chronic history of migraine.  She takes Depakote and acetazolamide at home.     Plan:  - Continue Depakote  - Hold acetazolamide due to concern for metabolic acidosis    Vitamin D deficiency  Assessment & Plan  CKD stage IIIb with vitamin D deficiency.  She takes vitamin D3 2000 units at home.    Plan:  -Continue vitamin D3     Stage 3b chronic kidney disease (HCC)  Assessment & Plan  Lab Results   Component Value Date    EGFR 31 07/26/2024    EGFR 30 07/26/2024    EGFR 30 07/26/2024    CREATININE 1.85 (H) 07/26/2024    CREATININE 1.91 (H) 07/26/2024    CREATININE 1.89 (H) 07/26/2024   recent baseline creatinine around 1.6-1.9  Cr on admission 1.85; GFR 31    Plan  - Continue IVF for hydration  - Continue sodium bicarb 650 twice daily home med  - Continue home med calcitriol 0.25 mcg daily  - Monitor electrolytes and renal function daily  - Avoid NSAID and nephrotoxic agents    ADHD  Assessment & Plan  Patient has a history of ADHD, and she takes Vyvanse at home.  He has stopped taking Vyvanse since Tuesday because low p.o. intake.    Plan:  - Hold Vyvanse for now        VTE Pharmacologic Prophylaxis: Heparin  VTE Mechanical Prophylaxis: sequential compression device    CHIEF COMPLAINT     Chief Complaint   Patient presents with    Flank Pain     Pt was sent over for kidney disease. Pt states she thinks she could be in DKA. Pt has been having abdominal pain      HISTORY OF PRESENT ILLNESS     This is a 48 y.o. female with a past medical history of insulin dependent DM, HTN, CKD 3, ADHD, Vitamin D deficiency, chronic migraine, and depression presenting to the ED due to  abnormal outpatient lab work ketosuria, elevated AG and decreased GFR at PCP appointment.  Patient states she has been having low p.o. intake for 2 weeks due to nausea and dry heaves. She has been sipping water and Gatorade to keep as hydrated. She states she started having abdominal pain, nausea, and vomiting on Tuesday. About 6-8 times of vomiting with the clear fluid vomitus. She rates her abdominal pain as 3/10 at present but at worst it was 7/10 with sharp pain in epigastric and no radiation. Thursday night moving to Friday morning, she states she started having nonblooody diarrhea, about 4-6 bouts of watery stools. She states her last antibiotic use was 1.5 weeks ago. She took Keflex for R second toe infection. Also mentions that she just returned from a beach vacation last weekend. Endorses positive sick contact of a friend who has viral symptoms.     Due to low p.o. intake, she stopped taking her long-acting insulin on Tuesday and her last dose of mealtime insulin was 2 weeks ago. Denies fever, chills, lightheadedness, shortness of breath, chest pain/chest pressure, heart palpitation, and urinary symptoms.  Endorses epigastric abdominal pain, nausea, vomiting, and diarrhea.     Patient seen and examined in the ED. Afebrile, HR 93, /104 O2 98% on RA. Elevated AG of 18, Cr 1.91, low bicarb 20, normal glucose 118. VBG pH 7.28. Elevated B-hydroxy 3.45     Patient is Full code and admitted under SOD B.    REVIEW OF SYSTEMS     Review of Systems   Constitutional:  Negative for chills, diaphoresis and fever.   HENT:  Negative for ear pain and sore throat.    Eyes:  Negative for pain and visual disturbance.   Respiratory:  Negative for cough, chest tightness and shortness of breath.    Cardiovascular:  Negative for chest pain and palpitations.   Gastrointestinal:  Positive for abdominal pain, diarrhea, nausea and vomiting.        Decreased PO intake   Genitourinary:  Negative for dysuria and hematuria.    Musculoskeletal:  Negative for arthralgias and back pain.   Skin:  Negative for color change and rash.   Neurological:  Negative for dizziness, seizures, syncope and light-headedness.   All other systems reviewed and are negative.    OBJECTIVE     Vitals:    24 1747 24 0008 24 0015 24 0046   BP: (!) 159/104 133/83 133/83 152/79   BP Location: Left arm  Left arm    Pulse: 93 102 94    Resp: 20  19    Temp: 97.6 °F (36.4 °C)   98.2 °F (36.8 °C)   TempSrc: Temporal      SpO2: 98%  99% 100%      Temperature:   Temp (24hrs), Av.8 °F (36.6 °C), Min:97.5 °F (36.4 °C), Max:98.2 °F (36.8 °C)    Temperature: 98.2 °F (36.8 °C)  Intake & Output:  I/O       None          Weights:        There is no height or weight on file to calculate BMI.  Weight (last 2 days)       None            Physical Exam  Constitutional:       Appearance: Normal appearance.   HENT:      Mouth/Throat:      Mouth: Mucous membranes are dry.   Eyes:      Extraocular Movements: Extraocular movements intact.      Conjunctiva/sclera: Conjunctivae normal.      Pupils: Pupils are equal, round, and reactive to light.   Cardiovascular:      Rate and Rhythm: Normal rate and regular rhythm.      Pulses: Normal pulses.      Heart sounds: Normal heart sounds.   Pulmonary:      Effort: Pulmonary effort is normal. No respiratory distress.      Breath sounds: Normal breath sounds. No wheezing or rales.   Abdominal:      General: Bowel sounds are normal. There is no distension.      Palpations: Abdomen is soft. There is no mass.      Tenderness: There is abdominal tenderness (epigastric). There is no guarding.   Musculoskeletal:         General: No swelling.      Right lower leg: No edema.      Left lower leg: No edema.   Skin:     General: Skin is warm and dry.      Capillary Refill: Capillary refill takes less than 2 seconds.      Coloration: Skin is not jaundiced.      Findings: No bruising or erythema.   Neurological:      Mental Status:  She is alert and oriented to person, place, and time. Mental status is at baseline.           PAST MEDICAL HISTORY     Past Medical History:   Diagnosis Date    ADD (attention deficit disorder)     Allergic rhinitis     ALS (amyotrophic lateral sclerosis) ([facility]) 11/22    Per neurological surgeon    Anemia 12/2021    Anxiety     Arthritis     Bipolar disorder ([facility])     Cervical disc disorder with radiculopathy of cervical region     Chronic depression     Chronic kidney disease     Stage 1    Chronic pain disorder     Cluster headache     Colitis     Last assessed - 11/25/14    Colon polyp     Concussion 2018    Condyloma acuminatum     Last assessed - 3/19/14    CTS (carpal tunnel syndrome) 2001    Depression     Diabetes mellitus ([facility])     Last assessed - 11/25/14    Diabetic nephropathy ([facility]) 2001    Diabetic neuropathy ([facility])     Diabetic retinopathy ([facility]) 2001    Difficulty walking 07/22    Fibromyalgia     Fibromyalgia, primary     GERD (gastroesophageal reflux disease)     Head injury 08/13/18    Fall    Headache(784.0) 1977    History of transfusion 12/2021    HTN (hypertension)     Last assessed - 11/25/14    Hyperlipidemia 07/22/2019    Hypertension     Intervertebral disc disorder with radiculopathy of lumbar region     Lupus ([facility]) 2003    Rhuematologist-Sheela Dasilva, [place]    Migraine     Miscarriage 1988    2001, 2004    Obesity 1980    Obesity, unspecified     Open wound of back 05/30/2023    Opioid abuse, in remission ([facility]) 01/2022    Only while in hospital and for about 10 days once home instead of one week.    Osteoporosis     Peripheral neuropathy     Postoperative wound infection 12/2021    Stop not healed    Preeclampsia     Last assessed - 11/25/14    Rheumatoid arthritis ([facility])     Wears dentures      PAST SURGICAL HISTORY     Past Surgical History:   Procedure Laterality Date    APPENDECTOMY      Last assessed - 11/25/14    BARIATRIC SURGERY  08/2016    CARPAL TUNNEL RELEASE      CATARACT  EXTRACTION      CERVICAL FUSION N/A 2019    Procedure: Anterior cervical discectomy w fusion C4-5, C5-6, C6-7; allograft and neuromonitoring;  Surgeon: Jose Gomez MD;  Location: [facility];  Service: Orthopedics     SECTION      Last assessed - 14    COLONOSCOPY      COLONOSCOPY      HEMORRHOID SURGERY      HERNIA REPAIR      Last assessed - 14    LUMBAR FUSION N/A 2021    Procedure: L1/2 laminectomy, discectomy with L1/2 MIS posterior fixation using neuromonitoring and neuronavigation;  Surgeon: Suhas Akbar MD;  Location: [facility];  Service: Neurosurgery    LUMBAR WOUND EXPLORATION Bilateral 2021    Procedure: Lumbar wound washout, debridement and intraoperative cultures;  Surgeon: Suhas Akbar MD;  Location: [facility];  Service: Neurosurgery    MAMMO (HISTORICAL)  2016    CA DEBRIDEMENT BONE 1ST 20 SQ CM/< N/A 2023    Procedure: SURGICAL PREPARATION OF BACK  WOUND AND LOCAL FLAP, PREVENA PLACEMENT;  Surgeon: Ady Rocha MD;  Location:  MAIN OR;  Service: Plastics    WOOD-EN-Y PROCEDURE      ULNAR TUNNEL RELEASE      VAC DRESSING APPLICATION Bilateral 2021    Procedure: APPLICATION VAC DRESSING;  Surgeon: Elbert Hawley MD;  Location: [facility];  Service: General    VAC DRESSING APPLICATION N/A 2021    Procedure: APPLICATION VAC DRESSING ABDOMEN/TRUNK;  Surgeon: Mani Ross DO;  Location: [facility];  Service: General    VENTRAL HERNIA REPAIR       SOCIAL & FAMILY HISTORY     Social History     Substance and Sexual Activity   Alcohol Use Not Currently       Social History     Substance and Sexual Activity   Drug Use Yes    Types: Marijuana    Comment: Medicinal     Social History     Tobacco Use   Smoking Status Never    Passive exposure: Never   Smokeless Tobacco Never   Tobacco Comments    Never used tabaco, don’t care to     Family History   Problem Relation Age of Onset    Cervical cancer Mother     Kidney disease Mother      Cancer Mother         Cervical    Diabetes Mother     Hypertension Mother             Neuropathy Mother             Suicidality Father     Depression Father     Mental illness Father         Most likely,, but doesat age20 without diagnosis    Anxiety disorder Father     Completed Suicide  Father         1978    Psychiatric Illness Father          age 21    Suicide Attempts Father         4    No Known Problems Sister     Mental illness Daughter         By-polar Disorder    Mental illness Daughter         Borderline Personality Disorder    Colon cancer Maternal Grandmother             Anemia Maternal Grandmother     No Known Problems Maternal Grandfather     Uterine cancer Paternal Grandmother     No Known Problems Paternal Grandfather     No Known Problems Maternal Aunt     Ovarian cancer Paternal Aunt     ADD / ADHD Cousin     ADD / ADHD Cousin     No Known Problems Family      LABORATORY DATA     Labs: I have personally reviewed pertinent reports.    Results from last 7 days   Lab Units 24   WBC Thousand/uL 6.91   HEMOGLOBIN g/dL 14.3   HEMATOCRIT % 43.6   PLATELETS Thousands/uL 204   SEGS PCT % 76*   MONO PCT % 5   EOS PCT % 0      Results from last 7 days   Lab Units 24  0021 24  2315 24   POTASSIUM mmol/L 3.5 3.3* 3.9   CHLORIDE mmol/L 104 105 105   CO2 mmol/L 18* 19* 20*   BUN mg/dL 25 24 27*   CREATININE mg/dL 1.92* 1.85* 1.91*   CALCIUM mg/dL 9.0 8.8 9.4   ALK PHOS U/L  --   --  68   ALT U/L  --   --  6*   AST U/L  --   --  10*     Results from last 7 days   Lab Units 24   MAGNESIUM mg/dL 1.8*     Results from last 7 days   Lab Units 24   PHOSPHORUS mg/dL 4.0          Results from last 7 days   Lab Units 24   LACTIC ACID mmol/L 1.0         Micro:  Lab Results   Component Value Date    BLOODCX No Growth After 5 Days. 2021    BLOODCX No Growth After 5 Days. 2021    BLOODCX No Growth After 5  Days. 12/09/2021    BLOODCX No Growth After 5 Days. 12/09/2021    URINECX <10,000 cfu/ml 09/19/2023    URINECX <10,000 cfu/ml 02/08/2023    URINECX 50,000-59,000 cfu/ml Lactobacillus species (A) 01/20/2021    WOUNDCULT No growth 05/30/2023    WOUNDCULT 2+ Growth of 12/07/2022     IMAGING & DIAGNOSTIC TESTS     Imaging: I have personally reviewed pertinent reports.    No results found.  EKG, Pathology, and Other Studies: I have personally reviewed pertinent reports.     ALLERGIES     Allergies   Allergen Reactions    Ace Inhibitors Cough    Pollen Extract Other (See Comments)     SNEEZING, COUGHING    Short Ragweed Pollen Ext Cough    Sodium Hyaluronate (Eron) Other (See Comments)     Edema at injection site  Edema at injection site    Levonorgestrel-Eth Estradiol [Levonorgestrel-Ethinyl Estrad] Other (See Comments)     burning    Latex Rash     MEDICATIONS PRIOR TO ARRIVAL     Prior to Admission medications    Medication Sig Start Date End Date Taking? Authorizing Provider   acetaZOLAMIDE (DIAMOX) 250 mg tablet TAKE 1 TABLET(250 MG) BY MOUTH TWICE DAILY 6/10/24  Yes Desiree Grigsby PA-C   atorvastatin (LIPITOR) 10 mg tablet Take 1 tablet (10 mg total) by mouth daily at bedtime 6/13/24  Yes Myla Norwood MD   calcitriol (ROCALTROL) 0.25 mcg capsule TAKE 1 CAPSULE BY MOUTH daily 5/8/24  Yes Myla Norwood MD   Cholecalciferol (Vitamin D3) 50 MCG (2000 UT) CAPS TAKE 1 CAPSULE(2,000 UNITS TOTAL) BY MOUTH DAILY 4/8/24  Yes Garret Palacios MD   clonazePAM (KlonoPIN) 0.5 mg tablet Take 1 tablet (0.5 mg total) by mouth 2 (two) times a day 5/20/24  Yes Myla Norwood MD   Continuous Blood Gluc Sensor (FreeStyle Filiberto 3 Sensor) [facility] Use 1 each every 14 (fourteen) days 4/27/24  Yes Myla Norwood MD   cycloSPORINE, PF, (Cequa) 0.09 % SOLN Apply 1 drop to eye 2 (two) times a day   Yes Historical Provider, MD   divalproex sodium (Depakote) 500 mg DR tablet Take 1 tablet (500 mg total) by mouth daily at bedtime 5/9/23  Yes eDsiree  EMILI Grigsby   DULoxetine (CYMBALTA) 60 mg delayed release capsule Take 1 capsule (60 mg total) by mouth daily 4/27/24  Yes Myla Norwood MD   ferrous sulfate 324 (65 Fe) mg Take 1 tablet (324 mg total) by mouth daily before breakfast 12/15/23  Yes Garret Palacios MD   insulin aspart (NovoLOG FlexPen) 100 UNIT/ML injection pen Inject 10 Units under the skin 3 (three) times a day with meals 8/21/23  Yes Myla Norwood MD   losartan-hydrochlorothiazide (HYZAAR) 100-12.5 MG per tablet TAKE ONE TABLET BY MOUTH ONE TIME DAILY 6/14/24  Yes Myla Norwood MD   methocarbamol (Robaxin-750) 750 mg tablet Take 1 tablet (750 mg total) by mouth every 6 (six) hours as needed for muscle spasms (back pain) 11/30/23  Yes Suhas Akbar MD   metoprolol tartrate (LOPRESSOR) 100 mg tablet Take 1 tablet (100 mg total) by mouth every 12 (twelve) hours 6/13/24  Yes Myla Norwood MD   ondansetron (ZOFRAN) 4 mg tablet Take 1 tablet (4 mg total) by mouth every 8 (eight) hours as needed for nausea or vomiting 7/26/24  Yes Gutierrez Horowitz MD   pregabalin (LYRICA) 100 mg capsule Take 1 capsule (100 mg total) by mouth 3 (three) times a day 5/20/24  Yes Myla Norwood MD   rimegepant sulfate (Nurtec) 75 mg TBDP Place one tab (75mg total) under the tongue as needed for migraine. 6/13/24  Yes Desiree Grigsby PA-C   sodium bicarbonate 650 mg tablet Take 1 tablet (650 mg total) by mouth 3 (three) times a day  Patient taking differently: Take 650 mg by mouth 2 (two) times a day 4/1/24  Yes Garret Palacios MD   tiZANidine (ZANAFLEX) 4 mg tablet Take 1 tablet (4 mg total) by mouth 3 (three) times a day 12/7/23  Yes Myla Norwood MD   verapamil (CALAN-SR) 120 mg CR tablet Take 1 tablet (120 mg total) by mouth daily at bedtime 4/12/23  Yes MD Cee Mallory 225 MG/1.5ML auto-injector INJECT 4.5 ML(675 MG TOTAL) UNDER THE SKIN EVERY 3 MONTHS 9/11/23   Desiree Grigsby PA-C   BD Pen Needle Glory U/F 32G X 4 MM MISC Inject under the skin 2 (two) times a day Use as  directed 3/20/24   Myla Norwood MD   benzonatate (TESSALON) 200 MG capsule Take 1 capsule (200 mg total) by mouth 3 (three) times a day as needed for cough  Patient not taking: Reported on 3/28/2024 12/15/23   Myla Norwood MD   Blood Glucose Monitoring Suppl (ONE TOUCH ULTRA 2) w/Device KIT Use 1 each 2 (two) times a day Use as instructed 5/4/22   Myla Norwood MD   cetirizine (ZyrTEC) oral solution Take 5 mL (5 mg total) by mouth daily  Patient not taking: Reported on 3/28/2024 4/12/23   Myla Norwood MD   ciclopirox (PENLAC) 8 % solution Apply topically daily at bedtime  Patient not taking: Reported on 4/25/2024 4/12/23   Myla Norwood MD   Cyanocobalamin (Vitamin B-12) 1000 MCG SUBL Place 1 tablet (1,000 mcg total) under the tongue in the morning  Patient not taking: Reported on 7/26/2024 4/25/24   Myla Norwood MD   cyanocobalamin 1,000 mcg/mL Inject 1 mL (1,000 mcg total) into a muscle once a week 7/1/24   Myla Norwood MD   cyproheptadine (PERIACTIN) 4 mg tablet Take 1 tablet (4 mg total) by mouth daily at bedtime  Patient not taking: Reported on 11/3/2023 4/12/23   Myla Norwood MD   diphenhydrAMINE (BENADRYL) 25 mg tablet Take 1 tablet (25 mg total) by mouth every 6 (six) hours as needed for itching  Patient not taking: Reported on 11/3/2023 11/29/21   Jyoti Christian PA-C   docusate sodium (COLACE) 100 mg capsule TAKE 1 CAPSULE BY MOUTH TWICE A DAY  Patient not taking: Reported on 7/26/2024 2/21/22   Myla Norwood MD   Insulin Glargine Solostar (Lantus SoloStar) 100 UNIT/ML SOPN Inject 0.4 mL (40 Units total) under the skin daily at bedtime 5/20/24   Myla Norwood MD   Lancets MISC Use 1 each 2 (two) times a day Use as instructed    Historical Provider, MD   levonorgestrel (MIRENA) 20 MCG/24HR IUD Mirena 20 mcg/24 hr (5 years) intrauterine device   Vaginally for 5 years.    Historical Provider, MD   lisdexamfetamine (VYVANSE) 30 MG capsule Take 1 capsule (30 mg total) by mouth every morning Max Daily  "Amount: 30 mg 5/20/24   Myla Norwood MD   montelukast (SINGULAIR) 10 mg tablet TAKE ONE TABLET BY MOUTH NIGHTLY  Patient not taking: Reported on 7/26/2024 7/21/24   Myla Norwood MD   Multiple Vitamins-Minerals (multivitamin with minerals) tablet Take 1 tablet by mouth daily  Patient not taking: Reported on 7/26/2024    Historical Provider, MD   NON FORMULARY Botox injections for HA every 3months (LD 3/20/23)    Historical Provider, MD   Ophthalmic Irrigation Solution (OCUSOFT EYE WASH OP) Apply 1 Application to eye daily at bedtime    Historical Provider, MD   Polyethyl Glycol-Propyl Glycol (Systane) 0.4-0.3 % GEL Apply 1 drop to eye 2 (two) times a day Using Ivizia as an alternative    Historical Provider, MD   polyethylene glycol (MIRALAX) 17 g packet Take 17 g by mouth daily 11/30/21   Jyoti Christian PA-C   Syringe/Needle, Disp, (SYRINGE 3CC/[id number]/2\") 25G X 1-1/2\" 3 ML MISC Use once a week 7/1/24   Myla Norwood MD   tirzepatide (Mounjaro) 15 MG/0.5ML Inject 0.5 mL (15 mg total) under the skin every 7 days 6/5/24   Myla Norwood MD   ondansetron (ZOFRAN) 4 mg tablet Take 1 tablet (4 mg total) by mouth every 8 (eight) hours as needed for nausea or vomiting 12/7/23 7/26/24  Myla Norwood MD     MEDICATIONS ADMINISTERED IN LAST 24 HOURS     Medication Administration - last 24 hours from 07/26/2024 0157 to 07/27/2024 0157         Date/Time Order Dose Route Action Action by     07/26/2024 2227 EDT lactated ringers bolus 1,000 mL 0 mL Intravenous Stopped Marina Khanna RN     07/26/2024 2029 EDT lactated ringers bolus 1,000 mL 1,000 mL Intravenous New Bag Marina Khanna RN     07/26/2024 2029 EDT ondansetron (ZOFRAN) injection 4 mg 4 mg Intravenous Given Marina Khanna RN     07/27/2024 0008 EDT atorvastatin (LIPITOR) tablet 10 mg 10 mg Oral Given Adrianna Lau RN     07/27/2024 0012 EDT divalproex sodium (DEPAKOTE) DR tablet 500 mg 500 mg Oral Given Adrianna Lau RN     07/27/2024 0133 EDT methocarbamol " (ROBAXIN) tablet 750 mg 750 mg Oral Given Asuncion Layton     07/27/2024 0008 EDT metoprolol tartrate (LOPRESSOR) tablet 100 mg 100 mg Oral Given Adrianna Lau RN     07/27/2024 0009 EDT pregabalin (LYRICA) capsule 100 mg 100 mg Oral Given Adrianna Lau RN     07/27/2024 0012 EDT tiZANidine (ZANAFLEX) tablet 4 mg 4 mg Oral Given Adrianna Lau RN     07/27/2024 0012 EDT verapamil (CALAN-SR) CR tablet 120 mg 120 mg Oral Given Adrianna Lau RN     07/27/2024 0150 EDT dextrose 5 % and sodium chloride 0.45 % infusion 250 mL/hr Intravenous New Bag Asuncion Layton     07/27/2024 0021 EDT magnesium sulfate 2 g/50 mL IVPB (premix) 2 g 2 g Intravenous New Bag Adrianna Lau RN     07/27/2024 0008 EDT potassium chloride (Klor-Con M20) CR tablet 80 mEq 80 mEq Oral Given Adrianna Lau RN     07/27/2024 0015 EDT insulin regular (HumuLIN R,NovoLIN R) 1 Units/mL in sodium chloride 0.9 % 100 mL infusion 0 Units/hr Intravenous Hold Olive Joseph     07/27/2024 0122 EDT potassium chloride 20 mEq IVPB (premix) 20 mEq Intravenous New Bag Asuncion Layton     07/27/2024 0130 EDT heparin (porcine) subcutaneous injection 5,000 Units 5,000 Units Subcutaneous Given Asuncion Layton          CURRENT MEDICATIONS     Current Facility-Administered Medications   Medication Dose Route Frequency Provider Last Rate    atorvastatin  10 mg Oral HS Ced Morales DO      calcitriol  0.25 mcg Oral Daily Ced Morales DO      Cholecalciferol  2,000 Units Oral Daily Ced Morales DO      clonazePAM  0.5 mg Oral BID Ced Morales DO      [START ON 7/28/2024] cyanocobalamin  1,000 mcg Intramuscular Weekly Ced Morales DO      cycloSPORINE (PF)  1 drop Ophthalmic BID Ced Morales DO      dextrose 5 % and sodium chloride 0.45 %  250 mL/hr Intravenous Continuous Ced Morales  mL/hr (07/27/24 0150)    divalproex sodium  500 mg Oral HS Ced Morales DO      DULoxetine  60 mg Oral Daily Ced Morales,       ferrous sulfate  325 mg Oral  "Daily With Breakfast Ced Morales, DO      heparin (porcine)  5,000 Units Subcutaneous Q8H [facility] John Paul Islas, DO      hydroCHLOROthiazide  12.5 mg Oral Daily John Paul Islas, DO      insulin regular (HumuLIN R,NovoLIN R) 1 Units/mL in sodium chloride 0.9 % 100 mL infusion  0.1-30 Units/hr Intravenous Continuous Ang Phani Islas, DO Stopped (07/27/24 0015)    losartan  100 mg Oral Daily John Paul Islas, DO      magnesium sulfate  2 g Intravenous Once John Paul [place], DO 2 g (07/27/24 0021)    methocarbamol  750 mg Oral Q6H PRN [facility] Darren Abarca, DO      metoprolol tartrate  100 mg Oral Q12H DIANELYS Ced Darren Abarca, DO      ondansetron  4 mg Oral Q8H PRN [facility] Tevin, DO      potassium chloride  20 mEq Intravenous Once Ced Darren Morales, DO 20 mEq (07/27/24 0122)    pregabalin  100 mg Oral TID [facility] Darren Abarca, DO      sodium bicarbonate  650 mg Oral BID [facility] Tevin, DO      tiZANidine  4 mg Oral TID [facility] Tevin, DO      verapamil  120 mg Oral HS [facility] Tevin, DO       dextrose 5 % and sodium chloride 0.45 %, 250 mL/hr, Last Rate: 250 mL/hr (07/27/24 0150)  insulin regular (HumuLIN R,NovoLIN R) 1 Units/mL in sodium chloride 0.9 % 100 mL infusion, 0.1-30 Units/hr, Last Rate: Stopped (07/27/24 0015)      methocarbamol, 750 mg, Q6H PRN  ondansetron, 4 mg, Q8H PRN        Admission Time  I spent 45 minutes admitting the patient.  This involved direct patient contact where I performed a full history and physical, reviewing previous records, and reviewing laboratory and other diagnostic studies.    Portions of the record may have been created with voice recognition software.  Occasional wrong word or \"sound a like\" substitutions may have occurred due to the inherent limitations of voice recognition software.  Read the chart carefully and recognize, using context, where substitutions have occurred.    ==  Ced Morales, DO  [facility]  Internal Medicine Residency PGY-I  "

## 2024-07-27 NOTE — ASSESSMENT & PLAN NOTE
CKD stage IIIb with vitamin D deficiency.  She takes vitamin D3 2000 units at home.    Plan:  -Continue vitamin D3

## 2024-07-27 NOTE — ASSESSMENT & PLAN NOTE
Lab Results   Component Value Date    EGFR 48 07/29/2024    EGFR 47 07/28/2024    EGFR 41 07/28/2024    CREATININE 1.29 07/29/2024    CREATININE 1.32 (H) 07/28/2024    CREATININE 1.47 (H) 07/28/2024   recent baseline creatinine around 1.6-1.9  Cr on admission 1.85; GFR 31    Plan  - D/c IVF, insulin gtt  - Creatinine improving  - Continue home med calcitriol 0.25 mcg daily  - Monitor electrolytes and renal function daily  - Avoid NSAID and nephrotoxic agents

## 2024-07-27 NOTE — PROGRESS NOTES
[facility]  Progress Note: Critical Care  Name: Kinza Meza 48 y.o. female I MRN: [id number]  Unit/Bed#: MICU 08 I Date of Admission: 7/26/2024   Date of Service: 7/27/2024 I Hospital Day: 1    Assessment & Plan   Depression  ADHD  Migraines  Toxic metabolic encephalopathy  Hypertension  Hypertension  Anion gap metabolic acidosis with concomitant metabolic alkalosis with respiratory acidosis  JOVANA  T2DM  Chronic Back pain    Neuro:   Diagnosis: Depression/ADHD  Diagnosis: Migraines  Diagnosis: Toxic metabolic encephalopathy vs delirium vs less likely seizures  PDMP reviewed and patient on Klonopin 0.5 mg twice daily  Plan:   Continue home Klonopin at half the dose 0.25 mg twice daily  Hold home Vyvanse  Continue Cymbalta  Continue depakote  Hold acetazolamide  CAM-ICU  Delirium precautions    CV:   Diagnosis: HTN  Diagnosis: Hypotension  Plan:   Hold home antihypertensives  Wean levophed to MAP>65    Pulm:  Diagnosis: AGMA Metabolic alkalosis with respiratory acidosis  Respiratory acidosis likely secondary to respiratory depression from Klonopin  Plan:  Continue Bipap to remove pCO2    GI:   Diagnosis: Nausea  Has been having abdominal cramping, nausea, vomiting and 4-6 loose bowel movements since Tuesday. Not complaining of nausea/vomiting at this time with abdominal tenderness   Plan:   Keep NPO  DKA protocol    :   Diagnosis: Anion gap metabolic acidosis with concomitant metabolic alkalosis with respiratory acidosis  Diagnosis: CKD with secondary hyperparathyroidism  AGMA with hypercholremic metabolic acidosis.   Plan:   DKA protocol  Continue home calcitriol  Bipap as above    F/E/N:   F: Isolyte 500 cc/hr for 4 hours followed by isolyte 250 cc/hr for 8 hours  E: replete per DKA protocol  N: NPO    Heme/Onc:   Diagnosis: JOVANA  Plan:   Resume Iron supplementation when appropriate    Endo:   Diagnosis: Type 2 diabetes in DKA  A1c on admission 5.1 with recent A1c of 6.2.   On Lantus 40 units nightly with lispro 10 units 3 times daily with meals as well as mounjaro.  Has not been taking basal bolus insulin since Tuesday due to poor p.o. intake  Plan:   DKA protocol    ID:   No active issues    MSK/Skin:   Diagnosis: Back pain  Plan:   Hold home muscle relaxers  Continue home Lyrica  PT/OT when appropriate    Disposition: Critical care    ICU Core Measures     A: Assess, Prevent, and Manage Pain Has pain been assessed? Yes  Need for changes to pain regimen? No   B: Both SAT/SAT  N/A   C: Choice of Sedation RASS Goal: 0 Alert and Calm  Need for changes to sedation or analgesia regimen? No   D: Delirium CAM-ICU: Negative   E: Early Mobility  Plan for early mobility? Yes   F: Family Engagement Plan for family engagement today? Yes         Prophylaxis:  VTE VTE covered by:  heparin (porcine), Subcutaneous, 5,000 Units at 07/27/24 0130       Stress Ulcer  not ordered        Significant 24hr Events     24hr events: Rapid response called for hypotension as well as change in mental status this morning.  Was bolused with fluids as well as started on low-dose levo.  Admitted overnight.  Patient found to be DKA     Subjective 48-year-old female with past medical history of type 2 diabetes on insulin, hypertension, CKD 3, ADHD, depression, migraine, depression, JOVANA.  She reports for the past 2 weeks having nausea, vomiting, dry heaving for the past 2 weeks with poor p.o. intake.  She reports she has not been taking any basal/bolus insulin since Tuesday due to poor p.o. intake.  She went to her PCP on Friday for the symptoms which her PCP was concerned that she may be in DKA and sent to the emergency room. Patient initially presented in the ED Afebrile, HR 93, /104 O2 98% on RA. Elevated AG of 18, Cr 1.91, low bicarb 20, normal glucose 118. VBG pH 7.28. Elevated B-hydroxy 3.45.  Rapid response was called for hypotension and AMS. Hypotension improved with fluid bolus and needed low dose  levo.    Review of Systems: See HPI for Review of Systems     Objective                            Vitals I/O      Most Recent Min/Max in 24hrs   Temp (!) 97.4 °F (36.3 °C) Temp  Min: 97.4 °F (36.3 °C)  Max: 98.3 °F (36.8 °C)   Pulse 59 Pulse  Min: 52  Max: 102   Resp 20 Resp  Min: 19  Max: 20   /91 BP  Min: 57/30  Max: 167/91   O2 Sat 100 % SpO2  Min: 96 %  Max: 100 %      Intake/Output Summary (Last 24 hours) at 7/27/2024 1228  Last data filed at 7/27/2024 1157  Gross per 24 hour   Intake 3004.17 ml   Output 200 ml   Net 2804.17 ml       Diet NPO; Sips with meds    Invasive Monitoring           Physical Exam   Physical Exam  Eyes:      Conjunctiva/sclera: Conjunctivae normal.   Skin:     General: Skin is warm and dry.   HENT:      Head: Normocephalic and atraumatic.      Mouth/Throat:      Mouth: Mucous membranes are dry.   Cardiovascular:      Rate and Rhythm: Normal rate and regular rhythm.   Musculoskeletal:      Right lower leg: No edema.      Left lower leg: No edema.   Abdominal: General: There is no distension.      Palpations: Abdomen is soft.      Tenderness: There is no abdominal tenderness.   Constitutional:       General: She is not in acute distress.     Appearance: She is not ill-appearing.   Pulmonary:      Effort: Pulmonary effort is normal.      Breath sounds: No wheezing or rhonchi.      Comments: BIPAP   Neurological:      General: No focal deficit present.      Mental Status: She is alert and oriented to person, place and time. She is somnolent.            Diagnostic Studies      EKG:   Imaging:  I have personally reviewed pertinent reports.       Medications:  Scheduled PRN   atorvastatin, 10 mg, HS  calcitriol, 0.25 mcg, Daily  Cholecalciferol, 2,000 Units, Daily  clonazePAM, 0.25 mg, BID  [START ON 7/28/2024] cyanocobalamin, 1,000 mcg, Weekly  cycloSPORINE (PF), 1 drop, BID  divalproex sodium, 500 mg, HS  DULoxetine, 60 mg, Daily  ferrous sulfate, 325 mg, Daily With Breakfast  heparin  (porcine), 5,000 Units, Q8H DIANELYS  metoprolol tartrate, 100 mg, Q12H DIANELYS  naloxone, ,   naloxone, ,   potassium chloride, 20 mEq, Q2H  potassium phosphate, 30 mmol, Once  pregabalin, 100 mg, TID  sodium bicarbonate, 650 mg, BID  tiZANidine, 4 mg, TID  verapamil, 120 mg, HS      acetaminophen, 650 mg, Q6H PRN  methocarbamol, 750 mg, Q6H PRN  naloxone, ,   naloxone, ,   ondansetron, 4 mg, Q8H PRN       Continuous    dextrose 5 % and sodium chloride 0.9 %, 250 mL/hr, Last Rate: Stopped (07/27/24 0920)  insulin regular (HumuLIN R,NovoLIN R) 1 Units/mL in sodium chloride 0.9 % 100 mL infusion, 0.1-30 Units/hr  multi-electrolyte, 500 mL/hr, Last Rate: 500 mL/hr (07/27/24 1158)   Followed by  multi-electrolyte, 250 mL/hr  norepinephrine, 1-30 mcg/min         Labs:    CBC    Recent Labs     07/27/24  0617 07/27/24  1059 07/27/24  1104 07/27/24  1148 07/27/24  1154   WBC 4.00*  --  5.50  --   --    HGB 10.4*   < > 10.0*  --  11.2*   HCT 32.0*   < > 31.8* <15* 33*     --  160  --   --     < > = values in this interval not displayed.     BMP    Recent Labs     07/27/24  0828 07/27/24  1059 07/27/24  1104 07/27/24  1154   SODIUM 141  --  141  --    K 3.7  --  4.3  --    *  --  115*  --    CO2 20*   < > 18* 22   AGAP 7  --  8  --    BUN 21  --  20  --    CREATININE 1.88*  --  1.86*  --    CALCIUM 8.0*  --  7.4*  --     < > = values in this interval not displayed.       Coags    No recent results     Additional Electrolytes  Recent Labs     07/27/24  0828 07/27/24  1059 07/27/24  1104 07/27/24  1148 07/27/24  1154   MG 2.6  --  2.5  --   --    PHOS 1.7*  --  1.6*  --   --    CAIONIZED  --    < >  --  0.56* 1.26    < > = values in this interval not displayed.          Blood Gas    No recent results  Recent Labs     07/27/24  0642   PHVEN 7.260*   [id number] 45.4   PO2VEN 26.0*   NWP0EDN 19.9*   BEVEN -6.9   [id number] 46.9*    LFTs  Recent Labs     07/26/24 2001   ALT 6*   AST 10*   ALKPHOS 68   ALB 4.4   TBILI 0.82        Infectious  No recent results  Glucose  Recent Labs     07/27/24  0208 07/27/24  0432 07/27/24  0828 07/27/24  1104   GLUC 138 202* 125 145*               Radha Corey, DO

## 2024-07-27 NOTE — ASSESSMENT & PLAN NOTE
Home meds include HCTZ 12.5 mg, Cozaar 100 mg daily, Lopressor 100 mg twice daily, and verapamil 120 mg daily at bedtime.  Blood pressure 159/104 upon arrival to the ED. denies headache, dizziness, shortness of breath, chest pain or chest discomfort, and heart palpitations.    Patient became hypotensive, rapid was called and now in critical care    Plan:  -Holding home Cozaar, HCTZ, and verapamil due to hypotension  -Will restart Lopressor at 100 mg Q12H  -Continue to monitor blood pressure    Discharge instructions:  - Will restart all antihypertensive medications  - Counseled patient to monitor blood pressure at home.

## 2024-07-27 NOTE — ASSESSMENT & PLAN NOTE
Lab Results   Component Value Date    HGBA1C 5.1 07/27/2024       Recent Labs     07/29/24  0418 07/29/24  0609 07/29/24  0751 07/29/24  1035   POCGLU 82 93 85 93       Blood Sugar Average: Last 72 hrs:  (P) [id number]    Insulin-dependent diabetes.  She takes 40 units of Lantus and 5 units of mealtime insulin at home.   Upon admission: elevated AG of 18, normal potassium 3.9 low bicarb 20, normal glucose 118, and elevated B-hydroxy 3.45; VBG pH 7.28. Euglycemic metabolic acidosis most likely due to DKA 2/2 stopping basal insulin on Tues and mealtime insulin 2 weeks ago.Gap closed, most recent AG 6.    Plan:  - Advanced diet to carb-controlled 2 from clear liquids, tolerating well   - D/c insulin gtt and dextrose 7/29 AM  - Will transition to 12 units Lantus  - Postprandial glucose checks, adjust insulin regimen accordingly   - Continue to monitor K, MG, and Phos; replete as needed  - Rapid response on 7/27 due to hypotension and hypoglycemia, escalated to critical care. Patient now stable for transfer to Med Surg.  - Will order a urinalysis for potential UTI    Discharge Instructions:  - Lantus 15 units and lispro PRN  - Continue CGM monitoring  - F/u with PCP in 1 week

## 2024-07-27 NOTE — RAPID RESPONSE
Rapid Response Note  Kinza Meza 48 y.o. female MRN: [id number]  Unit/Bed#: MICU 08 Encounter: [id number]    Rapid Response Notification(s):   Response called date/time:  7/27/2024 10:48 AM  Response team arrival date/time:  7/27/2024 10:50 AM  Response end date/time:  7/27/2024 11:19 AM  Level of care:  Stepdown 2  Primary reason for rapid response call:  Acute change in BP and acute change in neuro status    Rapid Response Intervention(s):   Airway:  None  Breathing:  None  Circulation:  Electrocardiogram and other (comment)  Fluids administered:  Normal saline  Medications administered:  Naloxone  Comments:  Given bolus fluids and Levophed at 10       Assessment:     Likely secondary to DKA hypovolemia/hypoglycemia  Less likely sepsis, no recent echo to assess heart function, Hgb stable,     Plan:   Upgrade to ICU level of care  Continue fluids and pressors as needed to maintain pressures  Basic blood work and ABG  Called  and updated  Electrolyte repletion       Rapid Response Outcome:   Transfer:  Transfer to ICU  Primary service notified of transfer: Yes    Code Status: Level 1 (Full Code)      Family notified: Yes, Name of Family member contacted Phani spouse          Background/Situation:   Kinza Meza is a 48 y.o. female with past medical history of CKD3b, ADHD, chronic migraine, hypertension, insulin-dependent diabetes who presented with nausea and abdominal pain and not feeling well.  Was in euglycemic DKA with pH 7.2, elevated beta hydroxybutyrate and bicarb 20 and anion gap of 20.   Patient was transitioned from insulin drip to sliding scale and given D5 dextrose fluids after anion gap closed x 2.  It was then this morning that patient became unresponsive and rapid was called.     Rapid response was called due to acute blood pressure dropped to 57/30, and glucose was 64, patient was very sleepy and unarousable, said she wanted to sleep.  Noted to be bradycardic to 52 bpm however AM  metoprolol was not given.      Review of Systems   Unable to perform ROS: Mental status change       Objective:   Vitals:    07/27/24 1054 07/27/24 1059 07/27/24 1100 07/27/24 1107   BP:   126/78 167/91   BP Location:       Pulse: (!) 52  (!) 54 59   Resp:       Temp:  (!) 97.4 °F (36.3 °C)     TempSrc:       SpO2: 97%  100% 100%   Weight:       Height:         Physical Exam  Vitals reviewed.   Constitutional:       Appearance: She is obese.   Cardiovascular:      Rate and Rhythm: Regular rhythm. Bradycardia present.   Pulmonary:      Effort: Pulmonary effort is normal.      Breath sounds: Normal breath sounds.   Abdominal:      Palpations: Abdomen is soft.      Tenderness: There is no abdominal tenderness.   Skin:     General: Skin is warm.   Neurological:      Comments: Drowsy lethargic         Oj Redd MD  Internal Medicine Residency, PGY-3  [facility]

## 2024-07-27 NOTE — PROGRESS NOTES
INTERNAL MEDICINE RESIDENCY PROGRESS NOTE     Name: Kinza Meza   Age & Sex: 48 y.o. female   MRN: [id number]  Unit/Bed#: MICU 08   Encounter: [id number]  Team: SOD Team B     PATIENT INFORMATION     Name: Kinza Meza   Age & Sex: 48 y.o. female   MRN: [id number]  Hospital Stay Days: 1    ASSESSMENT/PLAN     Principal Problem:    DKA (diabetic ketoacidosis) ([facility])  Active Problems:    ADHD    Stage 3b chronic kidney disease (HCC)    Vitamin D deficiency    Chronic migraine w/o aura w/o status migrainosus, not intractable    HTN (hypertension)    Depression      Depression  Assessment & Plan  History of depression anxiety.  Take Cymbalta 60 mg daily and Klonopin 0.5 mg twice daily    Plan:  -Continue Cymbalta and Klonopin    HTN (hypertension)  Assessment & Plan  Home meds include HCTZ 12.5 mg, Cozaar 100 mg daily, Lopressor 100 mg twice daily, and verapamil 120 mg daily at bedtime.  Blood pressure 159/104 upon arrival to the ED. denies headache, dizziness, shortness of breath, chest pain or chest discomfort, and heart palpitations.    Patient became hypotensive, rapid was called and now in critical care    Plan:  -Continue Cozaar, HCTZ, Lopressor, and verapamil  -Continue to monitor blood pressure    Chronic migraine w/o aura w/o status migrainosus, not intractable  Assessment & Plan  Chronic history of migraine.  She takes Depakote and acetazolamide at home.     Plan:  - Continue Depakote  - Hold acetazolamide due to concern for metabolic acidosis    Vitamin D deficiency  Assessment & Plan  CKD stage IIIb with vitamin D deficiency.  She takes vitamin D3 2000 units at home.    Plan:  -Continue vitamin D3     Stage 3b chronic kidney disease (HCC)  Assessment & Plan  Lab Results   Component Value Date    EGFR 30 07/27/2024    EGFR 29 07/27/2024    EGFR 27 07/27/2024    CREATININE 1.88 (H) 07/27/2024    CREATININE 1.96 (H) 07/27/2024    CREATININE 2.06 (H) 07/27/2024   recent baseline creatinine around  1.6-1.9  Cr on admission 1.85; GFR 31    Plan  - Continue IVF for hydration  - Continue sodium bicarb 650 twice daily home med  - Continue home med calcitriol 0.25 mcg daily  - Monitor electrolytes and renal function daily  - Avoid NSAID and nephrotoxic agents    ADHD  Assessment & Plan  Patient has a history of ADHD, and she takes Vyvanse at home.  He has stopped taking Vyvanse since Tuesday because low p.o. intake.    Plan:  - Hold Vyvanse for now    * DKA (diabetic ketoacidosis) ([facility])  Assessment & Plan  Lab Results   Component Value Date    HGBA1C 5.1 07/27/2024       Recent Labs     07/27/24  0757 07/27/24  1049 07/27/24  1057 07/27/24  1128   POCGLU 140 64* 163* 103       Blood Sugar Average: Last 72 hrs:  (P) [id number]    Insulin-dependent diabetes.  She takes 26 units of Lantus and 2-4 units of mealtime insulin at home.   Upon admission: elevated AG of 18, normal potassium 3.9 low bicarb 20, normal glucose 118, and elevated B-hydroxy 3.45; VBG pH 7.28. Euglycemic metabolic acidosis most likely due to DKA 2/2 stopping basal insulin on Tues and mealtime insulin 2 weeks ago. Patient has been having epigastric abdominal pain with N/V/D since Tuesday; but also consider the role of Diamox which she takes for migraine, her last dose was Friday.      Gap closed x2     Plan:  - Currently NPO  - D5 1/2 NS, plan was to transition to Isolyte with diabetic diet and sliding scale  - Insulin gtt  - DKA protocol  - Continue to monitor K, MG, and Phos; replete as needed  -Rapid response on 7/27 due to hypotension and hypoglycemia, escalated to critical care        Disposition: Upgrade to Critical Care     SUBJECTIVE     Patient seen and examined. No acute events overnight.  Patient states she was nauseous but hungry and wanted to eat some food this morning.  Patient this morning was alert and oriented fully, she denies chest pain or shortness of breath, fever or chills.  IV access was established by  nursing.    OBJECTIVE     Vitals:    24 1054 24 1059 24 1100 24 1107   BP:   126/78 167/91   BP Location:       Pulse: (!) 52  (!) 54 59   Resp:       Temp:  (!) 97.4 °F (36.3 °C)     TempSrc:       SpO2: 97%  100% 100%   Weight:       Height:          Temperature:   Temp (24hrs), Av.8 °F (36.6 °C), Min:97.4 °F (36.3 °C), Max:98.3 °F (36.8 °C)    Temperature: (!) 97.4 °F (36.3 °C)  Intake & Output:  I/O          07 07 07 07 07 0700    I.V. (mL/kg)  1000 712.5 (9.4)    IV Piggyback  1000 50    Total Intake(mL/kg)  2000 762.5 (10.1)    Urine (mL/kg/hr)  200     Total Output  200     Net  +1800 +762.5                 Weights:   IBW (Ideal Body Weight): 47.8 kg    Body mass index is 31.55 kg/m².  Weight (last 2 days)       Date/Time Weight    24 1019 75.8 (167)          Physical Exam  Vitals reviewed.   Constitutional:       General: She is not in acute distress.     Appearance: She is obese.   HENT:      Head: Normocephalic and atraumatic.   Eyes:      Conjunctiva/sclera: Conjunctivae normal.   Cardiovascular:      Rate and Rhythm: Normal rate and regular rhythm.      Pulses: Normal pulses.      Heart sounds: Normal heart sounds.   Pulmonary:      Effort: Pulmonary effort is normal.      Breath sounds: Normal breath sounds.   Abdominal:      Palpations: Abdomen is soft.      Tenderness: There is no abdominal tenderness.   Musculoskeletal:         General: Normal range of motion.   Neurological:      Mental Status: She is alert and oriented to person, place, and time.       LABORATORY DATA     Labs: I have personally reviewed pertinent reports.  Results from last 7 days   Lab Units 24  1104 24  1059 24  0617 24   WBC Thousand/uL 5.50  --  4.00* 6.91   HEMOGLOBIN g/dL 10.0*  --  10.4* 14.3   I STAT HEMOGLOBIN g/dl  --  7.8*  --   --    HEMATOCRIT % 31.8*  --  32.0* 43.6   HEMATOCRIT, ISTAT %  --  23*  --   --     PLATELETS Thousands/uL 160  --  162 204   SEGS PCT %  --   --   --  76*   MONO PCT %  --   --   --  5   EOS PCT %  --   --   --  0      Results from last 7 days   Lab Units 07/27/24  1104 07/27/24  1059 07/27/24  0828 07/27/24  0432 07/26/24  2315 07/26/24 2001   POTASSIUM mmol/L 4.3  --  3.7 3.7   < > 3.9   CHLORIDE mmol/L 115*  --  114* 109*   < > 105   CO2 mmol/L 18*  --  20* 21   < > 20*   CO2, I-STAT mmol/L  --  17*  --   --   --   --    BUN mg/dL 20  --  21 23   < > 27*   CREATININE mg/dL 1.86*  --  1.88* 1.96*   < > 1.91*   CALCIUM mg/dL 7.4*  --  8.0* 7.7*   < > 9.4   ALK PHOS U/L  --   --   --   --   --  68   ALT U/L  --   --   --   --   --  6*   AST U/L  --   --   --   --   --  10*   GLUCOSE, ISTAT mg/dl  --  271*  --   --   --   --     < > = values in this interval not displayed.     Results from last 7 days   Lab Units 07/27/24  1104 07/27/24  0828 07/27/24  0021   MAGNESIUM mg/dL 2.5 2.6 1.7*     Results from last 7 days   Lab Units 07/27/24  1104 07/27/24  0828 07/26/24 2001   PHOSPHORUS mg/dL 1.6* 1.7* 4.0          Results from last 7 days   Lab Units 07/27/24  1104   LACTIC ACID mmol/L 2.4*           IMAGING & DIAGNOSTIC TESTING     Radiology Results: I have personally reviewed pertinent reports.  No results found.  Other Diagnostic Testing: I have personally reviewed pertinent reports.    ACTIVE MEDICATIONS     Current Facility-Administered Medications   Medication Dose Route Frequency    [Transfer Hold] acetaminophen (TYLENOL) tablet 650 mg  650 mg Oral Q6H PRN    atorvastatin (LIPITOR) tablet 10 mg  10 mg Oral HS    calcitriol (ROCALTROL) capsule 0.25 mcg  0.25 mcg Oral Daily    Cholecalciferol (VITAMIN D3) tablet 2,000 Units  2,000 Units Oral Daily    clonazePAM (KlonoPIN) tablet 0.5 mg  0.5 mg Oral BID    [START ON 7/28/2024] cyanocobalamin injection 1,000 mcg  1,000 mcg Intramuscular Weekly    cycloSPORINE (PF) 0.09 % SOLN 1 drop  1 drop Ophthalmic BID    dextrose 5 % and sodium chloride  "0.9 % infusion  250 mL/hr Intravenous Continuous    dextrose 50 % IV solution **ADS Override Pull**        divalproex sodium (DEPAKOTE) DR tablet 500 mg  500 mg Oral HS    DULoxetine (CYMBALTA) delayed release capsule 60 mg  60 mg Oral Daily    ferrous sulfate tablet 325 mg  325 mg Oral Daily With Breakfast    [Transfer Hold] heparin (porcine) subcutaneous injection 5,000 Units  5,000 Units Subcutaneous Q8H DIANELYS    [Transfer Hold] hydroCHLOROthiazide tablet 12.5 mg  12.5 mg Oral Daily    [Transfer Hold] insulin glargine (LANTUS) subcutaneous injection 20 Units 0.2 mL  20 Units Subcutaneous HS    [Transfer Hold] insulin lispro (HumALOG/ADMELOG) 100 units/mL subcutaneous injection 1-6 Units  1-6 Units Subcutaneous TID AC    [Transfer Hold] insulin lispro (HumALOG/ADMELOG) 100 units/mL subcutaneous injection 1-6 Units  1-6 Units Subcutaneous HS    methocarbamol (ROBAXIN) tablet 750 mg  750 mg Oral Q6H PRN    metoprolol tartrate (LOPRESSOR) tablet 100 mg  100 mg Oral Q12H DIANELYS    multi-electrolyte (PLASMALYTE-A/ISOLYTE-S PH 7.4) IV solution  125 mL/hr Intravenous Continuous    naloxone (NARCAN) 0.4 mg/mL injection **ADS Override Pull**        naloxone (NARCAN) 0.4 mg/mL injection **ADS Override Pull**        ondansetron (ZOFRAN-ODT) dispersible tablet 4 mg  4 mg Oral Q8H PRN    [Transfer Hold] potassium phosphates 30 mmol in sodium chloride 0.9 % 250 mL infusion  30 mmol Intravenous Once    pregabalin (LYRICA) capsule 100 mg  100 mg Oral TID    sodium bicarbonate tablet 650 mg  650 mg Oral BID    tiZANidine (ZANAFLEX) tablet 4 mg  4 mg Oral TID    verapamil (CALAN-SR) CR tablet 120 mg  120 mg Oral HS       VTE Pharmacologic Prophylaxis: Heparin  VTE Mechanical Prophylaxis: sequential compression device    Portions of the record may have been created with voice recognition software.  Occasional wrong word or \"sound a like\" substitutions may have occurred due to the inherent limitations of voice recognition software.  Read " the chart carefully and recognize, using context, where substitutions have occurred.  ==  Oj Redd MD  [facility]  Internal Medicine Residency PGY-3

## 2024-07-27 NOTE — PLAN OF CARE
Problem: PAIN - ADULT  Goal: Verbalizes/displays adequate comfort level or baseline comfort level  Description: Interventions:  - Encourage patient to monitor pain and request assistance  - Assess pain using appropriate pain scale  - Administer analgesics based on type and severity of pain and evaluate response  - Implement non-pharmacological measures as appropriate and evaluate response  - Consider cultural and social influences on pain and pain management  - Notify physician/advanced practitioner if interventions unsuccessful or patient reports new pain  Outcome: Progressing     Problem: INFECTION - ADULT  Goal: Absence or prevention of progression during hospitalization  Description: INTERVENTIONS:  - Assess and monitor for signs and symptoms of infection  - Monitor lab/diagnostic results  - Monitor all insertion sites, i.e. indwelling lines, tubes, and drains  - Monitor endotracheal if appropriate and nasal secretions for changes in amount and color  - [place] appropriate cooling/warming therapies per order  - Administer medications as ordered  - Instruct and encourage patient and family to use good hand hygiene technique  - Identify and instruct in appropriate isolation precautions for identified infection/condition  Outcome: Progressing  Goal: Absence of fever/infection during neutropenic period  Description: INTERVENTIONS:  - Monitor WBC    Outcome: Progressing     Problem: SAFETY ADULT  Goal: Patient will remain free of falls  Description: INTERVENTIONS:  - Educate patient/family on patient safety including physical limitations  - Instruct patient to call for assistance with activity   - Consult OT/PT to assist with strengthening/mobility   - Keep Call bell within reach  - Keep bed low and locked with side rails adjusted as appropriate  - Keep care items and personal belongings within reach  - Initiate and maintain comfort rounds  - Make Fall Risk Sign visible to staff  - Offer Toileting every  Hours,  in advance of need  - Initiate/Maintain alarm  - Obtain necessary fall risk management equipment:   - Apply yellow socks and bracelet for high fall risk patients  - Consider moving patient to room near nurses station  Outcome: Progressing  Goal: Maintain or return to baseline ADL function  Description: INTERVENTIONS:  -  Assess patient's ability to carry out ADLs; assess patient's baseline for ADL function and identify physical deficits which impact ability to perform ADLs (bathing, care of mouth/teeth, toileting, grooming, dressing, etc.)  - Assess/evaluate cause of self-care deficits   - Assess range of motion  - Assess patient's mobility; develop plan if impaired  - Assess patient's need for assistive devices and provide as appropriate  - Encourage maximum independence but intervene and supervise when necessary  - Involve family in performance of ADLs  - Assess for home care needs following discharge   - Consider OT consult to assist with ADL evaluation and planning for discharge  - Provide patient education as appropriate  Outcome: Progressing  Goal: Maintains/Returns to pre admission functional level  Description: INTERVENTIONS:  - Perform AM-PAC 6 Click Basic Mobility/ Daily Activity assessment daily.  - Set and communicate daily mobility goal to care team and patient/family/caregiver.   - Collaborate with rehabilitation services on mobility goals if consulted  - Perform Range of Motion times a day.  - Reposition patient every  hours.  - Dangle patient  times a day  - Stand patient  times a day  - Ambulate patient  times a day  - Out of bed to chair  times a day   - Out of bed for meals times a day  - Out of bed for toileting  - Record patient progress and toleration of activity level   Outcome: Progressing     Problem: DISCHARGE PLANNING  Goal: Discharge to home or other facility with appropriate resources  Description: INTERVENTIONS:  - Identify barriers to discharge w/patient and caregiver  - Arrange for  needed discharge resources and transportation as appropriate  - Identify discharge learning needs (meds, wound care, etc.)  - Arrange for interpretive services to assist at discharge as needed  - Refer to Case Management Department for coordinating discharge planning if the patient needs post-hospital services based on physician/advanced practitioner order or complex needs related to functional status, cognitive ability, or social support system  Outcome: Progressing     Problem: Knowledge Deficit  Goal: Patient/family/caregiver demonstrates understanding of disease process, treatment plan, medications, and discharge instructions  Description: Complete learning assessment and assess knowledge base.  Interventions:  - Provide teaching at level of understanding  - Provide teaching via preferred learning methods  Outcome: Progressing

## 2024-07-27 NOTE — ASSESSMENT & PLAN NOTE
Chronic history of migraine.  She takes Depakote and acetazolamide at home.     Plan:  - Continue Depakote  - Hold acetazolamide due to concern for metabolic acidosis  -Can continue home medications as acidosis improves

## 2024-07-27 NOTE — QUICK NOTE
"RN messaged \"89 is complaining of lightheadedness and her BP is 78/55\" at 0303.    Went to bedside to evaluate patient. Patient was complaining of dizziness. Repeat BP was 66/45. Gave 1L LR bolus. Blood pressure started improving to 107/59 at 0344.     Patient's VBG came back 7.23. POCT glucose 158 and BMP pending. Difficulty getting extra IV access on the R arm. To avoid placing IV on her foot. Decision was made to give her SQ Lispro.    Patient's BP dropped to 88/62 at 0421. Another 1L LR bolus given. BP improved to 94/60 with MAP of 69. Switched D5 1/2 NS to D5 NS given volume depletion on presentation. POCTglucose 203 at 0451, patient has not gotten insulin so far. 23U Lispro was given SQ.   "

## 2024-07-27 NOTE — RESPIRATORY THERAPY NOTE
RT Protocol Note  Kinza Meza 48 y.o. female MRN: [id number]  Unit/Bed#: MICU 08 Encounter: [id number]    Assessment    Principal Problem:    DKA (diabetic ketoacidosis) ([facility])  Active Problems:    ADHD    Stage 3b chronic kidney disease ([facility])    Vitamin D deficiency    Chronic migraine w/o aura w/o status migrainosus, not intractable    HTN (hypertension)    Depression      Home Pulmonary Medications:  none       Past Medical History:   Diagnosis Date    ADD (attention deficit disorder)     Allergic rhinitis     ALS (amyotrophic lateral sclerosis) ([facility]) 11/22    Per neurological surgeon    Anemia 12/2021    Anxiety     Arthritis     Bipolar disorder ([facility])     Cervical disc disorder with radiculopathy of cervical region     Chronic depression     Chronic kidney disease     Stage 1    Chronic pain disorder     Cluster headache     Colitis     Last assessed - 11/25/14    Colon polyp     Concussion 2018    Condyloma acuminatum     Last assessed - 3/19/14    CTS (carpal tunnel syndrome) 2001    Depression     Diabetes mellitus ([facility])     Last assessed - 11/25/14    Diabetic nephropathy ([facility]) 2001    Diabetic neuropathy ([facility])     Diabetic retinopathy ([facility]) 2001    Difficulty walking 07/22    Fibromyalgia     Fibromyalgia, primary     GERD (gastroesophageal reflux disease)     Head injury 08/13/18    Fall    Headache(784.0) 1977    History of transfusion 12/2021    HTN (hypertension)     Last assessed - 11/25/14    Hyperlipidemia 07/22/2019    Hypertension     Intervertebral disc disorder with radiculopathy of lumbar region     Lupus ([facility]) 2003    Rhuematologist-Sheela Dasilva, [place]    Migraine     Miscarriage 1988    2001, 2004    Obesity 1980    Obesity, unspecified     Open wound of back 05/30/2023    Opioid abuse, in remission ([facility]) 01/2022    Only while in hospital and for about 10 days once home instead of one week.    Osteoporosis     Peripheral neuropathy     Postoperative wound infection 12/2021    Stop  "not healed    Preeclampsia     Last assessed - 11/25/14    Rheumatoid arthritis (HCC)     Wears dentures      Social History     Socioeconomic History    Marital status: /Civil Union     Spouse name: None    Number of children: None    Years of education: None    Highest education level: None   Occupational History    None   Tobacco Use    Smoking status: Never     Passive exposure: Never    Smokeless tobacco: Never    Tobacco comments:     Never used tabaco, don’t care to   Vaping Use    Vaping status: Never Used   Substance and Sexual Activity    Alcohol use: Not Currently    Drug use: Yes     Types: Marijuana     Comment: Medicinal    Sexual activity: Yes     Partners: Male     Birth control/protection: I.U.D.     Comment: Mirena 4/5/2017   Other Topics Concern    None   Social History Narrative    Daily caffeinated consumption     Exercise - Walking     Social Determinants of Health     Financial Resource Strain: Not on file   Food Insecurity: Not on file   Transportation Needs: Not on file   Physical Activity: Not on file   Stress: Not on file   Social Connections: Not on file   Intimate Partner Violence: Not on file   Housing Stability: Not on file       Subjective         Objective    Physical Exam:   Assessment Type: (P) Assess only  General Appearance: (P) Drowsy  Respiratory Pattern: (P) Shallow  Chest Assessment: (P) Chest expansion symmetrical  Bilateral Breath Sounds: (P) Diminished, Clear    Vitals:  Blood pressure 167/91, pulse 59, temperature (!) 97.4 °F (36.3 °C), resp. rate 20, height 5' 1\" (1.549 m), weight 75.8 kg (167 lb), SpO2 100%, not currently breastfeeding.    Results from last 7 days   Lab Units 07/27/24  0642   ANNY TEST  No       Imaging and other studies:           Plan    Respiratory Plan: (P) Vent/NIV/HFNC        Resp Comments: (P) pt placed on BIPAP due to vbg results. pt has no pulmonary hx and does not take any respiraotry meds @ home. pt is placed on respiratory protocol " for BiPAP management

## 2024-07-27 NOTE — ASSESSMENT & PLAN NOTE
History of depression anxiety.  Take Cymbalta 60 mg daily and Klonopin 0.5 mg twice daily    Plan:  -Continue Cymbalta   -Patient on 1/2 dose of Klonopin 0.25 mg BID, can return to home dose as tolerated

## 2024-07-27 NOTE — UTILIZATION REVIEW
Initial Clinical Review    Admission: Date/Time/Statement:   Admission Orders (From admission, onward)       Ordered        07/26/24 2330  INPATIENT ADMISSION  Once                          Orders Placed This Encounter   Procedures    INPATIENT ADMISSION     Standing Status:   Standing     Number of Occurrences:   1     Order Specific Question:   Level of Care     Answer:   Level 2 Stepdown / HOT [14]     Order Specific Question:   Estimated length of stay     Answer:   More than 2 Midnights     Order Specific Question:   Certification     Answer:   I certify that inpatient services are medically necessary for this patient for a duration of greater than two midnights. See H&P and MD Progress Notes for additional information about the patient's course of treatment.     ED Arrival Information       Expected   7/26/2024     Arrival   7/26/2024 17:38    Acuity   Urgent              Means of arrival   Walk-In    Escorted by   Self    Service   [facility]    Admission type   Emergency              Arrival complaint   kidney disease             Chief Complaint   Patient presents with    Flank Pain     Pt was sent over for kidney disease. Pt states she thinks she could be in DKA. Pt has been having abdominal pain       Initial Presentation: 48 y.o. female with PMHx includes  insulin dependent DM, HTN, CKD 3, ADHD, Vitamin D deficiency, chronic migraine, and depression, who presented on 7/26 to the ED due to abnormal outpatient lab work ketosuria, elevated AG and decreased GFR at PCP appointment.  Patient states she has been having low p.o. intake for 2 weeks due to nausea and dry heaves. She states she started having sharp epigastric abdominal pain, nausea, and vomiting and non-bloody diarrhea. She states her last antibiotic use was 1.5 weeks ago. She took Keflex for R second toe infection. Due to low p.o. intake, she stopped taking her long-acting insulin on Tuesday and her last dose of mealtime insulin was 2 weeks  ago. In the ED, elevated AG of 18, Cr 1.91, low bicarb 20, normal glucose 118. VBG pH 7.28. Elevated B-hydroxy 3.45. Given 1L IVF bolus, IV zofran, mag and potassium in ED.     Plan:  Admit Inpatient status to med surg dt DKA, Dehydration, Nausea and Vomiting: keep NPO, start insulin drip and accuchecks, monitor electrolytes and replete prn, monitor VS and labs including renal function.     Date: 7/27   Day 2:  Per Internal Med: Patient was complaining of dizziness. Repeat BP was 66/45. Gave 1L LR bolus. Blood pressure started improving to 107/59 at 0344.   Patient's VBG came back 7.23. POCT glucose 158 and BMP pending. Difficulty getting extra IV access on the R arm. To avoid placing IV on her foot. Decision was made to give her SQ Lispro. Pt was not started on insulin drip.     Patient's BP dropped to 88/62 at 0421. Another 1L LR bolus given. BP improved to 94/60 with MAP of 69. Switched D5 1/2 NS to D5 NS given volume depletion on presentation. POCTglucose 203 at 0451, patient has not gotten insulin so far. 23U Lispro was given SQ.   Continue IVF, accuchecks, VS and labs. PT OT eval.     ED Triage Vitals   Temperature Pulse Respirations Blood Pressure SpO2 Pain Score   07/26/24 1747 07/26/24 1747 07/26/24 1747 07/26/24 1747 07/26/24 1747 07/27/24 0040   97.6 °F (36.4 °C) 93 20 (!) 159/104 98 % No Pain     Weight (last 2 days)       None            Vital Signs (last 3 days)       Date/Time Temp Pulse Resp BP MAP (mmHg) SpO2 O2 Device Patient Position - Orthostatic VS Pain    07/27/24 0918 -- 76 -- 85/62 -- -- -- -- --    07/27/24 0732 -- 75 20 101/61 76 98 % None (Room air) Lying --    07/27/24 0440 -- -- -- -- -- -- None (Room air) -- No Pain    07/27/24 0434 -- 77 -- 94/60 69 -- -- -- --    07/27/24 0421 -- -- -- 88/62 71 -- -- Lying --    07/27/24 0344 -- -- -- 107/69 -- -- -- -- --    07/27/24 03:04:56 98.3 °F (36.8 °C) 83 -- 78/55 -- 98 % -- Lying --    07/27/24 00:46:53 98.2 °F (36.8 °C) -- -- 152/79 -- 100  % -- -- --    07/27/24 0040 -- -- -- -- -- -- None (Room air) -- No Pain    07/27/24 0015 -- 94 19 133/83 104 99 % None (Room air) Lying --    07/27/24 0008 -- 102 -- 133/83 -- -- -- -- --    07/26/24 1747 97.6 °F (36.4 °C) 93 20 159/104 -- 98 % None (Room air) -- --              Pertinent Labs/Diagnostic Test Results:   Radiology:  No orders to display     Cardiology:  No orders to display     GI:  No orders to display           Results from last 7 days   Lab Units 07/27/24  0617 07/26/24 2001   WBC Thousand/uL 4.00* 6.91   HEMOGLOBIN g/dL 10.4* 14.3   HEMATOCRIT % 32.0* 43.6   PLATELETS Thousands/uL 162 204   TOTAL NEUT ABS Thousands/µL  --  5.25         Results from last 7 days   Lab Units 07/27/24  0828 07/27/24  0751 07/27/24  0432 07/27/24  0208 07/27/24  0021 07/26/24  2315 07/26/24 2001   SODIUM mmol/L 141  --  140 139 141 141 143   POTASSIUM mmol/L 3.7  --  3.7 4.8 3.5 3.3* 3.9   CHLORIDE mmol/L 114*  --  109* 106 104 105 105   CO2 mmol/L 20*  --  21 19* 18* 19* 20*   ANION GAP mmol/L 7  --  10 14* 19* 17* 18*   BUN mg/dL 21  --  23 25 25 24 27*   CREATININE mg/dL 1.88*  --  1.96* 2.06* 1.92* 1.85* 1.91*   EGFR ml/min/1.73sq m 30  --  29 27 29 31 30   CALCIUM mg/dL 8.0*  --  7.7* 8.3* 9.0 8.8 9.4   CALCIUM, IONIZED mmol/L  --  1.15  --   --   --   --   --    MAGNESIUM mg/dL 2.6  --   --   --  1.7*  --  1.8*   PHOSPHORUS mg/dL 1.7*  --   --   --   --   --  4.0     Results from last 7 days   Lab Units 07/26/24 2001   AST U/L 10*   ALT U/L 6*   ALK PHOS U/L 68   TOTAL PROTEIN g/dL 7.2   ALBUMIN g/dL 4.4   TOTAL BILIRUBIN mg/dL 0.82     Results from last 7 days   Lab Units 07/27/24  0757 07/27/24  0658 07/27/24  0556 07/27/24  0451 07/27/24  0350 07/27/24  0256 07/27/24  0153 07/27/24  0119 07/27/24  0026 07/26/24  2313 07/26/24  1749   POC GLUCOSE mg/dl 140 177* 276* 203* 158* 170* 129 140 109 86 113     Results from last 7 days   Lab Units 07/27/24  0828 07/27/24  0432 07/27/24  0208 07/27/24  0021  07/26/24 2315 07/26/24 2001 07/26/24  1430   GLUCOSE RANDOM mg/dL 125 202* 138 97 81 118 129     Results from last 7 days   Lab Units 07/27/24  0346   OSMOLALITY, SERUM mmol/*     Results from last 7 days   Lab Units 07/27/24  0617   HEMOGLOBIN A1C % 5.1   EAG mg/dl 100     Beta- Hydroxybutyrate   Date Value Ref Range Status   07/26/2024 3.45 (H) 0.02 - 0.27 mmol/L Final   07/26/2024 4.84 (H) 0.02 - 0.27 mmol/L Final     BETA-HYDROXYBUTYRATE   Date Value Ref Range Status   07/13/2020 0.1 <0.6 mmol/L Final          Results from last 7 days   Lab Units 07/27/24  0642 07/27/24 0346 07/26/24 2001   PH KENNEDY  7.260* 7.238* 7.284*   PCO2 KENNEDY mm Hg 45.4 22.2* 42.2   PO2 KENNEDY mm Hg 26.0* 27.7* 20.0*   HCO3 KENNEDY mmol/L 19.9* 9.3* 19.6*   BASE EXC KENNEDY mmol/L -6.9 -16.7 -6.8   O2 CONTENT KENNEDY ml/dL 7.1 4.0 7.2   O2 HGB, VENOUS % 46.9* 53.5* 34.5*     Results from last 7 days   Lab Units 07/27/24  0432   TSH 3RD GENERATON uIU/mL 0.472         Results from last 7 days   Lab Units 07/26/24 2001   LACTIC ACID mmol/L 1.0     Results from last 7 days   Lab Units 07/26/24 2315   LIPASE u/L 30     Results from last 7 days   Lab Units 07/27/24 0346   OSMOLALITY, SERUM mmol/*     Results from last 7 days   Lab Units 07/27/24  0025 07/26/24  1513   CLARITY UA  Clear dark   COLOR UA  [place] yellow   SPEC GRAV UA  1.020  --    PH UA  5.5  --    GLUCOSE UA mg/dl Negative neg   KETONES UA mg/dl 80 (3+)* 2+   BLOOD UA  Negative  --    PROTEIN UA mg/dl Trace* 1+   NITRITE UA  Negative neg   BILIRUBIN UA  Large*  --    BILIRUBIN UA POC   --  2+   UROBILINOGEN UA E.U./dl 2.0* neg   LEUKOCYTES UA  Small* 15   WBC UA /hpf 4-10*  --    RBC UA /hpf None Seen  --    BACTERIA UA /hpf None Seen  --    EPITHELIAL CELLS WET PREP /hpf Moderate*  --    MUCUS THREADS  Occasional*  --      ED Treatment-Medication Administration from 07/26/2024 1544 to 07/27/2024 0035         Date/Time Order Dose Route Action     07/26/2024 2029 lactated ringers  bolus 1,000 mL 1,000 mL Intravenous New Bag     07/26/2024 2029 ondansetron (ZOFRAN) injection 4 mg 4 mg Intravenous Given     07/27/2024 0008 atorvastatin (LIPITOR) tablet 10 mg 10 mg Oral Given     07/27/2024 0012 divalproex sodium (DEPAKOTE) DR tablet 500 mg 500 mg Oral Given     07/27/2024 0008 metoprolol tartrate (LOPRESSOR) tablet 100 mg 100 mg Oral Given     07/27/2024 0009 pregabalin (LYRICA) capsule 100 mg 100 mg Oral Given     07/27/2024 0012 tiZANidine (ZANAFLEX) tablet 4 mg 4 mg Oral Given     07/27/2024 0012 verapamil (CALAN-SR) CR tablet 120 mg 120 mg Oral Given     07/27/2024 0021 magnesium sulfate 2 g/50 mL IVPB (premix) 2 g 2 g Intravenous New Bag     07/27/2024 0008 potassium chloride (Klor-Con M20) CR tablet 80 mEq 80 mEq Oral Given            Past Medical History:   Diagnosis Date    ADD (attention deficit disorder)     Allergic rhinitis     ALS (amyotrophic lateral sclerosis) ([facility]) 11/22    Per neurological surgeon    Anemia 12/2021    Anxiety     Arthritis     Bipolar disorder ([facility])     Cervical disc disorder with radiculopathy of cervical region     Chronic depression     Chronic kidney disease     Stage 1    Chronic pain disorder     Cluster headache     Colitis     Last assessed - 11/25/14    Colon polyp     Concussion 2018    Condyloma acuminatum     Last assessed - 3/19/14    CTS (carpal tunnel syndrome) 2001    Depression     Diabetes mellitus ([facility])     Last assessed - 11/25/14    Diabetic nephropathy ([facility]) 2001    Diabetic neuropathy ([facility])     Diabetic retinopathy ([facility]) 2001    Difficulty walking 07/22    Fibromyalgia     Fibromyalgia, primary     GERD (gastroesophageal reflux disease)     Head injury 08/13/18    Fall    Headache(784.0) 1977    History of transfusion 12/2021    HTN (hypertension)     Last assessed - 11/25/14    Hyperlipidemia 07/22/2019    Hypertension     Intervertebral disc disorder with radiculopathy of lumbar region     Lupus ([facility]) 2003    Rhuematologist-Dr Rashid,  MILO Coker    Migraine     Miscarriage 1988    2001, 2004    Obesity 1980    Obesity, unspecified     Open wound of back 05/30/2023    Opioid abuse, in remission ([facility]) 01/2022    Only while in hospital and for about 10 days once home instead of one week.    Osteoporosis     Peripheral neuropathy     Postoperative wound infection 12/2021    Stop not healed    Preeclampsia     Last assessed - 11/25/14    Rheumatoid arthritis ([facility])     Wears dentures      Present on Admission:   Stage 3b chronic kidney disease ([facility])   ADHD   HTN (hypertension)   Chronic migraine w/o aura w/o status migrainosus, not intractable   Depression   Vitamin D deficiency      Admitting Diagnosis: Diarrhea [R19.7]  Dehydration [E86.0]  DM (diabetes mellitus) ([facility]) [E11.9]  Nausea & vomiting [R11.2]  Flank pain [R10.9]  Age/Sex: 48 y.o. female  Admission Orders:  Scheduled Medications:  atorvastatin, 10 mg, Oral, HS  calcitriol, 0.25 mcg, Oral, Daily  Cholecalciferol, 2,000 Units, Oral, Daily  clonazePAM, 0.5 mg, Oral, BID  [START ON 7/28/2024] cyanocobalamin, 1,000 mcg, Intramuscular, Weekly  cycloSPORINE (PF), 1 drop, Ophthalmic, BID  divalproex sodium, 500 mg, Oral, HS  DULoxetine, 60 mg, Oral, Daily  ferrous sulfate, 325 mg, Oral, Daily With Breakfast  heparin (porcine), 5,000 Units, Subcutaneous, Q8H DIANELYS  hydroCHLOROthiazide, 12.5 mg, Oral, Daily  insulin glargine, 20 Units, Subcutaneous, HS  insulin lispro, 1-6 Units, Subcutaneous, TID AC  insulin lispro, 1-6 Units, Subcutaneous, HS  losartan, 100 mg, Oral, Daily  metoprolol tartrate, 100 mg, Oral, Q12H DIANELYS  potassium phosphate, 30 mmol, Intravenous, Once  pregabalin, 100 mg, Oral, TID  sodium bicarbonate, 650 mg, Oral, BID  tiZANidine, 4 mg, Oral, TID  verapamil, 120 mg, Oral, HS      Continuous IV Infusions:  dextrose 5 % and sodium chloride 0.9 %, 250 mL/hr, Intravenous, Continuous  multi-electrolyte, 125 mL/hr, Intravenous, Continuous      PRN Meds:  acetaminophen, 650 mg, Oral, Q6H  PRN  methocarbamol, 750 mg, Oral, Q6H PRN  ondansetron, 4 mg, Oral, Q8H PRN x1        IP CONSULT TO CASE MANAGEMENT    Network Utilization Review Department  ATTENTION: Please call with any questions or concerns to [phone] and carefully listen to the prompts so that you are directed to the right person. All voicemails are confidential.   For Discharge needs, contact Care Management DC Support Team at [phone] opt. [phone]  Send all requests for admission clinical reviews, approved or denied determinations and any other requests to dedicated fax number below belonging to the campus where the patient is receiving treatment. List of dedicated fax numbers for the Facilities:  FACILITY NAME UR FAX NUMBER   ADMISSION DENIALS (Administrative/Medical Necessity) [phone]   DISCHARGE SUPPORT TEAM (NETWORK) [phone]   PARENT CHILD HEALTH (Maternity/NICU/Pediatrics) [phone]   [facility] [phone]   [facility] [phone]   [facility] [phone]   [facility] [phone]   [facility] [phone]   [facility] [phone]   [facility] [phone]   [facility] [phone]   [facility] [phone]   [facility] [phone]   [facility] [phone]   [facility] [phone]

## 2024-07-27 NOTE — ASSESSMENT & PLAN NOTE
Patient has a history of ADHD, and she takes Vyvanse at home.  He has stopped taking Vyvanse since Tuesday because low p.o. intake.    Plan:  - Hold Vyvanse for now, can restart as patient returns to baseline

## 2024-07-27 NOTE — PROGRESS NOTES
Pastoral Care Progress Note    2024  Patient: Kinza Meza : 1975  Admission Date & Time: 2024 1859  MRN: [id number] [facility]: [id number]           24 1100   Clinical Encounter Type   Visited With Patient not available   Crisis Visit Code       Rapid Response for this patient, dealing with multiple medical issues.  Upon arrival, medical staff said patient (who was only semi-conscious) would be transferred to MICU 8.  Doctor informed me he called .  Told doctor I needed to respond to a trauma, but if  needs  services to page us.   services remain available.  QUYNH, 24 @[address] [place] [address]..

## 2024-07-28 LAB
ALBUMIN SERPL BCG-MCNC: 2.9 G/DL (ref 3.5–5)
ALBUMIN SERPL BCG-MCNC: 3 G/DL (ref 3.5–5)
ALP SERPL-CCNC: 47 U/L (ref 34–104)
ALT SERPL W P-5'-P-CCNC: 6 U/L (ref 7–52)
ANION GAP SERPL CALCULATED.3IONS-SCNC: 10 MMOL/L (ref 4–13)
ANION GAP SERPL CALCULATED.3IONS-SCNC: 7 MMOL/L (ref 4–13)
ANION GAP SERPL CALCULATED.3IONS-SCNC: 8 MMOL/L (ref 4–13)
ANION GAP SERPL CALCULATED.3IONS-SCNC: 8 MMOL/L (ref 4–13)
ANION GAP SERPL CALCULATED.3IONS-SCNC: 9 MMOL/L (ref 4–13)
ANION GAP SERPL CALCULATED.3IONS-SCNC: 9 MMOL/L (ref 4–13)
AST SERPL W P-5'-P-CCNC: 8 U/L (ref 13–39)
ATRIAL RATE: 46 BPM
ATRIAL RATE: 70 BPM
ATRIAL RATE: 92 BPM
BASE EX.OXY STD BLDV CALC-SCNC: 74.4 % (ref 60–80)
BASE EXCESS BLDV CALC-SCNC: -4.6 MMOL/L
BASOPHILS # BLD AUTO: 0.04 THOUSANDS/ÂΜL (ref 0–0.1)
BASOPHILS NFR BLD AUTO: 1 % (ref 0–1)
BILIRUB SERPL-MCNC: 0.54 MG/DL (ref 0.2–1)
BUN SERPL-MCNC: 11 MG/DL (ref 5–25)
BUN SERPL-MCNC: 12 MG/DL (ref 5–25)
BUN SERPL-MCNC: 12 MG/DL (ref 5–25)
BUN SERPL-MCNC: 13 MG/DL (ref 5–25)
BUN SERPL-MCNC: 13 MG/DL (ref 5–25)
BUN SERPL-MCNC: 14 MG/DL (ref 5–25)
CALCIUM ALBUM COR SERPL-MCNC: 8.6 MG/DL (ref 8.3–10.1)
CALCIUM SERPL-MCNC: 7.6 MG/DL (ref 8.4–10.2)
CALCIUM SERPL-MCNC: 7.7 MG/DL (ref 8.4–10.2)
CALCIUM SERPL-MCNC: 7.7 MG/DL (ref 8.4–10.2)
CALCIUM SERPL-MCNC: 7.9 MG/DL (ref 8.4–10.2)
CALCIUM SERPL-MCNC: 8.1 MG/DL (ref 8.4–10.2)
CALCIUM SERPL-MCNC: 8.1 MG/DL (ref 8.4–10.2)
CHLORIDE SERPL-SCNC: 108 MMOL/L (ref 96–108)
CHLORIDE SERPL-SCNC: 112 MMOL/L (ref 96–108)
CO2 SERPL-SCNC: 18 MMOL/L (ref 21–32)
CO2 SERPL-SCNC: 20 MMOL/L (ref 21–32)
CO2 SERPL-SCNC: 20 MMOL/L (ref 21–32)
CO2 SERPL-SCNC: 21 MMOL/L (ref 21–32)
CO2 SERPL-SCNC: 21 MMOL/L (ref 21–32)
CO2 SERPL-SCNC: 22 MMOL/L (ref 21–32)
CREAT SERPL-MCNC: 1.43 MG/DL (ref 0.6–1.3)
CREAT SERPL-MCNC: 1.44 MG/DL (ref 0.6–1.3)
CREAT SERPL-MCNC: 1.47 MG/DL (ref 0.6–1.3)
CREAT SERPL-MCNC: 1.47 MG/DL (ref 0.6–1.3)
CREAT SERPL-MCNC: 1.48 MG/DL (ref 0.6–1.3)
CREAT SERPL-MCNC: 1.52 MG/DL (ref 0.6–1.3)
EOSINOPHIL # BLD AUTO: 0.1 THOUSAND/ÂΜL (ref 0–0.61)
EOSINOPHIL NFR BLD AUTO: 2 % (ref 0–6)
ERYTHROCYTE [DISTWIDTH] IN BLOOD BY AUTOMATED COUNT: 14.6 % (ref 11.6–15.1)
GFR SERPL CREATININE-BSD FRML MDRD: 39 ML/MIN/1.73SQ M
GFR SERPL CREATININE-BSD FRML MDRD: 40 ML/MIN/1.73SQ M
GFR SERPL CREATININE-BSD FRML MDRD: 41 ML/MIN/1.73SQ M
GFR SERPL CREATININE-BSD FRML MDRD: 41 ML/MIN/1.73SQ M
GFR SERPL CREATININE-BSD FRML MDRD: 42 ML/MIN/1.73SQ M
GFR SERPL CREATININE-BSD FRML MDRD: 42 ML/MIN/1.73SQ M
GLUCOSE SERPL-MCNC: 102 MG/DL (ref 65–140)
GLUCOSE SERPL-MCNC: 105 MG/DL (ref 65–140)
GLUCOSE SERPL-MCNC: 109 MG/DL (ref 65–140)
GLUCOSE SERPL-MCNC: 111 MG/DL (ref 65–140)
GLUCOSE SERPL-MCNC: 114 MG/DL (ref 65–140)
GLUCOSE SERPL-MCNC: 115 MG/DL (ref 65–140)
GLUCOSE SERPL-MCNC: 127 MG/DL (ref 65–140)
GLUCOSE SERPL-MCNC: 133 MG/DL (ref 65–140)
GLUCOSE SERPL-MCNC: 148 MG/DL (ref 65–140)
GLUCOSE SERPL-MCNC: 149 MG/DL (ref 65–140)
GLUCOSE SERPL-MCNC: 152 MG/DL (ref 65–140)
GLUCOSE SERPL-MCNC: 167 MG/DL (ref 65–140)
GLUCOSE SERPL-MCNC: 68 MG/DL (ref 65–140)
GLUCOSE SERPL-MCNC: 72 MG/DL (ref 65–140)
GLUCOSE SERPL-MCNC: 86 MG/DL (ref 65–140)
GLUCOSE SERPL-MCNC: 89 MG/DL (ref 65–140)
GLUCOSE SERPL-MCNC: 93 MG/DL (ref 65–140)
GLUCOSE SERPL-MCNC: 95 MG/DL (ref 65–140)
GLUCOSE SERPL-MCNC: 97 MG/DL (ref 65–140)
HCO3 BLDV-SCNC: 20.3 MMOL/L (ref 24–30)
HCT VFR BLD AUTO: 32 % (ref 34.8–46.1)
HGB BLD-MCNC: 10.4 G/DL (ref 11.5–15.4)
IMM GRANULOCYTES # BLD AUTO: 0.04 THOUSAND/UL (ref 0–0.2)
IMM GRANULOCYTES NFR BLD AUTO: 1 % (ref 0–2)
LYMPHOCYTES # BLD AUTO: 1.98 THOUSANDS/ÂΜL (ref 0.6–4.47)
LYMPHOCYTES NFR BLD AUTO: 34 % (ref 14–44)
MAGNESIUM SERPL-MCNC: 1.9 MG/DL (ref 1.9–2.7)
MAGNESIUM SERPL-MCNC: 2 MG/DL (ref 1.9–2.7)
MAGNESIUM SERPL-MCNC: 2.1 MG/DL (ref 1.9–2.7)
MAGNESIUM SERPL-MCNC: 2.1 MG/DL (ref 1.9–2.7)
MAGNESIUM SERPL-MCNC: 2.2 MG/DL (ref 1.9–2.7)
MCH RBC QN AUTO: 27.8 PG (ref 26.8–34.3)
MCHC RBC AUTO-ENTMCNC: 32.5 G/DL (ref 31.4–37.4)
MCV RBC AUTO: 86 FL (ref 82–98)
MONOCYTES # BLD AUTO: 0.37 THOUSAND/ÂΜL (ref 0.17–1.22)
MONOCYTES NFR BLD AUTO: 6 % (ref 4–12)
NEUTROPHILS # BLD AUTO: 3.28 THOUSANDS/ÂΜL (ref 1.85–7.62)
NEUTS SEG NFR BLD AUTO: 56 % (ref 43–75)
NRBC BLD AUTO-RTO: 0 /100 WBCS
O2 CT BLDV-SCNC: 11.8 ML/DL
P AXIS: -3 DEGREES
P AXIS: 13 DEGREES
P AXIS: 28 DEGREES
PCO2 BLDV: 36.5 MM HG (ref 42–50)
PH BLDV: 7.36 [PH] (ref 7.3–7.4)
PHOSPHATE SERPL-MCNC: 1.9 MG/DL (ref 2.7–4.5)
PHOSPHATE SERPL-MCNC: 3.5 MG/DL (ref 2.7–4.5)
PHOSPHATE SERPL-MCNC: 3.6 MG/DL (ref 2.7–4.5)
PHOSPHATE SERPL-MCNC: 3.7 MG/DL (ref 2.7–4.5)
PHOSPHATE SERPL-MCNC: 3.9 MG/DL (ref 2.7–4.5)
PLATELET # BLD AUTO: 136 THOUSANDS/UL (ref 149–390)
PMV BLD AUTO: 11.1 FL (ref 8.9–12.7)
PO2 BLDV: 38.6 MM HG (ref 35–45)
POTASSIUM SERPL-SCNC: 3.7 MMOL/L (ref 3.5–5.3)
POTASSIUM SERPL-SCNC: 4.4 MMOL/L (ref 3.5–5.3)
POTASSIUM SERPL-SCNC: 4.5 MMOL/L (ref 3.5–5.3)
POTASSIUM SERPL-SCNC: 4.6 MMOL/L (ref 3.5–5.3)
PR INTERVAL: 148 MS
PR INTERVAL: 152 MS
PR INTERVAL: 154 MS
PROT SERPL-MCNC: 4.5 G/DL (ref 6.4–8.4)
QRS AXIS: -12 DEGREES
QRS AXIS: -20 DEGREES
QRS AXIS: -22 DEGREES
QRSD INTERVAL: 82 MS
QRSD INTERVAL: 86 MS
QRSD INTERVAL: 86 MS
QT INTERVAL: 352 MS
QT INTERVAL: 400 MS
QT INTERVAL: 462 MS
QTC INTERVAL: 404 MS
QTC INTERVAL: 432 MS
QTC INTERVAL: 435 MS
RBC # BLD AUTO: 3.74 MILLION/UL (ref 3.81–5.12)
SODIUM SERPL-SCNC: 139 MMOL/L (ref 135–147)
SODIUM SERPL-SCNC: 140 MMOL/L (ref 135–147)
SODIUM SERPL-SCNC: 141 MMOL/L (ref 135–147)
SODIUM SERPL-SCNC: 141 MMOL/L (ref 135–147)
T WAVE AXIS: -15 DEGREES
T WAVE AXIS: 19 DEGREES
T WAVE AXIS: 4 DEGREES
VENTRICULAR RATE: 46 BPM
VENTRICULAR RATE: 70 BPM
VENTRICULAR RATE: 92 BPM
WBC # BLD AUTO: 5.81 THOUSAND/UL (ref 4.31–10.16)

## 2024-07-28 PROCEDURE — 82040 ASSAY OF SERUM ALBUMIN: CPT

## 2024-07-28 PROCEDURE — 93010 ELECTROCARDIOGRAM REPORT: CPT | Performed by: INTERNAL MEDICINE

## 2024-07-28 PROCEDURE — 80048 BASIC METABOLIC PNL TOTAL CA: CPT | Performed by: STUDENT IN AN ORGANIZED HEALTH CARE EDUCATION/TRAINING PROGRAM

## 2024-07-28 PROCEDURE — 84100 ASSAY OF PHOSPHORUS: CPT | Performed by: STUDENT IN AN ORGANIZED HEALTH CARE EDUCATION/TRAINING PROGRAM

## 2024-07-28 PROCEDURE — 83735 ASSAY OF MAGNESIUM: CPT | Performed by: STUDENT IN AN ORGANIZED HEALTH CARE EDUCATION/TRAINING PROGRAM

## 2024-07-28 PROCEDURE — 85025 COMPLETE CBC W/AUTO DIFF WBC: CPT

## 2024-07-28 PROCEDURE — 82948 REAGENT STRIP/BLOOD GLUCOSE: CPT

## 2024-07-28 PROCEDURE — 80053 COMPREHEN METABOLIC PANEL: CPT

## 2024-07-28 PROCEDURE — 83519 RIA NONANTIBODY: CPT

## 2024-07-28 PROCEDURE — 82805 BLOOD GASES W/O2 SATURATION: CPT

## 2024-07-28 PROCEDURE — 99233 SBSQ HOSP IP/OBS HIGH 50: CPT | Performed by: INTERNAL MEDICINE

## 2024-07-28 RX ORDER — POTASSIUM CHLORIDE 20 MEQ/1
40 TABLET, EXTENDED RELEASE ORAL ONCE
Status: COMPLETED | OUTPATIENT
Start: 2024-07-28 | End: 2024-07-28

## 2024-07-28 RX ORDER — DEXTROSE MONOHYDRATE 25 G/50ML
50 INJECTION, SOLUTION INTRAVENOUS ONCE
Status: COMPLETED | OUTPATIENT
Start: 2024-07-28 | End: 2024-07-28

## 2024-07-28 RX ORDER — DEXTROSE MONOHYDRATE 25 G/50ML
INJECTION, SOLUTION INTRAVENOUS
Status: COMPLETED
Start: 2024-07-28 | End: 2024-07-28

## 2024-07-28 RX ORDER — LANOLIN ALCOHOL/MO/W.PET/CERES
800 CREAM (GRAM) TOPICAL ONCE
Status: COMPLETED | OUTPATIENT
Start: 2024-07-28 | End: 2024-07-28

## 2024-07-28 RX ORDER — DEXTROSE, SODIUM CHLORIDE, SODIUM LACTATE, POTASSIUM CHLORIDE, AND CALCIUM CHLORIDE 5; .6; .31; .03; .02 G/100ML; G/100ML; G/100ML; G/100ML; G/100ML
100 INJECTION, SOLUTION INTRAVENOUS CONTINUOUS
Status: DISCONTINUED | OUTPATIENT
Start: 2024-07-28 | End: 2024-07-29

## 2024-07-28 RX ORDER — INSULIN GLARGINE 100 [IU]/ML
25 INJECTION, SOLUTION SUBCUTANEOUS
Status: DISCONTINUED | OUTPATIENT
Start: 2024-07-28 | End: 2024-07-28

## 2024-07-28 RX ORDER — INSULIN LISPRO 100 [IU]/ML
1-6 INJECTION, SOLUTION INTRAVENOUS; SUBCUTANEOUS
Status: DISCONTINUED | OUTPATIENT
Start: 2024-07-28 | End: 2024-07-28

## 2024-07-28 RX ADMIN — DIVALPROEX SODIUM 500 MG: 500 TABLET, DELAYED RELEASE ORAL at 22:50

## 2024-07-28 RX ADMIN — POTASSIUM CHLORIDE 40 MEQ: 1500 TABLET, EXTENDED RELEASE ORAL at 22:50

## 2024-07-28 RX ADMIN — DEXTROSE MONOHYDRATE 50 ML: 25 INJECTION, SOLUTION INTRAVENOUS at 19:04

## 2024-07-28 RX ADMIN — MAGNESIUM OXIDE TAB 400 MG (241.3 MG ELEMENTAL MG) 800 MG: 400 (241.3 MG) TAB at 22:50

## 2024-07-28 RX ADMIN — POTASSIUM CHLORIDE 20 MEQ: 1500 TABLET, EXTENDED RELEASE ORAL at 00:36

## 2024-07-28 RX ADMIN — MAGNESIUM OXIDE TAB 400 MG (241.3 MG ELEMENTAL MG) 800 MG: 400 (241.3 MG) TAB at 18:15

## 2024-07-28 RX ADMIN — DEXTROSE, SODIUM CHLORIDE, SODIUM LACTATE, POTASSIUM CHLORIDE, AND CALCIUM CHLORIDE 150 ML/HR: 5; .6; .31; .03; .02 INJECTION, SOLUTION INTRAVENOUS at 14:52

## 2024-07-28 RX ADMIN — GLYCERIN 1 DROP: .002; .002; .01 SOLUTION/ DROPS OPHTHALMIC at 22:32

## 2024-07-28 RX ADMIN — PREGABALIN 100 MG: 100 CAPSULE ORAL at 22:33

## 2024-07-28 RX ADMIN — Medication 2000 UNITS: at 09:11

## 2024-07-28 RX ADMIN — SODIUM CHLORIDE 7.6 UNITS/HR: 9 INJECTION, SOLUTION INTRAVENOUS at 14:59

## 2024-07-28 RX ADMIN — GLYCERIN 1 DROP: .002; .002; .01 SOLUTION/ DROPS OPHTHALMIC at 09:09

## 2024-07-28 RX ADMIN — INSULIN HUMAN 10 UNITS: 100 INJECTION, SOLUTION PARENTERAL at 09:10

## 2024-07-28 RX ADMIN — DIBASIC SODIUM PHOSPHATE, MONOBASIC POTASSIUM PHOSPHATE AND MONOBASIC SODIUM PHOSPHATE 2 TABLET: 852; 155; 130 TABLET ORAL at 04:09

## 2024-07-28 RX ADMIN — HEPARIN SODIUM 5000 UNITS: 5000 INJECTION INTRAVENOUS; SUBCUTANEOUS at 22:33

## 2024-07-28 RX ADMIN — DEXTROSE MONOHYDRATE 50 ML: 25 INJECTION, SOLUTION INTRAVENOUS at 09:14

## 2024-07-28 RX ADMIN — FERROUS SULFATE TAB 325 MG (65 MG ELEMENTAL FE) 325 MG: 325 (65 FE) TAB at 09:10

## 2024-07-28 RX ADMIN — CYANOCOBALAMIN 1000 MCG: 1000 INJECTION, SOLUTION INTRAMUSCULAR at 10:13

## 2024-07-28 RX ADMIN — CLONAZEPAM 0.25 MG: 0.5 TABLET ORAL at 18:15

## 2024-07-28 RX ADMIN — SODIUM BICARBONATE 125 ML/HR: 84 INJECTION, SOLUTION INTRAVENOUS at 11:46

## 2024-07-28 RX ADMIN — PREGABALIN 100 MG: 100 CAPSULE ORAL at 16:10

## 2024-07-28 RX ADMIN — CALCITRIOL CAPSULES 0.25 MCG 0.25 MCG: 0.25 CAPSULE ORAL at 10:13

## 2024-07-28 RX ADMIN — CLONAZEPAM 0.25 MG: 0.5 TABLET ORAL at 09:11

## 2024-07-28 RX ADMIN — PREGABALIN 100 MG: 100 CAPSULE ORAL at 09:11

## 2024-07-28 RX ADMIN — HEPARIN SODIUM 5000 UNITS: 5000 INJECTION INTRAVENOUS; SUBCUTANEOUS at 14:58

## 2024-07-28 RX ADMIN — HEPARIN SODIUM 5000 UNITS: 5000 INJECTION INTRAVENOUS; SUBCUTANEOUS at 06:29

## 2024-07-28 RX ADMIN — SODIUM PHOSPHATE, MONOBASIC, MONOHYDRATE AND SODIUM PHOSPHATE, DIBASIC, ANHYDROUS 21 MMOL: 142; 276 INJECTION, SOLUTION INTRAVENOUS at 00:10

## 2024-07-28 RX ADMIN — DULOXETINE HYDROCHLORIDE 60 MG: 60 CAPSULE, DELAYED RELEASE ORAL at 09:11

## 2024-07-28 RX ADMIN — POTASSIUM CHLORIDE 40 MEQ: 1500 TABLET, EXTENDED RELEASE ORAL at 18:15

## 2024-07-28 RX ADMIN — GLYCERIN 1 DROP: .002; .002; .01 SOLUTION/ DROPS OPHTHALMIC at 16:50

## 2024-07-28 RX ADMIN — ATORVASTATIN CALCIUM 10 MG: 10 TABLET, FILM COATED ORAL at 22:33

## 2024-07-28 RX ADMIN — INSULIN GLARGINE 25 UNITS: 100 INJECTION, SOLUTION SUBCUTANEOUS at 00:35

## 2024-07-28 NOTE — RESPIRATORY THERAPY NOTE
RT Protocol Note  Kinza Meza 48 y.o. female MRN: [id number]  Unit/Bed#: MICU 08 Encounter: [id number]    Assessment    Principal Problem:    DKA (diabetic ketoacidosis) ([facility])  Active Problems:    ADHD    Stage 3b chronic kidney disease ([facility])    Vitamin D deficiency    Chronic migraine w/o aura w/o status migrainosus, not intractable    HTN (hypertension)    Depression      Home Pulmonary Medications:  none       Past Medical History:   Diagnosis Date    ADD (attention deficit disorder)     Allergic rhinitis     ALS (amyotrophic lateral sclerosis) ([facility]) 11/22    Per neurological surgeon    Anemia 12/2021    Anxiety     Arthritis     Bipolar disorder ([facility])     Cervical disc disorder with radiculopathy of cervical region     Chronic depression     Chronic kidney disease     Stage 1    Chronic pain disorder     Cluster headache     Colitis     Last assessed - 11/25/14    Colon polyp     Concussion 2018    Condyloma acuminatum     Last assessed - 3/19/14    CTS (carpal tunnel syndrome) 2001    Depression     Diabetes mellitus ([facility])     Last assessed - 11/25/14    Diabetic nephropathy ([facility]) 2001    Diabetic neuropathy ([facility])     Diabetic retinopathy ([facility]) 2001    Difficulty walking 07/22    Fibromyalgia     Fibromyalgia, primary     GERD (gastroesophageal reflux disease)     Head injury 08/13/18    Fall    Headache(784.0) 1977    History of transfusion 12/2021    HTN (hypertension)     Last assessed - 11/25/14    Hyperlipidemia 07/22/2019    Hypertension     Intervertebral disc disorder with radiculopathy of lumbar region     Lupus ([facility]) 2003    Rhuematologist-Sheela Dasilva, [place]    Migraine     Miscarriage 1988    2001, 2004    Obesity 1980    Obesity, unspecified     Open wound of back 05/30/2023    Opioid abuse, in remission ([facility]) 01/2022    Only while in hospital and for about 10 days once home instead of one week.    Osteoporosis     Peripheral neuropathy     Postoperative wound infection 12/2021    Stop  "not healed    Preeclampsia     Last assessed - 11/25/14    Rheumatoid arthritis ([facility])     Wears dentures      Social History     Socioeconomic History    Marital status: /Civil Union     Spouse name: None    Number of children: None    Years of education: None    Highest education level: None   Occupational History    None   Tobacco Use    Smoking status: Never     Passive exposure: Never    Smokeless tobacco: Never    Tobacco comments:     Never used tabaco, don’t care to   Vaping Use    Vaping status: Never Used   Substance and Sexual Activity    Alcohol use: Not Currently    Drug use: Yes     Types: Marijuana     Comment: Medicinal    Sexual activity: Yes     Partners: Male     Birth control/protection: I.U.D.     Comment: Mirena 4/5/2017   Other Topics Concern    None   Social History Narrative    Daily caffeinated consumption     Exercise - Walking     Social Determinants of Health     Financial Resource Strain: Not on file   Food Insecurity: No Food Insecurity (7/28/2024)    Hunger Vital Sign     Worried About Running Out of Food in the Last Year: Never true     Ran Out of Food in the Last Year: Never true   Transportation Needs: No Transportation Needs (7/28/2024)    PRAPARE - Transportation     Lack of Transportation (Medical): No     Lack of Transportation (Non-Medical): No   Physical Activity: Not on file   Stress: Not on file   Social Connections: Not on file   Intimate Partner Violence: Not on file   Housing Stability: Low Risk  (7/28/2024)    Housing Stability Vital Sign     Unable to Pay for Housing in the Last Year: No     Number of Times Moved in the Last Year: 0     Homeless in the Last Year: No       Subjective         Objective    Physical Exam:        Vitals:  Blood pressure 135/67, pulse 97, temperature 98.8 °F (37.1 °C), temperature source Oral, resp. rate 18, height 5' 1\" (1.549 m), weight 75.8 kg (167 lb), SpO2 97%, not currently breastfeeding.    Results from last 7 days   Lab Units " 07/27/24  0642   ANNY TEST  No       Imaging and other studies: I have personally reviewed pertinent reports.            Plan    Respiratory Plan: (P) Discontinue Protocol        Resp Comments: (P) pt was placed on respiratory protool for BiPAP management. pt is no longer on biPAP no other respiratory intervetnion is needed at this time will d/c from protocol

## 2024-07-28 NOTE — CASE MANAGEMENT
Case Management Assessment & Discharge Planning Note    Patient name Kinza Meza  [facility] [id number]  : 1975 Date 2024       Current Admission Date: 2024  Current Admission Diagnosis:DKA (diabetic ketoacidosis) ([facility])   Patient Active Problem List    Diagnosis Date Noted Date Diagnosed    DKA (diabetic ketoacidosis) ([facility]) 2024     Cellulitis of right toe 2024     Cellulitis of right shoulder 2024     Benign hypertension with chronic kidney disease, stage III ([facility]) 2024     Abnormal blood electrolyte level 10/30/2023     Thrombocytopenia ([facility]) 10/30/2023     Chest pain 10/29/2023     Metabolic acidosis 2023     Hyperphosphatemia 2023     Neurogenic bladder 2023     Bilateral carpal tunnel syndrome      IIH (idiopathic intracranial hypertension) 08/15/2023     Open wound of back 2023     Benign hypertension with CKD (chronic kidney disease) stage IV ([facility]) 2023     Maceration of skin 2023     Cortical age-related cataract of left eye 2023     Bipolar affective disorder, currently manic, mild ([facility]) 2022     Seasonal allergic rhinitis 2022     Surgical wound, non healing 2022     Diabetic polyneuropathy associated with diabetes mellitus due to underlying condition ([facility]) 2022     Continuous opioid dependence ([facility]) 2022     Anemia 2021     Wound infection 2021     Cauda equina syndrome ([facility]) 2021     Depression 10/28/2021     Annual physical exam 2021     Other gastritis without bleeding 2021     Obesity, unspecified      Diabetes mellitus ([facility])      HTN (hypertension)      Dizziness 2020     Secondary hyperparathyroidism of renal origin ([facility]) 2020     Insulin dependent type 2 diabetes mellitus ([facility])      Intractable migraine with status migrainosus      Type 2 diabetes mellitus with diabetic neuropathy ([facility]) 2020     Ventral hernia  without obstruction or gangrene 09/11/2020     Lumbar disc herniation with radiculopathy 09/11/2020     Concussion with moderate (1-24 hours) loss of consciousness 09/11/2020     Primary osteoarthritis of both knees 09/11/2020     Diabetic nephropathy associated with type 2 diabetes mellitus (HCC) 09/11/2020     Dysthymic disorder 09/11/2020     Acute on chronic kidney failure  (HCC) 07/13/2020     Hypokalemia 07/13/2020     Near syncope 07/13/2020     Weakness 07/13/2020     Status post gastric bypass for obesity 07/13/2020     Diarrhea 07/13/2020     Snores      Headache upon awakening      Chronic tension type headache 12/16/2019     Chronic migraine w/o aura w/o status migrainosus, not intractable 12/16/2019     Medical marijuana use 12/16/2019     Vitamin D deficiency 08/23/2019     Chronic kidney disease-mineral and bone disorder 08/20/2019     S/P cervical spinal fusion 07/25/2019     Mixed hyperlipidemia 07/22/2019     Cervical disc disorder with radiculopathy      Stage 3b chronic kidney disease ([facility]) 05/07/2019     Persistent proteinuria 05/07/2019     Hypertensive kidney disease with stage 3b chronic kidney disease ([facility]) 05/07/2019     Hyponatremia 05/07/2019     Intervertebral disc disorders with radiculopathy, lumbar region      Sacroiliitis ([facility])      Greater trochanteric bursitis of both hips      Herniated nucleus pulposus, L1-2 12/12/2017     Bilateral chronic knee pain 12/01/2017     Facet arthritis of lumbosacral region 12/01/2017     Fibromyalgia 12/01/2017     Lumbar stenosis with neurogenic claudication 12/01/2017     ADHD 08/22/2016     Uncontrolled type 2 diabetes mellitus with hyperglycemia ([facility]) 08/22/2016     Gastroesophageal reflux disease without esophagitis 08/22/2016     Morbid obesity with BMI of 40.0-44.9, adult ([facility]) 08/22/2016     Chronic renal insufficiency 11/25/2014     Endometriosis 05/16/2012       LOS (days): 2  Geometric Mean LOS (GMLOS) (days):   Days to GMLOS:      OBJECTIVE:    Risk of Unplanned Readmission Score: 32.67         Current admission status: Inpatient       Preferred Pharmacy:   [id number] - [place], [place] - [address]  [address]  [place] [address]  Phone: [phone] Fax: [phone]    [facility] [place] - [place] - [address]  [address]  [place] [address]  Phone: [phone] Fax: [phone]    [facility] - [place], [place] - [address]  [address] [address]  Phone: [phone] Fax: [phone]    [id number] - [place], [place] - [address]  [address]  [place] [address]  Phone: [phone] Fax: [phone]    [place], [place] - [address]  [address]  [address]  [facility] [address]  Phone: [phone] Fax: [phone]    [facility] [place] - [address]  [address]  [place] [address]  Phone: [phone] Fax: [phone]    Primary Care Provider: Myla Norwood MD    Primary Insurance: BLUE CROSS  Secondary Insurance:     ASSESSMENT:  Active Health Care Proxies       Phani Meza [facility] Care Representative - Spouse   Primary Phone: [phone] (Mobile)  Home Phone: [phone]                           Readmission Root Cause  30 Day Readmission: No    Patient Information  Admitted from:: Home  Mental Status: Alert  During Assessment patient was accompanied by: Not accompanied during assessment  Assessment information provided by:: Patient  Primary Caregiver: Self  Support Systems: Self, Spouse/significant other, Family members, Children  County of Residence: [place]  What city do you live in?: [place]  Home entry access options. Select all that apply.: Stairs  Number of steps to enter home.: 2  Do the steps have railings?: Yes  Type of Current Residence: 2 story home  Upon entering residence, is there a bedroom on  the main floor (no further steps)?: No  A bedroom is located on the following floor levels of residence (select all that apply):: 2nd Floor  Upon entering residence, is there a bathroom on the main floor (no further steps)?: Yes  Number of steps to 2nd floor from main floor: One Flight  Living Arrangements: Lives w/ Spouse/significant other  Is patient a ?: No    Activities of Daily Living Prior to Admission  Functional Status: Independent  Completes ADLs independently?: Yes  Ambulates independently?: Yes  Does patient use assisted devices?: No  Does patient currently own DME?: No  Does patient have a history of Outpatient Therapy (PT/OT)?: Yes  Does the patient have a history of Short-Term Rehab?: Yes ([facility])  Does patient have a history of HHC?: Yes ([facility])  Does patient currently have HHC?: No         Patient Information Continued  Income Source: SSI/SSD  Does patient have prescription coverage?: Yes  Does patient receive dialysis treatments?: No  Does patient have a history of substance abuse?: No  Does patient have a history of Mental Health Diagnosis?: Yes (depression/ ADHD)  Is patient receiving treatment for mental health?: Yes (managed by PCP)  Has patient received inpatient treatment related to mental health in the last 2 years?: No (last admission 2010)         Means of Transportation  Means of Transport to Appts:: Drives Self      Social Determinants of Health (SDOH)      Flowsheet Row Most Recent Value   Housing Stability    In the last 12 months, was there a time when you were not able to pay the mortgage or rent on time? N   In the past 12 months, how many times have you moved where you were living? 0   At any time in the past 12 months, were you homeless or living in a shelter (including now)? N   Transportation Needs    In the past 12 months, has lack of transportation kept you from medical appointments or from getting medications? no   In the past 12 months, has lack of transportation kept  you from meetings, work, or from getting things needed for daily living? No   Food Insecurity    Within the past 12 months, you worried that your food would run out before you got the money to buy more. Never true   Within the past 12 months, the food you bought just didn't last and you didn't have money to get more. Never true   Utilities    In the past 12 months has the electric, gas, oil, or water company threatened to shut off services in your home? No            DISCHARGE DETAILS:    Discharge planning discussed with:: patient @ bedside  [place] of Choice: Yes     CM contacted family/caregiver?: No- see comments (pt AAOX3)  Were Treatment Team discharge recommendations reviewed with patient/caregiver?: Yes  Did patient/caregiver verbalize understanding of patient care needs?: Yes  Were patient/caregiver advised of the risks associated with not following Treatment Team discharge recommendations?: Yes    Contacts  Patient Contacts: spouse- Phani  Relationship to Patient:: Family  Contact Method: Phone  Phone Number: [phone]  Reason/Outcome: Emergency Contact               CM introduced self and role to patient at bedside  Pt reports she resides with her spouse in 2SH ( 2STE). FFOS to 2nd floor bedroom, bath on first floor  Independent with ADLs, IADLs and ambulation  Hx of STR( [facility]), HHC ( [facility]) and OPPT.   Diagnosed with ADHD/ Depression, managed by PCP, last [facility] admission ~2010  Pt drives, but  will provide transportation home  Assigned CM to follow for dc planning needs    Patient/caregiver received discharge checklist.  Content reviewed.  Patient/caregiver encouraged to participate in discharge plan of care prior to discharge home.  CM reviewed d/c planning process including the following: identifying help at home, patient preference for d/c planning needs, Discharge Lounge, [facility]s to [facility] program, availability of treatment team to discuss questions or concerns patient and/or family may  have regarding understanding medications and recognizing signs and symptoms once discharged.  CM also encouraged patient to follow up with all recommended appointments after discharge. Patient advised of importance for patient and family to participate in managing patient’s medical well being.

## 2024-07-28 NOTE — PROGRESS NOTES
[facility]  Progress Note: Critical Care  Name: Kinza Meza 48 y.o. female I MRN: [id number]  Unit/Bed#: MICU 08 I Date of Admission: 7/26/2024   Date of Service: 7/28/2024 I Hospital Day: 2    Assessment & Plan   Depression  ADHD  Migraines  Toxic metabolic encephalopathy  Hypertension  Hypertension  Anion gap metabolic acidosis with concomitant metabolic alkalosis with respiratory acidosis  JOVANA  T2DM  Chronic Back pain     Neuro:   Diagnosis: Depression/ADHD  Diagnosis: Migraines  Diagnosis: Toxic metabolic encephalopathy vs delirium vs less likely seizures  PDMP reviewed and patient on Klonopin 0.5 mg twice daily  Plan:   Continue home Klonopin at half the dose 0.25 mg twice daily  Hold home Vyvanse  Continue Cymbalta  Continue depakote  Hold acetazolamide  CAM-ICU  Delirium precautions     CV:   Diagnosis: HTN  Diagnosis: Hypotension  Plan:   Hold home antihypertensives  S/p levophed     Pulm:  Diagnosis: AGMA Metabolic alkalosis with respiratory acidosis  Respiratory acidosis likely secondary to respiratory depression from Klonopin  Plan:  Dc bipap to NC today   Follow-up vbg 1 hrs post      GI:   None      :   Diagnosis: Anion gap metabolic acidosis with concomitant metabolic alkalosis with respiratory acidosis  Diagnosis: CKD with secondary hyperparathyroidism  AGMA with hypercholremic metabolic acidosis.   Plan:   DKA protocol  Continue home calcitriol    F/E/N:   F: Isolyte 500 cc/hr for 4 hours followed by isolyte 250 cc/hr for 8 hours  E: replete per DKA protocol  N: NPO     Heme/Onc:   Diagnosis: JOVANA  Plan:   Resume Iron supplementation when appropriate     Endo:   Diagnosis: Type 2 diabetes in DKA  A1c on admission 5.1 with recent A1c of 6.2.  On Lantus 40 units nightly with lispro 10 units 3 times daily with meals as well as mounjaro.  Has not been taking basal bolus insulin since Tuesday due to poor p.o. intake  S/p insulin dka gtt, now on subcut insulin  overnight. Calculated AG at 8->9. If correcting for albumin ag should be 7 (albumin 2.9*2.5).  AG   Plan:   Lantus 25U qhs  Will add regular insulin 10mg iv x 1 with amp dextrose,   Follow-up if true gap closes at 1200hrs     ID:   No active issues     MSK/Skin:   Diagnosis: Back pain  Plan:   Hold home muscle relaxers  Continue home Lyrica  PT/OT when appropriate     Disposition: Critical care    ICU Core Measures     A: Assess, Prevent, and Manage Pain Has pain been assessed? Yes  Need for changes to pain regimen? No   B: Both SAT/SAT  N/A   C: Choice of Sedation RASS Goal: 0 Alert and Calm  Need for changes to sedation or analgesia regimen? No   D: Delirium CAM-ICU: Negative   E: Early Mobility  Plan for early mobility? Yes   F: Family Engagement Plan for family engagement today? Yes         Prophylaxis:  VTE VTE covered by:  heparin (porcine), Subcutaneous, 5,000 Units at 07/28/24 0629       Stress Ulcer  not ordered        Significant 24hr Events     24hr events: calculated AG closed (not ag if corrected for albumin) and pt started on insulin subcutaneous glargince. D5LR dc 0400hrs. Tolerated bipap overnight. Remained off levophed since 2200hrs.     Subjective     Review of Systems: See HPI for Review of Systems     Objective                            Vitals I/O      Most Recent Min/Max in 24hrs   Temp 98.7 °F (37.1 °C) Temp  Min: 97.4 °F (36.3 °C)  Max: 98.7 °F (37.1 °C)   Pulse 82 Pulse  Min: 52  Max: 82   Resp 14 Resp  Min: 6  Max: 31   BP 97/59 BP  Min: 55/32  Max: 167/91   O2 Sat 97 % SpO2  Min: 96 %  Max: 100 %      Intake/Output Summary (Last 24 hours) at 7/28/2024 0645  Last data filed at 7/28/2024 0500  Gross per 24 hour   Intake 6070.18 ml   Output 1725 ml   Net 4345.18 ml       Diet NPO; Sips with meds    Invasive Monitoring           Physical Exam   Physical Exam  Vitals reviewed.   Eyes:      General: No scleral icterus.  Skin:     General: Skin is warm and dry.   HENT:      Head: Normocephalic.       Mouth/Throat:      Mouth: Mucous membranes are moist.   Cardiovascular:      Rate and Rhythm: Normal rate and regular rhythm.      Heart sounds: No murmur heard.  Musculoskeletal:         General: No signs of injury.   Abdominal:      Palpations: Abdomen is soft.   Constitutional:       General: She is not in acute distress.     Appearance: She is well-developed. She is not ill-appearing or toxic-appearing.      Interventions: She is not sedated and not intubated.  Pulmonary:      Effort: Pulmonary effort is normal. She is not intubated.   Neurological:      Mental Status: She is alert and oriented to person, place and time. She is calm.            Diagnostic Studies      EKG:   Imaging:  I have personally reviewed pertinent reports.       Medications:  Scheduled PRN   Artificial Tears, 1 drop, TID  atorvastatin, 10 mg, HS  calcitriol, 0.25 mcg, Daily  Cholecalciferol, 2,000 Units, Daily  clonazePAM, 0.25 mg, BID  cyanocobalamin, 1,000 mcg, Weekly  divalproex sodium, 500 mg, HS  DULoxetine, 60 mg, Daily  ferrous sulfate, 325 mg, Daily With Breakfast  heparin (porcine), 5,000 Units, Q8H DIANELYS  insulin glargine, 25 Units, HS  pregabalin, 100 mg, TID      acetaminophen, 650 mg, Q6H PRN  ondansetron, 4 mg, Q8H PRN       Continuous    norepinephrine, 1-30 mcg/min, Last Rate: Stopped (07/27/24 2200)         Labs:    CBC    Recent Labs     07/27/24  1104 07/27/24  1148 07/27/24  1154 07/28/24  0422   WBC 5.50  --   --  5.81   HGB 10.0*  --  11.2* 10.4*   HCT 31.8*   < > 33* 32.0*     --   --  136*    < > = values in this interval not displayed.     BMP    Recent Labs     07/28/24  0412 07/28/24  0447   SODIUM 141 140   K 4.6 4.6   * 112*   CO2 20* 20*   AGAP 9 8   BUN 13 13   CREATININE 1.43* 1.44*   CALCIUM 7.6* 7.7*       Coags    No recent results     Additional Electrolytes  Recent Labs     07/27/24  1148 07/27/24  1154 07/27/24  1549 07/28/24  0019 07/28/24  0412   MG  --   --    < > 2.2 2.1   PHOS  --    --    < > 1.9* 3.7   CAIONIZED 0.56* 1.26  --   --   --     < > = values in this interval not displayed.          Blood Gas    No recent results  Recent Labs     07/27/24 2009   PHVEN 7.296*   QNN9CWP 42.9   PO2VEN 16.9*   CTI2XZH 20.4*   BEVEN -5.8   P5QBYVY 26.6*    LFTs  Recent Labs     07/27/24  1646 07/28/24  0447   ALT 7 6*   AST 10* 8*   ALKPHOS 54 47   ALB 3.4* 2.9*   TBILI 0.60 0.54       Infectious  No recent results  Glucose  Recent Labs     07/27/24 2009 07/28/24  0019 07/28/24  0412 07/28/24  0447   GLUC 90 93 111 111               Nuzhat Mitchell, DO

## 2024-07-28 NOTE — PLAN OF CARE
Problem: PAIN - ADULT  Goal: Verbalizes/displays adequate comfort level or baseline comfort level  Description: Interventions:  - Encourage patient to monitor pain and request assistance  - Assess pain using appropriate pain scale  - Administer analgesics based on type and severity of pain and evaluate response  - Implement non-pharmacological measures as appropriate and evaluate response  - Consider cultural and social influences on pain and pain management  - Notify physician/advanced practitioner if interventions unsuccessful or patient reports new pain  Outcome: Progressing     Problem: SAFETY ADULT  Goal: Patient will remain free of falls  Description: INTERVENTIONS:  - Educate patient/family on patient safety including physical limitations  - Instruct patient to call for assistance with activity   - Consult OT/PT to assist with strengthening/mobility   - Keep Call bell within reach  - Keep bed low and locked with side rails adjusted as appropriate  - Keep care items and personal belongings within reach  - Initiate and maintain comfort rounds  - Make Fall Risk Sign visible to staff  - Apply yellow socks and bracelet for high fall risk patients  - Consider moving patient to room near nurses station  Outcome: Progressing  Goal: Maintain or return to baseline ADL function  Description: INTERVENTIONS:  -  Assess patient's ability to carry out ADLs; assess patient's baseline for ADL function and identify physical deficits which impact ability to perform ADLs (bathing, care of mouth/teeth, toileting, grooming, dressing, etc.)  - Assess/evaluate cause of self-care deficits   - Assess range of motion  - Assess patient's mobility; develop plan if impaired  - Assess patient's need for assistive devices and provide as appropriate  - Encourage maximum independence but intervene and supervise when necessary  - Involve family in performance of ADLs  - Assess for home care needs following discharge   - Consider OT consult  to assist with ADL evaluation and planning for discharge  - Provide patient education as appropriate  Outcome: Progressing  Goal: Maintains/Returns to pre admission functional level  Description: INTERVENTIONS:  - Perform AM-PAC 6 Click Basic Mobility/ Daily Activity assessment daily.  - Set and communicate daily mobility goal to care team and patient/family/caregiver.   - Collaborate with rehabilitation services on mobility goals if consulted  - Record patient progress and toleration of activity level   Outcome: Progressing

## 2024-07-28 NOTE — QUICK NOTE
Pt corrected AG with albumin 12.5= [10+(10-3.0*2.5)]  Per discussion with Dr. Barker, restart DKA protocol on pt.   Discontinue Bicarb gtt given normalized corrected bicarb 23= [2.5 ag difference+10 normal ag]

## 2024-07-28 NOTE — PLAN OF CARE
Problem: PAIN - ADULT  Goal: Verbalizes/displays adequate comfort level or baseline comfort level  Description: Interventions:  - Encourage patient to monitor pain and request assistance  - Assess pain using appropriate pain scale  - Administer analgesics based on type and severity of pain and evaluate response  - Implement non-pharmacological measures as appropriate and evaluate response  - Consider cultural and social influences on pain and pain management  - Notify physician/advanced practitioner if interventions unsuccessful or patient reports new pain  Outcome: Progressing     Problem: INFECTION - ADULT  Goal: Absence or prevention of progression during hospitalization  Description: INTERVENTIONS:  - Assess and monitor for signs and symptoms of infection  - Monitor lab/diagnostic results  - Monitor all insertion sites, i.e. indwelling lines, tubes, and drains  - Monitor endotracheal if appropriate and nasal secretions for changes in amount and color  - [place] appropriate cooling/warming therapies per order  - Administer medications as ordered  - Instruct and encourage patient and family to use good hand hygiene technique  - Identify and instruct in appropriate isolation precautions for identified infection/condition  Outcome: Progressing  Goal: Absence of fever/infection during neutropenic period  Description: INTERVENTIONS:  - Monitor WBC    Outcome: Progressing     Problem: SAFETY ADULT  Goal: Patient will remain free of falls  Description: INTERVENTIONS:  - Educate patient/family on patient safety including physical limitations  - Instruct patient to call for assistance with activity   - Consult OT/PT to assist with strengthening/mobility   - Keep Call bell within reach  - Keep bed low and locked with side rails adjusted as appropriate  - Keep care items and personal belongings within reach  - Initiate and maintain comfort rounds  - Make Fall Risk Sign visible to staff  - Offer Toileting every 2 Hours,  in advance of need  - Initiate/Maintain bed alarm  - Obtain necessary fall risk management equipment:   - Apply yellow socks and bracelet for high fall risk patients  - Consider moving patient to room near nurses station  Outcome: Progressing  Goal: Maintain or return to baseline ADL function  Description: INTERVENTIONS:  -  Assess patient's ability to carry out ADLs; assess patient's baseline for ADL function and identify physical deficits which impact ability to perform ADLs (bathing, care of mouth/teeth, toileting, grooming, dressing, etc.)  - Assess/evaluate cause of self-care deficits   - Assess range of motion  - Assess patient's mobility; develop plan if impaired  - Assess patient's need for assistive devices and provide as appropriate  - Encourage maximum independence but intervene and supervise when necessary  - Involve family in performance of ADLs  - Assess for home care needs following discharge   - Consider OT consult to assist with ADL evaluation and planning for discharge  - Provide patient education as appropriate  Outcome: Progressing  Goal: Maintains/Returns to pre admission functional level  Description: INTERVENTIONS:  - Perform AM-PAC 6 Click Basic Mobility/ Daily Activity assessment daily.  - Set and communicate daily mobility goal to care team and patient/family/caregiver.   - Collaborate with rehabilitation services on mobility goals if consulted  - Perform Range of Motion 3 times a day.  - Reposition patient every 2 hours.  - Dangle patient 3 times a day  - Stand patient 3 times a day  - Ambulate patient 1 times a day  - Out of bed to chair 1 times a day   - Out of bed for meals 3 times a day  - Out of bed for toileting  - Record patient progress and toleration of activity level   Outcome: Progressing     Problem: DISCHARGE PLANNING  Goal: Discharge to home or other facility with appropriate resources  Description: INTERVENTIONS:  - Identify barriers to discharge w/patient and caregiver  -  Arrange for needed discharge resources and transportation as appropriate  - Identify discharge learning needs (meds, wound care, etc.)  - Arrange for interpretive services to assist at discharge as needed  - Refer to Case Management Department for coordinating discharge planning if the patient needs post-hospital services based on physician/advanced practitioner order or complex needs related to functional status, cognitive ability, or social support system  Outcome: Progressing     Problem: Knowledge Deficit  Goal: Patient/family/caregiver demonstrates understanding of disease process, treatment plan, medications, and discharge instructions  Description: Complete learning assessment and assess knowledge base.  Interventions:  - Provide teaching at level of understanding  - Provide teaching via preferred learning methods  Outcome: Progressing

## 2024-07-29 LAB
ALBUMIN SERPL BCG-MCNC: 2.8 G/DL (ref 3.5–5)
ALBUMIN SERPL BCG-MCNC: 3 G/DL (ref 3.5–5)
ALP SERPL-CCNC: 49 U/L (ref 34–104)
ALT SERPL W P-5'-P-CCNC: 6 U/L (ref 7–52)
ANION GAP SERPL CALCULATED.3IONS-SCNC: 6 MMOL/L (ref 4–13)
ANION GAP SERPL CALCULATED.3IONS-SCNC: 7 MMOL/L (ref 4–13)
AST SERPL W P-5'-P-CCNC: 6 U/L (ref 13–39)
BASOPHILS # BLD AUTO: 0.03 THOUSANDS/ÂΜL (ref 0–0.1)
BASOPHILS NFR BLD AUTO: 1 % (ref 0–1)
BILIRUB SERPL-MCNC: 0.47 MG/DL (ref 0.2–1)
BUN SERPL-MCNC: 8 MG/DL (ref 5–25)
BUN SERPL-MCNC: 9 MG/DL (ref 5–25)
CALCIUM ALBUM COR SERPL-MCNC: 8.9 MG/DL (ref 8.3–10.1)
CALCIUM SERPL-MCNC: 7.9 MG/DL (ref 8.4–10.2)
CALCIUM SERPL-MCNC: 7.9 MG/DL (ref 8.4–10.2)
CHLORIDE SERPL-SCNC: 109 MMOL/L (ref 96–108)
CHLORIDE SERPL-SCNC: 112 MMOL/L (ref 96–108)
CO2 SERPL-SCNC: 22 MMOL/L (ref 21–32)
CO2 SERPL-SCNC: 23 MMOL/L (ref 21–32)
CREAT SERPL-MCNC: 1.29 MG/DL (ref 0.6–1.3)
CREAT SERPL-MCNC: 1.32 MG/DL (ref 0.6–1.3)
EOSINOPHIL # BLD AUTO: 0.07 THOUSAND/ÂΜL (ref 0–0.61)
EOSINOPHIL NFR BLD AUTO: 2 % (ref 0–6)
ERYTHROCYTE [DISTWIDTH] IN BLOOD BY AUTOMATED COUNT: 14.9 % (ref 11.6–15.1)
GFR SERPL CREATININE-BSD FRML MDRD: 47 ML/MIN/1.73SQ M
GFR SERPL CREATININE-BSD FRML MDRD: 48 ML/MIN/1.73SQ M
GLUCOSE SERPL-MCNC: 100 MG/DL (ref 65–140)
GLUCOSE SERPL-MCNC: 104 MG/DL (ref 65–140)
GLUCOSE SERPL-MCNC: 167 MG/DL (ref 65–140)
GLUCOSE SERPL-MCNC: 82 MG/DL (ref 65–140)
GLUCOSE SERPL-MCNC: 84 MG/DL (ref 65–140)
GLUCOSE SERPL-MCNC: 85 MG/DL (ref 65–140)
GLUCOSE SERPL-MCNC: 93 MG/DL (ref 65–140)
GLUCOSE SERPL-MCNC: 93 MG/DL (ref 65–140)
GLUCOSE SERPL-MCNC: 94 MG/DL (ref 65–140)
GLUCOSE SERPL-MCNC: 94 MG/DL (ref 65–140)
HCT VFR BLD AUTO: 31.1 % (ref 34.8–46.1)
HGB BLD-MCNC: 10.2 G/DL (ref 11.5–15.4)
IMM GRANULOCYTES # BLD AUTO: 0.03 THOUSAND/UL (ref 0–0.2)
IMM GRANULOCYTES NFR BLD AUTO: 1 % (ref 0–2)
LYMPHOCYTES # BLD AUTO: 1.89 THOUSANDS/ÂΜL (ref 0.6–4.47)
LYMPHOCYTES NFR BLD AUTO: 40 % (ref 14–44)
MAGNESIUM SERPL-MCNC: 1.7 MG/DL (ref 1.9–2.7)
MAGNESIUM SERPL-MCNC: 1.8 MG/DL (ref 1.9–2.7)
MCH RBC QN AUTO: 28.3 PG (ref 26.8–34.3)
MCHC RBC AUTO-ENTMCNC: 32.8 G/DL (ref 31.4–37.4)
MCV RBC AUTO: 86 FL (ref 82–98)
MONOCYTES # BLD AUTO: 0.35 THOUSAND/ÂΜL (ref 0.17–1.22)
MONOCYTES NFR BLD AUTO: 7 % (ref 4–12)
NEUTROPHILS # BLD AUTO: 2.34 THOUSANDS/ÂΜL (ref 1.85–7.62)
NEUTS SEG NFR BLD AUTO: 49 % (ref 43–75)
NRBC BLD AUTO-RTO: 0 /100 WBCS
PHOSPHATE SERPL-MCNC: 3.4 MG/DL (ref 2.7–4.5)
PHOSPHATE SERPL-MCNC: 3.4 MG/DL (ref 2.7–4.5)
PLATELET # BLD AUTO: 115 THOUSANDS/UL (ref 149–390)
PMV BLD AUTO: 11.5 FL (ref 8.9–12.7)
POTASSIUM SERPL-SCNC: 4.2 MMOL/L (ref 3.5–5.3)
POTASSIUM SERPL-SCNC: 5.1 MMOL/L (ref 3.5–5.3)
PROT SERPL-MCNC: 4.4 G/DL (ref 6.4–8.4)
RBC # BLD AUTO: 3.61 MILLION/UL (ref 3.81–5.12)
SODIUM SERPL-SCNC: 138 MMOL/L (ref 135–147)
SODIUM SERPL-SCNC: 141 MMOL/L (ref 135–147)
WBC # BLD AUTO: 4.71 THOUSAND/UL (ref 4.31–10.16)

## 2024-07-29 PROCEDURE — 84100 ASSAY OF PHOSPHORUS: CPT

## 2024-07-29 PROCEDURE — 83735 ASSAY OF MAGNESIUM: CPT

## 2024-07-29 PROCEDURE — 97166 OT EVAL MOD COMPLEX 45 MIN: CPT

## 2024-07-29 PROCEDURE — 82948 REAGENT STRIP/BLOOD GLUCOSE: CPT

## 2024-07-29 PROCEDURE — NC001 PR NO CHARGE: Performed by: STUDENT IN AN ORGANIZED HEALTH CARE EDUCATION/TRAINING PROGRAM

## 2024-07-29 PROCEDURE — 99233 SBSQ HOSP IP/OBS HIGH 50: CPT | Performed by: STUDENT IN AN ORGANIZED HEALTH CARE EDUCATION/TRAINING PROGRAM

## 2024-07-29 PROCEDURE — 80053 COMPREHEN METABOLIC PANEL: CPT

## 2024-07-29 PROCEDURE — 85025 COMPLETE CBC W/AUTO DIFF WBC: CPT | Performed by: STUDENT IN AN ORGANIZED HEALTH CARE EDUCATION/TRAINING PROGRAM

## 2024-07-29 PROCEDURE — 97163 PT EVAL HIGH COMPLEX 45 MIN: CPT

## 2024-07-29 RX ORDER — INSULIN GLARGINE 100 [IU]/ML
15 INJECTION, SOLUTION SUBCUTANEOUS
Status: DISCONTINUED | OUTPATIENT
Start: 2024-07-29 | End: 2024-07-30

## 2024-07-29 RX ORDER — MAGNESIUM SULFATE HEPTAHYDRATE 40 MG/ML
4 INJECTION, SOLUTION INTRAVENOUS ONCE
Status: COMPLETED | OUTPATIENT
Start: 2024-07-29 | End: 2024-07-29

## 2024-07-29 RX ORDER — INSULIN LISPRO 100 [IU]/ML
1-6 INJECTION, SOLUTION INTRAVENOUS; SUBCUTANEOUS
Status: DISCONTINUED | OUTPATIENT
Start: 2024-07-29 | End: 2024-07-31 | Stop reason: HOSPADM

## 2024-07-29 RX ORDER — INSULIN LISPRO 100 [IU]/ML
5 INJECTION, SOLUTION INTRAVENOUS; SUBCUTANEOUS
Status: DISCONTINUED | OUTPATIENT
Start: 2024-07-29 | End: 2024-07-30

## 2024-07-29 RX ADMIN — PREGABALIN 100 MG: 100 CAPSULE ORAL at 17:40

## 2024-07-29 RX ADMIN — PREGABALIN 100 MG: 100 CAPSULE ORAL at 08:07

## 2024-07-29 RX ADMIN — MAGNESIUM SULFATE HEPTAHYDRATE 4 G: 40 INJECTION, SOLUTION INTRAVENOUS at 08:07

## 2024-07-29 RX ADMIN — Medication 2000 UNITS: at 08:07

## 2024-07-29 RX ADMIN — ONDANSETRON 4 MG: 4 TABLET, ORALLY DISINTEGRATING ORAL at 14:48

## 2024-07-29 RX ADMIN — INSULIN LISPRO 5 UNITS: 100 INJECTION, SOLUTION INTRAVENOUS; SUBCUTANEOUS at 08:07

## 2024-07-29 RX ADMIN — INSULIN LISPRO 5 UNITS: 100 INJECTION, SOLUTION INTRAVENOUS; SUBCUTANEOUS at 13:08

## 2024-07-29 RX ADMIN — FERROUS SULFATE TAB 325 MG (65 MG ELEMENTAL FE) 325 MG: 325 (65 FE) TAB at 08:08

## 2024-07-29 RX ADMIN — PREGABALIN 100 MG: 100 CAPSULE ORAL at 22:12

## 2024-07-29 RX ADMIN — HEPARIN SODIUM 5000 UNITS: 5000 INJECTION INTRAVENOUS; SUBCUTANEOUS at 13:09

## 2024-07-29 RX ADMIN — INSULIN LISPRO 5 UNITS: 100 INJECTION, SOLUTION INTRAVENOUS; SUBCUTANEOUS at 17:43

## 2024-07-29 RX ADMIN — CLONAZEPAM 0.25 MG: 0.5 TABLET ORAL at 17:40

## 2024-07-29 RX ADMIN — CALCITRIOL CAPSULES 0.25 MCG 0.25 MCG: 0.25 CAPSULE ORAL at 08:08

## 2024-07-29 RX ADMIN — GLYCERIN 1 DROP: .002; .002; .01 SOLUTION/ DROPS OPHTHALMIC at 17:43

## 2024-07-29 RX ADMIN — ATORVASTATIN CALCIUM 10 MG: 10 TABLET, FILM COATED ORAL at 22:13

## 2024-07-29 RX ADMIN — DULOXETINE HYDROCHLORIDE 60 MG: 60 CAPSULE, DELAYED RELEASE ORAL at 08:07

## 2024-07-29 RX ADMIN — HEPARIN SODIUM 5000 UNITS: 5000 INJECTION INTRAVENOUS; SUBCUTANEOUS at 05:13

## 2024-07-29 RX ADMIN — DIVALPROEX SODIUM 500 MG: 500 TABLET, DELAYED RELEASE ORAL at 22:13

## 2024-07-29 RX ADMIN — GLYCERIN 1 DROP: .002; .002; .01 SOLUTION/ DROPS OPHTHALMIC at 08:07

## 2024-07-29 RX ADMIN — HEPARIN SODIUM 5000 UNITS: 5000 INJECTION INTRAVENOUS; SUBCUTANEOUS at 22:15

## 2024-07-29 RX ADMIN — CLONAZEPAM 0.25 MG: 0.5 TABLET ORAL at 08:07

## 2024-07-29 RX ADMIN — INSULIN GLARGINE 15 UNITS: 100 INJECTION, SOLUTION SUBCUTANEOUS at 08:08

## 2024-07-29 NOTE — OCCUPATIONAL THERAPY NOTE
Occupational Therapy Evaluation     Patient Name: Kinza Meza  Today's Date: 7/29/2024  Problem List  Principal Problem:    DKA (diabetic ketoacidosis) (Formerly [facility])  Active Problems:    ADHD    Stage 3b chronic kidney disease ([facility])    Vitamin D deficiency    Chronic migraine w/o aura w/o status migrainosus, not intractable    HTN (hypertension)    Depression    Past Medical History  Past Medical History:   Diagnosis Date    ADD (attention deficit disorder)     Allergic rhinitis     ALS (amyotrophic lateral sclerosis) ([facility]) 11/22    Per neurological surgeon    Anemia 12/2021    Anxiety     Arthritis     Bipolar disorder ([facility])     Cervical disc disorder with radiculopathy of cervical region     Chronic depression     Chronic kidney disease     Stage 1    Chronic pain disorder     Cluster headache     Colitis     Last assessed - 11/25/14    Colon polyp     Concussion 2018    Condyloma acuminatum     Last assessed - 3/19/14    CTS (carpal tunnel syndrome) 2001    Depression     Diabetes mellitus ([facility])     Last assessed - 11/25/14    Diabetic nephropathy ([facility]) 2001    Diabetic neuropathy ([facility])     Diabetic retinopathy ([facility]) 2001    Difficulty walking 07/22    Fibromyalgia     Fibromyalgia, primary     GERD (gastroesophageal reflux disease)     Head injury 08/13/18    Fall    Headache(784.0) 1977    History of transfusion 12/2021    HTN (hypertension)     Last assessed - 11/25/14    Hyperlipidemia 07/22/2019    Hypertension     Intervertebral disc disorder with radiculopathy of lumbar region     Lupus ([facility]) 2003    Rhuematologist-Sheela Dasilva, [place]    Migraine     Miscarriage 1988    2001, 2004    Obesity 1980    Obesity, unspecified     Open wound of back 05/30/2023    Opioid abuse, in remission ([facility]) 01/2022    Only while in hospital and for about 10 days once home instead of one week.    Osteoporosis     Peripheral neuropathy     Postoperative wound infection 12/2021    Stop not healed    Preeclampsia     Last  assessed - 14    Rheumatoid arthritis (HCC)     Wears dentures      Past Surgical History  Past Surgical History:   Procedure Laterality Date    APPENDECTOMY      Last assessed - 14    BARIATRIC SURGERY  2016    CARPAL TUNNEL RELEASE      CATARACT EXTRACTION      CERVICAL FUSION N/A 2019    Procedure: Anterior cervical discectomy w fusion C4-5, C5-6, C6-7; allograft and neuromonitoring;  Surgeon: Jose Gomez MD;  [facility]: [facility];  Service: Orthopedics     SECTION      Last assessed - 14    COLONOSCOPY      COLONOSCOPY      HEMORRHOID SURGERY      HERNIA REPAIR      Last assessed - 14    LUMBAR FUSION N/A 2021    Procedure: L1/2 laminectomy, discectomy with L1/2 MIS posterior fixation using neuromonitoring and neuronavigation;  Surgeon: Suhas Akbar MD;  [facility]: [facility];  Service: Neurosurgery    LUMBAR WOUND EXPLORATION Bilateral 2021    Procedure: Lumbar wound washout, debridement and intraoperative cultures;  Surgeon: Suhas Akbar MD;  [facility];  Service: Neurosurgery    MAMMO (HISTORICAL)      AL DEBRIDEMENT BONE 1ST 20 SQ CM/< N/A 2023    Procedure: SURGICAL PREPARATION OF BACK  WOUND AND LOCAL FLAP, PREVENA PLACEMENT;  Surgeon: Ady Rocha MD;  Location:  MAIN OR;  Service: Plastics    WOOD-EN-Y PROCEDURE      ULNAR TUNNEL RELEASE      VAC DRESSING APPLICATION Bilateral 2021    Procedure: APPLICATION VAC DRESSING;  Surgeon: Elbert Hawley MD;  [facility];  Service: General    VAC DRESSING APPLICATION N/A 2021    Procedure: APPLICATION VAC DRESSING ABDOMEN/TRUNK;  Surgeon: Mani Ross DO;  [facility];  Service: General    VENTRAL HERNIA REPAIR           24 [id number]   OT Last Visit   OT Visit Date 24   Note Type   Note type Evaluation   Pain Assessment   Pain Assessment Tool 0-10   Pain Score No Pain   Restrictions/Precautions   Weight Bearing Precautions Per Order No    Other Precautions Chair Alarm;Bed Alarm;Multiple lines;Telemetry;Fall Risk;Pain   Home Living   Type of Home House   Home Layout Two level;Bed/bath upstairs;1/2 bath on main level  (2 LINDA)   Bathroom Shower/Tub Tub/shower unit   Bathroom Toilet Raised   Bathroom Equipment Shower chair;Grab bars in shower;Grab bars around toilet   Home Equipment Walker;Cane  (RW used as needed ; primarily uses cane)   Additional Comments pt reports living in 2 SH, 2 LINDA, 11 steps to 2nd floor, bath on 1st floor, bed on 2nd floor.   Prior Function   Level of [place] Independent with ADLs;Independent with functional mobility;Independent with IADLS   Lives With Spouse   Receives Help From Family   IADLs Independent with driving;Independent with meal prep;Independent with medication management   Falls in the last 6 months 5 to 10  (6)   Vocational On disability   Comments (+)    Lifestyle   Autonomy I w/ ADLS, IADLS, transfers and functional mobility PTA   Reciprocal Relationships pt lives w/ her spouse. Grandson visits often   Service to Others SSD   Intrinsic Gratification spending time w/ her cats and dogs   ADL   Eating Assistance 5  Supervision/Setup   Grooming Assistance 5  Supervision/Setup   UB Bathing Assistance 5  Supervision/Setup   LB Bathing Assistance 4  Minimal Assistance   UB Dressing Assistance 5  Supervision/Setup   LB Dressing Assistance 4  Minimal Assistance   Toileting Assistance  4  Minimal Assistance   Functional Assistance 4  Minimal Assistance   Functional Deficit Steadying;Verbal cueing;Supervision/safety;Increased time to complete   Bed Mobility   Supine to Sit 5  Supervision   Additional items HOB elevated;Increased time required;Verbal cues   Sit to Supine Unable to assess   Additional Comments pt sat EOB w/ Fair sitting balance/trunk control   Transfers   Sit to Stand 4  Minimal assistance   Additional items Assist x 1;Increased time required;Verbal cues   Stand to Sit 4  Minimal assistance    Additional items Assist x 1;Increased time required;Verbal cues   Toilet transfer 4  Minimal assistance   Additional items Assist x 1;Increased time required;Verbal cues;Commode   Additional Comments w/ HHA   Functional Mobility   Functional Mobility 4  Minimal assistance   Additional Comments pt took few small steps from EOB to BSC and BSC to recliner w/ Min A x1; HHA used   Additional items Hand hold assistance   Balance   Static Sitting Fair   Dynamic Sitting Fair -   Static Standing Poor +   Dynamic Standing Poor +   Ambulatory Poor +   Activity Tolerance   Activity Tolerance Patient limited by fatigue;Patient limited by pain   Medical Staff Made Aware PT, SPT   Nurse Made Aware yes   RUE Assessment   RUE Assessment WFL   LUE Assessment   LUE Assessment WFL   Hand Function   Gross Motor Coordination Functional   Fine Motor Coordination Functional   Cognition   Overall Cognitive Status WFL   Arousal/Participation Responsive;Cooperative   Attention Within functional limits   Orientation Level Oriented X4   Memory Within functional limits   Following Commands Follows all commands and directions without difficulty   Comments pt is pleasant and cooperative   Assessment   Limitation Decreased ADL status;Decreased endurance;Decreased high-level ADLs;Decreased self-care trans   Prognosis Fair   Assessment Pt is a 47 y/o female seen for OT eval s/p adm to [facility] w/  abnormal outpatient lab work. Pt is dx'd w/ DKA. Pt  has a past medical history of ADD (attention deficit disorder), Allergic rhinitis, ALS (amyotrophic lateral sclerosis) ([facility]) (11/22), Anemia (12/2021), Anxiety, Arthritis, Bipolar disorder ([facility]), Cervical disc disorder with radiculopathy of cervical region, Chronic depression, Chronic kidney disease, Chronic pain disorder, Cluster headache, Colitis, Colon polyp, Concussion (2018), Condyloma acuminatum, CTS (carpal tunnel syndrome) (2001), Depression, Diabetes mellitus ([facility]), Diabetic nephropathy ([facility])  (), Diabetic neuropathy (), Diabetic retinopathy ([facility]) (), Difficulty walking (), Fibromyalgia, Fibromyalgia, primary, GERD (gastroesophageal reflux disease), Head injury (18), Headache(784.0) (), History of transfusion (2021), HTN (hypertension), Hyperlipidemia (2019), Hypertension, Intervertebral disc disorder with radiculopathy of lumbar region, Lupus ([facility]) (), Migraine, Miscarriage (), Obesity (), Obesity, unspecified, Open wound of back (2023), Opioid abuse, in remission ([facility]) (2022), Osteoporosis, Peripheral neuropathy, Postoperative wound infection (2021), Preeclampsia, Rheumatoid arthritis (), and Wears dentures. Pt with active OT orders and up and OOB as tolerated orders. Pt lives with her spouse in 2 , 2 [facility], 2nd floor setup. Pt was I w/ ADLS and IADLS, drove, & required use of DME PTA including cane and RW. Pt is currently demonstrating the following occupational deficits: S UB ADLS, Min A LB ADLS, S bed mobility, Min A transfers and functional mobility w/ HHA. These deficits that are impacting pt's baseline areas of occupation are a result of the following impairments: pain, endurance, activity tolerance, functional mobility, forward functional reach, balance, trunk control, functional standing tolerance, decreased I w/ ADLS/IADLS, strength, and decreased insight into deficits.The following Occupational Performance Areas to address include: bathing/shower, toilet hygiene, dressing, health maintenance, functional mobility, community mobility, clothing management, and household maintenance. Recommend home OT vs home w/ family support, pending progress, upon D/C. Pt to continue to benefit from acute immediate OT services to address the following goals 2-3x/week to  w/in 10-14 days:   Goals   Patient Goals to be independent   LTG Time Frame 10-14   Long Term Goal #1 see below listed goals   Plan   Treatment Interventions ADL  retraining;Functional transfer training;UE strengthening/ROM;Endurance training;Cognitive reorientation;Patient/family training;Equipment evaluation/education;Compensatory technique education;Continued evaluation;Energy conservation;Activityengagement   Goal Expiration Date 08/12/24   OT Frequency 2-3x/wk   Discharge Recommendation   Rehab Resource Intensity Level, OT III (Minimum Resource Intensity)   Additional Comments  The patient's raw score on the AM-PAC Daily Activity Inpatient Short Form is 21. A raw score of greater than or equal to 19 suggests the patient may benefit from discharge to home. Please refer to the recommendation of the Occupational Therapist for safe discharge planning   Additional Comments 2 Pt seen as a co-session due to the patient's co-morbidities, clinically unstable presentation, and present impairments which are a regression from the patient's baseline.   -[facility] Daily Activity Inpatient   Lower Body Dressing 3   Bathing 3   Toileting 3   Upper Body Dressing 4   Grooming 4   Eating 4   Daily Activity Raw Score 21   Daily Activity Standardized Score (Calc for Raw Score >=11) 44.27   AM-PAC Applied Cognition Inpatient   Following a Speech/Presentation 4   Understanding Ordinary Conversation 4   Taking Medications 4   Remembering Where Things Are Placed or Put Away 4   Remembering List of 4-5 Errands 4   Taking Care of Complicated Tasks 4   Applied Cognition Raw Score 24   Applied Cognition Standardized Score 62.21   End of Consult   Education Provided Yes   Patient Position at End of Consult Bedside chair;Bed/Chair alarm activated;All needs within reach   Nurse Communication Nurse aware of consult     GOALS    1) Pt will improve activity tolerance to G for 30 min txment sessions for increase engagement in functional tasks  2) Pt will complete UB/LB dressing/self care w/ mod I using adaptive device and DME as needed  3) Pt will complete bathing w/ Mod I w/ use of AE and DME as needed  4) Pt  will complete toileting w/ mod I w/ G hygiene/thoroughness using DME as needed  5) Pt will improve functional transfers to Mod I on/off all surfaces using DME as needed w/ G balance/safety   6) Pt will improve functional mobility during ADL/IADL/leisure tasks to Mod I using DME as needed w/ G balance/safety   7) Pt will participate in simulated IADL management task to increase independence to Mod I w/ G safety and endurance  8) Pt will be attentive 100% of the time during ongoing cognitive assessment w/ G participation to assist w/ safe d/c planning/recommendations  9) Pt will demonstrate G carryover of pt/caregiver education and training as appropriate w/o cues w/ good tolerance to increase safety during functional tasks  10) Pt will demonstrate 100% carryover of energy conservation techniques t/o functional I/ADL/leisure tasks w/o cues s/p skilled education to increase endurance during functional tasks     Felipa Hart MS, OTR/L
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
## 2024-07-29 NOTE — PLAN OF CARE
Problem: PAIN - ADULT  Goal: Verbalizes/displays adequate comfort level or baseline comfort level  Description: Interventions:  - Encourage patient to monitor pain and request assistance  - Assess pain using appropriate pain scale  - Administer analgesics based on type and severity of pain and evaluate response  - Implement non-pharmacological measures as appropriate and evaluate response  - Consider cultural and social influences on pain and pain management  - Notify physician/advanced practitioner if interventions unsuccessful or patient reports new pain  7/29/2024 1342 by Elizabeth Ribeiro RN  Outcome: Progressing  7/29/2024 1342 by Elizabeth Ribeiro RN  Outcome: Progressing     Problem: INFECTION - ADULT  Goal: Absence or prevention of progression during hospitalization  Description: INTERVENTIONS:  - Assess and monitor for signs and symptoms of infection  - Monitor lab/diagnostic results  - Monitor all insertion sites, i.e. indwelling lines, tubes, and drains  - Monitor endotracheal if appropriate and nasal secretions for changes in amount and color  - [place] appropriate cooling/warming therapies per order  - Administer medications as ordered  - Instruct and encourage patient and family to use good hand hygiene technique  - Identify and instruct in appropriate isolation precautions for identified infection/condition  7/29/2024 1342 by Elizabeth Ribeiro RN  Outcome: Progressing  7/29/2024 1342 by Elizabeth Ribeiro RN  Outcome: Progressing     Problem: SAFETY ADULT  Goal: Patient will remain free of falls  Description: INTERVENTIONS:  - Educate patient/family on patient safety including physical limitations  - Instruct patient to call for assistance with activity   - Consult OT/PT to assist with strengthening/mobility   - Keep Call bell within reach  - Keep bed low and locked with side rails adjusted as appropriate  - Keep care items and personal belongings within reach  - Initiate and maintain comfort rounds  -  Make Fall Risk Sign visible to staff  - Offer Toileting every 2 Hours, in advance of need  - Initiate/Maintain bed/chair alarm  - Obtain necessary fall risk management equipment  - Apply yellow socks and bracelet for high fall risk patients  - Consider moving patient to room near nurses station  7/29/2024 1342 by Elizabeth Ribeiro RN  Outcome: Progressing  7/29/2024 1342 by Elizabeth Ribeiro RN  Outcome: Progressing  Goal: Maintain or return to baseline ADL function  Description: INTERVENTIONS:  -  Assess patient's ability to carry out ADLs; assess patient's baseline for ADL function and identify physical deficits which impact ability to perform ADLs (bathing, care of mouth/teeth, toileting, grooming, dressing, etc.)  - Assess/evaluate cause of self-care deficits   - Assess range of motion  - Assess patient's mobility; develop plan if impaired  - Assess patient's need for assistive devices and provide as appropriate  - Encourage maximum independence but intervene and supervise when necessary  - Involve family in performance of ADLs  - Assess for home care needs following discharge   - Consider OT consult to assist with ADL evaluation and planning for discharge  - Provide patient education as appropriate  7/29/2024 1342 by Elizabeth Ribeiro RN  Outcome: Progressing  7/29/2024 1342 by Elizabeth Ribeiro RN  Outcome: Progressing  Goal: Maintains/Returns to pre admission functional level  Description: INTERVENTIONS:  - Perform AM-PAC 6 Click Basic Mobility/ Daily Activity assessment daily.  - Set and communicate daily mobility goal to care team and patient/family/caregiver.   - Collaborate with rehabilitation services on mobility goals if consulted  - Perform Range of Motion 4 times a day.  - Stand patient 2 times a day  - Ambulate patient 2 times a day  - Out of bed to chair 2 times a day   - Out of bed for meals 3 times a day  - Out of bed for toileting  - Record patient progress and toleration of activity level   7/29/2024  1342 by Elizabeth Ribeiro RN  Outcome: Progressing  7/29/2024 1342 by Elizabeth Ribeiro RN  Outcome: Progressing     Problem: DISCHARGE PLANNING  Goal: Discharge to home or other facility with appropriate resources  Description: INTERVENTIONS:  - Identify barriers to discharge w/patient and caregiver  - Arrange for needed discharge resources and transportation as appropriate  - Identify discharge learning needs (meds, wound care, etc.)  - Arrange for interpretive services to assist at discharge as needed  - Refer to Case Management Department for coordinating discharge planning if the patient needs post-hospital services based on physician/advanced practitioner order or complex needs related to functional status, cognitive ability, or social support system  7/29/2024 1342 by Elizabeth Ribeiro RN  Outcome: Progressing  7/29/2024 1342 by Elizabeth Ribeiro RN  Outcome: Progressing     Problem: Knowledge Deficit  Goal: Patient/family/caregiver demonstrates understanding of disease process, treatment plan, medications, and discharge instructions  Description: Complete learning assessment and assess knowledge base.  Interventions:  - Provide teaching at level of understanding  - Provide teaching via preferred learning methods  7/29/2024 1342 by Elizabeth Ribeiro RN  Outcome: Progressing  7/29/2024 1342 by Elizabeth Ribeiro RN  Outcome: Progressing     Problem: INFECTION - ADULT  Goal: Absence of fever/infection during neutropenic period  Description: INTERVENTIONS:  - Monitor WBC    Outcome: Completed

## 2024-07-29 NOTE — PROGRESS NOTES
INTERNAL MEDICINE RESIDENCY ICU TRANSFER ACCEPTANCE NOTE     Name: Kinza Meza   Age & Sex: 48 y.o. female   MRN: [id number]  Unit/Bed#: MICU 08   Encounter: [id number]  Hospital Stay Days: 3    Accepting team: SOD Team B   Code Status: Level 1 - Full Code  Disposition: Patient requires Med/Surg    ASSESSMENT/PLAN     Principal Problem:    DKA (diabetic ketoacidosis) ([facility])  Active Problems:    ADHD    Stage 3b chronic kidney disease (HCC)    Vitamin D deficiency    Chronic migraine w/o aura w/o status migrainosus, not intractable    HTN (hypertension)    Depression      * DKA (diabetic ketoacidosis) ([facility])  Assessment & Plan  Lab Results   Component Value Date    HGBA1C 5.1 07/27/2024       Recent Labs     07/29/24  0418 07/29/24  0609 07/29/24  0751 07/29/24  1035   POCGLU 82 93 85 93       Blood Sugar Average: Last 72 hrs:  (P) [id number]    Insulin-dependent diabetes.  She takes 40 units of Lantus and 5 units of mealtime insulin at home.   Upon admission: elevated AG of 18, normal potassium 3.9 low bicarb 20, normal glucose 118, and elevated B-hydroxy 3.45; VBG pH 7.28. Euglycemic metabolic acidosis most likely due to DKA 2/2 stopping basal insulin on Tues and mealtime insulin 2 weeks ago.Gap closed, most recent AG 6.    Plan:  - Advanced diet to carb-controlled 2 from clear liquids, tolerating well   - D/c insulin gtt and dextrose 7/29 AM  - Start basal-bolus with 15 units Lantus and 5 units Lispro TID w/ meals   - q4h glucose checks, adjust insulin regimen accordingly   - Continue to monitor K, MG, and Phos; replete as needed  - Rapid response on 7/27 due to hypotension and hypoglycemia, escalated to critical care. Patient now stable for transfer to Med Surg.    Stage 3b chronic kidney disease (HCC)  Assessment & Plan  Lab Results   Component Value Date    EGFR 48 07/29/2024    EGFR 47 07/28/2024    EGFR 41 07/28/2024    CREATININE 1.29 07/29/2024    CREATININE 1.32 (H) 07/28/2024     CREATININE 1.47 (H) 07/28/2024   recent baseline creatinine around 1.6-1.9  Cr on admission 1.85; GFR 31    Plan  - D/c IVF, insulin gtt  - Creatinine improving  - Continue home med calcitriol 0.25 mcg daily  - Monitor electrolytes and renal function daily  - Avoid NSAID and nephrotoxic agents    HTN (hypertension)  Assessment & Plan  Home meds include HCTZ 12.5 mg, Cozaar 100 mg daily, Lopressor 100 mg twice daily, and verapamil 120 mg daily at bedtime.  Blood pressure 159/104 upon arrival to the ED. denies headache, dizziness, shortness of breath, chest pain or chest discomfort, and heart palpitations.    Patient became hypotensive, rapid was called and now in critical care    Plan:  -Holding home Cozaar, HCTZ, Lopressor, and verapamil due to hypotension  -Restart as tolerated  -Continue to monitor blood pressure    Depression  Assessment & Plan  History of depression anxiety.  Take Cymbalta 60 mg daily and Klonopin 0.5 mg twice daily    Plan:  -Continue Cymbalta   -Patient on 1/2 dose of Klonopin 0.25 mg BID, can return to home dose as tolerated    Chronic migraine w/o aura w/o status migrainosus, not intractable  Assessment & Plan  Chronic history of migraine.  She takes Depakote and acetazolamide at home.     Plan:  - Continue Depakote  - Hold acetazolamide due to concern for metabolic acidosis  -Can continue home medications as acidosis improves    Vitamin D deficiency  Assessment & Plan  CKD stage IIIb with vitamin D deficiency.  She takes vitamin D3 2000 units at home.    Plan:  -Continue vitamin D3     ADHD  Assessment & Plan  Patient has a history of ADHD, and she takes Vyvanse at home.  He has stopped taking Vyvanse since Tuesday because low p.o. intake.    Plan:  - Hold Vyvanse for now, can restart as patient returns to baseline         VTE Pharmacologic Prophylaxis: Heparin  VTE Mechanical Prophylaxis: sequential compression device    HOSPITAL COURSE     Hospital course per Su Vilchis MD:  "\"48-year-old female with past medical history of type 2 diabetes on insulin, hypertension, CKD 3, ADHD, depression, migraine, and JOVANA. 2 week history of nausea, dry heaving with poor p.o. intake. She was not taking any of her basal/bolus insulin since Tuesday last due to poor p.o. intake. She went to her PCP and he was concerned for DKA and was sent to ED. Patient initially presented in the ED Afebrile, HR 93, /104 O2 98% on RA. Elevated AG of 18, Cr 1.91, low bicarb 20, normal glucose 118. VBG pH 7.28. Elevated B-hydroxy 3.45. Rapid response was called for hypotension and AMS. Hypotension improved with fluid bolus and needed low dose levo, now resolved. Patient transitioned her to basal-bolus this AM and discontinued insulin gtt. AG 6. Typically she is on 40 units lantus,10 units lispro TID but started her on 15 units lantus and 5 units lispro TID to see how she does. Can increase as needed. She has some mild diarrhea but overall no other complaints. Currently holding her home Vyvanse (ADHD), acetazolamide (migraines), anti-hypotensives and muscle relaxer's (chronic back pain). Will need to restart home Vyvanse likely tomorrow. She is on 1/2 dose of her home Klonopin due to concern for AMS, resp depression. Also holding her home HTN meds for now considering recent hypotension. She is stable for transfer to Med Surg.\"    SUBJECTIVE     Pt seen and examined at bedside reporting significant improvement in confusion. Still has some \"cloudiness\". No longer feeling nauseous, and able to tolerate PO diet. Reports mild abdominal discomfort and some diarrhea. Denies fevers, chills, nausea, vomiting, lightheadedness, or dizziness.     OBJECTIVE     Vitals:    07/29/24 0200 07/29/24 0300 07/29/24 0400 07/29/24 0500   BP: 111/55 154/72 117/55 132/63   BP Location:   Right leg    Pulse: 93 93 91 91   Resp: 18 17 16 17   Temp:   98.2 °F (36.8 °C)    TempSrc:   Axillary    SpO2: 94% 95% 94% 94%   Weight:       Height:     "     I/O last 24 hours:  In: 1781.3 [P.O.:150; I.V.:1631.3]  Out: 1250 [Urine:1250]    Physical Exam  Constitutional:       Appearance: Normal appearance.   HENT:      Head: Normocephalic and atraumatic.   Eyes:      Extraocular Movements: Extraocular movements intact.      Conjunctiva/sclera: Conjunctivae normal.   Cardiovascular:      Rate and Rhythm: Normal rate and regular rhythm.      Pulses: Normal pulses.      Heart sounds: No murmur heard.  Pulmonary:      Effort: Pulmonary effort is normal. No respiratory distress.      Breath sounds: Normal breath sounds. No wheezing.   Abdominal:      General: Bowel sounds are normal. There is no distension.      Palpations: Abdomen is soft.      Tenderness: There is abdominal tenderness in the suprapubic area.   Musculoskeletal:         General: Swelling present. No tenderness.      Comments: UE swelling    Skin:     General: Skin is warm and dry.   Neurological:      General: No focal deficit present.      Mental Status: She is alert and oriented to person, place, and time.   Psychiatric:         Mood and Affect: Mood normal.         Behavior: Behavior normal.       LABORATORY DATA     Labs: I have personally reviewed pertinent reports.    Results from last 7 days   Lab Units 07/29/24  0504 07/28/24  0422 07/27/24  1154 07/27/24  1148 07/27/24  1104 07/27/24  0617 07/26/24 2001   WBC Thousand/uL 4.71 5.81  --   --  5.50   < > 6.91   HEMOGLOBIN g/dL 10.2* 10.4*  --   --  10.0*   < > 14.3   I STAT HEMOGLOBIN g/dl  --   --  11.2*  --   --    < >  --    HEMATOCRIT % 31.1* 32.0*  --   --  31.8*   < > 43.6   HEMATOCRIT, ISTAT %  --   --  33*   < >  --    < >  --    PLATELETS Thousands/uL 115* 136*  --   --  160   < > 204   SEGS PCT % 49 56  --   --   --   --  76*   MONO PCT % 7 6  --   --   --   --  5   EOS PCT % 2 2  --   --   --   --  0    < > = values in this interval not displayed.      Results from last 7 days   Lab Units 07/29/24  0504 07/28/24  2340 07/28/24  3389  07/28/24  1006 07/28/24  0447 07/27/24 2009 07/27/24  1646 07/27/24  1154 07/27/24  1148 07/27/24  1104 07/27/24  1059   POTASSIUM mmol/L 5.1 4.2 3.7   < > 4.6   < > 4.2  --   --    < >  --    CHLORIDE mmol/L 112* 109* 108   < > 112*   < > 112*  --   --    < >  --    CO2 mmol/L 23 22 22   < > 20*   < > 21  --   --    < >  --    CO2, I-STAT mmol/L  --   --   --   --   --   --   --  22  --   --  17*   BUN mg/dL 8 9 11   < > 13   < > 16  --   --    < >  --    CREATININE mg/dL 1.29 1.32* 1.47*   < > 1.44*   < > 1.62*  --   --    < >  --    CALCIUM mg/dL 7.9* 7.9* 8.1*   < > 7.7*   < > 7.8*  --   --    < >  --    ALK PHOS U/L 49  --   --   --  47  --  54  --   --   --   --    ALT U/L 6*  --   --   --  6*  --  7  --   --   --   --    AST U/L 6*  --   --   --  8*  --  10*  --   --   --   --    GLUCOSE, ISTAT mg/dl  --   --   --   --   --   --   --  72 38*  --  271*    < > = values in this interval not displayed.     Results from last 7 days   Lab Units 07/29/24  0504 07/28/24  2340 07/28/24  1649   MAGNESIUM mg/dL 1.8* 1.7* 1.9     Results from last 7 days   Lab Units 07/29/24  0504 07/28/24  2340 07/28/24  1649   PHOSPHORUS mg/dL 3.4 3.4 3.5          Results from last 7 days   Lab Units 07/27/24  1232   LACTIC ACID mmol/L 1.9         Micro:  Lab Results   Component Value Date    BLOODCX No Growth at 24 hrs. 07/27/2024    BLOODCX No Growth at 24 hrs. 07/27/2024    BLOODCX No Growth After 5 Days. 12/24/2021    BLOODCX No Growth After 5 Days. 12/24/2021    URINECX <10,000 cfu/ml 09/19/2023    URINECX <10,000 cfu/ml 02/08/2023    URINECX 50,000-59,000 cfu/ml Lactobacillus species (A) 01/20/2021    WOUNDCULT No growth 05/30/2023    WOUNDCULT 2+ Growth of 12/07/2022     IMAGING & DIAGNOSTIC TESTING     Imaging: I have personally reviewed pertinent reports.    XR chest portable ICU    Result Date: 7/28/2024  Impression: No acute cardiopulmonary disease. Workstation performed: [id number]     CT head wo contrast    Result Date:  7/27/2024  Impression: No acute intracranial abnormality. Workstation performed: [id number]     EKG, Pathology, and Other Studies: I have personally reviewed pertinent reports.     ALLERGIES     Allergies   Allergen Reactions    Ace Inhibitors Cough    Pollen Extract Other (See Comments)     SNEEZING, COUGHING    Short Ragweed Pollen Ext Cough    Sodium Hyaluronate (Eron) Other (See Comments)     Edema at injection site  Edema at injection site    Levonorgestrel-Eth Estradiol [Levonorgestrel-Ethinyl Estrad] Other (See Comments)     burning    Latex Rash     MEDICATIONS     Current Facility-Administered Medications   Medication Dose Route Frequency Provider Last Rate    acetaminophen  650 mg Oral Q6H PRN Garcia Ragab, DO      Artificial Tears  1 drop Both Eyes TID Garcia Ragab, DO      atorvastatin  10 mg Oral HS Garcia Ragab, DO      calcitriol  0.25 mcg Oral Daily Garcia Ragab, DO      Cholecalciferol  2,000 Units Oral Daily Garcia Ragab, DO      clonazePAM  0.25 mg Oral BID Garcia Ragab, DO      cyanocobalamin  1,000 mcg Intramuscular Weekly Garcia Ragab, DO      divalproex sodium  500 mg Oral HS Garcia Ragab, DO      DULoxetine  60 mg Oral Daily Garcia Ragab, DO      ferrous sulfate  325 mg Oral Daily With Breakfast Garcia Ragab, DO      heparin (porcine)  5,000 Units Subcutaneous Q8H DIANELYS Garcia Ragab, DO      insulin glargine  15 Units Subcutaneous Daily With Breakfast Su Vilchis MD      insulin lispro  1-6 Units Subcutaneous TID AC Su Vilchis MD      insulin lispro  5 Units Subcutaneous TID With Meals Su Vilchis MD      magnesium sulfate  4 g Intravenous Once Magdalena Simpson MD 4 g (07/29/24 0807)    ondansetron  4 mg Oral Q8H PRN Garcia Ragab, DO      pregabalin  100 mg Oral TID Garcia Ragab, DO          acetaminophen, 650 mg, Q6H PRN  ondansetron, 4 mg, Q8H PRN        Portions of the record may have been created with voice recognition software.   "Occasional wrong word or \"sound a like\" substitutions may have occurred due to the inherent limitations of voice recognition software.  Read the chart carefully and recognize, using context, where substitutions have occurred.    ==  Edilia Mccoy DO  [facility]  Internal Medicine Residency PGY-2   "

## 2024-07-29 NOTE — PLAN OF CARE
Problem: PHYSICAL THERAPY ADULT  Goal: Performs mobility at highest level of function for planned discharge setting.  See evaluation for individualized goals.  Description: Treatment/Interventions: Functional transfer training, LE strengthening/ROM, Elevations, Therapeutic exercise, Endurance training, Bed mobility, Gait training, Spoke to nursing, OT  Equipment Recommended: Other (Comment) (TBD)       See flowsheet documentation for full assessment, interventions and recommendations.  Note: Prognosis: Good  Problem List: Decreased strength, Decreased endurance, Impaired balance, Decreased mobility, Obesity  Assessment: Pt is 48 year old female admitted on 7/26/2024 with flank and abdominal pain. Pt was sent to the ER by her internal medicine doctor. Pt reported that she could be in DKA. Pt had a rapid response called on 7/27/2024 d/t acute changes in her BP and neuro status. Pt's comorbidities affecting POC include: ADD (attention deficit disorder), ALS (amyotrophic lateral sclerosis) (11/22), Anxiety, Arthritis, Bipolar disorder, Cervical disc disorder with radiculopathy of cervical region, Chronic depression, Chronic kidney disease, Chronic pain disorder, Cluster headache, CTS (carpal tunnel syndrome), Depression, Diabetes mellitus, Diabetic nephropathy, Diabetic retinopathy, Difficulty walking, Fibromyalgia, Head injury (08/13/18), HTN (hypertension), Intervertebral disc disorder with radiculopathy of lumbar region, Lupus, Migraine, Obesity, Opioid abuse, in remission, Osteoporosis, and Rheumatoid arthritis. Her personal factors consist of: LINDA and stairs within the home. Pt's clinical presentation is currently  unstable/unpredictable which is evident in ongoing telem monitoring, pending medical work-up, and abn lab values. Pt presents w/ overall weakness, incl min decreased LE strength, decreased functional endurance and activity tolerance, impaired balance, and gait deviations. Will continue to follow pt in  PT for progressive mobilization to address above functional deficits and to maximize her level of independence, endurance, and safety. D/C recommendations are outlined below. Will continue to follow until medically cleared for D/C.  Barriers to Discharge: Inaccessible home environment     Rehab Resource Intensity Level, PT: III (Minimum Resource Intensity)    See flowsheet documentation for full assessment.

## 2024-07-29 NOTE — PROGRESS NOTES
[facility]  Progress Note: Critical Care  Name: Kinza Meza 48 y.o. female I MRN: [id number]  Unit/Bed#: MICU 08 I Date of Admission: 7/26/2024   Date of Service: 7/29/2024 I Hospital Day: 3    Assessment & Plan   Depression  ADHD  Migraines  Toxic metabolic encephalopathy  Hypertension  Hypertension  Anion gap metabolic acidosis with concomitant metabolic alkalosis with respiratory acidosis  JOVANA  T2DM  Chronic Back pain     Neuro:   Diagnosis: Depression/ADHD  Diagnosis: Migraines  Diagnosis: Toxic metabolic encephalopathy vs delirium vs less likely seizures  PDMP reviewed and patient on Klonopin 0.5 mg twice daily  Plan:   Continue home Klonopin at half the dose 0.25 mg twice daily  Hold home Vyvanse  Continue Cymbalta  Continue depakote  Hold acetazolamide  CAM-ICU  Delirium precautions     CV:   Diagnosis: HTN  Diagnosis: Hypotension  Home meds: Hyzaar (Losartan-HCTZ 100-12.5), Verapamil 120 mg qhs  Plan:   Holding home antihypertensives  S/p levophed     Pulm:  Diagnosis: AGMA Metabolic alkalosis with respiratory acidosis  Respiratory acidosis likely secondary to respiratory depression from Klonopin  Plan:  BiPAP discontinued 7/28, maintaining on RA  VBG 7.36/36.5/38.6/20.3 on 7/28     GI:   None      :   Diagnosis: Anion gap metabolic acidosis with concomitant metabolic alkalosis with respiratory acidosis  Diagnosis: CKD with secondary hyperparathyroidism  AGMA with hypercholremic metabolic acidosis.   Plan:   DKA protocol restarted 7/28 per albumin corrected AG  Continue home calcitriol    F/E/N:   F: s/p fluid resuscitation, held at this time  E: replete per DKA protocol, Mg 1.8 today  N: advanced to clear liquid diet     Heme/Onc:   Diagnosis: JOVANA  Plan:   Resume Iron supplementation when appropriate     Endo:   Diagnosis: Type 2 diabetes in DKA  A1c on admission 5.1 with recent A1c of 6.2.  On Lantus 40 units nightly with lispro 10 units 3 times daily with  meals as well as mounjaro.  Has not been taking basal bolus insulin since Tuesday due to poor p.o. intake  S/p insulin dka gtt then restarted 2/2 albumin corrected AG  AG closed today at 6    Plan:   Patient reinitiated on DKA protocol 7/28 with insulin and dextrose gtt   Will transition to 15 units Lantus, 5 units Lispro, d/c insulin and dextrose gtt 2 hrs following Lantus dose  Q4h glucose checks     ID:   No active issues     MSK/Skin:   Diagnosis: Back pain  Plan:   Hold home muscle relaxers  Continue home Lyrica  PT/OT when appropriate      Disposition: Med Surg     ICU Core Measures     A: Assess, Prevent, and Manage Pain Has pain been assessed? Yes  Need for changes to pain regimen? No   B: Both SAT/SAT  N/A   C: Choice of Sedation RASS Goal: 0 Alert and Calm  Need for changes to sedation or analgesia regimen? No   D: Delirium CAM-ICU: Negative   E: Early Mobility  Plan for early mobility? Yes   F: Family Engagement Plan for family engagement today? Yes         Prophylaxis:  VTE VTE covered by:  heparin (porcine), Subcutaneous, 5,000 Units at 07/29/24 0513       Stress Ulcer  not ordered        Significant 24hr Events     24hr events: no acute events overnight      Subjective     Review of Systems: Review of Systems   Constitutional:  Negative for diaphoresis, fatigue and fever.   HENT:  Positive for sore throat. Negative for congestion.    Respiratory:  Negative for chest tightness, shortness of breath and wheezing.    Cardiovascular:  Negative for chest pain, palpitations and leg swelling.   Gastrointestinal:  Positive for diarrhea. Negative for abdominal pain, constipation, nausea and vomiting.   Genitourinary:  Negative for difficulty urinating.   Neurological:  Negative for dizziness, weakness, light-headedness and headaches.   All other systems reviewed and are negative.       Objective                            Vitals I/O      Most Recent Min/Max in 24hrs   Temp 98.2 °F (36.8 °C) Temp  Min: 98 °F  (36.7 °C)  Max: 98.9 °F (37.2 °C)   Pulse 91 Pulse  Min: 82  Max: 99   Resp 17 Resp  Min: 10  Max: 29   /63 BP  Min: 111/55  Max: 166/72   O2 Sat 94 % SpO2  Min: 93 %  Max: 99 %      Intake/Output Summary (Last 24 hours) at 2024 0630  Last data filed at 2024 0516  Gross per 24 hour   Intake 1781.29 ml   Output 1250 ml   Net 531.29 ml       Diet Clear Liquid    Invasive Monitoring           Physical Exam   Physical Exam  Vitals and nursing note reviewed.   Eyes:      Conjunctiva/sclera: Conjunctivae normal.   Skin:     General: Skin is warm.      Coloration: Skin is not jaundiced.   HENT:      Head: Normocephalic and atraumatic.      Nose: No congestion.      Mouth/Throat:      Mouth: Mucous membranes are dry.   Cardiovascular:      Rate and Rhythm: Normal rate.      Pulses: Normal pulses.   Musculoskeletal:         General: Normal range of motion.      Right lower leg: No edema.      Left lower leg: No edema.   Abdominal: General: There is no distension.      Palpations: Abdomen is soft.      Tenderness: There is no abdominal tenderness.   Constitutional:       General: She is not in acute distress.     Appearance: She is not ill-appearing.      Interventions: She is not intubated and not restrained.  Pulmonary:      Effort: Pulmonary effort is normal. No respiratory distress. She is not intubated.      Breath sounds: Normal breath sounds.   Neurological:      General: No focal deficit present.      Mental Status: She is alert and oriented to person, place and time. She is calm.      Cranial Nerves: No facial asymmetry.      Motor: Strength full and intact in all extremities.            Diagnostic Studies      EK/27- sinus rhythm   Imaging: CTH - no intracranial abnormality I have personally reviewed pertinent reports.       Medications:  Scheduled PRN   Artificial Tears, 1 drop, TID  atorvastatin, 10 mg, HS  calcitriol, 0.25 mcg, Daily  Cholecalciferol, 2,000 Units, Daily  clonazePAM, 0.25  mg, BID  cyanocobalamin, 1,000 mcg, Weekly  divalproex sodium, 500 mg, HS  DULoxetine, 60 mg, Daily  ferrous sulfate, 325 mg, Daily With Breakfast  heparin (porcine), 5,000 Units, Q8H DIANELYS  pregabalin, 100 mg, TID      acetaminophen, 650 mg, Q6H PRN  ondansetron, 4 mg, Q8H PRN       Continuous    dextrose 5% lactated ringer's, 100 mL/hr, Last Rate: Stopped (07/29/24 0123)  insulin regular (HumuLIN R,NovoLIN R) 1 Units/mL in sodium chloride 0.9 % 100 mL infusion, 0.3-21 Units/hr, Last Rate: Stopped (07/29/24 0123)         Labs:    CBC    Recent Labs     07/28/24  0422 07/29/24  0504   WBC 5.81 4.71   HGB 10.4* 10.2*   HCT 32.0* 31.1*   * 115*     BMP    Recent Labs     07/28/24  2340 07/29/24  0504   SODIUM 138 141   K 4.2 5.1   * 112*   CO2 22 23   AGAP 7 6   BUN 9 8   CREATININE 1.32* 1.29   CALCIUM 7.9* 7.9*       Coags    No recent results     Additional Electrolytes  Recent Labs     07/27/24  1148 07/27/24  1154 07/27/24  1549 07/28/24  2340 07/29/24  0504   MG  --   --    < > 1.7* 1.8*   PHOS  --   --    < > 3.4 3.4   CAIONIZED 0.56* 1.26  --   --   --     < > = values in this interval not displayed.          Blood Gas    No recent results  Recent Labs     07/28/24 2340   PHVEN 7.362   ZNT4OSW 36.5*   PO2VEN 38.6   GLB3GGW 20.3*   BEVEN -4.6   [id number] 74.4    LFTs  Recent Labs     07/28/24  0447 07/28/24  1208 07/28/24  2340 07/29/24  0504   ALT 6*  --   --  6*   AST 8*  --   --  6*   ALKPHOS 47  --   --  49   ALB 2.9*   < > 3.0* 2.8*   TBILI 0.54  --   --  0.47    < > = values in this interval not displayed.       Infectious  No recent results  Glucose  Recent Labs     07/28/24  1208 07/28/24  1649 07/28/24  2340 07/29/24  0504   GLUC 127 105 100 82               Su Vilchis MD

## 2024-07-29 NOTE — CASE MANAGEMENT
Case Management Discharge Planning Note    Patient name Kinza Carmona  Location /-01 MRN [id number]  : 1975 Date 2024       Current Admission Date: 2024  Current Admission Diagnosis:DKA (diabetic ketoacidosis) ([facility])   Patient Active Problem List    Diagnosis Date Noted Date Diagnosed    DKA (diabetic ketoacidosis) ([facility]) 2024     Cellulitis of right toe 2024     Cellulitis of right shoulder 2024     Benign hypertension with chronic kidney disease, stage III ([facility]) 2024     Abnormal blood electrolyte level 10/30/2023     Thrombocytopenia ([facility]) 10/30/2023     Chest pain 10/29/2023     Metabolic acidosis 2023     Hyperphosphatemia 2023     Neurogenic bladder 2023     Bilateral carpal tunnel syndrome      IIH (idiopathic intracranial hypertension) 08/15/2023     Open wound of back 2023     Benign hypertension with CKD (chronic kidney disease) stage IV ([facility]) 2023     Maceration of skin 2023     Cortical age-related cataract of left eye 2023     Bipolar affective disorder, currently manic, mild ([facility]) 2022     Seasonal allergic rhinitis 2022     Surgical wound, non healing 2022     Diabetic polyneuropathy associated with diabetes mellitus due to underlying condition ([facility]) 2022     Continuous opioid dependence ([facility]) 2022     Anemia 2021     Wound infection 2021     Cauda equina syndrome ([facility]) 2021     Depression 10/28/2021     Annual physical exam 2021     Other gastritis without bleeding 2021     Obesity, unspecified      Diabetes mellitus ([facility])      HTN (hypertension)      Dizziness 2020     Secondary hyperparathyroidism of renal origin ([facility]) 2020     Insulin dependent type 2 diabetes mellitus ([facility])      Intractable migraine with status migrainosus      Type 2 diabetes mellitus with diabetic neuropathy ([facility]) 2020     Ventral hernia without  obstruction or gangrene 09/11/2020     Lumbar disc herniation with radiculopathy 09/11/2020     Concussion with moderate (1-24 hours) loss of consciousness 09/11/2020     Primary osteoarthritis of both knees 09/11/2020     Diabetic nephropathy associated with type 2 diabetes mellitus (HCC) 09/11/2020     Dysthymic disorder 09/11/2020     Acute on chronic kidney failure  (HCC) 07/13/2020     Hypokalemia 07/13/2020     Near syncope 07/13/2020     Weakness 07/13/2020     Status post gastric bypass for obesity 07/13/2020     Diarrhea 07/13/2020     Snores      Headache upon awakening      Chronic tension type headache 12/16/2019     Chronic migraine w/o aura w/o status migrainosus, not intractable 12/16/2019     Medical marijuana use 12/16/2019     Vitamin D deficiency 08/23/2019     Chronic kidney disease-mineral and bone disorder 08/20/2019     S/P cervical spinal fusion 07/25/2019     Mixed hyperlipidemia 07/22/2019     Cervical disc disorder with radiculopathy      Stage 3b chronic kidney disease ([facility]) 05/07/2019     Persistent proteinuria 05/07/2019     Hypertensive kidney disease with stage 3b chronic kidney disease ([facility]) 05/07/2019     Hyponatremia 05/07/2019     Intervertebral disc disorders with radiculopathy, lumbar region      Sacroiliitis ([facility])      Greater trochanteric bursitis of both hips      Herniated nucleus pulposus, L1-2 12/12/2017     Bilateral chronic knee pain 12/01/2017     Facet arthritis of lumbosacral region 12/01/2017     Fibromyalgia 12/01/2017     Lumbar stenosis with neurogenic claudication 12/01/2017     ADHD 08/22/2016     Uncontrolled type 2 diabetes mellitus with hyperglycemia ([facility]) 08/22/2016     Gastroesophageal reflux disease without esophagitis 08/22/2016     Morbid obesity with BMI of 40.0-44.9, adult ([facility]) 08/22/2016     Chronic renal insufficiency 11/25/2014     Endometriosis 05/16/2012       LOS (days): 3  Geometric Mean LOS (GMLOS) (days):   Days to GMLOS:     OBJECTIVE:  Risk  of Unplanned Readmission Score: 29.3         Current admission status: Inpatient   Preferred Pharmacy:   [id number] - [place], [place] - [address]  [address]  [place] [address]  Phone: [phone] Fax: [phone]    [facility] [place] - [place] - [address]  [address]  [place] [address]  Phone: [phone] Fax: [phone]    [facility] - [place], [place] - [address]  [address] [address]  Phone: [phone] Fax: [phone]    [id number] - [place], [place] - [address]  [address]  [place] [address]  Phone: [phone] Fax: [phone]    [facility] - [place], [place] - [address]  [address]  [address]  [facility] [address]  Phone: [phone] Fax: [phone]    [facility] [place], [place] - [address]  [address]  [place] [address]  Phone: [phone] Fax: [phone]    Primary Care Provider: Myla Norwood MD    Primary Insurance: BLUE CROSS  Secondary Insurance:     DISCHARGE DETAILS:    Discharge planning discussed with:: Patient     Comments - Freedom of Choice: Discussed FOC              Other Referral/Resources/Interventions Provided:  Referral Comments: Per chart review/PT: pt would benefit from HH PT post discharge. Met w/pt at bedside - had [facility] in past - agreeable to referral for SN/PT. Referral sent in Aidin for review - awaiting response.

## 2024-07-29 NOTE — PHYSICAL THERAPY NOTE
Physical Therapy Evaluation     Patient's Name: Kinza Meza    Admitting Diagnosis  Diarrhea [R19.7]  Dehydration [E86.0]  DM (diabetes mellitus) ([facility]) [E11.9]  Nausea & vomiting [R11.2]  Flank pain [R10.9]    Problem List  Patient Active Problem List   Diagnosis    ADHD    Bilateral chronic knee pain    Chronic renal insufficiency    Uncontrolled type 2 diabetes mellitus with hyperglycemia ([facility])    Endometriosis    Facet arthritis of lumbosacral region    Fibromyalgia    Gastroesophageal reflux disease without esophagitis    Herniated nucleus pulposus, L1-2    Lumbar stenosis with neurogenic claudication    Morbid obesity with BMI of 40.0-44.9, adult ([facility])    Greater trochanteric bursitis of both hips    Sacroiliitis ([facility])    Intervertebral disc disorders with radiculopathy, lumbar region    Stage 3b chronic kidney disease ([facility])    Persistent proteinuria    Hypertensive kidney disease with stage 3b chronic kidney disease ([facility])    Hyponatremia    Cervical disc disorder with radiculopathy    Mixed hyperlipidemia    S/P cervical spinal fusion    Chronic kidney disease-mineral and bone disorder    Vitamin D deficiency    Chronic tension type headache    Chronic migraine w/o aura w/o status migrainosus, not intractable    Medical marijuana use    Snores    Headache upon awakening    Acute on chronic kidney failure  ([facility])    Hypokalemia    Near syncope    Weakness    Status post gastric bypass for obesity    Diarrhea    Type 2 diabetes mellitus with diabetic neuropathy ([facility])    Ventral hernia without obstruction or gangrene    Lumbar disc herniation with radiculopathy    Concussion with moderate (1-24 hours) loss of consciousness    Primary osteoarthritis of both knees    Diabetic nephropathy associated with type 2 diabetes mellitus ([facility])    Dysthymic disorder    Insulin dependent type 2 diabetes mellitus ([facility])    Intractable migraine with status migrainosus    Secondary hyperparathyroidism of renal origin ([facility])     Dizziness    Obesity, unspecified    Diabetes mellitus (HCC)    HTN (hypertension)    Other gastritis without bleeding    Annual physical exam    Depression    Cauda equina syndrome ([facility])    Wound infection    Anemia    Continuous opioid dependence ([facility])    Surgical wound, non healing    Diabetic polyneuropathy associated with diabetes mellitus due to underlying condition ([facility])    Bipolar affective disorder, currently manic, mild ([facility])    Seasonal allergic rhinitis    Cortical age-related cataract of left eye    Benign hypertension with CKD (chronic kidney disease) stage IV ([facility])    Maceration of skin    Open wound of back    IIH (idiopathic intracranial hypertension)    Bilateral carpal tunnel syndrome    Neurogenic bladder    Metabolic acidosis    Hyperphosphatemia    Chest pain    Abnormal blood electrolyte level    Thrombocytopenia ([facility])    Benign hypertension with chronic kidney disease, stage III ([facility])    Cellulitis of right shoulder    Cellulitis of right toe    DKA (diabetic ketoacidosis) ([facility])       Past Medical History  Past Medical History:   Diagnosis Date    ADD (attention deficit disorder)     Allergic rhinitis     ALS (amyotrophic lateral sclerosis) ([facility]) 11/22    Per neurological surgeon    Anemia 12/2021    Anxiety     Arthritis     Bipolar disorder ([facility])     Cervical disc disorder with radiculopathy of cervical region     Chronic depression     Chronic kidney disease     Stage 1    Chronic pain disorder     Cluster headache     Colitis     Last assessed - 11/25/14    Colon polyp     Concussion 2018    Condyloma acuminatum     Last assessed - 3/19/14    CTS (carpal tunnel syndrome) 2001    Depression     Diabetes mellitus ([facility])     Last assessed - 11/25/14    Diabetic nephropathy ([facility]) 2001    Diabetic neuropathy ([facility])     Diabetic retinopathy ([facility]) 2001    Difficulty walking 07/22    Fibromyalgia     Fibromyalgia, primary     GERD (gastroesophageal reflux disease)     Head injury 08/13/18     Fall    Headache(784.0) 1977    History of transfusion 2021    HTN (hypertension)     Last assessed - 14    Hyperlipidemia 2019    Hypertension     Intervertebral disc disorder with radiculopathy of lumbar region     Lupus (HCC)     Rhuematologist[place]    Migraine     Miscarriage 1988    ,     Obesity 1980    Obesity, unspecified     Open wound of back 2023    Opioid abuse, in remission (HCC) 2022    Only while in hospital and for about 10 days once home instead of one week.    Osteoporosis     Peripheral neuropathy     Postoperative wound infection 2021    Stop not healed    Preeclampsia     Last assessed - 14    Rheumatoid arthritis (HCC)     Wears dentures        Past Surgical History  Past Surgical History:   Procedure Laterality Date    APPENDECTOMY      Last assessed - 14    BARIATRIC SURGERY  2016    CARPAL TUNNEL RELEASE      CATARACT EXTRACTION      CERVICAL FUSION N/A 2019    Procedure: Anterior cervical discectomy w fusion C4-5, C5-6, C6-7; allograft and neuromonitoring;  Surgeon: Jose Gomez MD;  [facility];  Service: Orthopedics     SECTION      Last assessed - 14    COLONOSCOPY      COLONOSCOPY      HEMORRHOID SURGERY      HERNIA REPAIR      Last assessed - 14    LUMBAR FUSION N/A 2021    Procedure: L1/2 laminectomy, discectomy with L1/2 MIS posterior fixation using neuromonitoring and neuronavigation;  Surgeon: Suhas Akbar MD;  [facility];  Service: Neurosurgery    LUMBAR WOUND EXPLORATION Bilateral 2021    Procedure: Lumbar wound washout, debridement and intraoperative cultures;  Surgeon: Suhas Akbar MD;  [facility];  Service: Neurosurgery    MAMMO (HISTORICAL)      MI DEBRIDEMENT BONE 1ST 20 SQ CM/< N/A 2023    Procedure: SURGICAL PREPARATION OF BACK  WOUND AND LOCAL FLAP, PREVENA PLACEMENT;  Surgeon: Ady Rocha MD;  Location:  MAIN OR;   Service: Plastics    WOOD-EN-Y PROCEDURE      ULNAR TUNNEL RELEASE      VAC DRESSING APPLICATION Bilateral 12/22/2021    Procedure: APPLICATION VAC DRESSING;  Surgeon: Elbert Hawley MD;  [facility];  Service: General    VAC DRESSING APPLICATION N/A 12/25/2021    Procedure: APPLICATION VAC DRESSING ABDOMEN/TRUNK;  Surgeon: Mani Ross DO;  [facility];  Service: General    VENTRAL HERNIA REPAIR            07/29/24 1154   PT Last Visit   PT Visit Date 07/29/24   Note Type   Note type Evaluation   Pain Assessment   Pain Assessment Tool 0-10   Restrictions/Precautions   Weight Bearing Precautions Per Order No   Braces or Orthoses Other (Comment)  (Denies owning any braces or orthoses)   Other Precautions Chair Alarm;Bed Alarm;Multiple lines;Telemetry;Fall Risk;Pain   Home Living   Type of Home House   Home Layout Two level;Bed/bath upstairs;1/2 bath on main level;Stairs to enter with rails  (2 LINDA w/ rails; 11 steps w/ rails to get to the second floor.)   Home Equipment Walker;Cane  (RW used prn; Primarily uses SPC)   Prior Function   Level of [place] Independent with ADLs;Independent with functional mobility   Lives With Spouse   Receives Help From Family   Falls in the last 6 months 5 to 10  (Pt reports 6 falls)   Vocational On disability   General   Additional Pertinent History Cleared for PT eval by nsg   Family/Caregiver Present No   Cognition   Overall Cognitive Status WFL   Arousal/Participation Alert   Attention Within functional limits   Orientation Level Oriented to person;Oriented to place;Oriented to situation   Memory Within functional limits   Following Commands Follows all commands and directions without difficulty   Subjective   Subjective Pt alert; lying in bed; reports she needs to go to the bathroom; agreeable to mobilize w/ PT and OT.   RUE Assessment   RUE Assessment WFL  (AROM)   LUE Assessment   LUE Assessment WFL  (AROM)   RLE Assessment   RLE Assessment  WFL  (AROM)   Strength RLE   RLE Overall Strength   (Fair/Fair+)   LLE Assessment   LLE Assessment WFL  (AROM)   Strength LLE   LLE Overall Strength   (Fair/Fair+)   Bed Mobility   Supine to Sit 5  Supervision   Additional items HOB elevated;Increased time required;Verbal cues   Additional Comments Pt sat EOB for approx 1 min w/ fair sitting balance   Transfers   Sit to Stand 4  Minimal assistance   Additional items Assist x 1;Increased time required;Verbal cues   Stand to Sit 4  Minimal assistance   Additional items Assist x 1;Increased time required;Bed elevated;Verbal cues   Ambulation/Elevation   Gait pattern Wide HARVEY;Decreased foot clearance;Shuffling;Inconsistent anna;Short stride;Excessively slow;Foward flexed   Gait Assistance 4  Minimal assist   Additional items Assist x 1;Verbal cues   Assistive Device Other (Comment)  ( HHA)   Distance 3ft to bedside commode + 3ft to recliner   Stair Management Assistance Not tested   Balance   Static Sitting Fair   Dynamic Sitting Fair -   Static Standing Poor +   Dynamic Standing Poor +   Ambulatory Poor +   Activity Tolerance   Activity Tolerance Patient limited by fatigue   Medical Staff Made Aware Co-david conducted w/ OT d/t medical complexity   Nurse Made Aware Spoke w/ CASANDRA Junior.   Assessment   Prognosis Good   Problem List Decreased strength;Decreased endurance;Impaired balance;Decreased mobility;Obesity   Assessment Pt is 48 year old female admitted on 7/26/2024 with flank and abdominal pain. Pt was sent to the ER by her internal medicine doctor. Pt reported that she could be in DKA. Pt had a rapid response called on 7/27/2024 d/t acute changes in her BP and neuro status. Pt's comorbidities affecting POC include: ADD (attention deficit disorder), ALS (amyotrophic lateral sclerosis) (11/22), Anxiety, Arthritis, Bipolar disorder, Cervical disc disorder with radiculopathy of cervical region, Chronic depression, Chronic kidney disease, Chronic pain disorder,  Cluster headache, CTS (carpal tunnel syndrome), Depression, Diabetes mellitus, Diabetic nephropathy, Diabetic retinopathy, Difficulty walking, Fibromyalgia, Head injury (08/13/18), HTN (hypertension), Intervertebral disc disorder with radiculopathy of lumbar region, Lupus, Migraine, Obesity, Opioid abuse, in remission, Osteoporosis, and Rheumatoid arthritis. Her personal factors consist of: LINDA and stairs within the home. Pt's clinical presentation is currently  unstable/unpredictable which is evident in ongoing telem monitoring, pending medical work-up, and abn lab values. Pt presents w/ overall weakness, incl min decreased LE strength, decreased functional endurance and activity tolerance, impaired balance, and gait deviations. Will continue to follow pt in PT for progressive mobilization to address above functional deficits and to maximize her level of independence, endurance, and safety. D/C recommendations are outlined below. Will continue to follow until medically cleared for D/C.   Barriers to Discharge Inaccessible home environment   Goals   Patient Goals To be independent   LTG Expiration Date 08/12/24   Long Term Goal #1 10-14 days. Pt will amb 100 ft w/ a SPC and (S), in order to facilitate return community amb status. Pt will achieve Mod(I) level w/ bed mob in order to facilitate safety with OOB and back to bed transitions in own living environment. Pt will perform transfers w/ Mod(I) to be able to perform household activities. Pt will be able to navigate 2--> 11 steps w/ handrails, SPC, and (S) to be able to enter her home and naviagte between the levels of her home safely. Pt will participate in LE therex and balance activities to max progression w/ mobility skills.   PT Treatment Day 0   Plan   Treatment/Interventions Functional transfer training;LE strengthening/ROM;Elevations;Therapeutic exercise;Endurance training;Bed mobility;Gait training;Spoke to nursing;OT   PT Frequency 3-5x/wk   Discharge  Recommendation   Rehab Resource Intensity Level, PT III (Minimum Resource Intensity)   Equipment Recommended Other (Comment)  (TBD)   AM-PAC Basic Mobility Inpatient   Turning in Flat Bed Without Bedrails 3   Lying on Back to Sitting on Edge of Flat Bed Without Bedrails 3   Moving Bed to Chair 3   Standing Up From Chair Using Arms 3   Walk in Room 3   Climb 3-5 Stairs With Railing 2   Basic Mobility Inpatient Raw Score 17   Basic Mobility Standardized Score 39.67   [facility] Highest Level Of Mobility   -[facility] Goal 5: Stand one or more mins   -[facility] Achieved 4: Move to chair/commode   Modified [place] Scale   Modified [place] Scale 4   End of Consult   Patient Position at End of Consult Bedside chair;Bed/Chair alarm activated;All needs within reach       Rena Dalton

## 2024-07-29 NOTE — PROGRESS NOTES
Critical Care Interval Transfer Note:    Brief Hospital Summary:   48-year-old female with past medical history of type 2 diabetes on insulin, hypertension, CKD 3, ADHD, depression, migraine,, and JOVANA. 2 week history of nausea, dry heaving with poor p.o. intake. She was not taking any of her basal/bolus insulin since Tuesday last due to poor p.o. intake. She went to her PCP and he was concerned for DKA and was sent to ED. Patient initially presented in the ED Afebrile, HR 93, /104 O2 98% on RA. Elevated AG of 18, Cr 1.91, low bicarb 20, normal glucose 118. VBG pH 7.28. Elevated B-hydroxy 3.45. Rapid response was called for hypotension and AMS. Hypotension improved with fluid bolus and needed low dose levo, now resolved. Patient transitioned her to basal-bolus this AM and discontinued insulin gtt. AG 6. Typically she is on 40 units lantus,10 units lispro TID but started her on 15 units lantus and 5 units lispro TID to see how she does. Can increase as needed. She has some mild diarrhea but overall no other complaints. Currently holding her home Vyvanse (ADHD), acetazolamide (migraines), anti-hypotensives and muscle relaxer's (chronic back pain). Will need to restart home Vyvanse likely tomorrow. She is on 1/2 dose of her home Klonopin due to concern for AMS, resp depression. Also holding her home HTN meds for now considering recent hypotension. She is stable for transfer to Med Surg.      Barriers to discharge:   Monitor BG levels on basal-bolus insulin regimen, adjust accordingly  Replete electrolytes PRN  Restart home medications as tolerated  PT/OT     Consults: IP CONSULT TO CASE MANAGEMENT    Recommended to review admission imaging for incidental findings and document in discharge navigator: Chart reviewed, no known incidental findings noted at this time.      Discharge Plan: Anticipate discharge in 48 hrs to home with home services.    PT Recommendations: Home with home health rehabilitation  OT  Recommendations: Post acute rehabilitation services    Patient seen and evaluated by Critical Care today and deemed to be appropriate for transfer to Med Surg. Spoke to Dr. Mccoy from [facility] to accept transfer. Critical care can be contacted via [employer] Chat with any questions or concerns.    Su Vilchis MD, MPH  [facility] Internal Medicine Residency PGY-III

## 2024-07-30 LAB
ANION GAP SERPL CALCULATED.3IONS-SCNC: 7 MMOL/L (ref 4–13)
BACTERIA UR QL AUTO: ABNORMAL /HPF
BASOPHILS # BLD AUTO: 0.03 THOUSANDS/ÂΜL (ref 0–0.1)
BASOPHILS NFR BLD AUTO: 1 % (ref 0–1)
BILIRUB UR QL STRIP: NEGATIVE
BUN SERPL-MCNC: 8 MG/DL (ref 5–25)
CALCIUM SERPL-MCNC: 8.2 MG/DL (ref 8.4–10.2)
CHLORIDE SERPL-SCNC: 111 MMOL/L (ref 96–108)
CLARITY UR: CLEAR
CO2 SERPL-SCNC: 24 MMOL/L (ref 21–32)
COLOR UR: ABNORMAL
CREAT SERPL-MCNC: 1.47 MG/DL (ref 0.6–1.3)
EOSINOPHIL # BLD AUTO: 0.07 THOUSAND/ÂΜL (ref 0–0.61)
EOSINOPHIL NFR BLD AUTO: 2 % (ref 0–6)
ERYTHROCYTE [DISTWIDTH] IN BLOOD BY AUTOMATED COUNT: 14.8 % (ref 11.6–15.1)
GFR SERPL CREATININE-BSD FRML MDRD: 41 ML/MIN/1.73SQ M
GLUCOSE SERPL-MCNC: 101 MG/DL (ref 65–140)
GLUCOSE SERPL-MCNC: 110 MG/DL (ref 65–140)
GLUCOSE SERPL-MCNC: 67 MG/DL (ref 65–140)
GLUCOSE SERPL-MCNC: 68 MG/DL (ref 65–140)
GLUCOSE SERPL-MCNC: 69 MG/DL (ref 65–140)
GLUCOSE SERPL-MCNC: 71 MG/DL (ref 65–140)
GLUCOSE SERPL-MCNC: 81 MG/DL (ref 65–140)
GLUCOSE SERPL-MCNC: 87 MG/DL (ref 65–140)
GLUCOSE UR STRIP-MCNC: NEGATIVE MG/DL
HCT VFR BLD AUTO: 33 % (ref 34.8–46.1)
HGB BLD-MCNC: 10.6 G/DL (ref 11.5–15.4)
HGB UR QL STRIP.AUTO: NEGATIVE
HYALINE CASTS #/AREA URNS LPF: ABNORMAL /LPF
IMM GRANULOCYTES # BLD AUTO: 0.06 THOUSAND/UL (ref 0–0.2)
IMM GRANULOCYTES NFR BLD AUTO: 1 % (ref 0–2)
KETONES UR STRIP-MCNC: NEGATIVE MG/DL
LEUKOCYTE ESTERASE UR QL STRIP: ABNORMAL
LYMPHOCYTES # BLD AUTO: 1.88 THOUSANDS/ÂΜL (ref 0.6–4.47)
LYMPHOCYTES NFR BLD AUTO: 43 % (ref 14–44)
MAGNESIUM SERPL-MCNC: 2 MG/DL (ref 1.9–2.7)
MCH RBC QN AUTO: 28 PG (ref 26.8–34.3)
MCHC RBC AUTO-ENTMCNC: 32.1 G/DL (ref 31.4–37.4)
MCV RBC AUTO: 87 FL (ref 82–98)
MONOCYTES # BLD AUTO: 0.32 THOUSAND/ÂΜL (ref 0.17–1.22)
MONOCYTES NFR BLD AUTO: 7 % (ref 4–12)
NEUTROPHILS # BLD AUTO: 2.03 THOUSANDS/ÂΜL (ref 1.85–7.62)
NEUTS SEG NFR BLD AUTO: 46 % (ref 43–75)
NITRITE UR QL STRIP: NEGATIVE
NON-SQ EPI CELLS URNS QL MICRO: ABNORMAL /HPF
NRBC BLD AUTO-RTO: 0 /100 WBCS
PH UR STRIP.AUTO: 6.5 [PH]
PHOSPHATE SERPL-MCNC: 3.5 MG/DL (ref 2.7–4.5)
PLATELET # BLD AUTO: 120 THOUSANDS/UL (ref 149–390)
PMV BLD AUTO: 11.4 FL (ref 8.9–12.7)
POTASSIUM SERPL-SCNC: 4.5 MMOL/L (ref 3.5–5.3)
PROT UR STRIP-MCNC: NEGATIVE MG/DL
RBC # BLD AUTO: 3.78 MILLION/UL (ref 3.81–5.12)
RBC #/AREA URNS AUTO: ABNORMAL /HPF
SODIUM SERPL-SCNC: 142 MMOL/L (ref 135–147)
SP GR UR STRIP.AUTO: 1.01 (ref 1–1.03)
UROBILINOGEN UR STRIP-ACNC: <2 MG/DL
WBC # BLD AUTO: 4.39 THOUSAND/UL (ref 4.31–10.16)
WBC #/AREA URNS AUTO: ABNORMAL /HPF

## 2024-07-30 PROCEDURE — 97530 THERAPEUTIC ACTIVITIES: CPT

## 2024-07-30 PROCEDURE — 84100 ASSAY OF PHOSPHORUS: CPT

## 2024-07-30 PROCEDURE — 82948 REAGENT STRIP/BLOOD GLUCOSE: CPT

## 2024-07-30 PROCEDURE — 81001 URINALYSIS AUTO W/SCOPE: CPT | Performed by: STUDENT IN AN ORGANIZED HEALTH CARE EDUCATION/TRAINING PROGRAM

## 2024-07-30 PROCEDURE — 97110 THERAPEUTIC EXERCISES: CPT

## 2024-07-30 PROCEDURE — 97116 GAIT TRAINING THERAPY: CPT

## 2024-07-30 PROCEDURE — 83735 ASSAY OF MAGNESIUM: CPT

## 2024-07-30 PROCEDURE — 85025 COMPLETE CBC W/AUTO DIFF WBC: CPT

## 2024-07-30 PROCEDURE — 80048 BASIC METABOLIC PNL TOTAL CA: CPT

## 2024-07-30 RX ORDER — INSULIN GLARGINE 100 [IU]/ML
12 INJECTION, SOLUTION SUBCUTANEOUS
Status: DISCONTINUED | OUTPATIENT
Start: 2024-07-31 | End: 2024-07-30

## 2024-07-30 RX ORDER — METOPROLOL TARTRATE 50 MG/1
100 TABLET, FILM COATED ORAL EVERY 12 HOURS SCHEDULED
Status: DISCONTINUED | OUTPATIENT
Start: 2024-07-30 | End: 2024-07-31 | Stop reason: HOSPADM

## 2024-07-30 RX ADMIN — Medication 2000 UNITS: at 08:19

## 2024-07-30 RX ADMIN — ONDANSETRON 4 MG: 4 TABLET, ORALLY DISINTEGRATING ORAL at 12:00

## 2024-07-30 RX ADMIN — METOPROLOL TARTRATE 100 MG: 50 TABLET, FILM COATED ORAL at 11:17

## 2024-07-30 RX ADMIN — DIVALPROEX SODIUM 500 MG: 500 TABLET, DELAYED RELEASE ORAL at 21:02

## 2024-07-30 RX ADMIN — HEPARIN SODIUM 5000 UNITS: 5000 INJECTION INTRAVENOUS; SUBCUTANEOUS at 05:52

## 2024-07-30 RX ADMIN — GLYCERIN 1 DROP: .002; .002; .01 SOLUTION/ DROPS OPHTHALMIC at 08:19

## 2024-07-30 RX ADMIN — HEPARIN SODIUM 5000 UNITS: 5000 INJECTION INTRAVENOUS; SUBCUTANEOUS at 21:02

## 2024-07-30 RX ADMIN — CALCITRIOL CAPSULES 0.25 MCG 0.25 MCG: 0.25 CAPSULE ORAL at 08:19

## 2024-07-30 RX ADMIN — HEPARIN SODIUM 5000 UNITS: 5000 INJECTION INTRAVENOUS; SUBCUTANEOUS at 15:31

## 2024-07-30 RX ADMIN — CLONAZEPAM 0.25 MG: 0.5 TABLET ORAL at 08:19

## 2024-07-30 RX ADMIN — GLYCERIN 1 DROP: .002; .002; .01 SOLUTION/ DROPS OPHTHALMIC at 21:02

## 2024-07-30 RX ADMIN — PREGABALIN 100 MG: 100 CAPSULE ORAL at 15:31

## 2024-07-30 RX ADMIN — METOPROLOL TARTRATE 100 MG: 50 TABLET, FILM COATED ORAL at 21:02

## 2024-07-30 RX ADMIN — PREGABALIN 100 MG: 100 CAPSULE ORAL at 08:19

## 2024-07-30 RX ADMIN — ATORVASTATIN CALCIUM 10 MG: 10 TABLET, FILM COATED ORAL at 21:02

## 2024-07-30 RX ADMIN — GLYCERIN 1 DROP: .002; .002; .01 SOLUTION/ DROPS OPHTHALMIC at 15:31

## 2024-07-30 RX ADMIN — CLONAZEPAM 0.25 MG: 0.5 TABLET ORAL at 17:32

## 2024-07-30 RX ADMIN — FERROUS SULFATE TAB 325 MG (65 MG ELEMENTAL FE) 325 MG: 325 (65 FE) TAB at 08:19

## 2024-07-30 RX ADMIN — DULOXETINE HYDROCHLORIDE 60 MG: 60 CAPSULE, DELAYED RELEASE ORAL at 08:19

## 2024-07-30 RX ADMIN — PREGABALIN 100 MG: 100 CAPSULE ORAL at 21:02

## 2024-07-30 RX ADMIN — INSULIN GLARGINE 15 UNITS: 100 INJECTION, SOLUTION SUBCUTANEOUS at 08:18

## 2024-07-30 NOTE — PLAN OF CARE
Problem: PHYSICAL THERAPY ADULT  Goal: Performs mobility at highest level of function for planned discharge setting.  See evaluation for individualized goals.  Description: Treatment/Interventions: Functional transfer training, LE strengthening/ROM, Elevations, Therapeutic exercise, Endurance training, Bed mobility, Gait training, Spoke to nursing, OT  Equipment Recommended: Other (Comment) (TBD)       See flowsheet documentation for full assessment, interventions and recommendations.  Outcome: Progressing  Note: Prognosis: Good  Problem List: Decreased strength, Decreased endurance, Impaired balance, Decreased mobility, Obesity  Assessment: Pt seen for PT treatment session this date. Therapy session focused on gait training, transfer training, LE strengthening, stair training and endurance training in order to improve overall mobility and independence. Pt demonstrates improvements in functional mobility by decreasing level of assistance for transfers and ambulation using RW. Increased unsteadiness and decreased gait speed w/o AD vs w/ RW. Pt making gains towards increasing strength and completed x10 STS w/ SUP and no UE support at armrests. Pt making good progress toward goals. Pt was left sitting at the end of PT session with all needs in reach. Pt would benefit from continued PT services while in hospital to address remaining limitations. PT to continue treating pt and recommends home w/ HHPT. The patient's AM-PAC Basic Mobility Inpatient Short Form Raw Score is 21. A Raw score of greater than 16 suggests the patient may benefit from discharge to home. Please also refer to the recommendation of the Physical Therapist for safe discharge planning.  Barriers to Discharge: Inaccessible home environment     Rehab Resource Intensity Level, PT: III (Minimum Resource Intensity)    See flowsheet documentation for full assessment.

## 2024-07-30 NOTE — OCCUPATIONAL THERAPY NOTE
Occupational Therapy Progress Note     Patient Name: Kinza Meza  Today's Date: 7/30/2024  Problem List  Principal Problem:    DKA (diabetic ketoacidosis) (HCC)  Active Problems:    ADHD    Stage 3b chronic kidney disease (HCC)    Vitamin D deficiency    Chronic migraine w/o aura w/o status migrainosus, not intractable    HTN (hypertension)    Depression        07/30/24 1257   OT Last Visit   OT Visit Date 07/30/24   Note Type   Note Type Treatment   Pain Assessment   Pain Assessment Tool 0-10   Pain Score No Pain   Restrictions/Precautions   Weight Bearing Precautions Per Order No   Other Precautions Fall Risk   Lifestyle   Autonomy I w/ ADLS, IADLS, transfers and functional mobility PTA   Reciprocal Relationships pt lives w/ her spouse. Grandson visits often   Service to Others SSD   Intrinsic Gratification spending time w/ her cats and dogs   Functional Standing Tolerance   Activity Pt played connect 4 in standing to imrpove dynamic standing balance for functional activity. Pt requires supervision for safety.   Bed Mobility   Additional Comments Pt OOB upon OT arrival   Transfers   Sit to Stand 5  Supervision   Additional items Increased time required   Stand to Sit 5  Supervision   Additional items Increased time required   Additional Comments no AD   Functional Mobility   Functional Mobility 5  Supervision   Additional Comments pt requires supervision to take steps without AD   Cognition   Overall Cognitive Status WFL   Arousal/Participation Responsive;Cooperative   Attention Within functional limits   Orientation Level Oriented X4   Memory Within functional limits   Following Commands Follows all commands and directions without difficulty   Comments Pt agreeable to therapy   Activity Tolerance   Activity Tolerance Patient tolerated treatment well   Medical Staff Made Aware RN Cleared   Assessment   Assessment Pt was seen on 7/30/2024 to address ADL retraining, functional transfer training, and activity  tolerance/endurance. Pt demonstrating improvements and currently requires supervision for safety to complete functional transfers and maintain dynamic standing balance. Pt is limited by decreased ADL status, functional transfers, functional mobility, and activity tolerance. Pt seated in bedside chair at beginning of session and seated in bedside chair at end of session with all items within reach. The patient's raw score on the AM-PAC Daily Activity Inpatient Short Form is 21. A raw score of greater than or equal to 19 suggests the patient may benefit from discharge to home. Please refer to the recommendation of the Occupational Therapist for safe discharge planning. Recommend Level III minimum intensity OT services  at d/c to maximize pt function.   Plan   Treatment Interventions ADL retraining;Functional transfer training;Endurance training;UE strengthening/ROM;Patient/family training;Equipment evaluation/education;Compensatory technique education;Continued evaluation;Energy conservation;Activityengagement   Goal Expiration Date 08/12/24   OT Treatment Day 1   OT Frequency 2-3x/wk   Discharge Recommendation   Rehab Resource Intensity Level, OT III (Minimum Resource Intensity)   -PAC Daily Activity Inpatient   Lower Body Dressing 3   Bathing 3   Toileting 3   Upper Body Dressing 4   Grooming 4   Eating 4   Daily Activity Raw Score 21   Daily Activity Standardized Score (Calc for Raw Score >=11) 44.27   AM-PAC Applied Cognition Inpatient   Following a Speech/Presentation 4   Understanding Ordinary Conversation 4   Taking Medications 4   Remembering Where Things Are Placed or Put Away 4   Remembering List of 4-5 Errands 4   Taking Care of Complicated Tasks 4   Applied Cognition Raw Score 24   Applied Cognition Standardized Score 62.21   End of Consult   Education Provided Yes   Patient Position at End of Consult Bedside chair;All needs within reach   Nurse Communication Nurse aware of consult       Bettye Akhtar  MSOT, OTR/L

## 2024-07-30 NOTE — PLAN OF CARE
Problem: PAIN - ADULT  Goal: Verbalizes/displays adequate comfort level or baseline comfort level  Description: Interventions:  - Encourage patient to monitor pain and request assistance  - Assess pain using appropriate pain scale  - Administer analgesics based on type and severity of pain and evaluate response  - Implement non-pharmacological measures as appropriate and evaluate response  - Consider cultural and social influences on pain and pain management  - Notify physician/advanced practitioner if interventions unsuccessful or patient reports new pain  Outcome: Progressing     Problem: INFECTION - ADULT  Goal: Absence or prevention of progression during hospitalization  Description: INTERVENTIONS:  - Assess and monitor for signs and symptoms of infection  - Monitor lab/diagnostic results  - Monitor all insertion sites, i.e. indwelling lines, tubes, and drains  - Monitor endotracheal if appropriate and nasal secretions for changes in amount and color  - [place] appropriate cooling/warming therapies per order  - Administer medications as ordered  - Instruct and encourage patient and family to use good hand hygiene technique  - Identify and instruct in appropriate isolation precautions for identified infection/condition  Outcome: Progressing     Problem: SAFETY ADULT  Goal: Patient will remain free of falls  Description: INTERVENTIONS:  - Educate patient/family on patient safety including physical limitations  - Instruct patient to call for assistance with activity   - Consult OT/PT to assist with strengthening/mobility   - Keep Call bell within reach  - Keep bed low and locked with side rails adjusted as appropriate  - Keep care items and personal belongings within reach  - Initiate and maintain comfort rounds  - Make Fall Risk Sign visible to staff  - Offer Toileting every 2 Hours, in advance of need  - Initiate/Maintain bed alarm  - Apply yellow socks and bracelet for high fall risk patients  - Consider  moving patient to room near nurses station  Outcome: Progressing  Goal: Maintain or return to baseline ADL function  Description: INTERVENTIONS:  -  Assess patient's ability to carry out ADLs; assess patient's baseline for ADL function and identify physical deficits which impact ability to perform ADLs (bathing, care of mouth/teeth, toileting, grooming, dressing, etc.)  - Assess/evaluate cause of self-care deficits   - Assess range of motion  - Assess patient's mobility; develop plan if impaired  - Assess patient's need for assistive devices and provide as appropriate  - Encourage maximum independence but intervene and supervise when necessary  - Involve family in performance of ADLs  - Assess for home care needs following discharge   - Consider OT consult to assist with ADL evaluation and planning for discharge  - Provide patient education as appropriate  Outcome: Progressing  Goal: Maintains/Returns to pre admission functional level  Description: INTERVENTIONS:  - Perform AM-PAC 6 Click Basic Mobility/ Daily Activity assessment daily.  - Set and communicate daily mobility goal to care team and patient/family/caregiver.   - Collaborate with rehabilitation services on mobility goals if consulted  - Stand patient 3 times a day  - Ambulate patient 3 times a day  - Out of bed to chair 3 times a day   - Out of bed for meals 3 times a day  - Out of bed for toileting  - Record patient progress and toleration of activity level   Outcome: Progressing     Problem: DISCHARGE PLANNING  Goal: Discharge to home or other facility with appropriate resources  Description: INTERVENTIONS:  - Identify barriers to discharge w/patient and caregiver  - Arrange for needed discharge resources and transportation as appropriate  - Identify discharge learning needs (meds, wound care, etc.)  - Arrange for interpretive services to assist at discharge as needed  - Refer to Case Management Department for coordinating discharge planning if the  patient needs post-hospital services based on physician/advanced practitioner order or complex needs related to functional status, cognitive ability, or social support system  Outcome: Progressing     Problem: Knowledge Deficit  Goal: Patient/family/caregiver demonstrates understanding of disease process, treatment plan, medications, and discharge instructions  Description: Complete learning assessment and assess knowledge base.  Interventions:  - Provide teaching at level of understanding  - Provide teaching via preferred learning methods  Outcome: Progressing

## 2024-07-30 NOTE — UTILIZATION REVIEW
Continued Stay Review    Date: 7/30/24                           Current Patient Class: Inpatient  Current Level of Care: MS     Admitted on 7/26/24 - DX: DKA / Stage 3b chronic kidney disease     Assessment/Plan:   7/30/24: Overall feeling much better per patient.  tolerating diet this morning.   Patient's blood glucose ranging from  over the last 24 hours. given episodes of hypoglycemia will dose reduce Lantus to 12 units nightly plus sliding scale insulin monitor blood glucose closely over the next 24 hours   Plan: cont Accuchecks with ssic; nausea control (see below); monitor labs; advancing diet; adjusting insulin      Vital Signs (last 3 days)       Date/Time Temp Pulse Resp BP MAP (mmHg) SpO2 O2 Device O2 Interface Device Patient Position - Orthostatic VS Lilli Coma Scale Score Pain    07/30/24 11:16:41 -- 103 -- 152/99 117 99 % -- -- -- -- --    07/30/24 0943 -- -- -- -- -- -- -- -- -- -- No Pain    07/30/24 0833 -- -- -- -- -- -- None (Room air) -- -- 15 3    07/30/24 07:15:34 98.3 °F (36.8 °C) 96 -- 144/93 110 96 % -- -- -- -- --    07/30/24 0300 -- -- -- -- -- -- -- -- -- 15 No Pain    07/29/24 22:26:47 99.3 °F (37.4 °C) 105 15 138/92 107 95 % None (Room air) -- Lying -- --    07/29/24 15:44:18 99.2 °F (37.3 °C) 57 17 136/92 107 99 % -- -- -- -- --    07/29/24 1305 -- 107 20 124/65 88 97 % -- -- -- -- --    07/29/24 1300 -- 109 20 126/74 96 96 % -- -- -- -- --    07/29/24 1200 -- 101 20 197/102 140 95 % -- -- -- -- --    07/29/24 1155 -- -- -- -- -- -- -- -- -- -- No Pain    07/29/24 0750 98.4 °F (36.9 °C) -- -- -- -- -- -- -- -- 15 No Pain    07/29/24 0500 -- 91 17 132/63 91 94 % -- -- -- -- --    07/29/24 0400 98.2 °F (36.8 °C) 91 16 117/55 76 94 % None (Room air) -- Lying 15 1    07/29/24 0300 -- 93 17 154/72 103 95 % -- -- -- -- --    07/29/24 0200 -- 93 18 111/55 78 94 % -- -- -- -- --    07/29/24 0100 -- 96 17 111/56 80 93 % -- -- -- -- --    07/29/24 0000 98.5 °F (36.9 °C) 93 10 130/60 86  97 % -- -- -- 15 --    07/28/24 2300 -- 92 16 166/72 103 98 % -- -- -- -- --    07/28/24 2000 98 °F (36.7 °C) -- -- -- -- -- None (Room air) -- Lying 15 --    07/28/24 1824 -- 90 25 135/81 104 99 % None (Room air) -- Lying -- --    07/28/24 1700 -- 94 18 111/71 87 99 % -- -- -- -- --    07/28/24 1600 98.9 °F (37.2 °C) 93 17 113/69 86 98 % None (Room air) -- Sitting 15 --    07/28/24 1500 -- 96 29 130/79 96 99 % None (Room air) -- Sitting -- --    07/28/24 1300 -- 97 18 135/67 90 97 % None (Room air) -- Sitting -- --    07/28/24 1247 -- -- -- 131/70 95 -- -- -- Sitting -- --    07/28/24 1200 98.8 °F (37.1 °C) 99 21 -- -- 97 % None (Room air) -- -- 15 --    07/28/24 1100 -- 93 17 125/73 96 98 % -- -- -- -- --    07/28/24 0945 -- 96 26 121/70 89 98 % None (Room air) -- Lying -- --    07/28/24 0920 -- 87 15 132/69 95 99 % None (Room air) -- Lying -- --    07/28/24 0800 -- 87 20 133/79 101 98 % -- -- -- 15 No Pain    07/28/24 0754 98.3 °F (36.8 °C) 88 21 123/69 91 98 % -- -- Lying -- --    07/28/24 0700 -- 82 17 119/73 92 98 % -- -- -- -- --    07/28/24 0600 -- 82 14 97/59 73 97 % -- -- -- -- --    07/28/24 0500 -- 79 14 116/71 89 97 % -- -- -- -- --    07/28/24 0400 98.7 °F (37.1 °C) 81 16 102/63 78 97 % BiPAP -- Lying 15 No Pain    07/28/24 0345 -- 81 17 101/61 75 97 % -- -- -- -- --    07/28/24 0344 -- -- -- -- -- -- -- Face mask -- -- --    07/28/24 0330 -- 80 18 104/58 76 97 % -- -- -- -- --    07/28/24 0315 -- 81 16 99/58 74 96 % -- -- -- -- --    07/28/24 0200 -- 82 15 101/60 75 97 % -- -- -- -- --    07/28/24 0100 -- 81 17 139/83 106 99 % -- -- -- -- --    07/28/24 0000 98.5 °F (36.9 °C) 80 14 102/64 78 98 % -- -- -- 15 No Pain    07/27/24 2300 -- 78 12 141/77 102 100 % -- -- -- -- --    07/27/24 2200 -- 75 13 159/94 121 100 % -- -- -- -- --    07/27/24 2100 -- 72 14 155/82 113 100 % -- -- -- -- --    07/27/24 2000 98 °F (36.7 °C) 69 16 156/84 112 100 % -- -- -- 15 --    07/27/24 1906 -- -- -- -- -- -- -- Face  mask -- -- --    07/27/24 1900 -- 72 13 139/84 106 100 % -- -- -- -- --    07/27/24 1800 -- 74 17 133/69 94 99 % -- -- -- -- --    07/27/24 1700 -- 74 22 143/91 111 100 % -- -- -- -- --    07/27/24 1600 -- 75 31 132/62 89 100 % -- -- -- 15 --    07/27/24 1546 -- -- -- -- -- -- -- Face mask -- -- --    07/27/24 1500 -- 74 11 128/73 96 100 % -- -- -- -- --    07/27/24 1400 -- 72 6 104/66 80 98 % -- -- -- -- --    07/27/24 1300 -- 74 25 111/63 79 100 % -- -- -- -- --    07/27/24 1254 -- 69 17 140/82 106 99 % -- -- -- -- --    07/27/24 1212 -- -- -- -- -- -- -- Face mask -- 15 --    07/27/24 1200 -- 77 15 55/32 39 97 % -- -- -- -- --    07/27/24 11:07:47 -- 59 -- 167/91 -- 100 % -- -- -- -- --    07/27/24 11:00:17 -- 54 -- 126/78 -- 100 % -- -- -- -- --    07/27/24 10:59:23 97.4 °F (36.3 °C) -- -- -- -- -- -- -- -- -- --    07/27/24 10:54:30 -- 52 -- -- -- 97 % -- -- -- -- --    07/27/24 10:52:36 -- -- -- 57/30 -- -- -- -- -- -- --    07/27/24 10:52:29 -- 72 -- 65/38 -- 96 % -- -- -- -- --    07/27/24 10:44:16 97.7 °F (36.5 °C) -- -- -- -- -- -- -- -- -- --    07/27/24 0918 -- 76 -- 85/62 -- -- -- -- -- -- --    07/27/24 0732 -- 75 20 101/61 76 98 % None (Room air) -- Lying -- No Pain    07/27/24 0440 -- -- -- -- -- -- None (Room air) -- -- -- No Pain    07/27/24 0434 -- 77 -- 94/60 69 -- -- -- -- -- --    07/27/24 0421 -- -- -- 88/62 71 -- -- -- Lying -- --    07/27/24 0344 -- -- -- 107/69 -- -- -- -- -- -- --    07/27/24 03:04:56 98.3 °F (36.8 °C) 83 -- 78/55 -- 98 % -- -- Lying -- --    07/27/24 00:46:53 98.2 °F (36.8 °C) -- -- 152/79 -- 100 % -- -- -- -- --    07/27/24 0040 -- -- -- -- -- -- None (Room air) -- -- -- No Pain    07/27/24 0015 -- 94 19 133/83 104 99 % None (Room air) -- Lying -- --    07/27/24 0008 -- 102 -- 133/83 -- -- -- -- -- -- --          Pertinent Labs/Diagnostic Results:   Radiology:  CT head wo contrast   Final Interpretation by Rd Hernandez MD (07/27 1912)      No acute intracranial  abnormality.                  Workstation performed: [id number]         XR chest portable ICU   Final Interpretation by Ashley Chow MD (07/28 0810)      No acute cardiopulmonary disease.            Workstation performed: [id number]           Cardiology:  ECG 12 lead   Final Result by Elbert Mccormick MD (07/28 2013)   Sinus rhythm   Baseline artifact   Possible Anterolateral infarct (cited on or before 26-JUL-2024)   Abnormal ECG      Confirmed by Elbert Mccormick ([id number]) on 7/28/2024 8:13:28 PM      ECG 12 lead   Final Result by Elbert cMcormick MD (07/28 0626)   Sinus bradycardia with Premature supraventricular complexes   Cannot rule out Anterior infarct (cited on or before 29-OCT-2023)   Nonspecific ST-t wave changes   Abnormal ECG      Confirmed by Elbert Mccormick ([id number]) on 7/28/2024 6:26:24 AM      ECG 12 lead   Final Result by Elbert Mccormick MD (07/28 2014)   Age and gender specific ECG analysis    Normal sinus rhythm   Anterolateral infarct (cited on or before 29-OCT-2023)   Abnormal ECG   When compared with ECG of 29-OCT-2023 13:56,   Questionable change in initial forces of Anteroseptal leads   Nonspecific T wave abnormality no longer evident in Lateral leads   Confirmed by Elbert Mccormick ([id number]) on 7/28/2024 8:14:00 PM          Results from last 7 days   Lab Units 07/30/24  0557 07/29/24  0504 07/28/24  0422 07/27/24  1154 07/27/24  1148 07/27/24  1104   WBC Thousand/uL 4.39 4.71 5.81  --   --  5.50   HEMOGLOBIN g/dL 10.6* 10.2* 10.4*  --   --  10.0*   I STAT HEMOGLOBIN g/dl  --   --   --  11.2*  --   --    HEMATOCRIT % 33.0* 31.1* 32.0*  --   --  31.8*   HEMATOCRIT, ISTAT %  --   --   --  33* <15*  --    PLATELETS Thousands/uL 120* 115* 136*  --   --  160   TOTAL NEUT ABS Thousands/µL 2.03 2.34 3.28  --   --   --          Results from last 7 days   Lab Units 07/30/24  0634 07/29/24  0504 07/28/24  2340 07/28/24  1649 07/28/24  1208 07/27/24  1549 07/27/24  1154  07/27/24  1148 07/27/24  1104 07/27/24  1059 07/27/24  0828 07/27/24  0751   SODIUM mmol/L 142 141 138 139 141   < >  --   --    < >  --    < >  --    POTASSIUM mmol/L 4.5 5.1 4.2 3.7 4.5   < >  --   --    < >  --    < >  --    CHLORIDE mmol/L 111* 112* 109* 108 112*   < >  --   --    < >  --    < >  --    CO2 mmol/L 24 23 22 22 21   < >  --   --    < >  --    < >  --    CO2, I-STAT mmol/L  --   --   --   --   --   --  22  --   --  17*   < >  --    ANION GAP mmol/L 7 6 7 9 8   < >  --   --    < >  --    < >  --    BUN mg/dL 8 8 9 11 12   < >  --   --    < >  --    < >  --    CREATININE mg/dL 1.47* 1.29 1.32* 1.47* 1.47*   < >  --   --    < >  --    < >  --    EGFR ml/min/1.73sq m 41 48 47 41 41   < >  --   --    < >  --    < >  --    CALCIUM mg/dL 8.2* 7.9* 7.9* 8.1* 7.9*   < >  --   --    < >  --    < >  --    CALCIUM, IONIZED mmol/L  --   --   --   --   --   --   --   --   --   --   --  1.15   CALCIUM, IONIZED, ISTAT mmol/L  --   --   --   --   --   --  1.26 0.56*  --  1.18  --   --    MAGNESIUM mg/dL 2.0 1.8* 1.7* 1.9 2.0   < >  --   --    < >  --    < >  --    PHOSPHORUS mg/dL 3.5 3.4 3.4 3.5 3.6   < >  --   --    < >  --    < >  --     < > = values in this interval not displayed.     Results from last 7 days   Lab Units 07/29/24  0504 07/28/24  6540 07/28/24  1208 07/28/24  0447 07/27/24  1646 07/26/24 2001   AST U/L 6*  --   --  8* 10* 10*   ALT U/L 6*  --   --  6* 7 6*   ALK PHOS U/L 49  --   --  47 54 68   TOTAL PROTEIN g/dL 4.4*  --   --  4.5* 5.6* 7.2   ALBUMIN g/dL 2.8* 3.0* 3.0* 2.9* 3.4* 4.4   TOTAL BILIRUBIN mg/dL 0.47  --   --  0.54 0.60 0.82   BILIRUBIN DIRECT mg/dL  --   --   --   --  0.18  --      Results from last 7 days   Lab Units 07/30/24  1111 07/30/24  0633 07/30/24  0437 07/30/24  0014 07/29/24  2109 07/29/24  1539 07/29/24  1359 07/29/24  1156 07/29/24  1035 07/29/24  0751 07/29/24  0609 07/29/24  0418   POC GLUCOSE mg/dl 101 110 68 69 94 104 167* 94 93 85 93 82     Results from last 7  days   Lab Units 07/30/24  0634 07/29/24  0504 07/28/24  2340 07/28/24  1649 07/28/24  1208 07/28/24  1006 07/28/24  0447 07/28/24  0412 07/28/24  0019 07/27/24 2009 07/27/24  1646 07/27/24  1104   GLUCOSE RANDOM mg/dL 71 82 100 105 127 152* 111 111 93 90 69 145*     Results from last 7 days   Lab Units 07/27/24  0346   OSMOLALITY, SERUM mmol/*     Results from last 7 days   Lab Units 07/27/24  0617   HEMOGLOBIN A1C % 5.1   EAG mg/dl 100     Beta- Hydroxybutyrate   Date Value Ref Range Status   07/26/2024 3.45 (H) 0.02 - 0.27 mmol/L Final   07/26/2024 4.84 (H) 0.02 - 0.27 mmol/L Final     BETA-HYDROXYBUTYRATE   Date Value Ref Range Status   07/13/2020 0.1 <0.6 mmol/L Final         Results from last 7 days   Lab Units 07/28/24  2340 07/27/24 2009 07/27/24  1549   PH KENNEDY  7.362 7.296* 7.286*   PCO2 KENNEDY mm Hg 36.5* 42.9 45.0   PO2 KENNEDY mm Hg 38.6 16.9* 36.2   HCO3 KENNEDY mmol/L 20.3* 20.4* 21.0*   BASE EXC KENNEDY mmol/L -4.6 -5.8 -5.6   O2 CONTENT KENNEDY ml/dL 11.8 4.6 12.4   O2 HGB, VENOUS % 74.4 26.6* 71.0     Results from last 7 days   Lab Units 07/27/24  1154 07/27/24  1148 07/27/24  1059   PH, KENNEDY I-STAT  7.252* 7.333  --    PCO2, KENNEDY ISTAT mm HG 45.9 <17.0*  --    PO2, KENNEDY ISTAT mm HG 16.0* 64.0*  --    HCO3, KENNEDY ISTAT mmol/L 20.2*  --   --    I STAT BASE EXC mmol/L -7*  --  -8*   I STAT O2 SAT % 16*  --  97*   ISTAT PH ART   --   --  7.355   I STAT ART PCO2 mm HG  --   --  29.2*   I STAT ART PO2 mm HG  --   --  91.0   I STAT ART HCO3 mmol/L  --   --  16.3*        Results from last 7 days   Lab Units 07/27/24  1646 07/27/24  1203 07/27/24  1104   HS TNI 0HR ng/L  --   --  23   HS TNI 2HR ng/L  --  21  --    HSTNI D2 ng/L  --  -2  --    HS TNI 4HR ng/L 34  --   --    HSTNI D4 ng/L 11  --   --         Results from last 7 days   Lab Units 07/27/24  0432   TSH 3RD GENERATON uIU/mL 0.472        Results from last 7 days   Lab Units 07/27/24  1232 07/27/24  1104 07/26/24 2001   LACTIC ACID mmol/L 1.9 2.4* 1.0         Results from last 7 days   Lab Units 07/26/24  2315   LIPASE u/L 30        Results from last 7 days   Lab Units 07/27/24  0346   OSMOLALITY, SERUM mmol/*     Results from last 7 days   Lab Units 07/30/24  1256 07/27/24  0025 07/26/24  1513   CLARITY UA  Clear Clear dark   COLOR UA  Light Yellow [place] yellow   SPEC GRAV UA  1.013 1.020  --    PH UA  6.5 5.5  --    GLUCOSE UA mg/dl Negative Negative neg   KETONES UA mg/dl Negative 80 (3+)* 2+   BLOOD UA  Negative Negative  --    PROTEIN UA mg/dl Negative Trace* 1+   NITRITE UA  Negative Negative neg   BILIRUBIN UA  Negative Large*  --    BILIRUBIN UA POC   --   --  2+   UROBILINOGEN UA E.U./dl  --  2.0* neg   UROBILINOGEN UA (BE) mg/dl <2.0  --   --    LEUKOCYTES UA  Moderate* Small* 15   WBC UA /hpf 4-10* 4-10*  --    RBC UA /hpf None Seen None Seen  --    BACTERIA UA /hpf None Seen None Seen  --    EPITHELIAL CELLS WET PREP /hpf Occasional Moderate*  --    MUCUS THREADS   --  Occasional*  --         Results from last 7 days   Lab Units 07/27/24  0025   AMPH/METH  Positive*   BARBITURATE UR  Negative   BENZODIAZEPINE UR  Negative   COCAINE UR  Negative   METHADONE URINE  Negative   OPIATE UR  Negative   PCP UR  Negative   THC UR  Positive*       Results from last 7 days   Lab Units 07/27/24  1232 07/27/24  1203   BLOOD CULTURE  No Growth at 48 hrs. No Growth at 48 hrs.       Medications:   Scheduled Medications:  Artificial Tears, 1 drop, Both Eyes, TID  atorvastatin, 10 mg, Oral, HS  calcitriol, 0.25 mcg, Oral, Daily  Cholecalciferol, 2,000 Units, Oral, Daily  clonazePAM, 0.25 mg, Oral, BID  cyanocobalamin, 1,000 mcg, Intramuscular, Weekly  divalproex sodium, 500 mg, Oral, HS  DULoxetine, 60 mg, Oral, Daily  ferrous sulfate, 325 mg, Oral, Daily With Breakfast  heparin (porcine), 5,000 Units, Subcutaneous, Q8H [facility]  [START ON 7/31/2024] insulin glargine, 12 Units, Subcutaneous, Daily With Breakfast  insulin lispro, 1-6 Units, Subcutaneous, TID AC  metoprolol  tartrate, 100 mg, Oral, Q12H DIANELYS  pregabalin, 100 mg, Oral, TID      Continuous IV Infusions: None       PRN Meds:  acetaminophen, 650 mg, Oral, Q6H PRN  ondansetron, 4 mg, Oral, Q8H PRN  (7/30 recd x1 so far today)         Discharge Plan: TBD    Network Utilization Review Department  ATTENTION: Please call with any questions or concerns to [phone] and carefully listen to the prompts so that you are directed to the right person. All voicemails are confidential.   For Discharge needs, contact Care Management DC Support Team at [phone] opt. [phone]  Send all requests for admission clinical reviews, approved or denied determinations and any other requests to dedicated fax number below belonging to the campus where the patient is receiving treatment. List of dedicated fax numbers for the Facilities:  FACILITY NAME UR FAX NUMBER   ADMISSION DENIALS (Administrative/Medical Necessity) [phone]   DISCHARGE SUPPORT TEAM (NETWORK) [phone]   PARENT CHILD HEALTH (Maternity/NICU/Pediatrics) [phone]   [facility] [phone]   [facility] [phone]   [facility] [phone]   [facility] [phone]   [facility] [phone]   [facility] [phone]   [facility] [phone]   [facility] [phone]   [facility] [phone]   [facility] [phone]   [facility] [phone]   [facility] [phone]

## 2024-07-30 NOTE — PLAN OF CARE
Problem: OCCUPATIONAL THERAPY ADULT  Goal: Performs self-care activities at highest level of function for planned discharge setting.  See evaluation for individualized goals.  Description: Treatment Interventions: ADL retraining, Functional transfer training, UE strengthening/ROM, Endurance training, Cognitive reorientation, Patient/family training, Equipment evaluation/education, Compensatory technique education, Continued evaluation, Energy conservation, Activityengagement          See flowsheet documentation for full assessment, interventions and recommendations.   Outcome: Progressing  Note: Limitation: Decreased ADL status, Decreased endurance, Decreased high-level ADLs, Decreased self-care trans  Prognosis: Fair  Assessment: Pt was seen on 7/30/2024 to address ADL retraining, functional transfer training, and activity tolerance/endurance. Pt demonstrating improvements and currently requires supervision for safety to complete functional transfers and maintain dynamic standing balance. Pt is limited by decreased ADL status, functional transfers, functional mobility, and activity tolerance. Pt seated in bedside chair at beginning of session and seated in bedside chair at end of session with all items within reach. The patient's raw score on the -PAC Daily Activity Inpatient Short Form is 21. A raw score of greater than or equal to 19 suggests the patient may benefit from discharge to home. Please refer to the recommendation of the Occupational Therapist for safe discharge planning. Recommend Level III minimum intensity OT services  at d/c to maximize pt function.     Rehab Resource Intensity Level, OT: III (Minimum Resource Intensity)

## 2024-07-30 NOTE — PHYSICAL THERAPY NOTE
PHYSICAL THERAPY NOTE      Patient Name: Kinza Meza  Today's Date: 7/30/2024 07/30/24 0943   PT Last Visit   PT Visit Date 07/30/24   Note Type   Note Type Treatment   Pain Assessment   Pain Assessment Tool 0-10   Pain Score No Pain   Restrictions/Precautions   Weight Bearing Precautions Per Order No   Other Precautions Fall Risk;Pain   General   Chart Reviewed Yes   Response to Previous Treatment Patient with no complaints from previous session.   Family/Caregiver Present No   Cognition   Overall Cognitive Status WFL   Arousal/Participation Responsive;Cooperative   Attention Within functional limits   Orientation Level Oriented X4   Memory Within functional limits   Following Commands Follows all commands and directions without difficulty   Comments Pt pleasant and cooperative t/o PT session   Bed Mobility   Supine to Sit 5  Supervision   Additional items HOB elevated;Verbal cues   Sit to Supine Unable to assess   Additional Comments Pt supine in bed upon arrival. Pt left sitting in chair with call bell and all needs following PT session   Transfers   Sit to Stand 5  Supervision   Additional items Increased time required   Stand to Sit 5  Supervision   Additional items Increased time required   Additional Comments w/ RW. Pt completed x15 STS t/o PT session including x10 consecutive STS w/o use of arms   Ambulation/Elevation   Gait pattern Improper Weight shift;Decreased foot clearance;Shuffling;Short stride;Excessively slow   Gait Assistance 5  Supervision  (CGA w/o AD)   Additional items Assist x 1;Verbal cues   Assistive Device Rolling walker;None   Distance 350' w/ RW + 100' w/o AD   Stair Management Assistance 4  Minimal assist   Additional items Assist x 1;Verbal cues;Increased time required   Stair Management Technique Foreward;One rail L;Sideways   Number of Stairs 12  (3 steps forwards approach + 9 steps sideways  approach)   Balance   Static Sitting Good   Dynamic Sitting Fair +   Static Standing Fair   Dynamic Standing Fair -   Ambulatory Fair -   Endurance Deficit   Endurance Deficit Yes   Endurance Deficit Description fatigue, weakness   Activity Tolerance   Activity Tolerance Patient tolerated treatment well;Patient limited by fatigue   Nurse Made Aware RN cleared   Exercises   Hamstring Sets Standing;10 reps;AROM;Bilateral   Hip Flexion Standing;10 reps;AROM;Bilateral   Hip Abduction Standing;10 reps;AROM;Bilateral   Hip Extension Standing;10 reps;AROM;Bilateral   Squat Sitting;Standing;10 reps;AROM;Bilateral  (x10 STS w/o use of armrests)   Assessment   Prognosis Good   Problem List Decreased strength;Decreased endurance;Impaired balance;Decreased mobility;Obesity   Assessment Pt seen for PT treatment session this date. Therapy session focused on gait training, transfer training, LE strengthening, stair training and endurance training in order to improve overall mobility and independence. Pt demonstrates improvements in functional mobility by decreasing level of assistance for transfers and ambulation using RW. Increased unsteadiness and decreased gait speed w/o AD vs w/ RW. Pt making gains towards increasing strength and completed x10 STS w/ SUP and no UE support at armrests. Pt making good progress toward goals. Pt was left sitting at the end of PT session with all needs in reach. Pt would benefit from continued PT services while in hospital to address remaining limitations. PT to continue treating pt and recommends home w/ HHPT. The patient's AM-PAC Basic Mobility Inpatient Short Form Raw Score is 21. A Raw score of greater than 16 suggests the patient may benefit from discharge to home. Please also refer to the recommendation of the Physical Therapist for safe discharge planning.   Barriers to Discharge Inaccessible home environment   Goals   Patient Goals to be able to spend time w/ erica   LTG Expiration Date  08/12/24   PT Treatment Day 1   Plan   Treatment/Interventions Functional transfer training;LE strengthening/ROM;Elevations;Therapeutic exercise;Endurance training;Patient/family training;Equipment eval/education;Bed mobility;Gait training;Spoke to nursing;Spoke to case management;OT   Progress Progressing toward goals   PT Frequency 3-5x/wk   Discharge Recommendation   Rehab Resource Intensity Level, PT III (Minimum Resource Intensity)   Equipment Recommended Walker   Walker Package Recommended Wheeled walker   AM-PAC Basic Mobility Inpatient   Turning in Flat Bed Without Bedrails 4   Lying on Back to Sitting on Edge of Flat Bed Without Bedrails 3   Moving Bed to Chair 4   Standing Up From Chair Using Arms 4   Walk in Room 3   Climb 3-5 Stairs With Railing 3   Basic Mobility Inpatient Raw Score 21   Basic Mobility Standardized Score 45.55   [facility] Highest Level Of Mobility   -HLM Goal 6: Walk 10 steps or more   JH-HLM Achieved 8: Walk 250 feet ot more   Education   Education Provided Mobility training;Assistive device   Patient Demonstrates acceptance/verbal understanding   End of Consult   Patient Position at End of Consult Bedside chair;All needs within reach     Mirela Kellogg PT, DPT

## 2024-07-31 VITALS
BODY MASS INDEX: 31.53 KG/M2 | OXYGEN SATURATION: 99 % | TEMPERATURE: 98.3 F | HEART RATE: 76 BPM | RESPIRATION RATE: 16 BRPM | HEIGHT: 61 IN | WEIGHT: 167 LBS | DIASTOLIC BLOOD PRESSURE: 90 MMHG | SYSTOLIC BLOOD PRESSURE: 141 MMHG

## 2024-07-31 PROBLEM — E11.10 DKA (DIABETIC KETOACIDOSIS) (HCC): Status: RESOLVED | Noted: 2024-07-26 | Resolved: 2024-07-31

## 2024-07-31 LAB
ANION GAP SERPL CALCULATED.3IONS-SCNC: 8 MMOL/L (ref 4–13)
BASOPHILS # BLD AUTO: 0.03 THOUSANDS/ÂΜL (ref 0–0.1)
BASOPHILS NFR BLD AUTO: 1 % (ref 0–1)
BUN SERPL-MCNC: 10 MG/DL (ref 5–25)
CALCIUM SERPL-MCNC: 7.8 MG/DL (ref 8.4–10.2)
CHLORIDE SERPL-SCNC: 110 MMOL/L (ref 96–108)
CO2 SERPL-SCNC: 25 MMOL/L (ref 21–32)
CREAT SERPL-MCNC: 1.31 MG/DL (ref 0.6–1.3)
EOSINOPHIL # BLD AUTO: 0.09 THOUSAND/ÂΜL (ref 0–0.61)
EOSINOPHIL NFR BLD AUTO: 2 % (ref 0–6)
ERYTHROCYTE [DISTWIDTH] IN BLOOD BY AUTOMATED COUNT: 14.6 % (ref 11.6–15.1)
GAD65 AB SER-ACNC: <5 U/ML (ref 0–5)
GFR SERPL CREATININE-BSD FRML MDRD: 47 ML/MIN/1.73SQ M
GLUCOSE SERPL-MCNC: 114 MG/DL (ref 65–140)
GLUCOSE SERPL-MCNC: 123 MG/DL (ref 65–140)
GLUCOSE SERPL-MCNC: 70 MG/DL (ref 65–140)
GLUCOSE SERPL-MCNC: 75 MG/DL (ref 65–140)
HCT VFR BLD AUTO: 32 % (ref 34.8–46.1)
HGB BLD-MCNC: 10.3 G/DL (ref 11.5–15.4)
IMM GRANULOCYTES # BLD AUTO: 0.05 THOUSAND/UL (ref 0–0.2)
IMM GRANULOCYTES NFR BLD AUTO: 1 % (ref 0–2)
LYMPHOCYTES # BLD AUTO: 2.21 THOUSANDS/ÂΜL (ref 0.6–4.47)
LYMPHOCYTES NFR BLD AUTO: 46 % (ref 14–44)
MCH RBC QN AUTO: 28.6 PG (ref 26.8–34.3)
MCHC RBC AUTO-ENTMCNC: 32.2 G/DL (ref 31.4–37.4)
MCV RBC AUTO: 89 FL (ref 82–98)
MONOCYTES # BLD AUTO: 0.36 THOUSAND/ÂΜL (ref 0.17–1.22)
MONOCYTES NFR BLD AUTO: 8 % (ref 4–12)
NEUTROPHILS # BLD AUTO: 1.96 THOUSANDS/ÂΜL (ref 1.85–7.62)
NEUTS SEG NFR BLD AUTO: 42 % (ref 43–75)
NRBC BLD AUTO-RTO: 0 /100 WBCS
PLATELET # BLD AUTO: 122 THOUSANDS/UL (ref 149–390)
PMV BLD AUTO: 11.8 FL (ref 8.9–12.7)
POTASSIUM SERPL-SCNC: 4.3 MMOL/L (ref 3.5–5.3)
RBC # BLD AUTO: 3.6 MILLION/UL (ref 3.81–5.12)
SODIUM SERPL-SCNC: 143 MMOL/L (ref 135–147)
WBC # BLD AUTO: 4.7 THOUSAND/UL (ref 4.31–10.16)

## 2024-07-31 PROCEDURE — 80048 BASIC METABOLIC PNL TOTAL CA: CPT

## 2024-07-31 PROCEDURE — 97530 THERAPEUTIC ACTIVITIES: CPT

## 2024-07-31 PROCEDURE — 82948 REAGENT STRIP/BLOOD GLUCOSE: CPT

## 2024-07-31 PROCEDURE — 85025 COMPLETE CBC W/AUTO DIFF WBC: CPT

## 2024-07-31 RX ORDER — INSULIN ASPART 100 [IU]/ML
INJECTION, SOLUTION INTRAVENOUS; SUBCUTANEOUS
Start: 2024-07-31

## 2024-07-31 RX ORDER — HYDROCHLOROTHIAZIDE 12.5 MG/1
12.5 TABLET ORAL DAILY
Status: DISCONTINUED | OUTPATIENT
Start: 2024-07-31 | End: 2024-07-31 | Stop reason: HOSPADM

## 2024-07-31 RX ORDER — INSULIN GLARGINE 100 [IU]/ML
15 INJECTION, SOLUTION SUBCUTANEOUS
Qty: 15 ML | Refills: 0 | Status: SHIPPED | OUTPATIENT
Start: 2024-07-31

## 2024-07-31 RX ORDER — METHOCARBAMOL 750 MG/1
750 TABLET, FILM COATED ORAL EVERY 6 HOURS PRN
Status: DISCONTINUED | OUTPATIENT
Start: 2024-07-31 | End: 2024-07-31 | Stop reason: HOSPADM

## 2024-07-31 RX ORDER — LOSARTAN POTASSIUM 50 MG/1
100 TABLET ORAL DAILY
Status: DISCONTINUED | OUTPATIENT
Start: 2024-07-31 | End: 2024-07-31 | Stop reason: HOSPADM

## 2024-07-31 RX ADMIN — DULOXETINE HYDROCHLORIDE 60 MG: 60 CAPSULE, DELAYED RELEASE ORAL at 09:20

## 2024-07-31 RX ADMIN — PREGABALIN 100 MG: 100 CAPSULE ORAL at 09:20

## 2024-07-31 RX ADMIN — FERROUS SULFATE TAB 325 MG (65 MG ELEMENTAL FE) 325 MG: 325 (65 FE) TAB at 09:20

## 2024-07-31 RX ADMIN — METOPROLOL TARTRATE 100 MG: 50 TABLET, FILM COATED ORAL at 09:20

## 2024-07-31 RX ADMIN — CALCITRIOL CAPSULES 0.25 MCG 0.25 MCG: 0.25 CAPSULE ORAL at 09:20

## 2024-07-31 RX ADMIN — Medication 2000 UNITS: at 09:20

## 2024-07-31 RX ADMIN — GLYCERIN 1 DROP: .002; .002; .01 SOLUTION/ DROPS OPHTHALMIC at 09:22

## 2024-07-31 RX ADMIN — HEPARIN SODIUM 5000 UNITS: 5000 INJECTION INTRAVENOUS; SUBCUTANEOUS at 05:24

## 2024-07-31 RX ADMIN — HYDROCHLOROTHIAZIDE 12.5 MG: 12.5 TABLET ORAL at 09:20

## 2024-07-31 RX ADMIN — CLONAZEPAM 0.25 MG: 0.5 TABLET ORAL at 09:22

## 2024-07-31 RX ADMIN — LOSARTAN POTASSIUM 100 MG: 50 TABLET, FILM COATED ORAL at 09:20

## 2024-07-31 NOTE — DISCHARGE SUMMARY
INTERNAL MEDICINE RESIDENCY DISCHARGE SUMMARY     Kinza Meza   48 y.o. female  MRN: [id number]  Room/Bed: /-01     [facility] MED SURG 7   Encounter: [id number]    Active Problems:    HTN (hypertension)    ADHD    Stage 3b chronic kidney disease (HCC)    Vitamin D deficiency    Chronic migraine w/o aura w/o status migrainosus, not intractable    Depression      * DKA (diabetic ketoacidosis) (HCC)-resolved as of 7/31/2024  Assessment & Plan  Lab Results   Component Value Date    HGBA1C 5.1 07/27/2024       Recent Labs     07/29/24  0418 07/29/24  0609 07/29/24  0751 07/29/24  1035   POCGLU 82 93 85 93       Blood Sugar Average: Last 72 hrs:  (P) [id number]    Insulin-dependent diabetes.  She takes 40 units of Lantus and 5 units of mealtime insulin at home.   Upon admission: elevated AG of 18, normal potassium 3.9 low bicarb 20, normal glucose 118, and elevated B-hydroxy 3.45; VBG pH 7.28. Euglycemic metabolic acidosis most likely due to DKA 2/2 stopping basal insulin on Tues and mealtime insulin 2 weeks ago.Gap closed, most recent AG 6.    Plan:  - Advanced diet to carb-controlled 2 from clear liquids, tolerating well   - D/c insulin gtt and dextrose 7/29 AM  - Will transition to 12 units Lantus  - Postprandial glucose checks, adjust insulin regimen accordingly   - Continue to monitor K, MG, and Phos; replete as needed  - Rapid response on 7/27 due to hypotension and hypoglycemia, escalated to critical care. Patient now stable for transfer to Med Surg.  - Will order a urinalysis for potential UTI    Discharge Instructions:  - Lantus 15 units and lispro PRN  - Continue CGM monitoring  - F/u with PCP in 1 week    HTN (hypertension)  Assessment & Plan  Home meds include HCTZ 12.5 mg, Cozaar 100 mg daily, Lopressor 100 mg twice daily, and verapamil 120 mg daily at bedtime.  Blood pressure 159/104 upon arrival to the ED. denies headache,  dizziness, shortness of breath, chest pain or chest discomfort, and heart palpitations.    Patient became hypotensive, rapid was called and now in critical care    Plan:  -Holding home Cozaar, HCTZ, and verapamil due to hypotension  -Will restart Lopressor at 100 mg Q12H  -Continue to monitor blood pressure    Discharge instructions:  - Will restart all antihypertensive medications  - Counseled patient to monitor blood pressure at home.     Depression  Assessment & Plan  History of depression anxiety.  Take Cymbalta 60 mg daily and Klonopin 0.5 mg twice daily    Plan:  -Continue Cymbalta   -Patient on 1/2 dose of Klonopin 0.25 mg BID, can return to home dose as tolerated    Chronic migraine w/o aura w/o status migrainosus, not intractable  Assessment & Plan  Chronic history of migraine.  She takes Depakote and acetazolamide at home.     Plan:  - Continue Depakote  - Hold acetazolamide due to concern for metabolic acidosis  -Can continue home medications as acidosis improves    Vitamin D deficiency  Assessment & Plan  CKD stage IIIb with vitamin D deficiency.  She takes vitamin D3 2000 units at home.    Plan:  -Continue vitamin D3     Stage 3b chronic kidney disease (HCC)  Assessment & Plan  Lab Results   Component Value Date    EGFR 48 07/29/2024    EGFR 47 07/28/2024    EGFR 41 07/28/2024    CREATININE 1.29 07/29/2024    CREATININE 1.32 (H) 07/28/2024    CREATININE 1.47 (H) 07/28/2024   recent baseline creatinine around 1.6-1.9  Cr on admission 1.85; GFR 31    Plan  - D/c IVF, insulin gtt  - Creatinine improving  - Continue home med calcitriol 0.25 mcg daily  - Monitor electrolytes and renal function daily  - Avoid NSAID and nephrotoxic agents    ADHD  Assessment & Plan  Patient has a history of ADHD, and she takes Vyvanse at home.  He has stopped taking Vyvanse since Tuesday because low p.o. intake.    Plan:  - Hold Vyvanse for now, can restart as patient returns to baseline         DETAILS OF HOSPITAL COURSE      Kinza Meza is a 47 y/o female with a past medical history of stage 3b CKD, hypertension, type 2 diabetes mellitus, GERD w/out esophagitis, Depression, and ADHD who presented with acute flank pain, nausea, nonbillious vomiting, and nonbloody diarrhea which has been ongoing for the past several days. On admission the patient was told by her PCP to present to the ED due to her abnormal kidney function. In the ED, patient was afebrile and /104. VBG revealed pH 7.284, pO2 20 mmhg, HCO3 19.6. Her CMP revealed AG 18, BUN 27, Cr 1.91, AST and ALT 10 and 6, respectively. Beta-hydroxybutyrate was elevated at 3.45.     The patient became hypotensive with AMS rapid response was called. She was given a fluid bolus and low dose norepinephrine and is now stable with an AG of 6. She was then transitioned to her basal bolus and discontinued insulin gtt. The patient is currently on lantus 15 units with 5 units of lispro TID. Her home medications Vyvanse, acetazolamide, and antihypertensives were placed on hold and are now being restarted gradually. She is currently on Lopressor 100 mg for hypertension.     Given this patient's history of repeated incidents of hypoglycemia and stable inpatient gluose levels, she will be discharged with an insulin regimen of Lantus 15 units daily with Humalog 3 times daily.     Physical Exam  Vitals and nursing note reviewed.   Constitutional:       Appearance: Normal appearance.   HENT:      Head: Normocephalic and atraumatic.      Nose: Nose normal.      Mouth/Throat:      Mouth: Mucous membranes are moist.   Cardiovascular:      Rate and Rhythm: Normal rate and regular rhythm.      Pulses: Normal pulses.      Heart sounds: Normal heart sounds.   Pulmonary:      Effort: Pulmonary effort is normal.      Breath sounds: Normal breath sounds.   Abdominal:      Palpations: Abdomen is soft.   Musculoskeletal:      Right lower leg: No edema.      Left lower leg: No edema.   Skin:      General: Skin is warm.   Neurological:      Mental Status: She is alert and oriented to person, place, and time.          DISCHARGE INFORMATION     PCP at Discharge: Myla Norwood MD      Admitting Provider: Fred Landin MD  Admission Date: 7/26/2024    Discharge Provider: Keanu Ga MD  Discharge Date: 07/31/2024    Discharge Disposition: Home/Self Care  Discharge Condition: fair  Discharge with Lines: no    Discharge Diet: regular diet  Activity Restrictions:  as tolerated  Test Results Pending at Discharge: None    Discharge Diagnoses:  Principal Problem (Resolved):    DKA (diabetic ketoacidosis) (HCC)  Active Problems:    HTN (hypertension)    ADHD    Stage 3b chronic kidney disease (HCC)    Vitamin D deficiency    Chronic migraine w/o aura w/o status migrainosus, not intractable    Depression      Consulting Providers:      Diagnostic & Therapeutic Procedures Performed:  XR chest portable ICU    Result Date: 7/28/2024  Impression: No acute cardiopulmonary disease. Workstation performed: [id number]     CT head wo contrast    Result Date: 7/27/2024  Impression: No acute intracranial abnormality. Workstation performed: [id number]       Code Status: Level 1 - Full Code  Advance Directive & Living Will: <no information>  Power of :    POLST:      Medications:  Current Discharge Medication List        Current Discharge Medication List        Current Discharge Medication List        CONTINUE these medications which have NOT CHANGED    Details   acetaZOLAMIDE (DIAMOX) 250 mg tablet TAKE 1 TABLET(250 MG) BY MOUTH TWICE DAILY  Qty: 180 tablet, Refills: 1    Comments: ZERO refills remain on this prescription. Your patient is requesting advance approval of refills for this medication to PREVENT ANY MISSED DOSES  Associated Diagnoses: IIH (idiopathic intracranial hypertension)      atorvastatin (LIPITOR) 10 mg tablet Take 1 tablet (10 mg total) by mouth daily at bedtime  Qty: 90 tablet, Refills: 0    Associated  Diagnoses: Hyperlipidemia, unspecified      calcitriol (ROCALTROL) 0.25 mcg capsule TAKE 1 CAPSULE BY MOUTH daily  Qty: 30 capsule, Refills: 5    Associated Diagnoses: Secondary hyperparathyroidism of renal origin (HCC)      Cholecalciferol (Vitamin D3) 50 MCG (2000 UT) CAPS TAKE 1 CAPSULE(2,000 UNITS TOTAL) BY MOUTH DAILY  Qty: 90 capsule, Refills: 1    Associated Diagnoses: Vitamin D deficiency      clonazePAM (KlonoPIN) 0.5 mg tablet Take 1 tablet (0.5 mg total) by mouth 2 (two) times a day  Qty: 180 tablet, Refills: 0    Associated Diagnoses: Dysthymic disorder      Continuous Blood Gluc Sensor (FreeStyle Filiberto 3 Sensor) MISC Use 1 each every 14 (fourteen) days  Qty: 6 each, Refills: 1    Associated Diagnoses: Uncontrolled type 2 diabetes mellitus with hyperglycemia ([facility])      cycloSPORINE, PF, (Cequa) 0.09 % SOLN Apply 1 drop to eye 2 (two) times a day      divalproex sodium (Depakote) 500 mg DR tablet Take 1 tablet (500 mg total) by mouth daily at bedtime  Qty: 90 tablet, Refills: 3    Associated Diagnoses: Migraine without aura and without status migrainosus, not intractable      DULoxetine (CYMBALTA) 60 mg delayed release capsule Take 1 capsule (60 mg total) by mouth daily  Qty: 90 capsule, Refills: 1    Associated Diagnoses: Bipolar affective disorder, currently manic, mild (HCC)      ferrous sulfate 324 (65 Fe) mg Take 1 tablet (324 mg total) by mouth daily before breakfast  Qty: 90 tablet, Refills: 3    Associated Diagnoses: Iron deficiency anemia      insulin aspart (NovoLOG FlexPen) 100 UNIT/ML injection pen Inject 10 Units under the skin 3 (three) times a day with meals  Qty: 15 mL, Refills: 4    Associated Diagnoses: Type 2 diabetes mellitus with nephropathy (HCC)      Insulin Glargine Solostar (Lantus SoloStar) 100 UNIT/ML SOPN Inject 0.4 mL (40 Units total) under the skin daily at bedtime  Qty: 15 mL, Refills: 4    Associated Diagnoses: Uncontrolled type 2 diabetes mellitus with hyperglycemia (HCC)       losartan-hydrochlorothiazide (HYZAAR) 100-12.5 MG per tablet TAKE ONE TABLET BY MOUTH ONE TIME DAILY  Qty: 90 tablet, Refills: 0    Associated Diagnoses: Benign hypertension with chronic kidney disease, stage III (HCC)      methocarbamol (Robaxin-750) 750 mg tablet Take 1 tablet (750 mg total) by mouth every 6 (six) hours as needed for muscle spasms (back pain)  Qty: 30 tablet, Refills: 3    Associated Diagnoses: Cauda equina syndrome (HCC)      metoprolol tartrate (LOPRESSOR) 100 mg tablet Take 1 tablet (100 mg total) by mouth every 12 (twelve) hours  Qty: 180 tablet, Refills: 0    Associated Diagnoses: Essential hypertension, benign      ondansetron (ZOFRAN) 4 mg tablet Take 1 tablet (4 mg total) by mouth every 8 (eight) hours as needed for nausea or vomiting  Qty: 120 tablet, Refills: 0    Associated Diagnoses: Other gastritis without bleeding      pregabalin (LYRICA) 100 mg capsule Take 1 capsule (100 mg total) by mouth 3 (three) times a day  Qty: 270 capsule, Refills: 0    Associated Diagnoses: Lumbar stenosis with neurogenic claudication      rimegepant sulfate (Nurtec) 75 mg TBDP Place one tab (75mg total) under the tongue as needed for migraine.  Qty: 16 tablet, Refills: 11    Associated Diagnoses: Migraine without aura and without status migrainosus, not intractable      sodium bicarbonate 650 mg tablet Take 1 tablet (650 mg total) by mouth 3 (three) times a day  Qty: 360 tablet, Refills: 3    Associated Diagnoses: Metabolic acidosis      tiZANidine (ZANAFLEX) 4 mg tablet Take 1 tablet (4 mg total) by mouth 3 (three) times a day  Qty: 360 tablet, Refills: 0    Associated Diagnoses: Myofascial pain syndrome      verapamil (CALAN-SR) 120 mg CR tablet Take 1 tablet (120 mg total) by mouth daily at bedtime  Qty: 90 tablet, Refills: 3    Associated Diagnoses: Benign hypertension with chronic kidney disease, stage III ([facility])      Ajovy 225 MG/1.5ML auto-injector INJECT 4.5 ML(675 MG TOTAL) UNDER THE SKIN  EVERY 3 MONTHS  Qty: 4.5 mL, Refills: 4    Associated Diagnoses: Migraine without aura and without status migrainosus, not intractable      BD Pen Needle Glory U/F 32G X 4 MM MISC Inject under the skin 2 (two) times a day Use as directed  Qty: 180 each, Refills: 0    Associated Diagnoses: Uncontrolled type 2 diabetes mellitus with hyperglycemia (HCC)      benzonatate (TESSALON) 200 MG capsule Take 1 capsule (200 mg total) by mouth 3 (three) times a day as needed for cough  Qty: 20 capsule, Refills: 0    Associated Diagnoses: Tracheitis      Blood Glucose Monitoring Suppl (ONE TOUCH ULTRA 2) w/Device KIT Use 1 each 2 (two) times a day Use as instructed  Qty: 1 kit, Refills: 0    Comments: Dx E11.9 test twice a day  Associated Diagnoses: Uncontrolled type 2 diabetes mellitus with hyperglycemia (HCC)      cetirizine (ZyrTEC) oral solution Take 5 mL (5 mg total) by mouth daily  Qty: 450 mL, Refills: 1    Associated Diagnoses: Seasonal allergic rhinitis due to pollen      ciclopirox (PENLAC) 8 % solution Apply topically daily at bedtime  Qty: 6.6 mL, Refills: 1    Associated Diagnoses: Onychomycosis      Cyanocobalamin (Vitamin B-12) 1000 MCG SUBL Place 1 tablet (1,000 mcg total) under the tongue in the morning  Qty: 90 tablet, Refills: 1    Associated Diagnoses: B12 deficiency      cyanocobalamin 1,000 mcg/mL Inject 1 mL (1,000 mcg total) into a muscle once a week  Qty: 4 mL, Refills: 3    Associated Diagnoses: B12 deficiency      cyproheptadine (PERIACTIN) 4 mg tablet Take 1 tablet (4 mg total) by mouth daily at bedtime  Qty: 90 tablet, Refills: 1    Associated Diagnoses: Intractable migraine without aura and with status migrainosus      diphenhydrAMINE (BENADRYL) 25 mg tablet Take 1 tablet (25 mg total) by mouth every 6 (six) hours as needed for itching  Qty: 30 tablet, Refills: 0    Associated Diagnoses: Status post lumbar spinal fusion; Itching      docusate sodium (COLACE) 100 mg capsule TAKE 1 CAPSULE BY MOUTH  "TWICE A DAY  Qty: 60 capsule, Refills: 0    Associated Diagnoses: Constipation      Lancets MISC Use 1 each 2 (two) times a day Use as instructed      levonorgestrel (MIRENA) 20 MCG/24HR IUD Mirena 20 mcg/24 hr (5 years) intrauterine device   Vaginally for 5 years.      lisdexamfetamine (VYVANSE) 30 MG capsule Take 1 capsule (30 mg total) by mouth every morning Max Daily Amount: 30 mg  Qty: 30 capsule, Refills: 0    Associated Diagnoses: Attention deficit hyperactivity disorder (ADHD), predominantly inattentive type      montelukast (SINGULAIR) 10 mg tablet TAKE ONE TABLET BY MOUTH NIGHTLY  Qty: 90 tablet, Refills: 1    Associated Diagnoses: Seasonal allergic rhinitis, unspecified trigger      Multiple Vitamins-Minerals (multivitamin with minerals) tablet Take 1 tablet by mouth daily      NON FORMULARY Botox injections for HA every 3months (LD 3/20/23)      Ophthalmic Irrigation Solution (OCUSOFT EYE WASH OP) Apply 1 Application to eye daily at bedtime      Polyethyl Glycol-Propyl Glycol (Systane) 0.4-0.3 % GEL Apply 1 drop to eye 2 (two) times a day Using Ivizia as an alternative      polyethylene glycol (MIRALAX) 17 g packet Take 17 g by mouth daily  Qty: 1 each, Refills: 0    Associated Diagnoses: Status post lumbar spinal fusion; Constipation due to opioid therapy      Syringe/Needle, Disp, (SYRINGE 3CC/57UM9-5/2\") 25G X 1-1/2\" 3 ML MISC Use once a week  Qty: 4 each, Refills: 3    Associated Diagnoses: B12 deficiency      tirzepatide (Mounjaro) 15 MG/0.5ML Inject 0.5 mL (15 mg total) under the skin every 7 days  Qty: 6 mL, Refills: 0    Associated Diagnoses: Uncontrolled type 2 diabetes mellitus with hyperglycemia (HCC)             Allergies:  Allergies   Allergen Reactions   • Ace Inhibitors Cough   • Pollen Extract Other (See Comments)     SNEEZING, COUGHING   • Short Ragweed Pollen Ext Cough   • Sodium Hyaluronate (Eron) Other (See Comments)     Edema at injection site  Edema at injection site   • " "Levonorgestrel-Eth Estradiol [Levonorgestrel-Ethinyl Estrad] Other (See Comments)     burning   • Latex Rash       FOLLOW-UP     PCP Outpatient Follow-up:  yes       Myla Norwood MD PCP - General Internal Medicine [phone] [address] [facility] [address]       Consulting Providers Follow-up:  yes       JONNY Escobar PCP - Wound Wound Care Nurse Practitioner [phone] [address] [place] [id number]           Discharge Statement:   I spent 45 minutes minutes discharging the patient. This time was spent on the day of discharge. I had direct contact with the patient on the day of discharge. Additional documentation is required if more than 30 minutes were spent on discharge.    Portions of the record may have been created with voice recognition software.  Occasional wrong word or \"sound a like\" substitutions may have occurred due to the inherent limitations of voice recognition software.  Read the chart carefully and recognize, using context, where substitutions have occurred.    ==  Hiral Gibbs MD  [facility]  Internal Medicine Resident PGY-2       "

## 2024-07-31 NOTE — PLAN OF CARE
Problem: PAIN - ADULT  Goal: Verbalizes/displays adequate comfort level or baseline comfort level  Description: Interventions:  - Encourage patient to monitor pain and request assistance  - Assess pain using appropriate pain scale  - Administer analgesics based on type and severity of pain and evaluate response  - Implement non-pharmacological measures as appropriate and evaluate response  - Consider cultural and social influences on pain and pain management  - Notify physician/advanced practitioner if interventions unsuccessful or patient reports new pain  Outcome: Progressing     Problem: INFECTION - ADULT  Goal: Absence or prevention of progression during hospitalization  Description: INTERVENTIONS:  - Assess and monitor for signs and symptoms of infection  - Monitor lab/diagnostic results  - Monitor all insertion sites, i.e. indwelling lines, tubes, and drains  - Monitor endotracheal if appropriate and nasal secretions for changes in amount and color  - [place] appropriate cooling/warming therapies per order  - Administer medications as ordered  - Instruct and encourage patient and family to use good hand hygiene technique  - Identify and instruct in appropriate isolation precautions for identified infection/condition  Outcome: Progressing     Problem: DISCHARGE PLANNING  Goal: Discharge to home or other facility with appropriate resources  Description: INTERVENTIONS:  - Identify barriers to discharge w/patient and caregiver  - Arrange for needed discharge resources and transportation as appropriate  - Identify discharge learning needs (meds, wound care, etc.)  - Arrange for interpretive services to assist at discharge as needed  - Refer to Case Management Department for coordinating discharge planning if the patient needs post-hospital services based on physician/advanced practitioner order or complex needs related to functional status, cognitive ability, or social support system  Outcome: Progressing      Problem: Knowledge Deficit  Goal: Patient/family/caregiver demonstrates understanding of disease process, treatment plan, medications, and discharge instructions  Description: Complete learning assessment and assess knowledge base.  Interventions:  - Provide teaching at level of understanding  - Provide teaching via preferred learning methods  Outcome: Progressing

## 2024-07-31 NOTE — CASE MANAGEMENT
Case Management Discharge Planning Note    Patient name Kinza Carmona  Location /-01 MRN [id number]  : 1975 Date 2024       Current Admission Date: 2024  Current Admission Diagnosis:Stage 3b chronic kidney disease (HCC)   Patient Active Problem List    Diagnosis Date Noted Date Diagnosed    Cellulitis of right toe 2024     Cellulitis of right shoulder 2024     Benign hypertension with chronic kidney disease, stage III ([facility]) 2024     Abnormal blood electrolyte level 10/30/2023     Thrombocytopenia ([facility]) 10/30/2023     Chest pain 10/29/2023     Metabolic acidosis 2023     Hyperphosphatemia 2023     Neurogenic bladder 2023     Bilateral carpal tunnel syndrome      IIH (idiopathic intracranial hypertension) 08/15/2023     Open wound of back 2023     Benign hypertension with CKD (chronic kidney disease) stage IV ([facility]) 2023     Maceration of skin 2023     Cortical age-related cataract of left eye 2023     Bipolar affective disorder, currently manic, mild ([facility]) 2022     Seasonal allergic rhinitis 2022     Surgical wound, non healing 2022     Diabetic polyneuropathy associated with diabetes mellitus due to underlying condition ([facility]) 2022     Continuous opioid dependence ([facility]) 2022     Anemia 2021     Wound infection 2021     Cauda equina syndrome ([facility]) 2021     Depression 10/28/2021     Annual physical exam 2021     Other gastritis without bleeding 2021     Obesity, unspecified      Diabetes mellitus ([facility])      HTN (hypertension)      Dizziness 2020     Secondary hyperparathyroidism of renal origin ([facility]) 2020     Insulin dependent type 2 diabetes mellitus ([facility])      Intractable migraine with status migrainosus      Type 2 diabetes mellitus with diabetic neuropathy ([facility]) 2020     Ventral hernia without obstruction or gangrene 2020     Lumbar disc  herniation with radiculopathy 09/11/2020     Concussion with moderate (1-24 hours) loss of consciousness 09/11/2020     Primary osteoarthritis of both knees 09/11/2020     Diabetic nephropathy associated with type 2 diabetes mellitus ([facility]) 09/11/2020     Dysthymic disorder 09/11/2020     Acute on chronic kidney failure  ([facility]) 07/13/2020     Hypokalemia 07/13/2020     Near syncope 07/13/2020     Weakness 07/13/2020     Status post gastric bypass for obesity 07/13/2020     Diarrhea 07/13/2020     Snores      Headache upon awakening      Chronic tension type headache 12/16/2019     Chronic migraine w/o aura w/o status migrainosus, not intractable 12/16/2019     Medical marijuana use 12/16/2019     Vitamin D deficiency 08/23/2019     Chronic kidney disease-mineral and bone disorder 08/20/2019     S/P cervical spinal fusion 07/25/2019     Mixed hyperlipidemia 07/22/2019     Cervical disc disorder with radiculopathy      Stage 3b chronic kidney disease ([facility]) 05/07/2019     Persistent proteinuria 05/07/2019     Hypertensive kidney disease with stage 3b chronic kidney disease ([facility]) 05/07/2019     Hyponatremia 05/07/2019     Intervertebral disc disorders with radiculopathy, lumbar region      Sacroiliitis ([facility])      Greater trochanteric bursitis of both hips      Herniated nucleus pulposus, L1-2 12/12/2017     Bilateral chronic knee pain 12/01/2017     Facet arthritis of lumbosacral region 12/01/2017     Fibromyalgia 12/01/2017     Lumbar stenosis with neurogenic claudication 12/01/2017     ADHD 08/22/2016     Uncontrolled type 2 diabetes mellitus with hyperglycemia ([facility]) 08/22/2016     Gastroesophageal reflux disease without esophagitis 08/22/2016     Morbid obesity with BMI of 40.0-44.9, adult ([facility]) 08/22/2016     Chronic renal insufficiency 11/25/2014     Endometriosis 05/16/2012       LOS (days): 5  Geometric Mean LOS (GMLOS) (days):   Days to GMLOS:     OBJECTIVE:  Risk of Unplanned Readmission Score: 30.62          Current admission status: Inpatient   Preferred Pharmacy:   [id number] - [place], [place] - [address]  [address]  [place] [address]  Phone: [phone] Fax: [phone]    [facility] [place] - [place] - [address]  [address]  [place] [address]  Phone: [phone] Fax: [phone]    [facility] - [place], [place] - [address]  [address] [address]  Phone: [phone] Fax: [phone]    [id number] - [place], [place] - [address]  [address]  [place] [address]  Phone: [phone] Fax: [phone]    [facility] - [place], [place] - [address]  [address]  [address]  [facility] [address]  Phone: [phone] Fax: [phone]    [facility] - [place], [place] - [address]  [address]  [place] [address]  Phone: [phone] Fax: [phone]    Primary Care Provider: Myla Norwood MD    Primary Insurance: BLUE CROSS  Secondary Insurance:     DISCHARGE DETAILS:    Discharge planning discussed with:: Pt bedside, provider & RN via epic SC, ANKUSH via [employer]     Requested Home Health Care         Is the patient interested in HHC at discharge?: Yes  Home Health Discipline requested:: Nursing, Physical Therapy  Home Health Agency Name:: [facility]  Home Health Follow-Up Provider:: PCP  Home Health Services Needed:: Evaluate Functional Status and Safety, Gait/ADL Training, Strengthening/Theraputic Exercises to Improve Function, Other (comment)  Homebound Criteria Met:: Uses an Assist Device (i.e. cane, walker, etc)  Supporting Clincal Findings:: Limited Endurance, Fatigues Easliy in Short Distances    Would you like to participate in our [facility] service program?  : No - Declined    Treatment Team Recommendation: Home with Home Health Care  Discharge Destination Plan:: Home with Home Health Care  Transport at  Discharge : Family     Additional Comments: CM reserved [facility] for patient post-discharge per patient request. CM notified provider via [employer] [place] that patient will need ambulatory referral for home health order placed upon discharge with appropriate disciplines mentioned: PT and RN. CM met with pt bedside to discuss DC. Pt reports approval of DCP. Pt reports spouse is picking up and should be at [facility] around 3:30PM. CM notified RN of expected DC time.

## 2024-07-31 NOTE — PLAN OF CARE
Problem: OCCUPATIONAL THERAPY ADULT  Goal: Performs self-care activities at highest level of function for planned discharge setting.  See evaluation for individualized goals.  Description: Treatment Interventions: ADL retraining, Functional transfer training, UE strengthening/ROM, Endurance training, Cognitive reorientation, Patient/family training, Equipment evaluation/education, Compensatory technique education, Continued evaluation, Energy conservation, Activityengagement          See flowsheet documentation for full assessment, interventions and recommendations.   Outcome: Progressing  Note: Limitation: Decreased ADL status, Decreased endurance, Decreased high-level ADLs, Decreased self-care trans  Prognosis: Fair  Assessment: Pt was seen on 7/31/2024 to address ADL retraining, functional transfer training, and activity tolerance/endurance. Pt demonstrating improvements and currently requires supervision for safety to complete functional transfers and to ambulate household distance functional mobility with rw. Discussed home safety with pt, pt denies having any additional questions or concerns at this time.. Pt is limited by decreased ADL status, functional transfers, functional mobility, and activity tolerance. Pt seated in bedside chair at beginning of session and seated in bedside chair at end of session with all items within reach. The patient's raw score on the -PAC Daily Activity Inpatient Short Form is 21. A raw score of greater than or equal to 19 suggests the patient may benefit from discharge to home. Please refer to the recommendation of the Occupational Therapist for safe discharge planning. Recommend Level III minimum intensity OT services  at d/c to maximize pt function.     Rehab Resource Intensity Level, OT: III (Minimum Resource Intensity)

## 2024-07-31 NOTE — OCCUPATIONAL THERAPY NOTE
Occupational Therapy Progress Note     Patient Name: Kinza Meza  Today's Date: 7/31/2024  Problem List  Active Problems:    ADHD    Stage 3b chronic kidney disease (HCC)    Vitamin D deficiency    Chronic migraine w/o aura w/o status migrainosus, not intractable    HTN (hypertension)    Depression        07/31/24 1124   OT Last Visit   OT Visit Date 07/31/24   Note Type   Note Type Treatment   Pain Assessment   Pain Assessment Tool 0-10   Pain Score No Pain   Restrictions/Precautions   Weight Bearing Precautions Per Order No   Other Precautions Chair Alarm;Bed Alarm;Fall Risk   Lifestyle   Autonomy I w/ ADLS, IADLS, transfers and functional mobility PTA   Reciprocal Relationships pt lives w/ her spouse. Grandson visits often   Service to Others SSD   Intrinsic Gratification spending time w/ her cats and dogs   Bed Mobility   Additional Comments pt OOB upon OT arrival   Transfers   Sit to Stand 5  Supervision   Additional items Increased time required;Armrests   Stand to Sit 5  Supervision   Additional items Armrests;Increased time required   Additional Comments with rw   Functional Mobility   Functional Mobility 5  Supervision   Additional Comments Pt requires supervision for safety and rw to ambulate household distance functional mobility   Additional items Rolling walker   Cognition   Overall Cognitive Status WFL   Arousal/Participation Responsive;Cooperative   Attention Within functional limits   Orientation Level Oriented X4   Memory Within functional limits   Following Commands Follows all commands and directions without difficulty   Comments Pt agreeable to therapy   Additional Activities   Additional Activities Comments Discussion with pt about home safety upon d/c, pt expresses no concerns at this time.   Activity Tolerance   Activity Tolerance Patient tolerated treatment well   Medical Staff Made Aware RN Cleared   Assessment   Assessment Pt was seen on 7/31/2024 to address ADL retraining,  functional transfer training, and activity tolerance/endurance. Pt demonstrating improvements and currently requires supervision for safety to complete functional transfers and to ambulate household distance functional mobility with rw. Discussed home safety with pt, pt denies having any additional questions or concerns at this time.. Pt is limited by decreased ADL status, functional transfers, functional mobility, and activity tolerance. Pt seated in bedside chair at beginning of session and seated in bedside chair at end of session with all items within reach. The patient's raw score on the AM-PAC Daily Activity Inpatient Short Form is 21. A raw score of greater than or equal to 19 suggests the patient may benefit from discharge to home. Please refer to the recommendation of the Occupational Therapist for safe discharge planning. Recommend Level III minimum intensity OT services  at d/c to maximize pt function.   Plan   Treatment Interventions ADL retraining;Functional transfer training;UE strengthening/ROM;Endurance training;Patient/family training;Equipment evaluation/education;Compensatory technique education;Continued evaluation;Energy conservation;Activityengagement   Goal Expiration Date 08/12/24   OT Treatment Day 2   OT Frequency 2-3x/wk   Discharge Recommendation   Rehab Resource Intensity Level, OT III (Minimum Resource Intensity)   AM-PAC Daily Activity Inpatient   Lower Body Dressing 3   Bathing 3   Toileting 3   Upper Body Dressing 4   Grooming 4   Eating 4   Daily Activity Raw Score 21   Daily Activity Standardized Score (Calc for Raw Score >=11) 44.27   AM-PAC Applied Cognition Inpatient   Following a Speech/Presentation 4   Understanding Ordinary Conversation 4   Taking Medications 4   Remembering Where Things Are Placed or Put Away 4   Remembering List of 4-5 Errands 4   Taking Care of Complicated Tasks 4   Applied Cognition Raw Score 24   Applied Cognition Standardized Score 62.21   End of  Consult   Education Provided Yes   Patient Position at End of Consult Bedside chair;All needs within reach   Nurse Communication Nurse aware of consult       NIKKY Rice, OTR/L

## 2024-07-31 NOTE — DISCHARGE INSTR - AVS FIRST PAGE
-There have been some changes made to your Insulin regimen:    Please decrease the dose of Insulin Lantus to 15 units once daily.   Use lispro as needed as advised before.   Check Blood glucose three times with meal.    -Continue home BP meds. Please check your BP at home once daily at home until you meet your nephrologist.  -Continue all other home meds.    -Follow-up with Dr Norwood in 1 week.  -Follow up with nephrology as scheduled.

## 2024-08-01 ENCOUNTER — HOME CARE VISIT (OUTPATIENT)
Dept: HOME HEALTH SERVICES | Facility: HOME HEALTHCARE | Age: 49
End: 2024-08-01

## 2024-08-01 LAB
BACTERIA BLD CULT: NORMAL
BACTERIA BLD CULT: NORMAL
GAD65 AB SER-ACNC: <5 U/ML (ref 0–5)

## 2024-08-02 ENCOUNTER — HOME CARE VISIT (OUTPATIENT)
Dept: HOME HEALTH SERVICES | Facility: HOME HEALTHCARE | Age: 49
End: 2024-08-02

## 2024-08-02 NOTE — CASE COMMUNICATION
Notification of Assess not Admit    [facility] has assessed your patient for Home Health services and has determined the patient is not eligible for service due to the following patient is not homebound. Patient able to get to appts and shopping and watches her grandson and goes on vacations and drives herself. SN reviewed medications with patient and encouraged her to get a BP cuff to monitor blood pressure and also to keep up with  diabetes management.     Jessica Garcia RN [facility]

## 2024-08-05 ENCOUNTER — OFFICE VISIT (OUTPATIENT)
Dept: INTERNAL MEDICINE CLINIC | Facility: CLINIC | Age: 49
End: 2024-08-05
Payer: COMMERCIAL

## 2024-08-05 ENCOUNTER — APPOINTMENT (OUTPATIENT)
Dept: LAB | Facility: CLINIC | Age: 49
End: 2024-08-05
Payer: COMMERCIAL

## 2024-08-05 VITALS
TEMPERATURE: 98.7 F | OXYGEN SATURATION: 98 % | HEIGHT: 61 IN | WEIGHT: 179 LBS | BODY MASS INDEX: 33.79 KG/M2 | DIASTOLIC BLOOD PRESSURE: 80 MMHG | SYSTOLIC BLOOD PRESSURE: 128 MMHG | HEART RATE: 90 BPM

## 2024-08-05 DIAGNOSIS — N18.9 CHRONIC KIDNEY DISEASE-MINERAL AND BONE DISORDER: ICD-10-CM

## 2024-08-05 DIAGNOSIS — E11.21 DIABETIC NEPHROPATHY ASSOCIATED WITH TYPE 2 DIABETES MELLITUS (HCC): ICD-10-CM

## 2024-08-05 DIAGNOSIS — M51.16 INTERVERTEBRAL DISC DISORDERS WITH RADICULOPATHY, LUMBAR REGION: ICD-10-CM

## 2024-08-05 DIAGNOSIS — E11.11 DIABETIC KETOACIDOSIS WITH COMA ASSOCIATED WITH TYPE 2 DIABETES MELLITUS (HCC): Primary | ICD-10-CM

## 2024-08-05 DIAGNOSIS — E78.2 MIXED HYPERLIPIDEMIA: ICD-10-CM

## 2024-08-05 DIAGNOSIS — E11.65 UNCONTROLLED TYPE 2 DIABETES MELLITUS WITH HYPERGLYCEMIA (HCC): ICD-10-CM

## 2024-08-05 DIAGNOSIS — F90.0 ATTENTION DEFICIT HYPERACTIVITY DISORDER (ADHD), PREDOMINANTLY INATTENTIVE TYPE: ICD-10-CM

## 2024-08-05 DIAGNOSIS — F34.1 DYSTHYMIC DISORDER: ICD-10-CM

## 2024-08-05 DIAGNOSIS — E83.9 CHRONIC KIDNEY DISEASE-MINERAL AND BONE DISORDER: ICD-10-CM

## 2024-08-05 DIAGNOSIS — E08.42 DIABETIC POLYNEUROPATHY ASSOCIATED WITH DIABETES MELLITUS DUE TO UNDERLYING CONDITION (HCC): ICD-10-CM

## 2024-08-05 DIAGNOSIS — M89.9 CHRONIC KIDNEY DISEASE-MINERAL AND BONE DISORDER: ICD-10-CM

## 2024-08-05 DIAGNOSIS — N18.32 STAGE 3B CHRONIC KIDNEY DISEASE (HCC): ICD-10-CM

## 2024-08-05 DIAGNOSIS — M17.0 PRIMARY OSTEOARTHRITIS OF BOTH KNEES: ICD-10-CM

## 2024-08-05 DIAGNOSIS — M50.10 CERVICAL DISC DISORDER WITH RADICULOPATHY: ICD-10-CM

## 2024-08-05 DIAGNOSIS — E53.8 B12 DEFICIENCY: ICD-10-CM

## 2024-08-05 PROCEDURE — 99496 TRANSJ CARE MGMT HIGH F2F 7D: CPT | Performed by: INTERNAL MEDICINE

## 2024-08-05 RX ORDER — LISDEXAMFETAMINE DIMESYLATE 30 MG/1
30 CAPSULE ORAL EVERY MORNING
Qty: 30 CAPSULE | Refills: 0 | Status: SHIPPED | OUTPATIENT
Start: 2024-08-05

## 2024-08-05 RX ORDER — CYANOCOBALAMIN 1000 UG/ML
1000 INJECTION, SOLUTION INTRAMUSCULAR; SUBCUTANEOUS WEEKLY
Qty: 4 ML | Refills: 3 | Status: SHIPPED | OUTPATIENT
Start: 2024-08-05

## 2024-08-05 NOTE — PROGRESS NOTES
Transitional Care Management Review:  Kinza Meza is a 48 y.o. female here for TCM follow up.     During the TCM phone call patient stated:    TCM Call       Date and time call was made  8/1/2024 12:03 PM    Hospital care reviewed  Records reviewed    Patient was hospitialized at  [facility]    Date of Admission  07/26/24    Date of discharge  07/31/24    Diagnosis  DKA    Disposition  Home    Were the patients medications reviewed and updated  Yes    Current Symptoms  Weakness; Headache; Fatigue; Constipation  chronic pain    Incisional pain severity  Moderate    Incisional pain onset  Ongoing          TCM Call       Clinical Progress Wound  Improving    Post hospital issues  Reduced activity    Scheduled for follow up?  Yes    Patients specialists  Other (comment)    Neurologist name  Dr. Duran, Dr. Navarro - neuropathy     Other specialists names  wound care, infectious disease     Did you obtain your prescribed medications  Yes    Do you need help managing your prescriptions or medications  No    Is transportation to your appointment needed  No    I have advised the patient to call PCP with any new or worsening symptoms  Lucas Virk CMA    Living Arrangements  Spouse or Significiant other    Support System  None    Are you recieving any outpatient services  Yes    What type of services  Nursing for wound care     Are you recieving home care services  Yes    Types of home care services  Nurse visit    Are you using any community resources  No    Current waiver services  No    Have you fallen in the last 12 months  Yes    How many times  twice      Interperter language line needed  No            Myla Norwood MD      Assessment/Plan:             1. Diabetic ketoacidosis with coma associated with type 2 diabetes mellitus (HCC)  Comments:  Continue same medication, keep eye on the sugar at home  2. Stage 3b chronic kidney disease (HCC)  3. Uncontrolled type 2 diabetes mellitus with  "hyperglycemia ([facility])  Comments:  Continue same medication  Orders:  -     tirzepatide (Mounjaro) 15 MG/0.5ML; Inject 0.5 mL (15 mg total) under the skin every 7 days  4. Diabetic nephropathy associated with type 2 diabetes mellitus ([facility])  5. Diabetic polyneuropathy associated with diabetes mellitus due to underlying condition ([facility])  6. Intervertebral disc disorders with radiculopathy, lumbar region  7. Primary osteoarthritis of both knees  8. Cervical disc disorder with radiculopathy  9. Dysthymic disorder  Comments:  Continue same medication  10. Mixed hyperlipidemia  Comments:  Continue same medication  11. B12 deficiency  -     cyanocobalamin 1,000 mcg/mL; Inject 1 mL (1,000 mcg total) into a muscle once a week  -     Syringe/Needle, Disp, (SYRINGE 3CC/39AR3-6/2\") 25G X 1-1/2\" 3 ML MISC; Use once a week  12. Attention deficit hyperactivity disorder (ADHD), predominantly inattentive type  Comments:  Continue same medication  Orders:  -     lisdexamfetamine (VYVANSE) 30 MG capsule; Take 1 capsule (30 mg total) by mouth every morning Max Daily Amount: 30 mg         Subjective:      Patient ID: Kinza Meza is a 48 y.o. female.    Follow-up from hospitalization, hospital record reviewed        The following portions of the patient's history were reviewed and updated as appropriate: She  has a past medical history of ADD (attention deficit disorder), Allergic rhinitis, ALS (amyotrophic lateral sclerosis) ([facility]) (11/22), Anemia (12/2021), Anxiety, Arthritis, Bipolar disorder ([facility]), Cervical disc disorder with radiculopathy of cervical region, Chronic depression, Chronic kidney disease, Chronic pain disorder, Cluster headache, Colitis, Colon polyp, Concussion (2018), Condyloma acuminatum, CTS (carpal tunnel syndrome) (2001), Depression, Diabetes mellitus ([facility]), Diabetic nephropathy ([facility]) (2001), Diabetic neuropathy ([facility]), Diabetic retinopathy ([facility]) (2001), Difficulty walking (07/22), Fibromyalgia, Fibromyalgia, " primary, GERD (gastroesophageal reflux disease), Head injury (08/13/18), Headache(784.0) (1977), History of transfusion (12/2021), HTN (hypertension), Hyperlipidemia (07/22/2019), Hypertension, Intervertebral disc disorder with radiculopathy of lumbar region, Lupus ([facility]) (2003), Migraine, Miscarriage (1988), Obesity (1980), Obesity, unspecified, Open wound of back (05/30/2023), Opioid abuse, in remission ([facility]) (01/2022), Osteoporosis, Peripheral neuropathy, Postoperative wound infection (12/2021), Preeclampsia, Rheumatoid arthritis ([facility]), and Wears dentures.  She   Patient Active Problem List    Diagnosis Date Noted    Cellulitis of right toe 07/01/2024    Cellulitis of right shoulder 03/28/2024    Benign hypertension with chronic kidney disease, stage III ([facility]) 01/31/2024    Abnormal blood electrolyte level 10/30/2023    Thrombocytopenia ([facility]) 10/30/2023    Chest pain 10/29/2023    Metabolic acidosis 09/27/2023    Hyperphosphatemia 09/27/2023    Neurogenic bladder 09/26/2023    Bilateral carpal tunnel syndrome     IIH (idiopathic intracranial hypertension) 08/15/2023    Open wound of back 05/30/2023    Benign hypertension with CKD (chronic kidney disease) stage IV ([facility]) 02/14/2023    Maceration of skin 02/14/2023    Cortical age-related cataract of left eye 01/04/2023    Bipolar affective disorder, currently manic, mild ([facility]) 03/30/2022    Seasonal allergic rhinitis 03/30/2022    Surgical wound, non healing 02/08/2022    Diabetic polyneuropathy associated with diabetes mellitus due to underlying condition ([facility]) 02/08/2022    Continuous opioid dependence ([facility]) 01/07/2022    Anemia 12/24/2021    Wound infection 12/09/2021    Cauda equina syndrome ([facility]) 12/08/2021    Depression 10/28/2021    Annual physical exam 08/27/2021    Other gastritis without bleeding 08/11/2021    Obesity, unspecified     Diabetes mellitus ([facility])     HTN (hypertension)     Dizziness 12/07/2020    Secondary hyperparathyroidism of renal origin  ([facility]) 11/16/2020    Insulin dependent type 2 diabetes mellitus (HCC)     Intractable migraine with status migrainosus     Type 2 diabetes mellitus with diabetic neuropathy ([facility]) 09/11/2020    Ventral hernia without obstruction or gangrene 09/11/2020    Lumbar disc herniation with radiculopathy 09/11/2020    Concussion with moderate (1-24 hours) loss of consciousness 09/11/2020    Primary osteoarthritis of both knees 09/11/2020    Diabetic nephropathy associated with type 2 diabetes mellitus ([facility]) 09/11/2020    Dysthymic disorder 09/11/2020    Acute on chronic kidney failure  (HCC) 07/13/2020    Hypokalemia 07/13/2020    Near syncope 07/13/2020    Weakness 07/13/2020    Status post gastric bypass for obesity 07/13/2020    Diarrhea 07/13/2020    Snores     Headache upon awakening     Chronic tension type headache 12/16/2019    Chronic migraine w/o aura w/o status migrainosus, not intractable 12/16/2019    Medical marijuana use 12/16/2019    Vitamin D deficiency 08/23/2019    Chronic kidney disease-mineral and bone disorder 08/20/2019    S/P cervical spinal fusion 07/25/2019    Mixed hyperlipidemia 07/22/2019    Cervical disc disorder with radiculopathy     Stage 3b chronic kidney disease ([facility]) 05/07/2019    Persistent proteinuria 05/07/2019    Hypertensive kidney disease with stage 3b chronic kidney disease ([facility]) 05/07/2019    Hyponatremia 05/07/2019    Intervertebral disc disorders with radiculopathy, lumbar region     Sacroiliitis ([facility])     Greater trochanteric bursitis of both hips     Herniated nucleus pulposus, L1-2 12/12/2017    Bilateral chronic knee pain 12/01/2017    Facet arthritis of lumbosacral region 12/01/2017    Fibromyalgia 12/01/2017    Lumbar stenosis with neurogenic claudication 12/01/2017    ADHD 08/22/2016    Uncontrolled type 2 diabetes mellitus with hyperglycemia ([facility]) 08/22/2016    Gastroesophageal reflux disease without esophagitis 08/22/2016    Morbid obesity with BMI of 40.0-44.9, adult  ([facility]) 2016    Chronic renal insufficiency 2014    Endometriosis 2012     She  has a past surgical history that includes Appendectomy;  section; Hernia repair; Cervical fusion (N/A, 2019); Colonoscopy; Carpal tunnel release; Hemorrhoid surgery; Ventral hernia repair; Shelby-en-y procedure; Ulnar tunnel release; Mammo (historical) (); Colonoscopy; Lumbar fusion (N/A, 2021); LUMBAR WOUND EXPLORATION (Bilateral, 2021); VAC DRESSING APPLICATION (Bilateral, 2021); VAC DRESSING APPLICATION (N/A, 2021); Bariatric Surgery (2016); Cataract extraction; and pr debridement bone 1st 20 sq cm/< (N/A, 2023).  Her family history includes ADD / ADHD in her cousin and cousin; Anemia in her maternal grandmother; Anxiety disorder in her father; Cancer in her mother; Cervical cancer in her mother; Colon cancer in her maternal grandmother; Completed Suicide  in her father; Depression in her father; Diabetes in her mother; Hypertension in her mother; Kidney disease in her mother; Mental illness in her daughter, daughter, and father; Neuropathy in her mother; No Known Problems in her family, maternal aunt, maternal grandfather, paternal grandfather, and sister; Ovarian cancer in her paternal aunt; Psychiatric Illness in her father; Suicidality in her father; Suicide Attempts in her father; Uterine cancer in her paternal grandmother.  She  reports that she has never smoked. She has never been exposed to tobacco smoke. She has never used smokeless tobacco. She reports that she does not currently use alcohol. She reports current drug use. Drug: Marijuana.  Current Outpatient Medications   Medication Sig Dispense Refill    acetaZOLAMIDE (DIAMOX) 250 mg tablet TAKE 1 TABLET(250 MG) BY MOUTH TWICE DAILY 180 tablet 1    Ajovy 225 MG/1.5ML auto-injector INJECT 4.5 ML(675 MG TOTAL) UNDER THE SKIN EVERY 3 MONTHS 4.5 mL 4    atorvastatin (LIPITOR) 10 mg tablet Take 1 tablet (10 mg  total) by mouth daily at bedtime 90 tablet 0    BD Pen Needle Glory U/F 32G X 4 MM MISC Inject under the skin 2 (two) times a day Use as directed 180 each 0    Blood Glucose Monitoring Suppl (ONE TOUCH ULTRA 2) w/Device KIT Use 1 each 2 (two) times a day Use as instructed 1 kit 0    calcitriol (ROCALTROL) 0.25 mcg capsule TAKE 1 CAPSULE BY MOUTH daily 30 capsule 5    Cholecalciferol (Vitamin D3) 50 MCG (2000 UT) CAPS TAKE 1 CAPSULE(2,000 UNITS TOTAL) BY MOUTH DAILY 90 capsule 1    clonazePAM (KlonoPIN) 0.5 mg tablet Take 1 tablet (0.5 mg total) by mouth 2 (two) times a day 180 tablet 0    Continuous Blood Gluc Sensor (FreeStyle Filiberto 3 Sensor) MISC Use 1 each every 14 (fourteen) days 6 each 1    Cyanocobalamin (Vitamin B-12) 1000 MCG SUBL Place 1 tablet (1,000 mcg total) under the tongue in the morning 90 tablet 1    cyanocobalamin 1,000 mcg/mL Inject 1 mL (1,000 mcg total) into a muscle once a week 4 mL 3    cycloSPORINE, PF, (Cequa) 0.09 % SOLN Apply 1 drop to eye 2 (two) times a day      divalproex sodium (Depakote) 500 mg DR tablet Take 1 tablet (500 mg total) by mouth daily at bedtime 90 tablet 3    DULoxetine (CYMBALTA) 60 mg delayed release capsule Take 1 capsule (60 mg total) by mouth daily 90 capsule 1    ferrous sulfate 324 (65 Fe) mg Take 1 tablet (324 mg total) by mouth daily before breakfast 90 tablet 3    insulin aspart (NovoLOG FlexPen) 100 UNIT/ML injection pen Continue with sliding scale insulin regimen      Insulin Glargine Solostar (Lantus SoloStar) 100 UNIT/ML SOPN Inject 0.15 mL (15 Units total) under the skin daily at bedtime 15 mL 0    Lancets MISC Use 1 each 2 (two) times a day Use as instructed      levonorgestrel (MIRENA) 20 MCG/24HR IUD Mirena 20 mcg/24 hr (5 years) intrauterine device   Vaginally for 5 years.      lisdexamfetamine (VYVANSE) 30 MG capsule Take 1 capsule (30 mg total) by mouth every morning Max Daily Amount: 30 mg 30 capsule 0    losartan-hydrochlorothiazide (HYZAAR)  "100-12.5 MG per tablet TAKE ONE TABLET BY MOUTH ONE TIME DAILY 90 tablet 0    methocarbamol (Robaxin-750) 750 mg tablet Take 1 tablet (750 mg total) by mouth every 6 (six) hours as needed for muscle spasms (back pain) 30 tablet 3    metoprolol tartrate (LOPRESSOR) 100 mg tablet Take 1 tablet (100 mg total) by mouth every 12 (twelve) hours 180 tablet 0    NON FORMULARY Botox injections for HA every 3months (LD 3/20/23)      ondansetron (ZOFRAN) 4 mg tablet Take 1 tablet (4 mg total) by mouth every 8 (eight) hours as needed for nausea or vomiting 120 tablet 0    Ophthalmic Irrigation Solution (OCUSOFT EYE WASH OP) Apply 1 Application to eye daily at bedtime      Polyethyl Glycol-Propyl Glycol (Systane) 0.4-0.3 % GEL Apply 1 drop to eye 2 (two) times a day Using Ivizia as an alternative      polyethylene glycol (MIRALAX) 17 g packet Take 17 g by mouth daily 1 each 0    pregabalin (LYRICA) 100 mg capsule Take 1 capsule (100 mg total) by mouth 3 (three) times a day 270 capsule 0    rimegepant sulfate (Nurtec) 75 mg TBDP Place one tab (75mg total) under the tongue as needed for migraine. 16 tablet 11    sodium bicarbonate 650 mg tablet Take 1 tablet (650 mg total) by mouth 3 (three) times a day (Patient taking differently: Take 650 mg by mouth 2 (two) times a day) 360 tablet 3    Syringe/Needle, Disp, (SYRINGE 3CC/62ST4-9/2\") 25G X 1-1/2\" 3 ML MISC Use once a week 4 each 3    tirzepatide (Mounjaro) 15 MG/0.5ML Inject 0.5 mL (15 mg total) under the skin every 7 days 6 mL 0    verapamil (CALAN-SR) 120 mg CR tablet Take 1 tablet (120 mg total) by mouth daily at bedtime 90 tablet 3    cetirizine (ZyrTEC) oral solution Take 5 mL (5 mg total) by mouth daily (Patient not taking: Reported on 8/5/2024) 450 mL 1    cyproheptadine (PERIACTIN) 4 mg tablet Take 1 tablet (4 mg total) by mouth daily at bedtime (Patient not taking: Reported on 11/3/2023) 90 tablet 1    diphenhydrAMINE (BENADRYL) 25 mg tablet Take 1 tablet (25 mg total) " "by mouth every 6 (six) hours as needed for itching (Patient not taking: Reported on 11/3/2023) 30 tablet 0    docusate sodium (COLACE) 100 mg capsule TAKE 1 CAPSULE BY MOUTH TWICE A DAY (Patient not taking: Reported on 7/26/2024) 60 capsule 0    montelukast (SINGULAIR) 10 mg tablet TAKE ONE TABLET BY MOUTH NIGHTLY (Patient not taking: Reported on 7/26/2024) 90 tablet 1    Multiple Vitamins-Minerals (multivitamin with minerals) tablet Take 1 tablet by mouth daily (Patient not taking: Reported on 7/26/2024)       Current Facility-Administered Medications   Medication Dose Route Frequency Provider Last Rate Last Admin    cyanocobalamin injection 1,000 mcg  1,000 mcg Intramuscular Weekly Myla Norwood MD   1,000 mcg at 07/01/24 1007     She is allergic to ace inhibitors, pollen extract, short ragweed pollen ext, sodium hyaluronate (yoav), levonorgestrel-eth estradiol [levonorgestrel-ethinyl estrad], and latex..    Review of Systems   Constitutional:  Positive for fatigue. Negative for chills and fever.   HENT:  Negative for congestion, ear pain and sore throat.    Eyes:  Negative for pain.   Respiratory:  Negative for cough and shortness of breath.    Cardiovascular:  Negative for chest pain and leg swelling.   Gastrointestinal:  Negative for abdominal pain, nausea and vomiting.   Endocrine: Negative for polyuria.   Genitourinary:  Negative for difficulty urinating, frequency and urgency.   Musculoskeletal:  Positive for arthralgias and back pain.   Skin:  Negative for rash.   Neurological:  Negative for weakness and headaches.   Psychiatric/Behavioral:  Negative for sleep disturbance. The patient is not nervous/anxious.          Objective:      /80 (BP Location: Left arm, Patient Position: Sitting, Cuff Size: Standard)   Pulse 90   Temp 98.7 °F (37.1 °C) (Temporal)   Ht 5' 1\" (1.549 m)   Wt 81.2 kg (179 lb)   SpO2 98%   BMI 33.82 kg/m²     Recent Results (from the past 336 hour(s))   Basic metabolic panel "    Collection Time: 07/26/24  2:30 PM   Result Value Ref Range    Sodium 142 135 - 147 mmol/L    Potassium 3.4 (L) 3.5 - 5.3 mmol/L    Chloride 104 96 - 108 mmol/L    CO2 18 (L) 21 - 32 mmol/L    ANION GAP 20 (H) 4 - 13 mmol/L    BUN 26 (H) 5 - 25 mg/dL    Creatinine 1.89 (H) 0.60 - 1.30 mg/dL    Glucose 129 65 - 140 mg/dL    Calcium 9.4 8.4 - 10.2 mg/dL    eGFR 30 ml/min/1.73sq m   Beta Hydroxybutyrate    Collection Time: 07/26/24  2:30 PM   Result Value Ref Range    Beta- Hydroxybutyrate 4.84 (H) 0.02 - 0.27 mmol/L   POCT urine dip auto non-scope    Collection Time: 07/26/24  3:13 PM   Result Value Ref Range     COLOR,UA yellow     CLARITY,UA dark     SPECIFIC GRAVITY,UA 1.030      PH,UA neg     LEUKOCYTE ESTERASE,UA 15     NITRITE,UA neg     GLUCOSE, UA neg     KETONES,UA 2+     BILIRUBIN,UA 2+     POCT URINE PROTEIN 1+     SL AMB POCT UROBILINOGEN neg    Fingerstick Glucose (POCT)    Collection Time: 07/26/24  5:49 PM   Result Value Ref Range    POC Glucose 113 65 - 140 mg/dl   CBC and differential    Collection Time: 07/26/24  8:01 PM   Result Value Ref Range    WBC 6.91 4.31 - 10.16 Thousand/uL    RBC 5.10 3.81 - 5.12 Million/uL    Hemoglobin 14.3 11.5 - 15.4 g/dL    Hematocrit 43.6 34.8 - 46.1 %    MCV 86 82 - 98 fL    MCH 28.0 26.8 - 34.3 pg    MCHC 32.8 31.4 - 37.4 g/dL    RDW 14.4 11.6 - 15.1 %    MPV 11.1 8.9 - 12.7 fL    Platelets 204 149 - 390 Thousands/uL    nRBC 0 /100 WBCs    Segmented % 76 (H) 43 - 75 %    Immature Grans % 1 0 - 2 %    Lymphocytes % 17 14 - 44 %    Monocytes % 5 4 - 12 %    Eosinophils Relative 0 0 - 6 %    Basophils Relative 1 0 - 1 %    Absolute Neutrophils 5.25 1.85 - 7.62 Thousands/µL    Absolute Immature Grans 0.06 0.00 - 0.20 Thousand/uL    Absolute Lymphocytes 1.18 0.60 - 4.47 Thousands/µL    Absolute Monocytes 0.35 0.17 - 1.22 Thousand/µL    Eosinophils Absolute 0.01 0.00 - 0.61 Thousand/µL    Basophils Absolute 0.06 0.00 - 0.10 Thousands/µL   Comprehensive metabolic panel     Collection Time: 07/26/24  8:01 PM   Result Value Ref Range    Sodium 143 135 - 147 mmol/L    Potassium 3.9 3.5 - 5.3 mmol/L    Chloride 105 96 - 108 mmol/L    CO2 20 (L) 21 - 32 mmol/L    ANION GAP 18 (H) 4 - 13 mmol/L    BUN 27 (H) 5 - 25 mg/dL    Creatinine 1.91 (H) 0.60 - 1.30 mg/dL    Glucose 118 65 - 140 mg/dL    Calcium 9.4 8.4 - 10.2 mg/dL    AST 10 (L) 13 - 39 U/L    ALT 6 (L) 7 - 52 U/L    Alkaline Phosphatase 68 34 - 104 U/L    Total Protein 7.2 6.4 - 8.4 g/dL    Albumin 4.4 3.5 - 5.0 g/dL    Total Bilirubin 0.82 0.20 - 1.00 mg/dL    eGFR 30 ml/min/1.73sq m   Beta Hydroxybutyrate    Collection Time: 07/26/24  8:01 PM   Result Value Ref Range    Beta- Hydroxybutyrate 3.45 (H) 0.02 - 0.27 mmol/L   Blood gas, venous    Collection Time: 07/26/24  8:01 PM   Result Value Ref Range    pH, Berry 7.284 (L) 7.300 - 7.400    pCO2, Berry 42.2 42.0 - 50.0 mm Hg    pO2, Berry 20.0 (L) 35.0 - 45.0 mm Hg    HCO3, Berry 19.6 (L) 24 - 30 mmol/L    Base Excess, Berry -6.8 mmol/L    O2 Content, Berry 7.2 ml/dL    O2 HGB, VENOUS 34.5 (L) 60.0 - 80.0 %   Magnesium    Collection Time: 07/26/24  8:01 PM   Result Value Ref Range    Magnesium 1.8 (L) 1.9 - 2.7 mg/dL   Phosphorus    Collection Time: 07/26/24  8:01 PM   Result Value Ref Range    Phosphorus 4.0 2.7 - 4.5 mg/dL   Lactic acid, plasma (w/reflex if result > 2.0)    Collection Time: 07/26/24  8:01 PM   Result Value Ref Range    LACTIC ACID 1.0 0.5 - 2.0 mmol/L   ECG 12 lead    Collection Time: 07/26/24  8:12 PM   Result Value Ref Range    Ventricular Rate 92 BPM    Atrial Rate 92 BPM    ND Interval 152 ms    QRSD Interval 86 ms    QT Interval 352 ms    QTC Interval 435 ms    P Axis 28 degrees    QRS Axis -20 degrees    T Wave Axis 19 degrees   Fingerstick Glucose (POCT)    Collection Time: 07/26/24 11:13 PM   Result Value Ref Range    POC Glucose 86 65 - 140 mg/dl   Basic metabolic panel    Collection Time: 07/26/24 11:15 PM   Result Value Ref Range    Sodium 141 135 - 147 mmol/L     Potassium 3.3 (L) 3.5 - 5.3 mmol/L    Chloride 105 96 - 108 mmol/L    CO2 19 (L) 21 - 32 mmol/L    ANION GAP 17 (H) 4 - 13 mmol/L    BUN 24 5 - 25 mg/dL    Creatinine 1.85 (H) 0.60 - 1.30 mg/dL    Glucose 81 65 - 140 mg/dL    Glucose, Fasting 81 65 - 99 mg/dL    Calcium 8.8 8.4 - 10.2 mg/dL    eGFR 31 ml/min/1.73sq m   Lipase    Collection Time: 07/26/24 11:15 PM   Result Value Ref Range    Lipase 30 11 - 82 u/L   Glutamic acid decarboxylase    Collection Time: 07/27/24 12:21 AM   Result Value Ref Range    Glutaminc Acid Decarboxylase 65 Ab <5.0 0.0 - 5.0 U/mL   Basic metabolic panel    Collection Time: 07/27/24 12:21 AM   Result Value Ref Range    Sodium 141 135 - 147 mmol/L    Potassium 3.5 3.5 - 5.3 mmol/L    Chloride 104 96 - 108 mmol/L    CO2 18 (L) 21 - 32 mmol/L    ANION GAP 19 (H) 4 - 13 mmol/L    BUN 25 5 - 25 mg/dL    Creatinine 1.92 (H) 0.60 - 1.30 mg/dL    Glucose 97 65 - 140 mg/dL    Calcium 9.0 8.4 - 10.2 mg/dL    eGFR 29 ml/min/1.73sq m   Magnesium    Collection Time: 07/27/24 12:21 AM   Result Value Ref Range    Magnesium 1.7 (L) 1.9 - 2.7 mg/dL   Urine Macroscopic, POC    Collection Time: 07/27/24 12:25 AM   Result Value Ref Range    Color, UA Jyotsna     Clarity, UA Clear     pH, UA 5.5 4.5 - 8.0    Leukocytes, UA Small (A) Negative    Nitrite, UA Negative Negative    Protein, UA Trace (A) Negative mg/dl    Glucose, UA Negative Negative mg/dl    Ketones, UA 80 (3+) (A) Negative mg/dl    Urobilinogen, UA 2.0 (A) 0.2, 1.0 E.U./dl E.U./dl    Bilirubin, UA Large (A) Negative    Occult Blood, UA Negative Negative    Specific Gravity, UA 1.020 1.003 - 1.030   Urine Microscopic    Collection Time: 07/27/24 12:25 AM   Result Value Ref Range    RBC, UA None Seen None Seen, 1-2 /hpf    WBC, UA 4-10 (A) None Seen, 1-2 /hpf    Epithelial Cells Moderate (A) None Seen, Occasional /hpf    Bacteria, UA None Seen None Seen, Occasional /hpf    MUCUS THREADS Occasional (A) None Seen    Hyaline Casts, UA 5-10 (A)  "None Seen /lpf   Rapid drug screen, urine    Collection Time: 07/27/24 12:25 AM   Result Value Ref Range    Amph/Meth UR Positive (A) Negative    Barbiturate Ur Negative Negative    Benzodiazepine Urine Negative Negative    Cocaine Urine Negative Negative    Methadone Urine Negative Negative    Opiate Urine Negative Negative    PCP Ur Negative Negative    THC Urine Positive (A) Negative    Oxycodone Urine Negative Negative    Fentanyl Urine Negative Negative    HYDROCODONE URINE Negative Negative   Fingerstick Glucose (POCT)    Collection Time: 07/27/24 12:26 AM   Result Value Ref Range    POC Glucose 109 65 - 140 mg/dl   Fingerstick Glucose (POCT)    Collection Time: 07/27/24  1:19 AM   Result Value Ref Range    POC Glucose 140 65 - 140 mg/dl   Fingerstick Glucose (POCT)    Collection Time: 07/27/24  1:53 AM   Result Value Ref Range    POC Glucose 129 65 - 140 mg/dl   Basic metabolic panel    Collection Time: 07/27/24  2:08 AM   Result Value Ref Range    Sodium 139 135 - 147 mmol/L    Potassium 4.8 3.5 - 5.3 mmol/L    Chloride 106 96 - 108 mmol/L    CO2 19 (L) 21 - 32 mmol/L    ANION GAP 14 (H) 4 - 13 mmol/L    BUN 25 5 - 25 mg/dL    Creatinine 2.06 (H) 0.60 - 1.30 mg/dL    Glucose 138 65 - 140 mg/dL    Calcium 8.3 (L) 8.4 - 10.2 mg/dL    eGFR 27 ml/min/1.73sq m   Fingerstick Glucose (POCT)    Collection Time: 07/27/24  2:56 AM   Result Value Ref Range    POC Glucose 170 (H) 65 - 140 mg/dl   Osmolality-\"If this is regarding a toxic alcohol, STOP. Test is not routinely indicated. Please consult medical  on call for further guidance.\"    Collection Time: 07/27/24  3:46 AM   Result Value Ref Range    Osmolality Serum 349 (H) 282 - 298 mmol/KG   Blood gas, venous    Collection Time: 07/27/24  3:46 AM   Result Value Ref Range    pH, Berry 7.238 (L) 7.300 - 7.400    pCO2, Berry 22.2 (L) 42.0 - 50.0 mm Hg    pO2, Berry 27.7 (L) 35.0 - 45.0 mm Hg    HCO3, Berry 9.3 (L) 24 - 30 mmol/L    Base Excess, Berry -16.7 mmol/L    " O2 Content, Berry 4.0 ml/dL    O2 HGB, VENOUS 53.5 (L) 60.0 - 80.0 %    ANNY TEST No    Fingerstick Glucose (POCT)    Collection Time: 07/27/24  3:50 AM   Result Value Ref Range    POC Glucose 158 (H) 65 - 140 mg/dl   Basic metabolic panel    Collection Time: 07/27/24  4:32 AM   Result Value Ref Range    Sodium 140 135 - 147 mmol/L    Potassium 3.7 3.5 - 5.3 mmol/L    Chloride 109 (H) 96 - 108 mmol/L    CO2 21 21 - 32 mmol/L    ANION GAP 10 4 - 13 mmol/L    BUN 23 5 - 25 mg/dL    Creatinine 1.96 (H) 0.60 - 1.30 mg/dL    Glucose 202 (H) 65 - 140 mg/dL    Calcium 7.7 (L) 8.4 - 10.2 mg/dL    eGFR 29 ml/min/1.73sq m   TSH, 3rd generation with Free T4 reflex    Collection Time: 07/27/24  4:32 AM   Result Value Ref Range    TSH 3RD GENERATON 0.472 0.450 - 4.500 uIU/mL   Fingerstick Glucose (POCT)    Collection Time: 07/27/24  4:51 AM   Result Value Ref Range    POC Glucose 203 (H) 65 - 140 mg/dl   Fingerstick Glucose (POCT)    Collection Time: 07/27/24  5:56 AM   Result Value Ref Range    POC Glucose 276 (H) 65 - 140 mg/dl   CBC and Platelet    Collection Time: 07/27/24  6:17 AM   Result Value Ref Range    WBC 4.00 (L) 4.31 - 10.16 Thousand/uL    RBC 3.67 (L) 3.81 - 5.12 Million/uL    Hemoglobin 10.4 (L) 11.5 - 15.4 g/dL    Hematocrit 32.0 (L) 34.8 - 46.1 %    MCV 87 82 - 98 fL    MCH 28.3 26.8 - 34.3 pg    MCHC 32.5 31.4 - 37.4 g/dL    RDW 15.1 11.6 - 15.1 %    Platelets 162 149 - 390 Thousands/uL    MPV 12.6 8.9 - 12.7 fL   Hemoglobin A1c w/EAG Estimation (Prechecked if no A1C within 90 days)    Collection Time: 07/27/24  6:17 AM   Result Value Ref Range    Hemoglobin A1C 5.1 Normal 4.0-5.6%; PreDiabetic 5.7-6.4%; Diabetic >=6.5%; Glycemic control for adults with diabetes <7.0% %     mg/dl   Blood gas, venous    Collection Time: 07/27/24  6:42 AM   Result Value Ref Range    pH, Berry 7.260 (L) 7.300 - 7.400    pCO2, Berry 45.4 42.0 - 50.0 mm Hg    pO2, Berry 26.0 (L) 35.0 - 45.0 mm Hg    HCO3, Berry 19.9 (L) 24 - 30  mmol/L    Base Excess, Berry -6.9 mmol/L    O2 Content, Berry 7.1 ml/dL    O2 HGB, VENOUS 46.9 (L) 60.0 - 80.0 %    ANNY TEST No    Fingerstick Glucose (POCT)    Collection Time: 07/27/24  6:58 AM   Result Value Ref Range    POC Glucose 177 (H) 65 - 140 mg/dl   Calcium, ionized    Collection Time: 07/27/24  7:29 AM   Result Value Ref Range    Calcium, Ionized     Calcium, ionized    Collection Time: 07/27/24  7:51 AM   Result Value Ref Range    Calcium, Ionized 1.15 1.12 - 1.32 mmol/L   Fingerstick Glucose (POCT)    Collection Time: 07/27/24  7:57 AM   Result Value Ref Range    POC Glucose 140 65 - 140 mg/dl   Basic metabolic panel    Collection Time: 07/27/24  8:28 AM   Result Value Ref Range    Sodium 141 135 - 147 mmol/L    Potassium 3.7 3.5 - 5.3 mmol/L    Chloride 114 (H) 96 - 108 mmol/L    CO2 20 (L) 21 - 32 mmol/L    ANION GAP 7 4 - 13 mmol/L    BUN 21 5 - 25 mg/dL    Creatinine 1.88 (H) 0.60 - 1.30 mg/dL    Glucose 125 65 - 140 mg/dL    Calcium 8.0 (L) 8.4 - 10.2 mg/dL    eGFR 30 ml/min/1.73sq m   Magnesium    Collection Time: 07/27/24  8:28 AM   Result Value Ref Range    Magnesium 2.6 1.9 - 2.7 mg/dL   Phosphorus    Collection Time: 07/27/24  8:28 AM   Result Value Ref Range    Phosphorus 1.7 (L) 2.7 - 4.5 mg/dL   Fingerstick Glucose (POCT)    Collection Time: 07/27/24 10:49 AM   Result Value Ref Range    POC Glucose 64 (L) 65 - 140 mg/dl   Fingerstick Glucose (POCT)    Collection Time: 07/27/24 10:57 AM   Result Value Ref Range    POC Glucose 163 (H) 65 - 140 mg/dl   POCT Blood Gas (CG8+)    Collection Time: 07/27/24 10:59 AM   Result Value Ref Range    pH, Art i-STAT 7.355 7.350 - 7.450    pCO2, Art i-STAT 29.2 (L) 36.0 - 44.0 mm HG    pO2, ART i-STAT 91.0 75.0 - 129.0 mm HG    BE, i-STAT -8 (L) -2 - 3 mmol/L    HCO3, Art i-STAT 16.3 (LL) 22.0 - 28.0 mmol/L    CO2, i-STAT 17 (L) 21 - 32 mmol/L    O2 Sat, i-STAT 97 (H) 60 - 85 %    SODIUM, I-STAT 140 136 - 145 mmol/l    Potassium, i-STAT 4.1 3.5 - 5.3 mmol/L  "   Calcium, Ionized i-STAT 1.18 1.12 - 1.32 mmol/L    Hct, i-STAT 23 (L) 34.8 - 46.1 %    Hgb, i-STAT 7.8 (L) 11.5 - 15.4 g/dl    Glucose, i-STAT 271 (H) 65 - 140 mg/dl    POC FIO2 21 L    Specimen Type ARTERIAL    ECG 12 lead    Collection Time: 07/27/24 11:00 AM   Result Value Ref Range    Ventricular Rate 46 BPM    Atrial Rate 46 BPM    MO Interval 148 ms    QRSD Interval 86 ms    QT Interval 462 ms    QTC Interval 404 ms    P Axis -3 degrees    QRS Axis -22 degrees    T Wave Axis -15 degrees   CBC    Collection Time: 07/27/24 11:04 AM   Result Value Ref Range    WBC 5.50 4.31 - 10.16 Thousand/uL    RBC 3.57 (L) 3.81 - 5.12 Million/uL    Hemoglobin 10.0 (L) 11.5 - 15.4 g/dL    Hematocrit 31.8 (L) 34.8 - 46.1 %    MCV 89 82 - 98 fL    MCH 28.0 26.8 - 34.3 pg    MCHC 31.4 31.4 - 37.4 g/dL    RDW 14.6 11.6 - 15.1 %    Platelets 160 149 - 390 Thousands/uL    MPV 11.4 8.9 - 12.7 fL   Magnesium    Collection Time: 07/27/24 11:04 AM   Result Value Ref Range    Magnesium 2.5 1.9 - 2.7 mg/dL   Phosphorus    Collection Time: 07/27/24 11:04 AM   Result Value Ref Range    Phosphorus 1.6 (L) 2.7 - 4.5 mg/dL   Basic metabolic panel    Collection Time: 07/27/24 11:04 AM   Result Value Ref Range    Sodium 141 135 - 147 mmol/L    Potassium 4.3 3.5 - 5.3 mmol/L    Chloride 115 (H) 96 - 108 mmol/L    CO2 18 (L) 21 - 32 mmol/L    ANION GAP 8 4 - 13 mmol/L    BUN 20 5 - 25 mg/dL    Creatinine 1.86 (H) 0.60 - 1.30 mg/dL    Glucose 145 (H) 65 - 140 mg/dL    Calcium 7.4 (L) 8.4 - 10.2 mg/dL    eGFR 31 ml/min/1.73sq m   Lactic acid, plasma (w/reflex if result > 2.0)    Collection Time: 07/27/24 11:04 AM   Result Value Ref Range    LACTIC ACID 2.4 (H) 0.5 - 2.0 mmol/L   HS Troponin 0hr (reflex protocol)    Collection Time: 07/27/24 11:04 AM   Result Value Ref Range    hs TnI 0hr 23 \"Refer to ACS Flowchart\"- see link ng/L   ECG 12 lead    Collection Time: 07/27/24 11:21 AM   Result Value Ref Range    Ventricular Rate 70 BPM    Atrial Rate " "70 BPM    ME Interval 154 ms    QRSD Interval 82 ms    QT Interval 400 ms    QTC Interval 432 ms    P Glasco 13 degrees    QRS Axis -12 degrees    T Wave Axis 4 degrees   Fingerstick Glucose (POCT)    Collection Time: 07/27/24 11:28 AM   Result Value Ref Range    POC Glucose 103 65 - 140 mg/dl   POCT Blood Gas (CG8+)    Collection Time: 07/27/24 11:48 AM   Result Value Ref Range    ph, Berry ISTAT 7.333 7.300 - 7.400    pCO2, Berry i-STAT <17.0 (LL) 42.0 - 50.0 mm HG    pO2, Berry i-STAT 64.0 (H) 35.0 - 45.0 mm HG    BE, i-STAT      HCO3, Berry i-STAT      CO2, i-STAT      O2 Sat, i-STAT      SODIUM, I-STAT 149 (H) 136 - 145 mmol/l    Potassium, i-STAT 2.6 (L) 3.5 - 5.3 mmol/L    Calcium, Ionized i-STAT 0.56 (LL) 1.12 - 1.32 mmol/L    Hct, i-STAT <15 (L) 34.8 - 46.1 %    Hgb, i-STAT      Glucose, i-STAT 38 (LL) 65 - 140 mg/dl    POC FIO2 21 L    Specimen Type VENOUS    POCT Blood Gas (CG8+)    Collection Time: 07/27/24 11:54 AM   Result Value Ref Range    ph, Berry ISTAT 7.252 (L) 7.300 - 7.400    pCO2, Berry i-STAT 45.9 42.0 - 50.0 mm HG    pO2, Berry i-STAT 16.0 (L) 35.0 - 45.0 mm HG    BE, i-STAT -7 (L) -2 - 3 mmol/L    HCO3, Berry i-STAT 20.2 (L) 24.0 - 30.0 mmol/L    CO2, i-STAT 22 21 - 32 mmol/L    O2 Sat, i-STAT 16 (L) 60 - 85 %    SODIUM, I-STAT 142 136 - 145 mmol/l    Potassium, i-STAT 3.8 3.5 - 5.3 mmol/L    Calcium, Ionized i-STAT 1.26 1.12 - 1.32 mmol/L    Hct, i-STAT 33 (L) 34.8 - 46.1 %    Hgb, i-STAT 11.2 (L) 11.5 - 15.4 g/dl    Glucose, i-STAT 72 65 - 140 mg/dl    POC FIO2 21 L    Specimen Type VENOUS    HS Troponin I 2hr    Collection Time: 07/27/24 12:03 PM   Result Value Ref Range    hs TnI 2hr 21 \"Refer to ACS Flowchart\"- see link ng/L    Delta 2hr hsTnI -2 <20 ng/L   Blood culture    Collection Time: 07/27/24 12:03 PM    Specimen: Arm, Right; Blood   Result Value Ref Range    Blood Culture No Growth After 5 Days.    Lactic acid 2 Hours    Collection Time: 07/27/24 12:32 PM   Result Value Ref Range    LACTIC ACID " "1.9 0.5 - 2.0 mmol/L   Blood culture    Collection Time: 07/27/24 12:32 PM    Specimen: Arm, Right; Blood   Result Value Ref Range    Blood Culture No Growth After 5 Days.    Fingerstick Glucose (POCT)    Collection Time: 07/27/24  1:11 PM   Result Value Ref Range    POC Glucose 103 65 - 140 mg/dl   Fingerstick Glucose (POCT)    Collection Time: 07/27/24  2:00 PM   Result Value Ref Range    POC Glucose 234 (H) 65 - 140 mg/dl   Fingerstick Glucose (POCT)    Collection Time: 07/27/24  3:04 PM   Result Value Ref Range    POC Glucose 53 (L) 65 - 140 mg/dl   Magnesium    Collection Time: 07/27/24  3:49 PM   Result Value Ref Range    Magnesium 2.8 (H) 1.9 - 2.7 mg/dL   Blood gas, venous    Collection Time: 07/27/24  3:49 PM   Result Value Ref Range    pH, Berry 7.286 (L) 7.300 - 7.400    pCO2, Berry 45.0 42.0 - 50.0 mm Hg    pO2, Berry 36.2 35.0 - 45.0 mm Hg    HCO3, Berry 21.0 (L) 24 - 30 mmol/L    Base Excess, Berry -5.6 mmol/L    O2 Content, Berry 12.4 ml/dL    O2 HGB, VENOUS 71.0 60.0 - 80.0 %    Non Vent type BIPAP BIPAP     IPAP 12     EPAP 6     BIPAP fio2 21 %   Fingerstick Glucose (POCT)    Collection Time: 07/27/24  3:57 PM   Result Value Ref Range    POC Glucose 132 65 - 140 mg/dl   Basic metabolic panel    Collection Time: 07/27/24  4:46 PM   Result Value Ref Range    Sodium 140 135 - 147 mmol/L    Potassium 4.2 3.5 - 5.3 mmol/L    Chloride 112 (H) 96 - 108 mmol/L    CO2 21 21 - 32 mmol/L    ANION GAP 7 4 - 13 mmol/L    BUN 16 5 - 25 mg/dL    Creatinine 1.62 (H) 0.60 - 1.30 mg/dL    Glucose 69 65 - 140 mg/dL    Calcium 7.8 (L) 8.4 - 10.2 mg/dL    eGFR 36 ml/min/1.73sq m   Phosphorus    Collection Time: 07/27/24  4:46 PM   Result Value Ref Range    Phosphorus 1.2 (L) 2.7 - 4.5 mg/dL   HS Troponin I 4hr    Collection Time: 07/27/24  4:46 PM   Result Value Ref Range    hs TnI 4hr 34 \"Refer to ACS Flowchart\"- see link ng/L    Delta 4hr hsTnI 11 <20 ng/L   Hepatic function panel    Collection Time: 07/27/24  4:46 PM   Result " Value Ref Range    Total Bilirubin 0.60 0.20 - 1.00 mg/dL    Bilirubin, Direct 0.18 0.00 - 0.20 mg/dL    Alkaline Phosphatase 54 34 - 104 U/L    AST 10 (L) 13 - 39 U/L    ALT 7 7 - 52 U/L    Total Protein 5.6 (L) 6.4 - 8.4 g/dL    Albumin 3.4 (L) 3.5 - 5.0 g/dL   Fingerstick Glucose (POCT)    Collection Time: 07/27/24  5:01 PM   Result Value Ref Range    POC Glucose 122 65 - 140 mg/dl   Fingerstick Glucose (POCT)    Collection Time: 07/27/24  6:06 PM   Result Value Ref Range    POC Glucose 75 65 - 140 mg/dl   Fingerstick Glucose (POCT)    Collection Time: 07/27/24  7:06 PM   Result Value Ref Range    POC Glucose 72 65 - 140 mg/dl   Fingerstick Glucose (POCT)    Collection Time: 07/27/24  8:03 PM   Result Value Ref Range    POC Glucose 77 65 - 140 mg/dl   Basic metabolic panel    Collection Time: 07/27/24  8:09 PM   Result Value Ref Range    Sodium 140 135 - 147 mmol/L    Potassium 4.8 3.5 - 5.3 mmol/L    Chloride 111 (H) 96 - 108 mmol/L    CO2 23 21 - 32 mmol/L    ANION GAP 6 4 - 13 mmol/L    BUN 16 5 - 25 mg/dL    Creatinine 1.61 (H) 0.60 - 1.30 mg/dL    Glucose 90 65 - 140 mg/dL    Calcium 8.1 (L) 8.4 - 10.2 mg/dL    eGFR 37 ml/min/1.73sq m   Magnesium    Collection Time: 07/27/24  8:09 PM   Result Value Ref Range    Magnesium 2.4 1.9 - 2.7 mg/dL   Phosphorus    Collection Time: 07/27/24  8:09 PM   Result Value Ref Range    Phosphorus 1.5 (L) 2.7 - 4.5 mg/dL   Blood gas, venous    Collection Time: 07/27/24  8:09 PM   Result Value Ref Range    pH, Berry 7.296 (L) 7.300 - 7.400    pCO2, Berry 42.9 42.0 - 50.0 mm Hg    pO2, Berry 16.9 (L) 35.0 - 45.0 mm Hg    HCO3, Berry 20.4 (L) 24 - 30 mmol/L    Base Excess, Berry -5.8 mmol/L    O2 Content, Berry 4.6 ml/dL    O2 HGB, VENOUS 26.6 (L) 60.0 - 80.0 %    Non Vent type BIPAP BIPAP    Fingerstick Glucose (POCT)    Collection Time: 07/27/24  9:34 PM   Result Value Ref Range    POC Glucose 80 65 - 140 mg/dl   Fingerstick Glucose (POCT)    Collection Time: 07/27/24 10:22 PM   Result  Value Ref Range    POC Glucose 94 65 - 140 mg/dl   Fingerstick Glucose (POCT)    Collection Time: 07/27/24 11:51 PM   Result Value Ref Range    POC Glucose 109 65 - 140 mg/dl   Basic metabolic panel    Collection Time: 07/28/24 12:19 AM   Result Value Ref Range    Sodium 140 135 - 147 mmol/L    Potassium 4.4 3.5 - 5.3 mmol/L    Chloride 112 (H) 96 - 108 mmol/L    CO2 21 21 - 32 mmol/L    ANION GAP 7 4 - 13 mmol/L    BUN 14 5 - 25 mg/dL    Creatinine 1.48 (H) 0.60 - 1.30 mg/dL    Glucose 93 65 - 140 mg/dL    Calcium 7.7 (L) 8.4 - 10.2 mg/dL    eGFR 40 ml/min/1.73sq m   Magnesium    Collection Time: 07/28/24 12:19 AM   Result Value Ref Range    Magnesium 2.2 1.9 - 2.7 mg/dL   Phosphorus    Collection Time: 07/28/24 12:19 AM   Result Value Ref Range    Phosphorus 1.9 (L) 2.7 - 4.5 mg/dL   Fingerstick Glucose (POCT)    Collection Time: 07/28/24  2:29 AM   Result Value Ref Range    POC Glucose 115 65 - 140 mg/dl   Fingerstick Glucose (POCT)    Collection Time: 07/28/24  4:07 AM   Result Value Ref Range    POC Glucose 111 65 - 140 mg/dl   Basic metabolic panel    Collection Time: 07/28/24  4:12 AM   Result Value Ref Range    Sodium 141 135 - 147 mmol/L    Potassium 4.6 3.5 - 5.3 mmol/L    Chloride 112 (H) 96 - 108 mmol/L    CO2 20 (L) 21 - 32 mmol/L    ANION GAP 9 4 - 13 mmol/L    BUN 13 5 - 25 mg/dL    Creatinine 1.43 (H) 0.60 - 1.30 mg/dL    Glucose 111 65 - 140 mg/dL    Calcium 7.6 (L) 8.4 - 10.2 mg/dL    eGFR 42 ml/min/1.73sq m   Magnesium    Collection Time: 07/28/24  4:12 AM   Result Value Ref Range    Magnesium 2.1 1.9 - 2.7 mg/dL   Phosphorus    Collection Time: 07/28/24  4:12 AM   Result Value Ref Range    Phosphorus 3.7 2.7 - 4.5 mg/dL   CBC and differential    Collection Time: 07/28/24  4:22 AM   Result Value Ref Range    WBC 5.81 4.31 - 10.16 Thousand/uL    RBC 3.74 (L) 3.81 - 5.12 Million/uL    Hemoglobin 10.4 (L) 11.5 - 15.4 g/dL    Hematocrit 32.0 (L) 34.8 - 46.1 %    MCV 86 82 - 98 fL    MCH 27.8 26.8 -  34.3 pg    MCHC 32.5 31.4 - 37.4 g/dL    RDW 14.6 11.6 - 15.1 %    MPV 11.1 8.9 - 12.7 fL    Platelets 136 (L) 149 - 390 Thousands/uL    nRBC 0 /100 WBCs    Segmented % 56 43 - 75 %    Immature Grans % 1 0 - 2 %    Lymphocytes % 34 14 - 44 %    Monocytes % 6 4 - 12 %    Eosinophils Relative 2 0 - 6 %    Basophils Relative 1 0 - 1 %    Absolute Neutrophils 3.28 1.85 - 7.62 Thousands/µL    Absolute Immature Grans 0.04 0.00 - 0.20 Thousand/uL    Absolute Lymphocytes 1.98 0.60 - 4.47 Thousands/µL    Absolute Monocytes 0.37 0.17 - 1.22 Thousand/µL    Eosinophils Absolute 0.10 0.00 - 0.61 Thousand/µL    Basophils Absolute 0.04 0.00 - 0.10 Thousands/µL   Comprehensive metabolic panel    Collection Time: 07/28/24  4:47 AM   Result Value Ref Range    Sodium 140 135 - 147 mmol/L    Potassium 4.6 3.5 - 5.3 mmol/L    Chloride 112 (H) 96 - 108 mmol/L    CO2 20 (L) 21 - 32 mmol/L    ANION GAP 8 4 - 13 mmol/L    BUN 13 5 - 25 mg/dL    Creatinine 1.44 (H) 0.60 - 1.30 mg/dL    Glucose 111 65 - 140 mg/dL    Calcium 7.7 (L) 8.4 - 10.2 mg/dL    Corrected Calcium 8.6 8.3 - 10.1 mg/dL    AST 8 (L) 13 - 39 U/L    ALT 6 (L) 7 - 52 U/L    Alkaline Phosphatase 47 34 - 104 U/L    Total Protein 4.5 (L) 6.4 - 8.4 g/dL    Albumin 2.9 (L) 3.5 - 5.0 g/dL    Total Bilirubin 0.54 0.20 - 1.00 mg/dL    eGFR 42 ml/min/1.73sq m   Fingerstick Glucose (POCT)    Collection Time: 07/28/24  6:32 AM   Result Value Ref Range    POC Glucose 95 65 - 140 mg/dl   Fingerstick Glucose (POCT)    Collection Time: 07/28/24  7:54 AM   Result Value Ref Range    POC Glucose 89 65 - 140 mg/dl   Basic metabolic panel    Collection Time: 07/28/24 10:06 AM   Result Value Ref Range    Sodium 140 135 - 147 mmol/L    Potassium 4.6 3.5 - 5.3 mmol/L    Chloride 112 (H) 96 - 108 mmol/L    CO2 18 (L) 21 - 32 mmol/L    ANION GAP 10 4 - 13 mmol/L    BUN 12 5 - 25 mg/dL    Creatinine 1.52 (H) 0.60 - 1.30 mg/dL    Glucose 152 (H) 65 - 140 mg/dL    Calcium 8.1 (L) 8.4 - 10.2 mg/dL     eGFR 39 ml/min/1.73sq m   Magnesium    Collection Time: 07/28/24 10:06 AM   Result Value Ref Range    Magnesium 2.1 1.9 - 2.7 mg/dL   Phosphorus    Collection Time: 07/28/24 10:06 AM   Result Value Ref Range    Phosphorus 3.9 2.7 - 4.5 mg/dL   Fingerstick Glucose (POCT)    Collection Time: 07/28/24 10:14 AM   Result Value Ref Range    POC Glucose 149 (H) 65 - 140 mg/dl   Fingerstick Glucose (POCT)    Collection Time: 07/28/24 11:47 AM   Result Value Ref Range    POC Glucose 114 65 - 140 mg/dl   Basic metabolic panel    Collection Time: 07/28/24 12:08 PM   Result Value Ref Range    Sodium 141 135 - 147 mmol/L    Potassium 4.5 3.5 - 5.3 mmol/L    Chloride 112 (H) 96 - 108 mmol/L    CO2 21 21 - 32 mmol/L    ANION GAP 8 4 - 13 mmol/L    BUN 12 5 - 25 mg/dL    Creatinine 1.47 (H) 0.60 - 1.30 mg/dL    Glucose 127 65 - 140 mg/dL    Calcium 7.9 (L) 8.4 - 10.2 mg/dL    eGFR 41 ml/min/1.73sq m   Magnesium    Collection Time: 07/28/24 12:08 PM   Result Value Ref Range    Magnesium 2.0 1.9 - 2.7 mg/dL   Phosphorus    Collection Time: 07/28/24 12:08 PM   Result Value Ref Range    Phosphorus 3.6 2.7 - 4.5 mg/dL   Glutamic acid decarboxylase    Collection Time: 07/28/24 12:08 PM   Result Value Ref Range    Glutaminc Acid Decarboxylase 65 Ab <5.0 0.0 - 5.0 U/mL   Albumin    Collection Time: 07/28/24 12:08 PM   Result Value Ref Range    Albumin 3.0 (L) 3.5 - 5.0 g/dL   Fingerstick Glucose (POCT)    Collection Time: 07/28/24  1:58 PM   Result Value Ref Range    POC Glucose 167 (H) 65 - 140 mg/dl   Fingerstick Glucose (POCT)    Collection Time: 07/28/24  2:42 PM   Result Value Ref Range    POC Glucose 148 (H) 65 - 140 mg/dl   Fingerstick Glucose (POCT)    Collection Time: 07/28/24  4:09 PM   Result Value Ref Range    POC Glucose 133 65 - 140 mg/dl   Basic metabolic panel    Collection Time: 07/28/24  4:49 PM   Result Value Ref Range    Sodium 139 135 - 147 mmol/L    Potassium 3.7 3.5 - 5.3 mmol/L    Chloride 108 96 - 108 mmol/L    CO2  22 21 - 32 mmol/L    ANION GAP 9 4 - 13 mmol/L    BUN 11 5 - 25 mg/dL    Creatinine 1.47 (H) 0.60 - 1.30 mg/dL    Glucose 105 65 - 140 mg/dL    Calcium 8.1 (L) 8.4 - 10.2 mg/dL    eGFR 41 ml/min/1.73sq m   Magnesium    Collection Time: 07/28/24  4:49 PM   Result Value Ref Range    Magnesium 1.9 1.9 - 2.7 mg/dL   Phosphorus    Collection Time: 07/28/24  4:49 PM   Result Value Ref Range    Phosphorus 3.5 2.7 - 4.5 mg/dL   Fingerstick Glucose (POCT)    Collection Time: 07/28/24  5:05 PM   Result Value Ref Range    POC Glucose 97 65 - 140 mg/dl   Fingerstick Glucose (POCT)    Collection Time: 07/28/24  6:19 PM   Result Value Ref Range    POC Glucose 72 65 - 140 mg/dl   Fingerstick Glucose (POCT)    Collection Time: 07/28/24  6:57 PM   Result Value Ref Range    POC Glucose 68 65 - 140 mg/dl   Fingerstick Glucose (POCT)    Collection Time: 07/28/24  8:56 PM   Result Value Ref Range    POC Glucose 109 65 - 140 mg/dl   Fingerstick Glucose (POCT)    Collection Time: 07/28/24 10:40 PM   Result Value Ref Range    POC Glucose 86 65 - 140 mg/dl   Basic metabolic panel    Collection Time: 07/28/24 11:40 PM   Result Value Ref Range    Sodium 138 135 - 147 mmol/L    Potassium 4.2 3.5 - 5.3 mmol/L    Chloride 109 (H) 96 - 108 mmol/L    CO2 22 21 - 32 mmol/L    ANION GAP 7 4 - 13 mmol/L    BUN 9 5 - 25 mg/dL    Creatinine 1.32 (H) 0.60 - 1.30 mg/dL    Glucose 100 65 - 140 mg/dL    Calcium 7.9 (L) 8.4 - 10.2 mg/dL    eGFR 47 ml/min/1.73sq m   Magnesium    Collection Time: 07/28/24 11:40 PM   Result Value Ref Range    Magnesium 1.7 (L) 1.9 - 2.7 mg/dL   Phosphorus    Collection Time: 07/28/24 11:40 PM   Result Value Ref Range    Phosphorus 3.4 2.7 - 4.5 mg/dL   Blood gas, venous    Collection Time: 07/28/24 11:40 PM   Result Value Ref Range    pH, Berry 7.362 7.300 - 7.400    pCO2, Berry 36.5 (L) 42.0 - 50.0 mm Hg    pO2, Berry 38.6 35.0 - 45.0 mm Hg    HCO3, Berry 20.3 (L) 24 - 30 mmol/L    Base Excess, Berry -4.6 mmol/L    O2 Content, Berry 11.8  ml/dL    O2 HGB, VENOUS 74.4 60.0 - 80.0 %   Albumin    Collection Time: 07/28/24 11:40 PM   Result Value Ref Range    Albumin 3.0 (L) 3.5 - 5.0 g/dL   Fingerstick Glucose (POCT)    Collection Time: 07/28/24 11:54 PM   Result Value Ref Range    POC Glucose 102 65 - 140 mg/dl   Fingerstick Glucose (POCT)    Collection Time: 07/29/24  1:04 AM   Result Value Ref Range    POC Glucose 84 65 - 140 mg/dl   Fingerstick Glucose (POCT)    Collection Time: 07/29/24  2:29 AM   Result Value Ref Range    POC Glucose 82 65 - 140 mg/dl   Fingerstick Glucose (POCT)    Collection Time: 07/29/24  4:18 AM   Result Value Ref Range    POC Glucose 82 65 - 140 mg/dl   Magnesium    Collection Time: 07/29/24  5:04 AM   Result Value Ref Range    Magnesium 1.8 (L) 1.9 - 2.7 mg/dL   Phosphorus    Collection Time: 07/29/24  5:04 AM   Result Value Ref Range    Phosphorus 3.4 2.7 - 4.5 mg/dL   Comprehensive metabolic panel    Collection Time: 07/29/24  5:04 AM   Result Value Ref Range    Sodium 141 135 - 147 mmol/L    Potassium 5.1 3.5 - 5.3 mmol/L    Chloride 112 (H) 96 - 108 mmol/L    CO2 23 21 - 32 mmol/L    ANION GAP 6 4 - 13 mmol/L    BUN 8 5 - 25 mg/dL    Creatinine 1.29 0.60 - 1.30 mg/dL    Glucose 82 65 - 140 mg/dL    Calcium 7.9 (L) 8.4 - 10.2 mg/dL    Corrected Calcium 8.9 8.3 - 10.1 mg/dL    AST 6 (L) 13 - 39 U/L    ALT 6 (L) 7 - 52 U/L    Alkaline Phosphatase 49 34 - 104 U/L    Total Protein 4.4 (L) 6.4 - 8.4 g/dL    Albumin 2.8 (L) 3.5 - 5.0 g/dL    Total Bilirubin 0.47 0.20 - 1.00 mg/dL    eGFR 48 ml/min/1.73sq m   CBC and differential    Collection Time: 07/29/24  5:04 AM   Result Value Ref Range    WBC 4.71 4.31 - 10.16 Thousand/uL    RBC 3.61 (L) 3.81 - 5.12 Million/uL    Hemoglobin 10.2 (L) 11.5 - 15.4 g/dL    Hematocrit 31.1 (L) 34.8 - 46.1 %    MCV 86 82 - 98 fL    MCH 28.3 26.8 - 34.3 pg    MCHC 32.8 31.4 - 37.4 g/dL    RDW 14.9 11.6 - 15.1 %    MPV 11.5 8.9 - 12.7 fL    Platelets 115 (L) 149 - 390 Thousands/uL    nRBC 0 /100  WBCs    Segmented % 49 43 - 75 %    Immature Grans % 1 0 - 2 %    Lymphocytes % 40 14 - 44 %    Monocytes % 7 4 - 12 %    Eosinophils Relative 2 0 - 6 %    Basophils Relative 1 0 - 1 %    Absolute Neutrophils 2.34 1.85 - 7.62 Thousands/µL    Absolute Immature Grans 0.03 0.00 - 0.20 Thousand/uL    Absolute Lymphocytes 1.89 0.60 - 4.47 Thousands/µL    Absolute Monocytes 0.35 0.17 - 1.22 Thousand/µL    Eosinophils Absolute 0.07 0.00 - 0.61 Thousand/µL    Basophils Absolute 0.03 0.00 - 0.10 Thousands/µL   Fingerstick Glucose (POCT)    Collection Time: 07/29/24  6:09 AM   Result Value Ref Range    POC Glucose 93 65 - 140 mg/dl   Fingerstick Glucose (POCT)    Collection Time: 07/29/24  7:51 AM   Result Value Ref Range    POC Glucose 85 65 - 140 mg/dl   Fingerstick Glucose (POCT)    Collection Time: 07/29/24 10:35 AM   Result Value Ref Range    POC Glucose 93 65 - 140 mg/dl   Fingerstick Glucose (POCT)    Collection Time: 07/29/24 11:56 AM   Result Value Ref Range    POC Glucose 94 65 - 140 mg/dl   Fingerstick Glucose (POCT)    Collection Time: 07/29/24  1:59 PM   Result Value Ref Range    POC Glucose 167 (H) 65 - 140 mg/dl   Fingerstick Glucose (POCT)    Collection Time: 07/29/24  3:39 PM   Result Value Ref Range    POC Glucose 104 65 - 140 mg/dl   Fingerstick Glucose (POCT)    Collection Time: 07/29/24  9:09 PM   Result Value Ref Range    POC Glucose 94 65 - 140 mg/dl   Fingerstick Glucose (POCT)    Collection Time: 07/30/24 12:14 AM   Result Value Ref Range    POC Glucose 69 65 - 140 mg/dl   Fingerstick Glucose (POCT)    Collection Time: 07/30/24  4:37 AM   Result Value Ref Range    POC Glucose 68 65 - 140 mg/dl   CBC and differential    Collection Time: 07/30/24  5:57 AM   Result Value Ref Range    WBC 4.39 4.31 - 10.16 Thousand/uL    RBC 3.78 (L) 3.81 - 5.12 Million/uL    Hemoglobin 10.6 (L) 11.5 - 15.4 g/dL    Hematocrit 33.0 (L) 34.8 - 46.1 %    MCV 87 82 - 98 fL    MCH 28.0 26.8 - 34.3 pg    MCHC 32.1 31.4 - 37.4  g/dL    RDW 14.8 11.6 - 15.1 %    MPV 11.4 8.9 - 12.7 fL    Platelets 120 (L) 149 - 390 Thousands/uL    nRBC 0 /100 WBCs    Segmented % 46 43 - 75 %    Immature Grans % 1 0 - 2 %    Lymphocytes % 43 14 - 44 %    Monocytes % 7 4 - 12 %    Eosinophils Relative 2 0 - 6 %    Basophils Relative 1 0 - 1 %    Absolute Neutrophils 2.03 1.85 - 7.62 Thousands/µL    Absolute Immature Grans 0.06 0.00 - 0.20 Thousand/uL    Absolute Lymphocytes 1.88 0.60 - 4.47 Thousands/µL    Absolute Monocytes 0.32 0.17 - 1.22 Thousand/µL    Eosinophils Absolute 0.07 0.00 - 0.61 Thousand/µL    Basophils Absolute 0.03 0.00 - 0.10 Thousands/µL   Fingerstick Glucose (POCT)    Collection Time: 07/30/24  6:33 AM   Result Value Ref Range    POC Glucose 110 65 - 140 mg/dl   Phosphorus    Collection Time: 07/30/24  6:34 AM   Result Value Ref Range    Phosphorus 3.5 2.7 - 4.5 mg/dL   Magnesium    Collection Time: 07/30/24  6:34 AM   Result Value Ref Range    Magnesium 2.0 1.9 - 2.7 mg/dL   Basic metabolic panel    Collection Time: 07/30/24  6:34 AM   Result Value Ref Range    Sodium 142 135 - 147 mmol/L    Potassium 4.5 3.5 - 5.3 mmol/L    Chloride 111 (H) 96 - 108 mmol/L    CO2 24 21 - 32 mmol/L    ANION GAP 7 4 - 13 mmol/L    BUN 8 5 - 25 mg/dL    Creatinine 1.47 (H) 0.60 - 1.30 mg/dL    Glucose 71 65 - 140 mg/dL    Calcium 8.2 (L) 8.4 - 10.2 mg/dL    eGFR 41 ml/min/1.73sq m   Fingerstick Glucose (POCT)    Collection Time: 07/30/24 11:11 AM   Result Value Ref Range    POC Glucose 101 65 - 140 mg/dl   UA w Reflex to Microscopic w Reflex to Culture    Collection Time: 07/30/24 12:56 PM    Specimen: Urine, Clean Catch   Result Value Ref Range    Color, UA Light Yellow     Clarity, UA Clear     Specific Gravity, UA 1.013 1.003 - 1.030    pH, UA 6.5 4.5, 5.0, 5.5, 6.0, 6.5, 7.0, 7.5, 8.0    Leukocytes, UA Moderate (A) Negative    Nitrite, UA Negative Negative    Protein, UA Negative Negative mg/dl    Glucose, UA Negative Negative mg/dl    Ketones, UA  Negative Negative mg/dl    Urobilinogen, UA <2.0 <2.0 mg/dl mg/dl    Bilirubin, UA Negative Negative    Occult Blood, UA Negative Negative   Urine Microscopic    Collection Time: 07/30/24 12:56 PM   Result Value Ref Range    RBC, UA None Seen None Seen, 1-2 /hpf    WBC, UA 4-10 (A) None Seen, 1-2 /hpf    Epithelial Cells Occasional None Seen, Occasional /hpf    Bacteria, UA None Seen None Seen, Occasional /hpf    Hyaline Casts, UA 0-3 (A) None Seen /lpf   Fingerstick Glucose (POCT)    Collection Time: 07/30/24  4:08 PM   Result Value Ref Range    POC Glucose 87 65 - 140 mg/dl   Fingerstick Glucose (POCT)    Collection Time: 07/30/24  8:48 PM   Result Value Ref Range    POC Glucose 67 65 - 140 mg/dl   Fingerstick Glucose (POCT)    Collection Time: 07/30/24  9:08 PM   Result Value Ref Range    POC Glucose 81 65 - 140 mg/dl   CBC and differential    Collection Time: 07/31/24  5:44 AM   Result Value Ref Range    WBC 4.70 4.31 - 10.16 Thousand/uL    RBC 3.60 (L) 3.81 - 5.12 Million/uL    Hemoglobin 10.3 (L) 11.5 - 15.4 g/dL    Hematocrit 32.0 (L) 34.8 - 46.1 %    MCV 89 82 - 98 fL    MCH 28.6 26.8 - 34.3 pg    MCHC 32.2 31.4 - 37.4 g/dL    RDW 14.6 11.6 - 15.1 %    MPV 11.8 8.9 - 12.7 fL    Platelets 122 (L) 149 - 390 Thousands/uL    nRBC 0 /100 WBCs    Segmented % 42 (L) 43 - 75 %    Immature Grans % 1 0 - 2 %    Lymphocytes % 46 (H) 14 - 44 %    Monocytes % 8 4 - 12 %    Eosinophils Relative 2 0 - 6 %    Basophils Relative 1 0 - 1 %    Absolute Neutrophils 1.96 1.85 - 7.62 Thousands/µL    Absolute Immature Grans 0.05 0.00 - 0.20 Thousand/uL    Absolute Lymphocytes 2.21 0.60 - 4.47 Thousands/µL    Absolute Monocytes 0.36 0.17 - 1.22 Thousand/µL    Eosinophils Absolute 0.09 0.00 - 0.61 Thousand/µL    Basophils Absolute 0.03 0.00 - 0.10 Thousands/µL   Basic metabolic panel    Collection Time: 07/31/24  5:44 AM   Result Value Ref Range    Sodium 143 135 - 147 mmol/L    Potassium 4.3 3.5 - 5.3 mmol/L    Chloride 110 (H) 96  - 108 mmol/L    CO2 25 21 - 32 mmol/L    ANION GAP 8 4 - 13 mmol/L    BUN 10 5 - 25 mg/dL    Creatinine 1.31 (H) 0.60 - 1.30 mg/dL    Glucose 70 65 - 140 mg/dL    Calcium 7.8 (L) 8.4 - 10.2 mg/dL    eGFR 47 ml/min/1.73sq m   Fingerstick Glucose (POCT)    Collection Time: 07/31/24  6:28 AM   Result Value Ref Range    POC Glucose 75 65 - 140 mg/dl   Fingerstick Glucose (POCT)    Collection Time: 07/31/24  9:31 AM   Result Value Ref Range    POC Glucose 114 65 - 140 mg/dl   Fingerstick Glucose (POCT)    Collection Time: 07/31/24 10:32 AM   Result Value Ref Range    POC Glucose 123 65 - 140 mg/dl        Physical Exam  Constitutional:       Appearance: Normal appearance.   HENT:      Head: Normocephalic.      Right Ear: External ear normal.      Left Ear: External ear normal.      Nose: Nose normal. No congestion.      Mouth/Throat:      Mouth: Mucous membranes are moist.      Pharynx: Oropharynx is clear. No oropharyngeal exudate or posterior oropharyngeal erythema.   Eyes:      Extraocular Movements: Extraocular movements intact.      Conjunctiva/sclera: Conjunctivae normal.   Cardiovascular:      Rate and Rhythm: Normal rate and regular rhythm.      Heart sounds: Normal heart sounds. No murmur heard.  Pulmonary:      Effort: Pulmonary effort is normal.      Breath sounds: Normal breath sounds. No wheezing or rales.   Abdominal:      General: Abdomen is flat. There is no distension.      Palpations: Abdomen is soft.      Tenderness: There is no abdominal tenderness.   Musculoskeletal:      Cervical back: Normal range of motion and neck supple.      Right lower leg: Edema present.      Left lower leg: Edema present.   Lymphadenopathy:      Cervical: No cervical adenopathy.   Skin:     General: Skin is warm.   Neurological:      General: No focal deficit present.      Mental Status: She is alert and oriented to person, place, and time.

## 2024-08-14 ENCOUNTER — PROCEDURE VISIT (OUTPATIENT)
Age: 49
End: 2024-08-14
Payer: COMMERCIAL

## 2024-08-14 VITALS
SYSTOLIC BLOOD PRESSURE: 120 MMHG | HEART RATE: 72 BPM | HEIGHT: 61 IN | BODY MASS INDEX: 33.79 KG/M2 | DIASTOLIC BLOOD PRESSURE: 86 MMHG | WEIGHT: 179 LBS

## 2024-08-14 DIAGNOSIS — M48.062 SPINAL STENOSIS OF LUMBAR REGION WITH NEUROGENIC CLAUDICATION: Primary | ICD-10-CM

## 2024-08-14 DIAGNOSIS — M99.04 SEGMENTAL DYSFUNCTION OF SACRAL REGION: ICD-10-CM

## 2024-08-14 DIAGNOSIS — M99.02 SEGMENTAL DYSFUNCTION OF THORACIC REGION: ICD-10-CM

## 2024-08-14 DIAGNOSIS — M99.01 SEGMENTAL DYSFUNCTION OF CERVICAL REGION: ICD-10-CM

## 2024-08-14 DIAGNOSIS — M62.838 MUSCLE SPASM: ICD-10-CM

## 2024-08-14 DIAGNOSIS — M99.03 SEGMENTAL DYSFUNCTION OF LUMBAR REGION: ICD-10-CM

## 2024-08-14 DIAGNOSIS — M47.812 CERVICAL SPONDYLOSIS: ICD-10-CM

## 2024-08-14 PROCEDURE — 98941 CHIROPRACT MANJ 3-4 REGIONS: CPT | Performed by: CHIROPRACTOR

## 2024-08-14 PROCEDURE — 97110 THERAPEUTIC EXERCISES: CPT | Performed by: CHIROPRACTOR

## 2024-08-14 NOTE — PROGRESS NOTES
Initial date of service: 5/6/24    Diagnoses and all orders for this visit:    Spinal stenosis of lumbar region with neurogenic claudication    Segmental dysfunction of sacral region    Muscle spasm    Cervical spondylosis    Segmental dysfunction of lumbar region    Segmental dysfunction of thoracic region    Segmental dysfunction of cervical region    Pt suffered mild exacerbation but responded to tx with reduced pain and increased ROM. Overall improving with more mobility    TREATMENT: [id number]  Ther-ex: IASTM; discussed post procedure soreness and/or ecchymosis for up to 36 hrs, applied to affected mm hypertonicities; supine hamstring stretch, supine gluteal stretch, single knee to chest stretch, upper trap stretch, transitional mvmt education, abdominal bracing; greater than 15 min spent performing above mentioned ther-ex to improve ROM/flexibility. Cervical supine graded mobilization and traction, Thoracic mobilization/manipulation: prone P-A mob; Lumbar mobilization/manipulation: diversified side laying graded HVLA, flexion-traction; SIJ Manipulation/Mobilization: R/L SIJ HVLA - long axis distraction, mederos drop table maneuver to affected SIJ    HPI:  Kinza Meza is a 48 y.o. female  Chief Complaint   Patient presents with    Neck Pain     About a 4 bilateral     Back Pain     Low back when walking without cane      Pt presents for tx for exacerbation of chronic neck/back pain. Pt has had back and neck surgery. Pt was utilizing cane and now using walker occasionally. 2022 MRI demonstrates multilevel degenerative changes of postsurgical lumbar spine status post L1-L2 posterior spinal fixation with varying degrees of canal stenosis (moderate L4-L5) and foraminal narrowing (mild left L1-L2 and left L2-L3 ). 2024 fl/ext xrays unremarkable for instability. 2021 C/S MRI demonstrates s/p ACDF from C4 to C7. Multilevel degenerative spondylosis as described. Possible ossification of the posterior  longitudinal ligament at C2-3 and C6-7      Neck Pain   This is a chronic problem. The current episode started more than 1 year ago. The problem occurs constantly. The problem has been waxing and waning. The pain is present in the left side, midline and right side. The quality of the pain is described as aching. The symptoms are aggravated by bending, position, stress and twisting. The pain is Worse during the day.   Back Pain  This is a chronic problem. The current episode started more than 1 year ago. The problem occurs constantly. The problem has been waxing and waning since onset. The pain is present in the gluteal, lumbar spine, sacro-iliac and thoracic spine. The quality of the pain is described as aching, burning and stabbing. The pain radiates to the left thigh, left knee and right thigh. Pain scale: 4-9/10. The pain is Worse during the day. The symptoms are aggravated by standing, twisting and bending (walking, laying supine, transitional mvmts; palliative includes sitting, massage). Pertinent negatives include no bladder incontinence or bowel incontinence. Risk factors include lack of exercise.     Past Medical History:   Diagnosis Date    ADD (attention deficit disorder)     Allergic rhinitis     ALS (amyotrophic lateral sclerosis) ([facility]) 11/22    Per neurological surgeon    Anemia 12/2021    Anxiety     Arthritis     Bipolar disorder ([facility])     Cervical disc disorder with radiculopathy of cervical region     Chronic depression     Chronic kidney disease     Stage 1    Chronic pain disorder     Cluster headache     Colitis     Last assessed - 11/25/14    Colon polyp     Concussion 2018    Condyloma acuminatum     Last assessed - 3/19/14    CTS (carpal tunnel syndrome) 2001    Depression     Diabetes mellitus ([facility])     Last assessed - 11/25/14    Diabetic nephropathy ([facility]) 2001    Diabetic neuropathy ([facility])     Diabetic retinopathy ([facility]) 2001    Difficulty walking 07/22    Fibromyalgia     Fibromyalgia,  primary     GERD (gastroesophageal reflux disease)     Head injury 18    Fall    Headache(784.0) 1977    History of transfusion 2021    HTN (hypertension)     Last assessed - 14    Hyperlipidemia 2019    Hypertension     Intervertebral disc disorder with radiculopathy of lumbar region     Lupus (HCC)     Rhuematologist-Sheela Dasilva, [place]    Migraine     Miscarriage 1988    ,     Obesity 1980    Obesity, unspecified     Open wound of back 2023    Opioid abuse, in remission (HCC) 2022    Only while in hospital and for about 10 days once home instead of one week.    Osteoporosis     Peripheral neuropathy     Postoperative wound infection 2021    Stop not healed    Preeclampsia     Last assessed - 14    Rheumatoid arthritis (HCC)     Wears dentures       Past Surgical History:   Procedure Laterality Date    APPENDECTOMY      Last assessed - 14    BARIATRIC SURGERY  2016    CARPAL TUNNEL RELEASE      CATARACT EXTRACTION      CERVICAL FUSION N/A 2019    Procedure: Anterior cervical discectomy w fusion C4-5, C5-6, C6-7; allograft and neuromonitoring;  Surgeon: Jose Gomez MD;  [facility];  Service: Orthopedics     SECTION      Last assessed - 14    COLONOSCOPY      COLONOSCOPY      HEMORRHOID SURGERY      HERNIA REPAIR      Last assessed - 14    LUMBAR FUSION N/A 2021    Procedure: L1/2 laminectomy, discectomy with L1/2 MIS posterior fixation using neuromonitoring and neuronavigation;  Surgeon: Suhas Akbar MD;  [facility];  Service: Neurosurgery    LUMBAR WOUND EXPLORATION Bilateral 2021    Procedure: Lumbar wound washout, debridement and intraoperative cultures;  Surgeon: Suhas Akbar MD;  [facility];  Service: Neurosurgery    MAMMO (HISTORICAL)  2016    CA DEBRIDEMENT BONE 1ST 20 SQ CM/< N/A 2023    Procedure: SURGICAL PREPARATION OF BACK  WOUND AND LOCAL FLAP, PREVENA PLACEMENT;   Surgeon: Ady Rocha MD;  Location:  MAIN OR;  Service: Plastics    WOOD-EN-Y PROCEDURE      ULNAR TUNNEL RELEASE      VAC DRESSING APPLICATION Bilateral 12/22/2021    Procedure: APPLICATION VAC DRESSING;  Surgeon: Elbert Hawley MD;  [facility];  Service: General    VAC DRESSING APPLICATION N/A 12/25/2021    Procedure: APPLICATION VAC DRESSING ABDOMEN/TRUNK;  Surgeon: Mani Ross DO;  [facility];  Service: General    VENTRAL HERNIA REPAIR       The following portions of the patient's history were reviewed and updated as appropriate: allergies, past family history, past medical history, past social history, past surgical history, and problem list.  Review of Systems   Gastrointestinal:  Negative for bowel incontinence.   Genitourinary:  Negative for bladder incontinence.   Musculoskeletal:  Positive for back pain and neck pain.     Physical Exam  Neck:        Comments: Pnful and limited in Brot, Blf  Musculoskeletal:      Cervical back: Pain with movement and muscular tenderness present. Decreased range of motion.      Thoracic back: Spasms and tenderness present. Decreased range of motion.      Lumbar back: Spasms and tenderness present. Decreased range of motion. Negative right straight leg raise test and negative left straight leg raise test.        Back:       Comments: Pnful and limited in Brot, Blf, Ext centralizes    Lymphadenopathy:      Cervical: No cervical adenopathy.   Skin:     General: Skin is warm and dry.   Neurological:      Mental Status: She is alert and oriented to person, place, and time.      Gait: Gait is intact.   Psychiatric:         Mood and Affect: Mood and affect normal.         Behavior: Behavior normal.       SOFT TISSUE ASSESSMENT Hypertonicity and tenderness palpated B upper traps, B SCM, B T10-S1 erector spinae, glute med/min, QL, hamstring JOINT RESTRICTIONS: C4/5, T1/2, T10-S1 and R/L SIJ    Return in about 2 weeks (around 8/28/2024) for Next  scheduled follow up.

## 2024-08-20 ENCOUNTER — RA CDI HCC (OUTPATIENT)
Dept: OTHER | Facility: HOSPITAL | Age: 49
End: 2024-08-20

## 2024-08-28 ENCOUNTER — PROCEDURE VISIT (OUTPATIENT)
Age: 49
End: 2024-08-28
Payer: COMMERCIAL

## 2024-08-28 VITALS
WEIGHT: 179 LBS | DIASTOLIC BLOOD PRESSURE: 87 MMHG | HEIGHT: 61 IN | HEART RATE: 86 BPM | BODY MASS INDEX: 33.79 KG/M2 | SYSTOLIC BLOOD PRESSURE: 125 MMHG

## 2024-08-28 DIAGNOSIS — M99.02 SEGMENTAL DYSFUNCTION OF THORACIC REGION: ICD-10-CM

## 2024-08-28 DIAGNOSIS — M99.04 SEGMENTAL DYSFUNCTION OF SACRAL REGION: ICD-10-CM

## 2024-08-28 DIAGNOSIS — M62.838 MUSCLE SPASM: ICD-10-CM

## 2024-08-28 DIAGNOSIS — M99.01 SEGMENTAL DYSFUNCTION OF CERVICAL REGION: ICD-10-CM

## 2024-08-28 DIAGNOSIS — M47.812 CERVICAL SPONDYLOSIS: ICD-10-CM

## 2024-08-28 DIAGNOSIS — M99.03 SEGMENTAL DYSFUNCTION OF LUMBAR REGION: ICD-10-CM

## 2024-08-28 DIAGNOSIS — M48.062 SPINAL STENOSIS OF LUMBAR REGION WITH NEUROGENIC CLAUDICATION: Primary | ICD-10-CM

## 2024-08-28 PROCEDURE — 98941 CHIROPRACT MANJ 3-4 REGIONS: CPT | Performed by: CHIROPRACTOR

## 2024-08-28 PROCEDURE — 97110 THERAPEUTIC EXERCISES: CPT | Performed by: CHIROPRACTOR

## 2024-08-28 NOTE — PROGRESS NOTES
Initial date of service: 5/6/24    Diagnoses and all orders for this visit:    Spinal stenosis of lumbar region with neurogenic claudication    Segmental dysfunction of sacral region    Muscle spasm    Cervical spondylosis    Segmental dysfunction of lumbar region    Segmental dysfunction of thoracic region    Segmental dysfunction of cervical region    Pt suffered mild exacerbation but responded to tx with reduced pain and increased ROM. Overall improving with more mobility    TREATMENT: [id number]  Ther-ex: IASTM; discussed post procedure soreness and/or ecchymosis for up to 36 hrs, applied to affected mm hypertonicities; supine hamstring stretch, supine gluteal stretch, single knee to chest stretch, upper trap stretch, transitional mvmt education, abdominal bracing; greater than 15 min spent performing above mentioned ther-ex to improve ROM/flexibility. Cervical supine graded mobilization and traction, Thoracic mobilization/manipulation: prone P-A mob; Lumbar mobilization/manipulation: diversified side laying graded HVLA, flexion-traction; SIJ Manipulation/Mobilization: R/L SIJ HVLA - long axis distraction, mederos drop table maneuver to affected SIJ    HPI:  Kinza Meza is a 48 y.o. female  Chief Complaint   Patient presents with    Neck Pain     Neck left side about a 4     Back Pain     Low back bilateral 4 as well      Pt presents for tx for exacerbation of chronic neck/back pain. Pt has had back and neck surgery. Pt was utilizing cane and now using walker occasionally. 2022 MRI demonstrates multilevel degenerative changes of postsurgical lumbar spine status post L1-L2 posterior spinal fixation with varying degrees of canal stenosis (moderate L4-L5) and foraminal narrowing (mild left L1-L2 and left L2-L3 ). 2024 fl/ext xrays unremarkable for instability. 2021 C/S MRI demonstrates s/p ACDF from C4 to C7. Multilevel degenerative spondylosis as described. Possible ossification of the posterior  longitudinal ligament at C2-3 and C6-7  8/28: pt reports suffered flare-up playing with [employer]      Neck Pain   This is a chronic problem. The current episode started more than 1 year ago. The problem occurs constantly. The problem has been waxing and waning. The pain is present in the left side, midline and right side. The quality of the pain is described as aching. The symptoms are aggravated by bending, position, stress and twisting. The pain is Worse during the day.   Back Pain  This is a chronic problem. The current episode started more than 1 year ago. The problem occurs constantly. The problem has been waxing and waning since onset. The pain is present in the gluteal, lumbar spine, sacro-iliac and thoracic spine. The quality of the pain is described as aching, burning and stabbing. The pain radiates to the left thigh, left knee and right thigh. Pain scale: 4-9/10. The pain is Worse during the day. The symptoms are aggravated by standing, twisting and bending (walking, laying supine, transitional mvmts; palliative includes sitting, massage). Pertinent negatives include no bladder incontinence or bowel incontinence. Risk factors include lack of exercise.     Past Medical History:   Diagnosis Date    ADD (attention deficit disorder)     Allergic rhinitis     ALS (amyotrophic lateral sclerosis) ([facility]) 11/22    Per neurological surgeon    Anemia 12/2021    Anxiety     Arthritis     Bipolar disorder ([facility])     Cervical disc disorder with radiculopathy of cervical region     Chronic depression     Chronic kidney disease     Stage 1    Chronic pain disorder     Cluster headache     Colitis     Last assessed - 11/25/14    Colon polyp     Concussion 2018    Condyloma acuminatum     Last assessed - 3/19/14    CTS (carpal tunnel syndrome) 2001    Depression     Diabetes mellitus ([facility])     Last assessed - 11/25/14    Diabetic nephropathy ([facility]) 2001    Diabetic neuropathy ([facility])     Diabetic retinopathy ([facility]) 2001     Difficulty walking     Fibromyalgia     Fibromyalgia, primary     GERD (gastroesophageal reflux disease)     Head injury 18    Fall    Headache(784.0) 1977    History of transfusion 2021    HTN (hypertension)     Last assessed - 14    Hyperlipidemia 2019    Hypertension     Intervertebral disc disorder with radiculopathy of lumbar region     Lupus (HCC)     Rhuematologist-Sheela Dasilva, PA    Migraine     Miscarriage 1988    ,     Obesity 1980    Obesity, unspecified     Open wound of back 2023    Opioid abuse, in remission (HCC) 2022    Only while in hospital and for about 10 days once home instead of one week.    Osteoporosis     Peripheral neuropathy     Postoperative wound infection 2021    Stop not healed    Preeclampsia     Last assessed - 14    Rheumatoid arthritis (HCC)     Wears dentures       Past Surgical History:   Procedure Laterality Date    APPENDECTOMY      Last assessed - 14    BARIATRIC SURGERY  2016    CARPAL TUNNEL RELEASE      CATARACT EXTRACTION      CERVICAL FUSION N/A 2019    Procedure: Anterior cervical discectomy w fusion C4-5, C5-6, C6-7; allograft and neuromonitoring;  Surgeon: Jose Gomez MD;  [facility];  Service: Orthopedics     SECTION      Last assessed - 14    COLONOSCOPY      COLONOSCOPY      HEMORRHOID SURGERY      HERNIA REPAIR      Last assessed - 14    LUMBAR FUSION N/A 2021    Procedure: L1/2 laminectomy, discectomy with L1/2 MIS posterior fixation using neuromonitoring and neuronavigation;  Surgeon: Suhas Akbar MD;  [facility];  Service: Neurosurgery    LUMBAR WOUND EXPLORATION Bilateral 2021    Procedure: Lumbar wound washout, debridement and intraoperative cultures;  Surgeon: Suhas Akbar MD;  [facility];  Service: Neurosurgery    MAMMO (HISTORICAL)      MO DEBRIDEMENT BONE 1ST 20 SQ CM/< N/A 2023    Procedure: SURGICAL  PREPARATION OF BACK  WOUND AND LOCAL FLAP, PREVENA PLACEMENT;  Surgeon: Ady Rocha MD;  Location:  MAIN OR;  Service: Plastics    WOOD-EN-Y PROCEDURE      ULNAR TUNNEL RELEASE      VAC DRESSING APPLICATION Bilateral 12/22/2021    Procedure: APPLICATION VAC DRESSING;  Surgeon: Elbert Hawley MD;  [facility];  Service: General    VAC DRESSING APPLICATION N/A 12/25/2021    Procedure: APPLICATION VAC DRESSING ABDOMEN/TRUNK;  Surgeon: Mani Ross DO;  [facility];  Service: General    VENTRAL HERNIA REPAIR       The following portions of the patient's history were reviewed and updated as appropriate: allergies, past family history, past medical history, past social history, past surgical history, and problem list.  Review of Systems   Gastrointestinal:  Negative for bowel incontinence.   Genitourinary:  Negative for bladder incontinence.   Musculoskeletal:  Positive for back pain and neck pain.     Physical Exam  Neck:        Comments: Pnful and limited in Brot, Blf  Musculoskeletal:      Cervical back: Pain with movement and muscular tenderness present. Decreased range of motion.      Thoracic back: Spasms and tenderness present. Decreased range of motion.      Lumbar back: Spasms and tenderness present. Decreased range of motion. Negative right straight leg raise test and negative left straight leg raise test.        Back:       Comments: Pnful and limited in Brot, Blf, Ext centralizes    Lymphadenopathy:      Cervical: No cervical adenopathy.   Skin:     General: Skin is warm and dry.   Neurological:      Mental Status: She is alert and oriented to person, place, and time.      Gait: Gait is intact.   Psychiatric:         Mood and Affect: Mood and affect normal.         Behavior: Behavior normal.       SOFT TISSUE ASSESSMENT Hypertonicity and tenderness palpated B upper traps, B SCM, B T10-S1 erector spinae, glute med/min, QL, hamstring JOINT RESTRICTIONS: C4/5, T1/2, T10-S1 and  R/L SIJ    Return in about 2 weeks (around 9/11/2024) for Next scheduled follow up.

## 2024-08-29 ENCOUNTER — OFFICE VISIT (OUTPATIENT)
Dept: INTERNAL MEDICINE CLINIC | Facility: CLINIC | Age: 49
End: 2024-08-29
Payer: COMMERCIAL

## 2024-08-29 VITALS
BODY MASS INDEX: 30.58 KG/M2 | HEART RATE: 95 BPM | HEIGHT: 61 IN | SYSTOLIC BLOOD PRESSURE: 130 MMHG | DIASTOLIC BLOOD PRESSURE: 84 MMHG | TEMPERATURE: 97.8 F | WEIGHT: 162 LBS | OXYGEN SATURATION: 98 %

## 2024-08-29 DIAGNOSIS — S06.0X9A CONCUSSION WITH MODERATE (1-24 HOURS) LOSS OF CONSCIOUSNESS: ICD-10-CM

## 2024-08-29 DIAGNOSIS — M79.7 FIBROMYALGIA: ICD-10-CM

## 2024-08-29 DIAGNOSIS — F34.1 DYSTHYMIC DISORDER: ICD-10-CM

## 2024-08-29 DIAGNOSIS — M50.10 CERVICAL DISC DISORDER WITH RADICULOPATHY: ICD-10-CM

## 2024-08-29 DIAGNOSIS — E11.9 INSULIN DEPENDENT TYPE 2 DIABETES MELLITUS (HCC): Primary | ICD-10-CM

## 2024-08-29 DIAGNOSIS — N31.9 NEUROGENIC BLADDER: ICD-10-CM

## 2024-08-29 DIAGNOSIS — E78.2 MIXED HYPERLIPIDEMIA: ICD-10-CM

## 2024-08-29 DIAGNOSIS — L08.9 SKIN INFECTION: ICD-10-CM

## 2024-08-29 DIAGNOSIS — F31.11 BIPOLAR AFFECTIVE DISORDER, CURRENTLY MANIC, MILD (HCC): ICD-10-CM

## 2024-08-29 DIAGNOSIS — Z79.4 INSULIN DEPENDENT TYPE 2 DIABETES MELLITUS (HCC): Primary | ICD-10-CM

## 2024-08-29 DIAGNOSIS — N18.32 STAGE 3B CHRONIC KIDNEY DISEASE (HCC): ICD-10-CM

## 2024-08-29 DIAGNOSIS — E55.9 VITAMIN D DEFICIENCY: ICD-10-CM

## 2024-08-29 DIAGNOSIS — M51.16 INTERVERTEBRAL DISC DISORDERS WITH RADICULOPATHY, LUMBAR REGION: ICD-10-CM

## 2024-08-29 DIAGNOSIS — E53.8 B12 DEFICIENCY: ICD-10-CM

## 2024-08-29 DIAGNOSIS — K21.9 GASTROESOPHAGEAL REFLUX DISEASE WITHOUT ESOPHAGITIS: ICD-10-CM

## 2024-08-29 PROCEDURE — 99214 OFFICE O/P EST MOD 30 MIN: CPT | Performed by: INTERNAL MEDICINE

## 2024-08-29 RX ORDER — MUPIROCIN 20 MG/G
OINTMENT TOPICAL 3 TIMES DAILY
Qty: 60 G | Refills: 0 | Status: SHIPPED | OUTPATIENT
Start: 2024-08-29

## 2024-08-29 RX ORDER — CLONAZEPAM 0.5 MG/1
0.5 TABLET ORAL 2 TIMES DAILY
Qty: 180 TABLET | Refills: 0 | Status: SHIPPED | OUTPATIENT
Start: 2024-08-29

## 2024-08-29 NOTE — PROGRESS NOTES
Assessment/Plan:             1. Insulin dependent type 2 diabetes mellitus (HCC)  Comments:  Continue same medication  2. Gastroesophageal reflux disease without esophagitis  3. Intervertebral disc disorders with radiculopathy, lumbar region  4. Concussion with moderate (1-24 hours) loss of consciousness  5. Cervical disc disorder with radiculopathy  6. Stage 3b chronic kidney disease (HCC)  -     Basic metabolic panel; Future  7. Bipolar affective disorder, currently manic, mild (Formerly [facility])  Comments:  Continue same medication  8. Fibromyalgia  9. Vitamin D deficiency  10. Mixed hyperlipidemia  Comments:  Continue same medication  11. Neurogenic bladder  12. Dysthymic disorder  -     clonazePAM (KlonoPIN) 0.5 mg tablet; Take 1 tablet (0.5 mg total) by mouth 2 (two) times a day  13. Skin infection  -     mupirocin (BACTROBAN) 2 % ointment; Apply topically 3 (three) times a day  14. B12 deficiency  Comments:  Advised her to hold the B12 injection for now         Subjective:      Patient ID: Kinza Meza is a 48 y.o. female.    Follow-up on blood and urine test done on 8/5/2024 test discussed with her        The following portions of the patient's history were reviewed and updated as appropriate: She  has a past medical history of ADD (attention deficit disorder), Allergic rhinitis, ALS (amyotrophic lateral sclerosis) (Formerly [facility]) (11/22), Anemia (12/2021), Anxiety, Arthritis, Bipolar disorder ([facility]), Cervical disc disorder with radiculopathy of cervical region, Chronic depression, Chronic kidney disease, Chronic pain disorder, Cluster headache, Colitis, Colon polyp, Concussion (2018), Condyloma acuminatum, CTS (carpal tunnel syndrome) (2001), Depression, Diabetes mellitus ([facility]), Diabetic nephropathy ([facility]) (2001), Diabetic neuropathy ([facility]), Diabetic retinopathy ([facility]) (2001), Difficulty walking (07/22), Fibromyalgia, Fibromyalgia, primary, GERD (gastroesophageal reflux disease), Head injury (08/13/18), Headache(784.0) (1977),  History of transfusion (12/2021), HTN (hypertension), Hyperlipidemia (07/22/2019), Hypertension, Intervertebral disc disorder with radiculopathy of lumbar region, Lupus ([facility]) (2003), Migraine, Miscarriage (1988), Obesity (1980), Obesity, unspecified, Open wound of back (05/30/2023), Opioid abuse, in remission ([facility]) (01/2022), Osteoporosis, Peripheral neuropathy, Postoperative wound infection (12/2021), Preeclampsia, Rheumatoid arthritis ([facility]), and Wears dentures.  She   Patient Active Problem List    Diagnosis Date Noted    Cellulitis of right toe 07/01/2024    Cellulitis of right shoulder 03/28/2024    Benign hypertension with chronic kidney disease, stage III ([facility]) 01/31/2024    Abnormal blood electrolyte level 10/30/2023    Thrombocytopenia ([facility]) 10/30/2023    Chest pain 10/29/2023    Metabolic acidosis 09/27/2023    Hyperphosphatemia 09/27/2023    Neurogenic bladder 09/26/2023    Bilateral carpal tunnel syndrome     IIH (idiopathic intracranial hypertension) 08/15/2023    Open wound of back 05/30/2023    Benign hypertension with CKD (chronic kidney disease) stage IV ([facility]) 02/14/2023    Maceration of skin 02/14/2023    Cortical age-related cataract of left eye 01/04/2023    Bipolar affective disorder, currently manic, mild ([facility]) 03/30/2022    Seasonal allergic rhinitis 03/30/2022    Surgical wound, non healing 02/08/2022    Diabetic polyneuropathy associated with diabetes mellitus due to underlying condition ([facility]) 02/08/2022    Continuous opioid dependence ([facility]) 01/07/2022    Anemia 12/24/2021    Wound infection 12/09/2021    Cauda equina syndrome ([facility]) 12/08/2021    Depression 10/28/2021    Annual physical exam 08/27/2021    Other gastritis without bleeding 08/11/2021    Obesity, unspecified     Diabetes mellitus ([facility])     HTN (hypertension)     Dizziness 12/07/2020    Secondary hyperparathyroidism of renal origin ([facility]) 11/16/2020    Insulin dependent type 2 diabetes mellitus ([facility])     Intractable migraine with  status migrainosus     Type 2 diabetes mellitus with diabetic neuropathy ([facility]) 09/11/2020    Ventral hernia without obstruction or gangrene 09/11/2020    Lumbar disc herniation with radiculopathy 09/11/2020    Concussion with moderate (1-24 hours) loss of consciousness 09/11/2020    Primary osteoarthritis of both knees 09/11/2020    Diabetic nephropathy associated with type 2 diabetes mellitus ([facility]) 09/11/2020    Dysthymic disorder 09/11/2020    Acute on chronic kidney failure  ([facility]) 07/13/2020    Hypokalemia 07/13/2020    Near syncope 07/13/2020    Weakness 07/13/2020    Status post gastric bypass for obesity 07/13/2020    Diarrhea 07/13/2020    Snores     Headache upon awakening     Chronic tension type headache 12/16/2019    Chronic migraine w/o aura w/o status migrainosus, not intractable 12/16/2019    Medical marijuana use 12/16/2019    Vitamin D deficiency 08/23/2019    Chronic kidney disease-mineral and bone disorder 08/20/2019    S/P cervical spinal fusion 07/25/2019    Mixed hyperlipidemia 07/22/2019    Cervical disc disorder with radiculopathy     Stage 3b chronic kidney disease ([facility]) 05/07/2019    Persistent proteinuria 05/07/2019    Hypertensive kidney disease with stage 3b chronic kidney disease ([facility]) 05/07/2019    Hyponatremia 05/07/2019    Intervertebral disc disorders with radiculopathy, lumbar region     Sacroiliitis ([facility])     Greater trochanteric bursitis of both hips     Herniated nucleus pulposus, L1-2 12/12/2017    Bilateral chronic knee pain 12/01/2017    Facet arthritis of lumbosacral region 12/01/2017    Fibromyalgia 12/01/2017    Lumbar stenosis with neurogenic claudication 12/01/2017    ADHD 08/22/2016    Uncontrolled type 2 diabetes mellitus with hyperglycemia ([facility]) 08/22/2016    Gastroesophageal reflux disease without esophagitis 08/22/2016    Morbid obesity with BMI of 40.0-44.9, adult ([facility]) 08/22/2016    Chronic renal insufficiency 11/25/2014    Endometriosis 05/16/2012     She  has a  past surgical history that includes Appendectomy;  section; Hernia repair; Cervical fusion (N/A, 2019); Colonoscopy; Carpal tunnel release; Hemorrhoid surgery; Ventral hernia repair; Shelby-en-y procedure; Ulnar tunnel release; Mammo (historical) (); Colonoscopy; Lumbar fusion (N/A, 2021); LUMBAR WOUND EXPLORATION (Bilateral, 2021); VAC DRESSING APPLICATION (Bilateral, 2021); VAC DRESSING APPLICATION (N/A, 2021); Bariatric Surgery (2016); Cataract extraction; and pr debridement bone 1st 20 sq cm/< (N/A, 2023).  Her family history includes ADD / ADHD in her cousin and cousin; Anemia in her maternal grandmother; Anxiety disorder in her father; Cancer in her mother; Cervical cancer in her mother; Colon cancer in her maternal grandmother; Completed Suicide  in her father; Depression in her father; Diabetes in her mother; Hypertension in her mother; Kidney disease in her mother; Mental illness in her daughter, daughter, and father; Neuropathy in her mother; No Known Problems in her family, maternal aunt, maternal grandfather, paternal grandfather, and sister; Ovarian cancer in her paternal aunt; Psychiatric Illness in her father; Suicidality in her father; Suicide Attempts in her father; Uterine cancer in her paternal grandmother.  She  reports that she has never smoked. She has never been exposed to tobacco smoke. She has never used smokeless tobacco. She reports that she does not currently use alcohol. She reports current drug use. Drug: Marijuana.  Current Outpatient Medications   Medication Sig Dispense Refill    acetaZOLAMIDE (DIAMOX) 250 mg tablet TAKE 1 TABLET(250 MG) BY MOUTH TWICE DAILY 180 tablet 1    Ajovy 225 MG/1.5ML auto-injector INJECT 4.5 ML(675 MG TOTAL) UNDER THE SKIN EVERY 3 MONTHS 4.5 mL 4    atorvastatin (LIPITOR) 10 mg tablet Take 1 tablet (10 mg total) by mouth daily at bedtime 90 tablet 0    BD Pen Needle Glory U/F 32G X 4 MM MISC Inject under the  skin 2 (two) times a day Use as directed 180 each 0    Blood Glucose Monitoring Suppl (ONE TOUCH ULTRA 2) w/Device KIT Use 1 each 2 (two) times a day Use as instructed 1 kit 0    calcitriol (ROCALTROL) 0.25 mcg capsule TAKE 1 CAPSULE BY MOUTH daily 30 capsule 5    Cholecalciferol (Vitamin D3) 50 MCG (2000 UT) CAPS TAKE 1 CAPSULE(2,000 UNITS TOTAL) BY MOUTH DAILY 90 capsule 1    clonazePAM (KlonoPIN) 0.5 mg tablet Take 1 tablet (0.5 mg total) by mouth 2 (two) times a day 180 tablet 0    Continuous Blood Gluc Sensor (FreeStyle Filiberto 3 Sensor) MISC Use 1 each every 14 (fourteen) days 6 each 1    cyanocobalamin 1,000 mcg/mL Inject 1 mL (1,000 mcg total) into a muscle once a week 4 mL 3    cycloSPORINE, PF, (Cequa) 0.09 % SOLN Apply 1 drop to eye 2 (two) times a day      divalproex sodium (Depakote) 500 mg DR tablet Take 1 tablet (500 mg total) by mouth daily at bedtime 90 tablet 3    docusate sodium (COLACE) 100 mg capsule TAKE 1 CAPSULE BY MOUTH TWICE A DAY 60 capsule 0    DULoxetine (CYMBALTA) 60 mg delayed release capsule Take 1 capsule (60 mg total) by mouth daily 90 capsule 1    ferrous sulfate 324 (65 Fe) mg Take 1 tablet (324 mg total) by mouth daily before breakfast 90 tablet 3    insulin aspart (NovoLOG FlexPen) 100 UNIT/ML injection pen Continue with sliding scale insulin regimen      Insulin Glargine Solostar (Lantus SoloStar) 100 UNIT/ML SOPN Inject 0.15 mL (15 Units total) under the skin daily at bedtime 15 mL 0    Lancets MISC Use 1 each 2 (two) times a day Use as instructed      levonorgestrel (MIRENA) 20 MCG/24HR IUD Mirena 20 mcg/24 hr (5 years) intrauterine device   Vaginally for 5 years.      lisdexamfetamine (VYVANSE) 30 MG capsule Take 1 capsule (30 mg total) by mouth every morning Max Daily Amount: 30 mg 30 capsule 0    losartan-hydrochlorothiazide (HYZAAR) 100-12.5 MG per tablet TAKE ONE TABLET BY MOUTH ONE TIME DAILY 90 tablet 0    methocarbamol (Robaxin-750) 750 mg tablet Take 1 tablet (750 mg  "total) by mouth every 6 (six) hours as needed for muscle spasms (back pain) 30 tablet 3    metoprolol tartrate (LOPRESSOR) 100 mg tablet Take 1 tablet (100 mg total) by mouth every 12 (twelve) hours 180 tablet 0    Multiple Vitamins-Minerals (multivitamin with minerals) tablet Take 1 tablet by mouth daily      mupirocin (BACTROBAN) 2 % ointment Apply topically 3 (three) times a day 60 g 0    NON FORMULARY Botox injections for HA every 3months (LD 3/20/23)      ondansetron (ZOFRAN) 4 mg tablet Take 1 tablet (4 mg total) by mouth every 8 (eight) hours as needed for nausea or vomiting 120 tablet 0    Ophthalmic Irrigation Solution (OCUSOFT EYE WASH OP) Apply 1 Application to eye daily at bedtime      Polyethyl Glycol-Propyl Glycol (Systane) 0.4-0.3 % GEL Apply 1 drop to eye 2 (two) times a day Using Ivizia as an alternative      polyethylene glycol (MIRALAX) 17 g packet Take 17 g by mouth daily 1 each 0    pregabalin (LYRICA) 100 mg capsule Take 1 capsule (100 mg total) by mouth 3 (three) times a day 270 capsule 0    rimegepant sulfate (Nurtec) 75 mg TBDP Place one tab (75mg total) under the tongue as needed for migraine. 16 tablet 11    sodium bicarbonate 650 mg tablet Take 1 tablet (650 mg total) by mouth 3 (three) times a day (Patient taking differently: Take 650 mg by mouth 2 (two) times a day) 360 tablet 3    Syringe/Needle, Disp, (SYRINGE 3CC/61TC5-6/2\") 25G X 1-1/2\" 3 ML MISC Use once a week 4 each 3    tirzepatide (Mounjaro) 15 MG/0.5ML Inject 0.5 mL (15 mg total) under the skin every 7 days 6 mL 0    cetirizine (ZyrTEC) oral solution Take 5 mL (5 mg total) by mouth daily (Patient not taking: Reported on 8/29/2024) 450 mL 1    cyproheptadine (PERIACTIN) 4 mg tablet Take 1 tablet (4 mg total) by mouth daily at bedtime (Patient not taking: Reported on 11/3/2023) 90 tablet 1    diphenhydrAMINE (BENADRYL) 25 mg tablet Take 1 tablet (25 mg total) by mouth every 6 (six) hours as needed for itching (Patient not " taking: Reported on 11/3/2023) 30 tablet 0     Current Facility-Administered Medications   Medication Dose Route Frequency Provider Last Rate Last Admin    cyanocobalamin injection 1,000 mcg  1,000 mcg Intramuscular Weekly Myla Norwood MD   1,000 mcg at 07/01/24 1007     Current Outpatient Medications on File Prior to Visit   Medication Sig    acetaZOLAMIDE (DIAMOX) 250 mg tablet TAKE 1 TABLET(250 MG) BY MOUTH TWICE DAILY    Ajovy 225 MG/1.5ML auto-injector INJECT 4.5 ML(675 MG TOTAL) UNDER THE SKIN EVERY 3 MONTHS    atorvastatin (LIPITOR) 10 mg tablet Take 1 tablet (10 mg total) by mouth daily at bedtime    BD Pen Needle Glory U/F 32G X 4 MM MISC Inject under the skin 2 (two) times a day Use as directed    Blood Glucose Monitoring Suppl (ONE TOUCH ULTRA 2) w/Device KIT Use 1 each 2 (two) times a day Use as instructed    calcitriol (ROCALTROL) 0.25 mcg capsule TAKE 1 CAPSULE BY MOUTH daily    Cholecalciferol (Vitamin D3) 50 MCG (2000 UT) CAPS TAKE 1 CAPSULE(2,000 UNITS TOTAL) BY MOUTH DAILY    Continuous Blood Gluc Sensor (FreeStyle Filiberto 3 Sensor) MISC Use 1 each every 14 (fourteen) days    cyanocobalamin 1,000 mcg/mL Inject 1 mL (1,000 mcg total) into a muscle once a week    cycloSPORINE, PF, (Cequa) 0.09 % SOLN Apply 1 drop to eye 2 (two) times a day    divalproex sodium (Depakote) 500 mg DR tablet Take 1 tablet (500 mg total) by mouth daily at bedtime    docusate sodium (COLACE) 100 mg capsule TAKE 1 CAPSULE BY MOUTH TWICE A DAY    DULoxetine (CYMBALTA) 60 mg delayed release capsule Take 1 capsule (60 mg total) by mouth daily    ferrous sulfate 324 (65 Fe) mg Take 1 tablet (324 mg total) by mouth daily before breakfast    insulin aspart (NovoLOG FlexPen) 100 UNIT/ML injection pen Continue with sliding scale insulin regimen    Insulin Glargine Solostar (Lantus SoloStar) 100 UNIT/ML SOPN Inject 0.15 mL (15 Units total) under the skin daily at bedtime    Lancets MISC Use 1 each 2 (two) times a day Use as instructed  "   levonorgestrel (MIRENA) 20 MCG/24HR IUD Mirena 20 mcg/24 hr (5 years) intrauterine device   Vaginally for 5 years.    lisdexamfetamine (VYVANSE) 30 MG capsule Take 1 capsule (30 mg total) by mouth every morning Max Daily Amount: 30 mg    losartan-hydrochlorothiazide (HYZAAR) 100-12.5 MG per tablet TAKE ONE TABLET BY MOUTH ONE TIME DAILY    methocarbamol (Robaxin-750) 750 mg tablet Take 1 tablet (750 mg total) by mouth every 6 (six) hours as needed for muscle spasms (back pain)    metoprolol tartrate (LOPRESSOR) 100 mg tablet Take 1 tablet (100 mg total) by mouth every 12 (twelve) hours    Multiple Vitamins-Minerals (multivitamin with minerals) tablet Take 1 tablet by mouth daily    NON FORMULARY Botox injections for HA every 3months (LD 3/20/23)    ondansetron (ZOFRAN) 4 mg tablet Take 1 tablet (4 mg total) by mouth every 8 (eight) hours as needed for nausea or vomiting    Ophthalmic Irrigation Solution (OCUSOFT EYE WASH OP) Apply 1 Application to eye daily at bedtime    Polyethyl Glycol-Propyl Glycol (Systane) 0.4-0.3 % GEL Apply 1 drop to eye 2 (two) times a day Using Ivizia as an alternative    polyethylene glycol (MIRALAX) 17 g packet Take 17 g by mouth daily    pregabalin (LYRICA) 100 mg capsule Take 1 capsule (100 mg total) by mouth 3 (three) times a day    rimegepant sulfate (Nurtec) 75 mg TBDP Place one tab (75mg total) under the tongue as needed for migraine.    sodium bicarbonate 650 mg tablet Take 1 tablet (650 mg total) by mouth 3 (three) times a day (Patient taking differently: Take 650 mg by mouth 2 (two) times a day)    Syringe/Needle, Disp, (SYRINGE 3CC/87DP4-8/2\") 25G X 1-1/2\" 3 ML MISC Use once a week    tirzepatide (Mounjaro) 15 MG/0.5ML Inject 0.5 mL (15 mg total) under the skin every 7 days    [DISCONTINUED] clonazePAM (KlonoPIN) 0.5 mg tablet Take 1 tablet (0.5 mg total) by mouth 2 (two) times a day    cetirizine (ZyrTEC) oral solution Take 5 mL (5 mg total) by mouth daily (Patient not " "taking: Reported on 8/29/2024)    cyproheptadine (PERIACTIN) 4 mg tablet Take 1 tablet (4 mg total) by mouth daily at bedtime (Patient not taking: Reported on 11/3/2023)    diphenhydrAMINE (BENADRYL) 25 mg tablet Take 1 tablet (25 mg total) by mouth every 6 (six) hours as needed for itching (Patient not taking: Reported on 11/3/2023)     Current Facility-Administered Medications on File Prior to Visit   Medication    cyanocobalamin injection 1,000 mcg     She is allergic to ace inhibitors, pollen extract, short ragweed pollen ext, sodium hyaluronate (yoav), levonorgestrel-eth estradiol [levonorgestrel-ethinyl estrad], and latex..    Review of Systems   Constitutional:  Negative for chills and fever.   HENT:  Negative for congestion, ear pain and sore throat.    Eyes:  Negative for pain.   Respiratory:  Positive for shortness of breath. Negative for cough.    Cardiovascular:  Negative for chest pain and leg swelling.   Gastrointestinal:  Negative for abdominal pain, nausea and vomiting.   Endocrine: Negative for polyuria.   Genitourinary:  Negative for difficulty urinating, frequency and urgency.   Musculoskeletal:  Positive for arthralgias and back pain.   Skin:  Negative for rash.   Neurological:  Negative for weakness and headaches.   Psychiatric/Behavioral:  Negative for sleep disturbance. The patient is not nervous/anxious.          Objective:      /84 (BP Location: Left arm, Patient Position: Sitting, Cuff Size: Standard)   Pulse 95   Temp 97.8 °F (36.6 °C) (Temporal)   Ht 5' 1\" (1.549 m)   Wt 73.5 kg (162 lb)   SpO2 98%   BMI 30.61 kg/m²     Recent Results (from the past 1344 hour(s))   Basic metabolic panel    Collection Time: 07/26/24  2:30 PM   Result Value Ref Range    Sodium 142 135 - 147 mmol/L    Potassium 3.4 (L) 3.5 - 5.3 mmol/L    Chloride 104 96 - 108 mmol/L    CO2 18 (L) 21 - 32 mmol/L    ANION GAP 20 (H) 4 - 13 mmol/L    BUN 26 (H) 5 - 25 mg/dL    Creatinine 1.89 (H) 0.60 - 1.30 mg/dL "    Glucose 129 65 - 140 mg/dL    Calcium 9.4 8.4 - 10.2 mg/dL    eGFR 30 ml/min/1.73sq m   Beta Hydroxybutyrate    Collection Time: 07/26/24  2:30 PM   Result Value Ref Range    Beta- Hydroxybutyrate 4.84 (H) 0.02 - 0.27 mmol/L   POCT urine dip auto non-scope    Collection Time: 07/26/24  3:13 PM   Result Value Ref Range     COLOR,UA yellow     CLARITY,UA dark     SPECIFIC GRAVITY,UA 1.030      PH,UA neg     LEUKOCYTE ESTERASE,UA 15     NITRITE,UA neg     GLUCOSE, UA neg     KETONES,UA 2+     BILIRUBIN,UA 2+     POCT URINE PROTEIN 1+     SL AMB POCT UROBILINOGEN neg    Fingerstick Glucose (POCT)    Collection Time: 07/26/24  5:49 PM   Result Value Ref Range    POC Glucose 113 65 - 140 mg/dl   CBC and differential    Collection Time: 07/26/24  8:01 PM   Result Value Ref Range    WBC 6.91 4.31 - 10.16 Thousand/uL    RBC 5.10 3.81 - 5.12 Million/uL    Hemoglobin 14.3 11.5 - 15.4 g/dL    Hematocrit 43.6 34.8 - 46.1 %    MCV 86 82 - 98 fL    MCH 28.0 26.8 - 34.3 pg    MCHC 32.8 31.4 - 37.4 g/dL    RDW 14.4 11.6 - 15.1 %    MPV 11.1 8.9 - 12.7 fL    Platelets 204 149 - 390 Thousands/uL    nRBC 0 /100 WBCs    Segmented % 76 (H) 43 - 75 %    Immature Grans % 1 0 - 2 %    Lymphocytes % 17 14 - 44 %    Monocytes % 5 4 - 12 %    Eosinophils Relative 0 0 - 6 %    Basophils Relative 1 0 - 1 %    Absolute Neutrophils 5.25 1.85 - 7.62 Thousands/µL    Absolute Immature Grans 0.06 0.00 - 0.20 Thousand/uL    Absolute Lymphocytes 1.18 0.60 - 4.47 Thousands/µL    Absolute Monocytes 0.35 0.17 - 1.22 Thousand/µL    Eosinophils Absolute 0.01 0.00 - 0.61 Thousand/µL    Basophils Absolute 0.06 0.00 - 0.10 Thousands/µL   Comprehensive metabolic panel    Collection Time: 07/26/24  8:01 PM   Result Value Ref Range    Sodium 143 135 - 147 mmol/L    Potassium 3.9 3.5 - 5.3 mmol/L    Chloride 105 96 - 108 mmol/L    CO2 20 (L) 21 - 32 mmol/L    ANION GAP 18 (H) 4 - 13 mmol/L    BUN 27 (H) 5 - 25 mg/dL    Creatinine 1.91 (H) 0.60 - 1.30 mg/dL     Glucose 118 65 - 140 mg/dL    Calcium 9.4 8.4 - 10.2 mg/dL    AST 10 (L) 13 - 39 U/L    ALT 6 (L) 7 - 52 U/L    Alkaline Phosphatase 68 34 - 104 U/L    Total Protein 7.2 6.4 - 8.4 g/dL    Albumin 4.4 3.5 - 5.0 g/dL    Total Bilirubin 0.82 0.20 - 1.00 mg/dL    eGFR 30 ml/min/1.73sq m   Beta Hydroxybutyrate    Collection Time: 07/26/24  8:01 PM   Result Value Ref Range    Beta- Hydroxybutyrate 3.45 (H) 0.02 - 0.27 mmol/L   Blood gas, venous    Collection Time: 07/26/24  8:01 PM   Result Value Ref Range    pH, Berry 7.284 (L) 7.300 - 7.400    pCO2, Berry 42.2 42.0 - 50.0 mm Hg    pO2, Berry 20.0 (L) 35.0 - 45.0 mm Hg    HCO3, Berry 19.6 (L) 24 - 30 mmol/L    Base Excess, Berry -6.8 mmol/L    O2 Content, Berry 7.2 ml/dL    O2 HGB, VENOUS 34.5 (L) 60.0 - 80.0 %   Magnesium    Collection Time: 07/26/24  8:01 PM   Result Value Ref Range    Magnesium 1.8 (L) 1.9 - 2.7 mg/dL   Phosphorus    Collection Time: 07/26/24  8:01 PM   Result Value Ref Range    Phosphorus 4.0 2.7 - 4.5 mg/dL   Lactic acid, plasma (w/reflex if result > 2.0)    Collection Time: 07/26/24  8:01 PM   Result Value Ref Range    LACTIC ACID 1.0 0.5 - 2.0 mmol/L   ECG 12 lead    Collection Time: 07/26/24  8:12 PM   Result Value Ref Range    Ventricular Rate 92 BPM    Atrial Rate 92 BPM    PA Interval 152 ms    QRSD Interval 86 ms    QT Interval 352 ms    QTC Interval 435 ms    P Axis 28 degrees    QRS Axis -20 degrees    T Wave Axis 19 degrees   Fingerstick Glucose (POCT)    Collection Time: 07/26/24 11:13 PM   Result Value Ref Range    POC Glucose 86 65 - 140 mg/dl   Basic metabolic panel    Collection Time: 07/26/24 11:15 PM   Result Value Ref Range    Sodium 141 135 - 147 mmol/L    Potassium 3.3 (L) 3.5 - 5.3 mmol/L    Chloride 105 96 - 108 mmol/L    CO2 19 (L) 21 - 32 mmol/L    ANION GAP 17 (H) 4 - 13 mmol/L    BUN 24 5 - 25 mg/dL    Creatinine 1.85 (H) 0.60 - 1.30 mg/dL    Glucose 81 65 - 140 mg/dL    Glucose, Fasting 81 65 - 99 mg/dL    Calcium 8.8 8.4 - 10.2 mg/dL     eGFR 31 ml/min/1.73sq m   Lipase    Collection Time: 07/26/24 11:15 PM   Result Value Ref Range    Lipase 30 11 - 82 u/L   Glutamic acid decarboxylase    Collection Time: 07/27/24 12:21 AM   Result Value Ref Range    Glutaminc Acid Decarboxylase 65 Ab <5.0 0.0 - 5.0 U/mL   Basic metabolic panel    Collection Time: 07/27/24 12:21 AM   Result Value Ref Range    Sodium 141 135 - 147 mmol/L    Potassium 3.5 3.5 - 5.3 mmol/L    Chloride 104 96 - 108 mmol/L    CO2 18 (L) 21 - 32 mmol/L    ANION GAP 19 (H) 4 - 13 mmol/L    BUN 25 5 - 25 mg/dL    Creatinine 1.92 (H) 0.60 - 1.30 mg/dL    Glucose 97 65 - 140 mg/dL    Calcium 9.0 8.4 - 10.2 mg/dL    eGFR 29 ml/min/1.73sq m   Magnesium    Collection Time: 07/27/24 12:21 AM   Result Value Ref Range    Magnesium 1.7 (L) 1.9 - 2.7 mg/dL   Urine Macroscopic, POC    Collection Time: 07/27/24 12:25 AM   Result Value Ref Range    Color, UA Jyotsna     Clarity, UA Clear     pH, UA 5.5 4.5 - 8.0    Leukocytes, UA Small (A) Negative    Nitrite, UA Negative Negative    Protein, UA Trace (A) Negative mg/dl    Glucose, UA Negative Negative mg/dl    Ketones, UA 80 (3+) (A) Negative mg/dl    Urobilinogen, UA 2.0 (A) 0.2, 1.0 E.U./dl E.U./dl    Bilirubin, UA Large (A) Negative    Occult Blood, UA Negative Negative    Specific Gravity, UA 1.020 1.003 - 1.030   Urine Microscopic    Collection Time: 07/27/24 12:25 AM   Result Value Ref Range    RBC, UA None Seen None Seen, 1-2 /hpf    WBC, UA 4-10 (A) None Seen, 1-2 /hpf    Epithelial Cells Moderate (A) None Seen, Occasional /hpf    Bacteria, UA None Seen None Seen, Occasional /hpf    MUCUS THREADS Occasional (A) None Seen    Hyaline Casts, UA 5-10 (A) None Seen /lpf   Rapid drug screen, urine    Collection Time: 07/27/24 12:25 AM   Result Value Ref Range    Amph/Meth UR Positive (A) Negative    Barbiturate Ur Negative Negative    Benzodiazepine Urine Negative Negative    Cocaine Urine Negative Negative    Methadone Urine Negative Negative     "Opiate Urine Negative Negative    PCP Ur Negative Negative    THC Urine Positive (A) Negative    Oxycodone Urine Negative Negative    Fentanyl Urine Negative Negative    HYDROCODONE URINE Negative Negative   Fingerstick Glucose (POCT)    Collection Time: 07/27/24 12:26 AM   Result Value Ref Range    POC Glucose 109 65 - 140 mg/dl   Fingerstick Glucose (POCT)    Collection Time: 07/27/24  1:19 AM   Result Value Ref Range    POC Glucose 140 65 - 140 mg/dl   Fingerstick Glucose (POCT)    Collection Time: 07/27/24  1:53 AM   Result Value Ref Range    POC Glucose 129 65 - 140 mg/dl   Basic metabolic panel    Collection Time: 07/27/24  2:08 AM   Result Value Ref Range    Sodium 139 135 - 147 mmol/L    Potassium 4.8 3.5 - 5.3 mmol/L    Chloride 106 96 - 108 mmol/L    CO2 19 (L) 21 - 32 mmol/L    ANION GAP 14 (H) 4 - 13 mmol/L    BUN 25 5 - 25 mg/dL    Creatinine 2.06 (H) 0.60 - 1.30 mg/dL    Glucose 138 65 - 140 mg/dL    Calcium 8.3 (L) 8.4 - 10.2 mg/dL    eGFR 27 ml/min/1.73sq m   Fingerstick Glucose (POCT)    Collection Time: 07/27/24  2:56 AM   Result Value Ref Range    POC Glucose 170 (H) 65 - 140 mg/dl   Osmolality-\"If this is regarding a toxic alcohol, STOP. Test is not routinely indicated. Please consult medical  on call for further guidance.\"    Collection Time: 07/27/24  3:46 AM   Result Value Ref Range    Osmolality Serum 349 (H) 282 - 298 mmol/KG   Blood gas, venous    Collection Time: 07/27/24  3:46 AM   Result Value Ref Range    pH, Berry 7.238 (L) 7.300 - 7.400    pCO2, Berry 22.2 (L) 42.0 - 50.0 mm Hg    pO2, Berry 27.7 (L) 35.0 - 45.0 mm Hg    HCO3, Berry 9.3 (L) 24 - 30 mmol/L    Base Excess, Berry -16.7 mmol/L    O2 Content, Berry 4.0 ml/dL    O2 HGB, VENOUS 53.5 (L) 60.0 - 80.0 %    ANNY TEST No    Fingerstick Glucose (POCT)    Collection Time: 07/27/24  3:50 AM   Result Value Ref Range    POC Glucose 158 (H) 65 - 140 mg/dl   Basic metabolic panel    Collection Time: 07/27/24  4:32 AM   Result Value Ref " Range    Sodium 140 135 - 147 mmol/L    Potassium 3.7 3.5 - 5.3 mmol/L    Chloride 109 (H) 96 - 108 mmol/L    CO2 21 21 - 32 mmol/L    ANION GAP 10 4 - 13 mmol/L    BUN 23 5 - 25 mg/dL    Creatinine 1.96 (H) 0.60 - 1.30 mg/dL    Glucose 202 (H) 65 - 140 mg/dL    Calcium 7.7 (L) 8.4 - 10.2 mg/dL    eGFR 29 ml/min/1.73sq m   TSH, 3rd generation with Free T4 reflex    Collection Time: 07/27/24  4:32 AM   Result Value Ref Range    TSH 3RD GENERATON 0.472 0.450 - 4.500 uIU/mL   Fingerstick Glucose (POCT)    Collection Time: 07/27/24  4:51 AM   Result Value Ref Range    POC Glucose 203 (H) 65 - 140 mg/dl   Fingerstick Glucose (POCT)    Collection Time: 07/27/24  5:56 AM   Result Value Ref Range    POC Glucose 276 (H) 65 - 140 mg/dl   CBC and Platelet    Collection Time: 07/27/24  6:17 AM   Result Value Ref Range    WBC 4.00 (L) 4.31 - 10.16 Thousand/uL    RBC 3.67 (L) 3.81 - 5.12 Million/uL    Hemoglobin 10.4 (L) 11.5 - 15.4 g/dL    Hematocrit 32.0 (L) 34.8 - 46.1 %    MCV 87 82 - 98 fL    MCH 28.3 26.8 - 34.3 pg    MCHC 32.5 31.4 - 37.4 g/dL    RDW 15.1 11.6 - 15.1 %    Platelets 162 149 - 390 Thousands/uL    MPV 12.6 8.9 - 12.7 fL   Hemoglobin A1c w/EAG Estimation (Prechecked if no A1C within 90 days)    Collection Time: 07/27/24  6:17 AM   Result Value Ref Range    Hemoglobin A1C 5.1 Normal 4.0-5.6%; PreDiabetic 5.7-6.4%; Diabetic >=6.5%; Glycemic control for adults with diabetes <7.0% %     mg/dl   Blood gas, venous    Collection Time: 07/27/24  6:42 AM   Result Value Ref Range    pH, Berry 7.260 (L) 7.300 - 7.400    pCO2, Berry 45.4 42.0 - 50.0 mm Hg    pO2, Berry 26.0 (L) 35.0 - 45.0 mm Hg    HCO3, Berry 19.9 (L) 24 - 30 mmol/L    Base Excess, Berry -6.9 mmol/L    O2 Content, Berry 7.1 ml/dL    O2 HGB, VENOUS 46.9 (L) 60.0 - 80.0 %    ANNY TEST No    Fingerstick Glucose (POCT)    Collection Time: 07/27/24  6:58 AM   Result Value Ref Range    POC Glucose 177 (H) 65 - 140 mg/dl   Calcium, ionized    Collection Time:  07/27/24  7:29 AM   Result Value Ref Range    Calcium, Ionized     Calcium, ionized    Collection Time: 07/27/24  7:51 AM   Result Value Ref Range    Calcium, Ionized 1.15 1.12 - 1.32 mmol/L   Fingerstick Glucose (POCT)    Collection Time: 07/27/24  7:57 AM   Result Value Ref Range    POC Glucose 140 65 - 140 mg/dl   Basic metabolic panel    Collection Time: 07/27/24  8:28 AM   Result Value Ref Range    Sodium 141 135 - 147 mmol/L    Potassium 3.7 3.5 - 5.3 mmol/L    Chloride 114 (H) 96 - 108 mmol/L    CO2 20 (L) 21 - 32 mmol/L    ANION GAP 7 4 - 13 mmol/L    BUN 21 5 - 25 mg/dL    Creatinine 1.88 (H) 0.60 - 1.30 mg/dL    Glucose 125 65 - 140 mg/dL    Calcium 8.0 (L) 8.4 - 10.2 mg/dL    eGFR 30 ml/min/1.73sq m   Magnesium    Collection Time: 07/27/24  8:28 AM   Result Value Ref Range    Magnesium 2.6 1.9 - 2.7 mg/dL   Phosphorus    Collection Time: 07/27/24  8:28 AM   Result Value Ref Range    Phosphorus 1.7 (L) 2.7 - 4.5 mg/dL   Fingerstick Glucose (POCT)    Collection Time: 07/27/24 10:49 AM   Result Value Ref Range    POC Glucose 64 (L) 65 - 140 mg/dl   Fingerstick Glucose (POCT)    Collection Time: 07/27/24 10:57 AM   Result Value Ref Range    POC Glucose 163 (H) 65 - 140 mg/dl   POCT Blood Gas (CG8+)    Collection Time: 07/27/24 10:59 AM   Result Value Ref Range    pH, Art i-STAT 7.355 7.350 - 7.450    pCO2, Art i-STAT 29.2 (L) 36.0 - 44.0 mm HG    pO2, ART i-STAT 91.0 75.0 - 129.0 mm HG    BE, i-STAT -8 (L) -2 - 3 mmol/L    HCO3, Art i-STAT 16.3 (LL) 22.0 - 28.0 mmol/L    CO2, i-STAT 17 (L) 21 - 32 mmol/L    O2 Sat, i-STAT 97 (H) 60 - 85 %    SODIUM, I-STAT 140 136 - 145 mmol/l    Potassium, i-STAT 4.1 3.5 - 5.3 mmol/L    Calcium, Ionized i-STAT 1.18 1.12 - 1.32 mmol/L    Hct, i-STAT 23 (L) 34.8 - 46.1 %    Hgb, i-STAT 7.8 (L) 11.5 - 15.4 g/dl    Glucose, i-STAT 271 (H) 65 - 140 mg/dl    POC FIO2 21 L    Specimen Type ARTERIAL    ECG 12 lead    Collection Time: 07/27/24 11:00 AM   Result Value Ref Range     "Ventricular Rate 46 BPM    Atrial Rate 46 BPM    ID Interval 148 ms    QRSD Interval 86 ms    QT Interval 462 ms    QTC Interval 404 ms    P Axis -3 degrees    QRS Axis -22 degrees    T Wave Axis -15 degrees   CBC    Collection Time: 07/27/24 11:04 AM   Result Value Ref Range    WBC 5.50 4.31 - 10.16 Thousand/uL    RBC 3.57 (L) 3.81 - 5.12 Million/uL    Hemoglobin 10.0 (L) 11.5 - 15.4 g/dL    Hematocrit 31.8 (L) 34.8 - 46.1 %    MCV 89 82 - 98 fL    MCH 28.0 26.8 - 34.3 pg    MCHC 31.4 31.4 - 37.4 g/dL    RDW 14.6 11.6 - 15.1 %    Platelets 160 149 - 390 Thousands/uL    MPV 11.4 8.9 - 12.7 fL   Magnesium    Collection Time: 07/27/24 11:04 AM   Result Value Ref Range    Magnesium 2.5 1.9 - 2.7 mg/dL   Phosphorus    Collection Time: 07/27/24 11:04 AM   Result Value Ref Range    Phosphorus 1.6 (L) 2.7 - 4.5 mg/dL   Basic metabolic panel    Collection Time: 07/27/24 11:04 AM   Result Value Ref Range    Sodium 141 135 - 147 mmol/L    Potassium 4.3 3.5 - 5.3 mmol/L    Chloride 115 (H) 96 - 108 mmol/L    CO2 18 (L) 21 - 32 mmol/L    ANION GAP 8 4 - 13 mmol/L    BUN 20 5 - 25 mg/dL    Creatinine 1.86 (H) 0.60 - 1.30 mg/dL    Glucose 145 (H) 65 - 140 mg/dL    Calcium 7.4 (L) 8.4 - 10.2 mg/dL    eGFR 31 ml/min/1.73sq m   Lactic acid, plasma (w/reflex if result > 2.0)    Collection Time: 07/27/24 11:04 AM   Result Value Ref Range    LACTIC ACID 2.4 (H) 0.5 - 2.0 mmol/L   HS Troponin 0hr (reflex protocol)    Collection Time: 07/27/24 11:04 AM   Result Value Ref Range    hs TnI 0hr 23 \"Refer to ACS Flowchart\"- see link ng/L   ECG 12 lead    Collection Time: 07/27/24 11:21 AM   Result Value Ref Range    Ventricular Rate 70 BPM    Atrial Rate 70 BPM    ID Interval 154 ms    QRSD Interval 82 ms    QT Interval 400 ms    QTC Interval 432 ms    P [place] 13 degrees    QRS Axis -12 degrees    T Wave Axis 4 degrees   Fingerstick Glucose (POCT)    Collection Time: 07/27/24 11:28 AM   Result Value Ref Range    POC Glucose 103 65 - 140 mg/dl " "  POCT Blood Gas (CG8+)    Collection Time: 07/27/24 11:48 AM   Result Value Ref Range    ph, Berry ISTAT 7.333 7.300 - 7.400    pCO2, Berry i-STAT <17.0 (LL) 42.0 - 50.0 mm HG    pO2, Berry i-STAT 64.0 (H) 35.0 - 45.0 mm HG    BE, i-STAT      HCO3, Berry i-STAT      CO2, i-STAT      O2 Sat, i-STAT      SODIUM, I-STAT 149 (H) 136 - 145 mmol/l    Potassium, i-STAT 2.6 (L) 3.5 - 5.3 mmol/L    Calcium, Ionized i-STAT 0.56 (LL) 1.12 - 1.32 mmol/L    Hct, i-STAT <15 (L) 34.8 - 46.1 %    Hgb, i-STAT      Glucose, i-STAT 38 (LL) 65 - 140 mg/dl    POC FIO2 21 L    Specimen Type VENOUS    POCT Blood Gas (CG8+)    Collection Time: 07/27/24 11:54 AM   Result Value Ref Range    ph, Berry ISTAT 7.252 (L) 7.300 - 7.400    pCO2, Berry i-STAT 45.9 42.0 - 50.0 mm HG    pO2, Berry i-STAT 16.0 (L) 35.0 - 45.0 mm HG    BE, i-STAT -7 (L) -2 - 3 mmol/L    HCO3, Berry i-STAT 20.2 (L) 24.0 - 30.0 mmol/L    CO2, i-STAT 22 21 - 32 mmol/L    O2 Sat, i-STAT 16 (L) 60 - 85 %    SODIUM, I-STAT 142 136 - 145 mmol/l    Potassium, i-STAT 3.8 3.5 - 5.3 mmol/L    Calcium, Ionized i-STAT 1.26 1.12 - 1.32 mmol/L    Hct, i-STAT 33 (L) 34.8 - 46.1 %    Hgb, i-STAT 11.2 (L) 11.5 - 15.4 g/dl    Glucose, i-STAT 72 65 - 140 mg/dl    POC FIO2 21 L    Specimen Type VENOUS    HS Troponin I 2hr    Collection Time: 07/27/24 12:03 PM   Result Value Ref Range    hs TnI 2hr 21 \"Refer to ACS Flowchart\"- see link ng/L    Delta 2hr hsTnI -2 <20 ng/L   Blood culture    Collection Time: 07/27/24 12:03 PM    Specimen: Arm, Right; Blood   Result Value Ref Range    Blood Culture No Growth After 5 Days.    Lactic acid 2 Hours    Collection Time: 07/27/24 12:32 PM   Result Value Ref Range    LACTIC ACID 1.9 0.5 - 2.0 mmol/L   Blood culture    Collection Time: 07/27/24 12:32 PM    Specimen: Arm, Right; Blood   Result Value Ref Range    Blood Culture No Growth After 5 Days.    Fingerstick Glucose (POCT)    Collection Time: 07/27/24  1:11 PM   Result Value Ref Range    POC Glucose 103 65 - 140 " "mg/dl   Fingerstick Glucose (POCT)    Collection Time: 07/27/24  2:00 PM   Result Value Ref Range    POC Glucose 234 (H) 65 - 140 mg/dl   Fingerstick Glucose (POCT)    Collection Time: 07/27/24  3:04 PM   Result Value Ref Range    POC Glucose 53 (L) 65 - 140 mg/dl   Magnesium    Collection Time: 07/27/24  3:49 PM   Result Value Ref Range    Magnesium 2.8 (H) 1.9 - 2.7 mg/dL   Blood gas, venous    Collection Time: 07/27/24  3:49 PM   Result Value Ref Range    pH, Berry 7.286 (L) 7.300 - 7.400    pCO2, Berry 45.0 42.0 - 50.0 mm Hg    pO2, Berry 36.2 35.0 - 45.0 mm Hg    HCO3, Berry 21.0 (L) 24 - 30 mmol/L    Base Excess, Berry -5.6 mmol/L    O2 Content, Berry 12.4 ml/dL    O2 HGB, VENOUS 71.0 60.0 - 80.0 %    Non Vent type BIPAP BIPAP     IPAP 12     EPAP 6     BIPAP fio2 21 %   Fingerstick Glucose (POCT)    Collection Time: 07/27/24  3:57 PM   Result Value Ref Range    POC Glucose 132 65 - 140 mg/dl   Basic metabolic panel    Collection Time: 07/27/24  4:46 PM   Result Value Ref Range    Sodium 140 135 - 147 mmol/L    Potassium 4.2 3.5 - 5.3 mmol/L    Chloride 112 (H) 96 - 108 mmol/L    CO2 21 21 - 32 mmol/L    ANION GAP 7 4 - 13 mmol/L    BUN 16 5 - 25 mg/dL    Creatinine 1.62 (H) 0.60 - 1.30 mg/dL    Glucose 69 65 - 140 mg/dL    Calcium 7.8 (L) 8.4 - 10.2 mg/dL    eGFR 36 ml/min/1.73sq m   Phosphorus    Collection Time: 07/27/24  4:46 PM   Result Value Ref Range    Phosphorus 1.2 (L) 2.7 - 4.5 mg/dL   HS Troponin I 4hr    Collection Time: 07/27/24  4:46 PM   Result Value Ref Range    hs TnI 4hr 34 \"Refer to ACS Flowchart\"- see link ng/L    Delta 4hr hsTnI 11 <20 ng/L   Hepatic function panel    Collection Time: 07/27/24  4:46 PM   Result Value Ref Range    Total Bilirubin 0.60 0.20 - 1.00 mg/dL    Bilirubin, Direct 0.18 0.00 - 0.20 mg/dL    Alkaline Phosphatase 54 34 - 104 U/L    AST 10 (L) 13 - 39 U/L    ALT 7 7 - 52 U/L    Total Protein 5.6 (L) 6.4 - 8.4 g/dL    Albumin 3.4 (L) 3.5 - 5.0 g/dL   Fingerstick Glucose (POCT)    " Collection Time: 07/27/24  5:01 PM   Result Value Ref Range    POC Glucose 122 65 - 140 mg/dl   Fingerstick Glucose (POCT)    Collection Time: 07/27/24  6:06 PM   Result Value Ref Range    POC Glucose 75 65 - 140 mg/dl   Fingerstick Glucose (POCT)    Collection Time: 07/27/24  7:06 PM   Result Value Ref Range    POC Glucose 72 65 - 140 mg/dl   Fingerstick Glucose (POCT)    Collection Time: 07/27/24  8:03 PM   Result Value Ref Range    POC Glucose 77 65 - 140 mg/dl   Basic metabolic panel    Collection Time: 07/27/24  8:09 PM   Result Value Ref Range    Sodium 140 135 - 147 mmol/L    Potassium 4.8 3.5 - 5.3 mmol/L    Chloride 111 (H) 96 - 108 mmol/L    CO2 23 21 - 32 mmol/L    ANION GAP 6 4 - 13 mmol/L    BUN 16 5 - 25 mg/dL    Creatinine 1.61 (H) 0.60 - 1.30 mg/dL    Glucose 90 65 - 140 mg/dL    Calcium 8.1 (L) 8.4 - 10.2 mg/dL    eGFR 37 ml/min/1.73sq m   Magnesium    Collection Time: 07/27/24  8:09 PM   Result Value Ref Range    Magnesium 2.4 1.9 - 2.7 mg/dL   Phosphorus    Collection Time: 07/27/24  8:09 PM   Result Value Ref Range    Phosphorus 1.5 (L) 2.7 - 4.5 mg/dL   Blood gas, venous    Collection Time: 07/27/24  8:09 PM   Result Value Ref Range    pH, Berry 7.296 (L) 7.300 - 7.400    pCO2, Berry 42.9 42.0 - 50.0 mm Hg    pO2, Berry 16.9 (L) 35.0 - 45.0 mm Hg    HCO3, Berry 20.4 (L) 24 - 30 mmol/L    Base Excess, Berry -5.8 mmol/L    O2 Content, Berry 4.6 ml/dL    O2 HGB, VENOUS 26.6 (L) 60.0 - 80.0 %    Non Vent type BIPAP BIPAP    Fingerstick Glucose (POCT)    Collection Time: 07/27/24  9:34 PM   Result Value Ref Range    POC Glucose 80 65 - 140 mg/dl   Fingerstick Glucose (POCT)    Collection Time: 07/27/24 10:22 PM   Result Value Ref Range    POC Glucose 94 65 - 140 mg/dl   Fingerstick Glucose (POCT)    Collection Time: 07/27/24 11:51 PM   Result Value Ref Range    POC Glucose 109 65 - 140 mg/dl   Basic metabolic panel    Collection Time: 07/28/24 12:19 AM   Result Value Ref Range    Sodium 140 135 - 147 mmol/L     Potassium 4.4 3.5 - 5.3 mmol/L    Chloride 112 (H) 96 - 108 mmol/L    CO2 21 21 - 32 mmol/L    ANION GAP 7 4 - 13 mmol/L    BUN 14 5 - 25 mg/dL    Creatinine 1.48 (H) 0.60 - 1.30 mg/dL    Glucose 93 65 - 140 mg/dL    Calcium 7.7 (L) 8.4 - 10.2 mg/dL    eGFR 40 ml/min/1.73sq m   Magnesium    Collection Time: 07/28/24 12:19 AM   Result Value Ref Range    Magnesium 2.2 1.9 - 2.7 mg/dL   Phosphorus    Collection Time: 07/28/24 12:19 AM   Result Value Ref Range    Phosphorus 1.9 (L) 2.7 - 4.5 mg/dL   Fingerstick Glucose (POCT)    Collection Time: 07/28/24  2:29 AM   Result Value Ref Range    POC Glucose 115 65 - 140 mg/dl   Fingerstick Glucose (POCT)    Collection Time: 07/28/24  4:07 AM   Result Value Ref Range    POC Glucose 111 65 - 140 mg/dl   Basic metabolic panel    Collection Time: 07/28/24  4:12 AM   Result Value Ref Range    Sodium 141 135 - 147 mmol/L    Potassium 4.6 3.5 - 5.3 mmol/L    Chloride 112 (H) 96 - 108 mmol/L    CO2 20 (L) 21 - 32 mmol/L    ANION GAP 9 4 - 13 mmol/L    BUN 13 5 - 25 mg/dL    Creatinine 1.43 (H) 0.60 - 1.30 mg/dL    Glucose 111 65 - 140 mg/dL    Calcium 7.6 (L) 8.4 - 10.2 mg/dL    eGFR 42 ml/min/1.73sq m   Magnesium    Collection Time: 07/28/24  4:12 AM   Result Value Ref Range    Magnesium 2.1 1.9 - 2.7 mg/dL   Phosphorus    Collection Time: 07/28/24  4:12 AM   Result Value Ref Range    Phosphorus 3.7 2.7 - 4.5 mg/dL   CBC and differential    Collection Time: 07/28/24  4:22 AM   Result Value Ref Range    WBC 5.81 4.31 - 10.16 Thousand/uL    RBC 3.74 (L) 3.81 - 5.12 Million/uL    Hemoglobin 10.4 (L) 11.5 - 15.4 g/dL    Hematocrit 32.0 (L) 34.8 - 46.1 %    MCV 86 82 - 98 fL    MCH 27.8 26.8 - 34.3 pg    MCHC 32.5 31.4 - 37.4 g/dL    RDW 14.6 11.6 - 15.1 %    MPV 11.1 8.9 - 12.7 fL    Platelets 136 (L) 149 - 390 Thousands/uL    nRBC 0 /100 WBCs    Segmented % 56 43 - 75 %    Immature Grans % 1 0 - 2 %    Lymphocytes % 34 14 - 44 %    Monocytes % 6 4 - 12 %    Eosinophils Relative 2 0 -  6 %    Basophils Relative 1 0 - 1 %    Absolute Neutrophils 3.28 1.85 - 7.62 Thousands/µL    Absolute Immature Grans 0.04 0.00 - 0.20 Thousand/uL    Absolute Lymphocytes 1.98 0.60 - 4.47 Thousands/µL    Absolute Monocytes 0.37 0.17 - 1.22 Thousand/µL    Eosinophils Absolute 0.10 0.00 - 0.61 Thousand/µL    Basophils Absolute 0.04 0.00 - 0.10 Thousands/µL   Comprehensive metabolic panel    Collection Time: 07/28/24  4:47 AM   Result Value Ref Range    Sodium 140 135 - 147 mmol/L    Potassium 4.6 3.5 - 5.3 mmol/L    Chloride 112 (H) 96 - 108 mmol/L    CO2 20 (L) 21 - 32 mmol/L    ANION GAP 8 4 - 13 mmol/L    BUN 13 5 - 25 mg/dL    Creatinine 1.44 (H) 0.60 - 1.30 mg/dL    Glucose 111 65 - 140 mg/dL    Calcium 7.7 (L) 8.4 - 10.2 mg/dL    Corrected Calcium 8.6 8.3 - 10.1 mg/dL    AST 8 (L) 13 - 39 U/L    ALT 6 (L) 7 - 52 U/L    Alkaline Phosphatase 47 34 - 104 U/L    Total Protein 4.5 (L) 6.4 - 8.4 g/dL    Albumin 2.9 (L) 3.5 - 5.0 g/dL    Total Bilirubin 0.54 0.20 - 1.00 mg/dL    eGFR 42 ml/min/1.73sq m   Fingerstick Glucose (POCT)    Collection Time: 07/28/24  6:32 AM   Result Value Ref Range    POC Glucose 95 65 - 140 mg/dl   Fingerstick Glucose (POCT)    Collection Time: 07/28/24  7:54 AM   Result Value Ref Range    POC Glucose 89 65 - 140 mg/dl   Basic metabolic panel    Collection Time: 07/28/24 10:06 AM   Result Value Ref Range    Sodium 140 135 - 147 mmol/L    Potassium 4.6 3.5 - 5.3 mmol/L    Chloride 112 (H) 96 - 108 mmol/L    CO2 18 (L) 21 - 32 mmol/L    ANION GAP 10 4 - 13 mmol/L    BUN 12 5 - 25 mg/dL    Creatinine 1.52 (H) 0.60 - 1.30 mg/dL    Glucose 152 (H) 65 - 140 mg/dL    Calcium 8.1 (L) 8.4 - 10.2 mg/dL    eGFR 39 ml/min/1.73sq m   Magnesium    Collection Time: 07/28/24 10:06 AM   Result Value Ref Range    Magnesium 2.1 1.9 - 2.7 mg/dL   Phosphorus    Collection Time: 07/28/24 10:06 AM   Result Value Ref Range    Phosphorus 3.9 2.7 - 4.5 mg/dL   Fingerstick Glucose (POCT)    Collection Time: 07/28/24  10:14 AM   Result Value Ref Range    POC Glucose 149 (H) 65 - 140 mg/dl   Fingerstick Glucose (POCT)    Collection Time: 07/28/24 11:47 AM   Result Value Ref Range    POC Glucose 114 65 - 140 mg/dl   Basic metabolic panel    Collection Time: 07/28/24 12:08 PM   Result Value Ref Range    Sodium 141 135 - 147 mmol/L    Potassium 4.5 3.5 - 5.3 mmol/L    Chloride 112 (H) 96 - 108 mmol/L    CO2 21 21 - 32 mmol/L    ANION GAP 8 4 - 13 mmol/L    BUN 12 5 - 25 mg/dL    Creatinine 1.47 (H) 0.60 - 1.30 mg/dL    Glucose 127 65 - 140 mg/dL    Calcium 7.9 (L) 8.4 - 10.2 mg/dL    eGFR 41 ml/min/1.73sq m   Magnesium    Collection Time: 07/28/24 12:08 PM   Result Value Ref Range    Magnesium 2.0 1.9 - 2.7 mg/dL   Phosphorus    Collection Time: 07/28/24 12:08 PM   Result Value Ref Range    Phosphorus 3.6 2.7 - 4.5 mg/dL   Glutamic acid decarboxylase    Collection Time: 07/28/24 12:08 PM   Result Value Ref Range    Glutaminc Acid Decarboxylase 65 Ab <5.0 0.0 - 5.0 U/mL   Albumin    Collection Time: 07/28/24 12:08 PM   Result Value Ref Range    Albumin 3.0 (L) 3.5 - 5.0 g/dL   Fingerstick Glucose (POCT)    Collection Time: 07/28/24  1:58 PM   Result Value Ref Range    POC Glucose 167 (H) 65 - 140 mg/dl   Fingerstick Glucose (POCT)    Collection Time: 07/28/24  2:42 PM   Result Value Ref Range    POC Glucose 148 (H) 65 - 140 mg/dl   Fingerstick Glucose (POCT)    Collection Time: 07/28/24  4:09 PM   Result Value Ref Range    POC Glucose 133 65 - 140 mg/dl   Basic metabolic panel    Collection Time: 07/28/24  4:49 PM   Result Value Ref Range    Sodium 139 135 - 147 mmol/L    Potassium 3.7 3.5 - 5.3 mmol/L    Chloride 108 96 - 108 mmol/L    CO2 22 21 - 32 mmol/L    ANION GAP 9 4 - 13 mmol/L    BUN 11 5 - 25 mg/dL    Creatinine 1.47 (H) 0.60 - 1.30 mg/dL    Glucose 105 65 - 140 mg/dL    Calcium 8.1 (L) 8.4 - 10.2 mg/dL    eGFR 41 ml/min/1.73sq m   Magnesium    Collection Time: 07/28/24  4:49 PM   Result Value Ref Range    Magnesium 1.9 1.9  - 2.7 mg/dL   Phosphorus    Collection Time: 07/28/24  4:49 PM   Result Value Ref Range    Phosphorus 3.5 2.7 - 4.5 mg/dL   Fingerstick Glucose (POCT)    Collection Time: 07/28/24  5:05 PM   Result Value Ref Range    POC Glucose 97 65 - 140 mg/dl   Fingerstick Glucose (POCT)    Collection Time: 07/28/24  6:19 PM   Result Value Ref Range    POC Glucose 72 65 - 140 mg/dl   Fingerstick Glucose (POCT)    Collection Time: 07/28/24  6:57 PM   Result Value Ref Range    POC Glucose 68 65 - 140 mg/dl   Fingerstick Glucose (POCT)    Collection Time: 07/28/24  8:56 PM   Result Value Ref Range    POC Glucose 109 65 - 140 mg/dl   Fingerstick Glucose (POCT)    Collection Time: 07/28/24 10:40 PM   Result Value Ref Range    POC Glucose 86 65 - 140 mg/dl   Basic metabolic panel    Collection Time: 07/28/24 11:40 PM   Result Value Ref Range    Sodium 138 135 - 147 mmol/L    Potassium 4.2 3.5 - 5.3 mmol/L    Chloride 109 (H) 96 - 108 mmol/L    CO2 22 21 - 32 mmol/L    ANION GAP 7 4 - 13 mmol/L    BUN 9 5 - 25 mg/dL    Creatinine 1.32 (H) 0.60 - 1.30 mg/dL    Glucose 100 65 - 140 mg/dL    Calcium 7.9 (L) 8.4 - 10.2 mg/dL    eGFR 47 ml/min/1.73sq m   Magnesium    Collection Time: 07/28/24 11:40 PM   Result Value Ref Range    Magnesium 1.7 (L) 1.9 - 2.7 mg/dL   Phosphorus    Collection Time: 07/28/24 11:40 PM   Result Value Ref Range    Phosphorus 3.4 2.7 - 4.5 mg/dL   Blood gas, venous    Collection Time: 07/28/24 11:40 PM   Result Value Ref Range    pH, Berry 7.362 7.300 - 7.400    pCO2, Berry 36.5 (L) 42.0 - 50.0 mm Hg    pO2, Berry 38.6 35.0 - 45.0 mm Hg    HCO3, Berry 20.3 (L) 24 - 30 mmol/L    Base Excess, Berry -4.6 mmol/L    O2 Content, Berry 11.8 ml/dL    O2 HGB, VENOUS 74.4 60.0 - 80.0 %   Albumin    Collection Time: 07/28/24 11:40 PM   Result Value Ref Range    Albumin 3.0 (L) 3.5 - 5.0 g/dL   Fingerstick Glucose (POCT)    Collection Time: 07/28/24 11:54 PM   Result Value Ref Range    POC Glucose 102 65 - 140 mg/dl   Fingerstick Glucose  (POCT)    Collection Time: 07/29/24  1:04 AM   Result Value Ref Range    POC Glucose 84 65 - 140 mg/dl   Fingerstick Glucose (POCT)    Collection Time: 07/29/24  2:29 AM   Result Value Ref Range    POC Glucose 82 65 - 140 mg/dl   Fingerstick Glucose (POCT)    Collection Time: 07/29/24  4:18 AM   Result Value Ref Range    POC Glucose 82 65 - 140 mg/dl   Magnesium    Collection Time: 07/29/24  5:04 AM   Result Value Ref Range    Magnesium 1.8 (L) 1.9 - 2.7 mg/dL   Phosphorus    Collection Time: 07/29/24  5:04 AM   Result Value Ref Range    Phosphorus 3.4 2.7 - 4.5 mg/dL   Comprehensive metabolic panel    Collection Time: 07/29/24  5:04 AM   Result Value Ref Range    Sodium 141 135 - 147 mmol/L    Potassium 5.1 3.5 - 5.3 mmol/L    Chloride 112 (H) 96 - 108 mmol/L    CO2 23 21 - 32 mmol/L    ANION GAP 6 4 - 13 mmol/L    BUN 8 5 - 25 mg/dL    Creatinine 1.29 0.60 - 1.30 mg/dL    Glucose 82 65 - 140 mg/dL    Calcium 7.9 (L) 8.4 - 10.2 mg/dL    Corrected Calcium 8.9 8.3 - 10.1 mg/dL    AST 6 (L) 13 - 39 U/L    ALT 6 (L) 7 - 52 U/L    Alkaline Phosphatase 49 34 - 104 U/L    Total Protein 4.4 (L) 6.4 - 8.4 g/dL    Albumin 2.8 (L) 3.5 - 5.0 g/dL    Total Bilirubin 0.47 0.20 - 1.00 mg/dL    eGFR 48 ml/min/1.73sq m   CBC and differential    Collection Time: 07/29/24  5:04 AM   Result Value Ref Range    WBC 4.71 4.31 - 10.16 Thousand/uL    RBC 3.61 (L) 3.81 - 5.12 Million/uL    Hemoglobin 10.2 (L) 11.5 - 15.4 g/dL    Hematocrit 31.1 (L) 34.8 - 46.1 %    MCV 86 82 - 98 fL    MCH 28.3 26.8 - 34.3 pg    MCHC 32.8 31.4 - 37.4 g/dL    RDW 14.9 11.6 - 15.1 %    MPV 11.5 8.9 - 12.7 fL    Platelets 115 (L) 149 - 390 Thousands/uL    nRBC 0 /100 WBCs    Segmented % 49 43 - 75 %    Immature Grans % 1 0 - 2 %    Lymphocytes % 40 14 - 44 %    Monocytes % 7 4 - 12 %    Eosinophils Relative 2 0 - 6 %    Basophils Relative 1 0 - 1 %    Absolute Neutrophils 2.34 1.85 - 7.62 Thousands/µL    Absolute Immature Grans 0.03 0.00 - 0.20 Thousand/uL     Absolute Lymphocytes 1.89 0.60 - 4.47 Thousands/µL    Absolute Monocytes 0.35 0.17 - 1.22 Thousand/µL    Eosinophils Absolute 0.07 0.00 - 0.61 Thousand/µL    Basophils Absolute 0.03 0.00 - 0.10 Thousands/µL   Fingerstick Glucose (POCT)    Collection Time: 07/29/24  6:09 AM   Result Value Ref Range    POC Glucose 93 65 - 140 mg/dl   Fingerstick Glucose (POCT)    Collection Time: 07/29/24  7:51 AM   Result Value Ref Range    POC Glucose 85 65 - 140 mg/dl   Fingerstick Glucose (POCT)    Collection Time: 07/29/24 10:35 AM   Result Value Ref Range    POC Glucose 93 65 - 140 mg/dl   Fingerstick Glucose (POCT)    Collection Time: 07/29/24 11:56 AM   Result Value Ref Range    POC Glucose 94 65 - 140 mg/dl   Fingerstick Glucose (POCT)    Collection Time: 07/29/24  1:59 PM   Result Value Ref Range    POC Glucose 167 (H) 65 - 140 mg/dl   Fingerstick Glucose (POCT)    Collection Time: 07/29/24  3:39 PM   Result Value Ref Range    POC Glucose 104 65 - 140 mg/dl   Fingerstick Glucose (POCT)    Collection Time: 07/29/24  9:09 PM   Result Value Ref Range    POC Glucose 94 65 - 140 mg/dl   Fingerstick Glucose (POCT)    Collection Time: 07/30/24 12:14 AM   Result Value Ref Range    POC Glucose 69 65 - 140 mg/dl   Fingerstick Glucose (POCT)    Collection Time: 07/30/24  4:37 AM   Result Value Ref Range    POC Glucose 68 65 - 140 mg/dl   CBC and differential    Collection Time: 07/30/24  5:57 AM   Result Value Ref Range    WBC 4.39 4.31 - 10.16 Thousand/uL    RBC 3.78 (L) 3.81 - 5.12 Million/uL    Hemoglobin 10.6 (L) 11.5 - 15.4 g/dL    Hematocrit 33.0 (L) 34.8 - 46.1 %    MCV 87 82 - 98 fL    MCH 28.0 26.8 - 34.3 pg    MCHC 32.1 31.4 - 37.4 g/dL    RDW 14.8 11.6 - 15.1 %    MPV 11.4 8.9 - 12.7 fL    Platelets 120 (L) 149 - 390 Thousands/uL    nRBC 0 /100 WBCs    Segmented % 46 43 - 75 %    Immature Grans % 1 0 - 2 %    Lymphocytes % 43 14 - 44 %    Monocytes % 7 4 - 12 %    Eosinophils Relative 2 0 - 6 %    Basophils Relative 1 0 - 1  %    Absolute Neutrophils 2.03 1.85 - 7.62 Thousands/µL    Absolute Immature Grans 0.06 0.00 - 0.20 Thousand/uL    Absolute Lymphocytes 1.88 0.60 - 4.47 Thousands/µL    Absolute Monocytes 0.32 0.17 - 1.22 Thousand/µL    Eosinophils Absolute 0.07 0.00 - 0.61 Thousand/µL    Basophils Absolute 0.03 0.00 - 0.10 Thousands/µL   Fingerstick Glucose (POCT)    Collection Time: 07/30/24  6:33 AM   Result Value Ref Range    POC Glucose 110 65 - 140 mg/dl   Phosphorus    Collection Time: 07/30/24  6:34 AM   Result Value Ref Range    Phosphorus 3.5 2.7 - 4.5 mg/dL   Magnesium    Collection Time: 07/30/24  6:34 AM   Result Value Ref Range    Magnesium 2.0 1.9 - 2.7 mg/dL   Basic metabolic panel    Collection Time: 07/30/24  6:34 AM   Result Value Ref Range    Sodium 142 135 - 147 mmol/L    Potassium 4.5 3.5 - 5.3 mmol/L    Chloride 111 (H) 96 - 108 mmol/L    CO2 24 21 - 32 mmol/L    ANION GAP 7 4 - 13 mmol/L    BUN 8 5 - 25 mg/dL    Creatinine 1.47 (H) 0.60 - 1.30 mg/dL    Glucose 71 65 - 140 mg/dL    Calcium 8.2 (L) 8.4 - 10.2 mg/dL    eGFR 41 ml/min/1.73sq m   Fingerstick Glucose (POCT)    Collection Time: 07/30/24 11:11 AM   Result Value Ref Range    POC Glucose 101 65 - 140 mg/dl   UA w Reflex to Microscopic w Reflex to Culture    Collection Time: 07/30/24 12:56 PM    Specimen: Urine, Clean Catch   Result Value Ref Range    Color, UA Light Yellow     Clarity, UA Clear     Specific Gravity, UA 1.013 1.003 - 1.030    pH, UA 6.5 4.5, 5.0, 5.5, 6.0, 6.5, 7.0, 7.5, 8.0    Leukocytes, UA Moderate (A) Negative    Nitrite, UA Negative Negative    Protein, UA Negative Negative mg/dl    Glucose, UA Negative Negative mg/dl    Ketones, UA Negative Negative mg/dl    Urobilinogen, UA <2.0 <2.0 mg/dl mg/dl    Bilirubin, UA Negative Negative    Occult Blood, UA Negative Negative   Urine Microscopic    Collection Time: 07/30/24 12:56 PM   Result Value Ref Range    RBC, UA None Seen None Seen, 1-2 /hpf    WBC, UA 4-10 (A) None Seen, 1-2 /hpf     Epithelial Cells Occasional None Seen, Occasional /hpf    Bacteria, UA None Seen None Seen, Occasional /hpf    Hyaline Casts, UA 0-3 (A) None Seen /lpf   Fingerstick Glucose (POCT)    Collection Time: 07/30/24  4:08 PM   Result Value Ref Range    POC Glucose 87 65 - 140 mg/dl   Fingerstick Glucose (POCT)    Collection Time: 07/30/24  8:48 PM   Result Value Ref Range    POC Glucose 67 65 - 140 mg/dl   Fingerstick Glucose (POCT)    Collection Time: 07/30/24  9:08 PM   Result Value Ref Range    POC Glucose 81 65 - 140 mg/dl   CBC and differential    Collection Time: 07/31/24  5:44 AM   Result Value Ref Range    WBC 4.70 4.31 - 10.16 Thousand/uL    RBC 3.60 (L) 3.81 - 5.12 Million/uL    Hemoglobin 10.3 (L) 11.5 - 15.4 g/dL    Hematocrit 32.0 (L) 34.8 - 46.1 %    MCV 89 82 - 98 fL    MCH 28.6 26.8 - 34.3 pg    MCHC 32.2 31.4 - 37.4 g/dL    RDW 14.6 11.6 - 15.1 %    MPV 11.8 8.9 - 12.7 fL    Platelets 122 (L) 149 - 390 Thousands/uL    nRBC 0 /100 WBCs    Segmented % 42 (L) 43 - 75 %    Immature Grans % 1 0 - 2 %    Lymphocytes % 46 (H) 14 - 44 %    Monocytes % 8 4 - 12 %    Eosinophils Relative 2 0 - 6 %    Basophils Relative 1 0 - 1 %    Absolute Neutrophils 1.96 1.85 - 7.62 Thousands/µL    Absolute Immature Grans 0.05 0.00 - 0.20 Thousand/uL    Absolute Lymphocytes 2.21 0.60 - 4.47 Thousands/µL    Absolute Monocytes 0.36 0.17 - 1.22 Thousand/µL    Eosinophils Absolute 0.09 0.00 - 0.61 Thousand/µL    Basophils Absolute 0.03 0.00 - 0.10 Thousands/µL   Basic metabolic panel    Collection Time: 07/31/24  5:44 AM   Result Value Ref Range    Sodium 143 135 - 147 mmol/L    Potassium 4.3 3.5 - 5.3 mmol/L    Chloride 110 (H) 96 - 108 mmol/L    CO2 25 21 - 32 mmol/L    ANION GAP 8 4 - 13 mmol/L    BUN 10 5 - 25 mg/dL    Creatinine 1.31 (H) 0.60 - 1.30 mg/dL    Glucose 70 65 - 140 mg/dL    Calcium 7.8 (L) 8.4 - 10.2 mg/dL    eGFR 47 ml/min/1.73sq m   Fingerstick Glucose (POCT)    Collection Time: 07/31/24  6:28 AM   Result  Value Ref Range    POC Glucose 75 65 - 140 mg/dl   Fingerstick Glucose (POCT)    Collection Time: 07/31/24  9:31 AM   Result Value Ref Range    POC Glucose 114 65 - 140 mg/dl   Fingerstick Glucose (POCT)    Collection Time: 07/31/24 10:32 AM   Result Value Ref Range    POC Glucose 123 65 - 140 mg/dl   Albumin / creatinine urine ratio    Collection Time: 08/05/24  9:57 AM   Result Value Ref Range    Creatinine, Ur 167.4 Reference range not established. mg/dL    Albumin,U,Random 21.3 (H) <20.0 mg/L    Albumin Creat Ratio 13 0 - 30 mg/g creatinine   CBC and differential    Collection Time: 08/05/24  9:57 AM   Result Value Ref Range    WBC 4.31 4.31 - 10.16 Thousand/uL    RBC 3.92 3.81 - 5.12 Million/uL    Hemoglobin 11.0 (L) 11.5 - 15.4 g/dL    Hematocrit 35.1 34.8 - 46.1 %    MCV 90 82 - 98 fL    MCH 28.1 26.8 - 34.3 pg    MCHC 31.3 (L) 31.4 - 37.4 g/dL    RDW 14.9 11.6 - 15.1 %    MPV 11.4 8.9 - 12.7 fL    Platelets 177 149 - 390 Thousands/uL    nRBC 0 /100 WBCs    Segmented % 47 43 - 75 %    Immature Grans % 1 0 - 2 %    Lymphocytes % 43 14 - 44 %    Monocytes % 7 4 - 12 %    Eosinophils Relative 1 0 - 6 %    Basophils Relative 1 0 - 1 %    Absolute Neutrophils 2.02 1.85 - 7.62 Thousands/µL    Absolute Immature Grans 0.02 0.00 - 0.20 Thousand/uL    Absolute Lymphocytes 1.84 0.60 - 4.47 Thousands/µL    Absolute Monocytes 0.32 0.17 - 1.22 Thousand/µL    Eosinophils Absolute 0.06 0.00 - 0.61 Thousand/µL    Basophils Absolute 0.05 0.00 - 0.10 Thousands/µL   Comprehensive metabolic panel    Collection Time: 08/05/24  9:57 AM   Result Value Ref Range    Sodium 144 135 - 147 mmol/L    Potassium 3.9 3.5 - 5.3 mmol/L    Chloride 111 (H) 96 - 108 mmol/L    CO2 23 21 - 32 mmol/L    ANION GAP 10 4 - 13 mmol/L    BUN 20 5 - 25 mg/dL    Creatinine 1.95 (H) 0.60 - 1.30 mg/dL    Glucose, Fasting 69 65 - 99 mg/dL    Calcium 8.0 (L) 8.4 - 10.2 mg/dL    AST 8 (L) 13 - 39 U/L    ALT 6 (L) 7 - 52 U/L    Alkaline Phosphatase 46 34 - 104  U/L    Total Protein 5.6 (L) 6.4 - 8.4 g/dL    Albumin 3.6 3.5 - 5.0 g/dL    Total Bilirubin 0.65 0.20 - 1.00 mg/dL    eGFR 29 ml/min/1.73sq m   Lipid Panel with Direct LDL reflex    Collection Time: 08/05/24  9:57 AM   Result Value Ref Range    Cholesterol 116 See Comment mg/dL    Triglycerides 135 See Comment mg/dL    HDL, Direct 41 (L) >=50 mg/dL    LDL Calculated 48 0 - 100 mg/dL   TSH, 3rd generation    Collection Time: 08/05/24  9:57 AM   Result Value Ref Range    TSH 3RD GENERATON 1.616 0.450 - 4.500 uIU/mL   Vitamin B12    Collection Time: 08/05/24  9:57 AM   Result Value Ref Range    Vitamin B-12 4,060 (H) 180 - 914 pg/mL   Hemoglobin A1C    Collection Time: 08/05/24  9:57 AM   Result Value Ref Range    Hemoglobin A1C 5.0 Normal 4.0-5.6%; PreDiabetic 5.7-6.4%; Diabetic >=6.5%; Glycemic control for adults with diabetes <7.0% %    EAG 97 mg/dl        Physical Exam  Constitutional:       Appearance: Normal appearance.   HENT:      Head: Normocephalic.      Right Ear: Tympanic membrane, ear canal and external ear normal.      Left Ear: Tympanic membrane, ear canal and external ear normal.      Nose: Nose normal. No congestion.      Mouth/Throat:      Mouth: Mucous membranes are moist.      Pharynx: Oropharynx is clear. No oropharyngeal exudate or posterior oropharyngeal erythema.   Eyes:      Extraocular Movements: Extraocular movements intact.      Conjunctiva/sclera: Conjunctivae normal.   Cardiovascular:      Rate and Rhythm: Normal rate and regular rhythm.      Heart sounds: Normal heart sounds. No murmur heard.  Pulmonary:      Effort: Pulmonary effort is normal.      Breath sounds: Normal breath sounds. No wheezing or rales.   Abdominal:      General: Abdomen is flat. There is no distension.      Palpations: Abdomen is soft.      Tenderness: There is no abdominal tenderness.   Musculoskeletal:      Cervical back: Normal range of motion and neck supple.      Right lower leg: No edema.      Left lower leg:  No edema.   Lymphadenopathy:      Cervical: No cervical adenopathy.   Skin:     General: Skin is warm.   Neurological:      General: No focal deficit present.      Mental Status: She is alert and oriented to person, place, and time.

## 2024-09-04 ENCOUNTER — APPOINTMENT (OUTPATIENT)
Dept: LAB | Facility: CLINIC | Age: 49
End: 2024-09-04
Payer: COMMERCIAL

## 2024-09-04 DIAGNOSIS — N18.32 STAGE 3B CHRONIC KIDNEY DISEASE (HCC): ICD-10-CM

## 2024-09-04 LAB
ANION GAP SERPL CALCULATED.3IONS-SCNC: 13 MMOL/L (ref 4–13)
BUN SERPL-MCNC: 37 MG/DL (ref 5–25)
CALCIUM SERPL-MCNC: 8.4 MG/DL (ref 8.4–10.2)
CHLORIDE SERPL-SCNC: 109 MMOL/L (ref 96–108)
CO2 SERPL-SCNC: 23 MMOL/L (ref 21–32)
CREAT SERPL-MCNC: 2.13 MG/DL (ref 0.6–1.3)
GFR SERPL CREATININE-BSD FRML MDRD: 26 ML/MIN/1.73SQ M
GLUCOSE P FAST SERPL-MCNC: 63 MG/DL (ref 65–99)
POTASSIUM SERPL-SCNC: 3.4 MMOL/L (ref 3.5–5.3)
SODIUM SERPL-SCNC: 145 MMOL/L (ref 135–147)

## 2024-09-04 PROCEDURE — 80048 BASIC METABOLIC PNL TOTAL CA: CPT

## 2024-09-04 PROCEDURE — 36415 COLL VENOUS BLD VENIPUNCTURE: CPT

## 2024-09-05 DIAGNOSIS — N18.32 STAGE 3B CHRONIC KIDNEY DISEASE (HCC): Primary | ICD-10-CM

## 2024-09-11 ENCOUNTER — PROCEDURE VISIT (OUTPATIENT)
Age: 49
End: 2024-09-11
Payer: COMMERCIAL

## 2024-09-11 VITALS — HEIGHT: 61 IN | BODY MASS INDEX: 30.58 KG/M2 | WEIGHT: 162 LBS

## 2024-09-11 DIAGNOSIS — M47.812 CERVICAL SPONDYLOSIS: ICD-10-CM

## 2024-09-11 DIAGNOSIS — M99.03 SEGMENTAL DYSFUNCTION OF LUMBAR REGION: ICD-10-CM

## 2024-09-11 DIAGNOSIS — M99.04 SEGMENTAL DYSFUNCTION OF SACRAL REGION: ICD-10-CM

## 2024-09-11 DIAGNOSIS — M62.838 MUSCLE SPASM: ICD-10-CM

## 2024-09-11 DIAGNOSIS — M48.062 SPINAL STENOSIS OF LUMBAR REGION WITH NEUROGENIC CLAUDICATION: Primary | ICD-10-CM

## 2024-09-11 DIAGNOSIS — M99.02 SEGMENTAL DYSFUNCTION OF THORACIC REGION: ICD-10-CM

## 2024-09-11 PROCEDURE — 97110 THERAPEUTIC EXERCISES: CPT | Performed by: CHIROPRACTOR

## 2024-09-11 PROCEDURE — 98941 CHIROPRACT MANJ 3-4 REGIONS: CPT | Performed by: CHIROPRACTOR

## 2024-09-11 NOTE — PROGRESS NOTES
Initial date of service: 5/6/24    Diagnoses and all orders for this visit:    Spinal stenosis of lumbar region with neurogenic claudication    Segmental dysfunction of sacral region    Muscle spasm    Cervical spondylosis    Segmental dysfunction of lumbar region    Segmental dysfunction of thoracic region    Pt suffered mild exacerbation but responded to tx with reduced pain and increased ROM. Overall improving with more mobility    TREATMENT: [id number]  Ther-ex: IASTM; discussed post procedure soreness and/or ecchymosis for up to 36 hrs, applied to affected mm hypertonicities; supine hamstring stretch, supine gluteal stretch, single knee to chest stretch, upper trap stretch, transitional mvmt education, abdominal bracing; greater than 15 min spent performing above mentioned ther-ex to improve ROM/flexibility. Cervical supine graded mobilization and traction, Thoracic mobilization/manipulation: prone P-A mob; Lumbar mobilization/manipulation: diversified side laying graded HVLA, flexion-traction; SIJ Manipulation/Mobilization: R/L SIJ HVLA - long axis distraction, mederos drop table maneuver to affected SIJ    HPI:  Kinza Meza is a 48 y.o. female  Chief Complaint   Patient presents with    Neck Pain     About a 2 bilateral     Back Pain     Low abck about a 5 bilateral      Pt presents for tx for exacerbation of chronic neck/back pain. Pt has had back and neck surgery. Pt was utilizing cane and now using walker occasionally. 2022 MRI demonstrates multilevel degenerative changes of postsurgical lumbar spine status post L1-L2 posterior spinal fixation with varying degrees of canal stenosis (moderate L4-L5) and foraminal narrowing (mild left L1-L2 and left L2-L3 ). 2024 fl/ext xrays unremarkable for instability. 2021 C/S MRI demonstrates s/p ACDF from C4 to C7. Multilevel degenerative spondylosis as described. Possible ossification of the posterior longitudinal ligament at C2-3 and C6-7  9/11: pt reports  suffered flare-up playing with grandkids, again; thinks she overdid it      Neck Pain   This is a chronic problem. The current episode started more than 1 year ago. The problem occurs constantly. The problem has been waxing and waning. The pain is present in the left side, midline and right side. The quality of the pain is described as aching. The symptoms are aggravated by bending, position, stress and twisting. The pain is Worse during the day.   Back Pain  This is a chronic problem. The current episode started more than 1 year ago. The problem occurs constantly. The problem has been waxing and waning since onset. The pain is present in the gluteal, lumbar spine, sacro-iliac and thoracic spine. The quality of the pain is described as aching, burning and stabbing. The pain radiates to the left thigh, left knee and right thigh. Pain scale: 1-6/10. The pain is Worse during the day. The symptoms are aggravated by standing, twisting and bending (walking, laying supine, transitional mvmts; palliative includes sitting, massage). Pertinent negatives include no bladder incontinence or bowel incontinence. Risk factors include lack of exercise.     Past Medical History:   Diagnosis Date    ADD (attention deficit disorder)     Allergic rhinitis     ALS (amyotrophic lateral sclerosis) ([facility]) 11/22    Per neurological surgeon    Anemia 12/2021    Anxiety     Arthritis     Bipolar disorder ([facility])     Cervical disc disorder with radiculopathy of cervical region     Chronic depression     Chronic kidney disease     Stage 1    Chronic pain disorder     Cluster headache     Colitis     Last assessed - 11/25/14    Colon polyp     Concussion 2018    Condyloma acuminatum     Last assessed - 3/19/14    CTS (carpal tunnel syndrome) 2001    Depression     Diabetes mellitus ([facility])     Last assessed - 11/25/14    Diabetic nephropathy ([facility]) 2001    Diabetic neuropathy ([facility])     Diabetic retinopathy ([facility]) 2001    Difficulty walking 07/22     Fibromyalgia     Fibromyalgia, primary     GERD (gastroesophageal reflux disease)     Head injury 18    Fall    Headache(784.0) 1977    History of transfusion 2021    HTN (hypertension)     Last assessed - 14    Hyperlipidemia 2019    Hypertension     Intervertebral disc disorder with radiculopathy of lumbar region     Lupus (HCC)     Rhuematologist-Sheela Dasilva, PA    Migraine     Miscarriage 1988    ,     Obesity 1980    Obesity, unspecified     Open wound of back 2023    Opioid abuse, in remission (HCC) 2022    Only while in hospital and for about 10 days once home instead of one week.    Osteoporosis     Peripheral neuropathy     Postoperative wound infection 2021    Stop not healed    Preeclampsia     Last assessed - 14    Rheumatoid arthritis ([facility])     Wears dentures       Past Surgical History:   Procedure Laterality Date    APPENDECTOMY      Last assessed - 14    BARIATRIC SURGERY  2016    CARPAL TUNNEL RELEASE      CATARACT EXTRACTION      CERVICAL FUSION N/A 2019    Procedure: Anterior cervical discectomy w fusion C4-5, C5-6, C6-7; allograft and neuromonitoring;  Surgeon: Jose Gomez MD;  Location: [facility];  Service: Orthopedics     SECTION      Last assessed - 14    COLONOSCOPY      COLONOSCOPY      HEMORRHOID SURGERY      HERNIA REPAIR      Last assessed - 14    LUMBAR FUSION N/A 2021    Procedure: L1/2 laminectomy, discectomy with L1/2 MIS posterior fixation using neuromonitoring and neuronavigation;  Surgeon: Suhas Akbar MD;  Location: [facility];  Service: Neurosurgery    LUMBAR WOUND EXPLORATION Bilateral 2021    Procedure: Lumbar wound washout, debridement and intraoperative cultures;  Surgeon: Suhas Akbar MD;  Location: [facility];  Service: Neurosurgery    MAMMO (HISTORICAL)      NC DEBRIDEMENT BONE 1ST 20 SQ CM/< N/A 2023    Procedure: SURGICAL PREPARATION OF BACK  WOUND AND  LOCAL FLAP, PREVENA PLACEMENT;  Surgeon: Ady Rocha MD;  Location:  MAIN OR;  Service: Plastics    WOOD-EN-Y PROCEDURE      ULNAR TUNNEL RELEASE      VAC DRESSING APPLICATION Bilateral 12/22/2021    Procedure: APPLICATION VAC DRESSING;  Surgeon: Elbert Hawley MD;  Location: [facility];  Service: General    VAC DRESSING APPLICATION N/A 12/25/2021    Procedure: APPLICATION VAC DRESSING ABDOMEN/TRUNK;  Surgeon: Mani Ross DO;  Location: [facility];  Service: General    VENTRAL HERNIA REPAIR       The following portions of the patient's history were reviewed and updated as appropriate: allergies, past family history, past medical history, past social history, past surgical history, and problem list.  Review of Systems   Gastrointestinal:  Negative for bowel incontinence.   Genitourinary:  Negative for bladder incontinence.   Musculoskeletal:  Positive for back pain and neck pain.     Physical Exam  Neck:        Comments: Pnful and limited in Brot, Blf  Musculoskeletal:      Cervical back: Pain with movement and muscular tenderness present. Decreased range of motion.      Thoracic back: Spasms and tenderness present. Decreased range of motion.      Lumbar back: Spasms and tenderness present. Decreased range of motion. Negative right straight leg raise test and negative left straight leg raise test.        Back:       Comments: Pnful and limited in Brot, Blf, Ext centralizes    Lymphadenopathy:      Cervical: No cervical adenopathy.   Skin:     General: Skin is warm and dry.   Neurological:      Mental Status: She is alert and oriented to person, place, and time.      Gait: Gait is intact.   Psychiatric:         Mood and Affect: Mood and affect normal.         Behavior: Behavior normal.       SOFT TISSUE ASSESSMENT Hypertonicity and tenderness palpated B upper traps, B SCM, B T10-S1 erector spinae, glute med/min, QL, hamstring JOINT RESTRICTIONS: C4/5, T1/2, T10-S1 and R/L SIJ    Return in about 2  weeks (around 9/25/2024) for Next scheduled follow up.

## 2024-09-13 ENCOUNTER — LAB (OUTPATIENT)
Dept: LAB | Facility: CLINIC | Age: 49
End: 2024-09-13
Payer: COMMERCIAL

## 2024-09-13 ENCOUNTER — ANNUAL EXAM (OUTPATIENT)
Age: 49
End: 2024-09-13
Payer: COMMERCIAL

## 2024-09-13 ENCOUNTER — TELEPHONE (OUTPATIENT)
Age: 49
End: 2024-09-13

## 2024-09-13 VITALS
DIASTOLIC BLOOD PRESSURE: 82 MMHG | HEIGHT: 61 IN | BODY MASS INDEX: 31 KG/M2 | SYSTOLIC BLOOD PRESSURE: 126 MMHG | WEIGHT: 164.2 LBS

## 2024-09-13 DIAGNOSIS — Z79.4 CONTROLLED TYPE 2 DIABETES MELLITUS WITH STAGE 3 CHRONIC KIDNEY DISEASE, WITH LONG-TERM CURRENT USE OF INSULIN (HCC): ICD-10-CM

## 2024-09-13 DIAGNOSIS — N18.4 STAGE 4 CHRONIC KIDNEY DISEASE (HCC): ICD-10-CM

## 2024-09-13 DIAGNOSIS — N18.4 BENIGN HYPERTENSION WITH CKD (CHRONIC KIDNEY DISEASE) STAGE IV (HCC): ICD-10-CM

## 2024-09-13 DIAGNOSIS — E87.20 METABOLIC ACIDOSIS: ICD-10-CM

## 2024-09-13 DIAGNOSIS — E55.9 VITAMIN D DEFICIENCY: ICD-10-CM

## 2024-09-13 DIAGNOSIS — N95.1 PERIMENOPAUSE: ICD-10-CM

## 2024-09-13 DIAGNOSIS — E11.22 CONTROLLED TYPE 2 DIABETES MELLITUS WITH STAGE 3 CHRONIC KIDNEY DISEASE, WITH LONG-TERM CURRENT USE OF INSULIN (HCC): ICD-10-CM

## 2024-09-13 DIAGNOSIS — I12.9 BENIGN HYPERTENSION WITH CKD (CHRONIC KIDNEY DISEASE) STAGE IV (HCC): ICD-10-CM

## 2024-09-13 DIAGNOSIS — N18.30 CONTROLLED TYPE 2 DIABETES MELLITUS WITH STAGE 3 CHRONIC KIDNEY DISEASE, WITH LONG-TERM CURRENT USE OF INSULIN (HCC): ICD-10-CM

## 2024-09-13 DIAGNOSIS — Z01.419 ENCOUNTER FOR ANNUAL ROUTINE GYNECOLOGICAL EXAMINATION: Primary | ICD-10-CM

## 2024-09-13 DIAGNOSIS — R80.1 PERSISTENT PROTEINURIA: ICD-10-CM

## 2024-09-13 DIAGNOSIS — E83.39 HYPERPHOSPHATEMIA: ICD-10-CM

## 2024-09-13 DIAGNOSIS — N25.81 SECONDARY HYPERPARATHYROIDISM OF RENAL ORIGIN (HCC): ICD-10-CM

## 2024-09-13 DIAGNOSIS — D50.9 IRON DEFICIENCY ANEMIA, UNSPECIFIED IRON DEFICIENCY ANEMIA TYPE: ICD-10-CM

## 2024-09-13 DIAGNOSIS — Z12.31 SCREENING MAMMOGRAM FOR BREAST CANCER: ICD-10-CM

## 2024-09-13 LAB
25(OH)D3 SERPL-MCNC: 61.2 NG/ML (ref 30–100)
ANION GAP SERPL CALCULATED.3IONS-SCNC: 9 MMOL/L (ref 4–13)
BACTERIA UR QL AUTO: ABNORMAL /HPF
BILIRUB UR QL STRIP: NEGATIVE
BUN SERPL-MCNC: 27 MG/DL (ref 5–25)
CALCIUM SERPL-MCNC: 8.4 MG/DL (ref 8.4–10.2)
CHLORIDE SERPL-SCNC: 111 MMOL/L (ref 96–108)
CLARITY UR: CLEAR
CO2 SERPL-SCNC: 23 MMOL/L (ref 21–32)
COLOR UR: YELLOW
CREAT SERPL-MCNC: 1.76 MG/DL (ref 0.6–1.3)
CREAT UR-MCNC: 183.5 MG/DL
ERYTHROCYTE [DISTWIDTH] IN BLOOD BY AUTOMATED COUNT: 13.9 % (ref 11.6–15.1)
ESTRADIOL SERPL-MCNC: <15 PG/ML
FERRITIN SERPL-MCNC: 43 NG/ML (ref 11–307)
FSH SERPL-ACNC: 157.8 MIU/ML
GFR SERPL CREATININE-BSD FRML MDRD: 33 ML/MIN/1.73SQ M
GLUCOSE SERPL-MCNC: 129 MG/DL (ref 65–140)
GLUCOSE UR STRIP-MCNC: NEGATIVE MG/DL
HCT VFR BLD AUTO: 37.6 % (ref 34.8–46.1)
HGB BLD-MCNC: 12.3 G/DL (ref 11.5–15.4)
HGB UR QL STRIP.AUTO: NEGATIVE
HYALINE CASTS #/AREA URNS LPF: ABNORMAL /LPF
IRON SATN MFR SERPL: 30 % (ref 15–50)
IRON SERPL-MCNC: 69 UG/DL (ref 50–212)
KETONES UR STRIP-MCNC: NEGATIVE MG/DL
LEUKOCYTE ESTERASE UR QL STRIP: ABNORMAL
MAGNESIUM SERPL-MCNC: 1.8 MG/DL (ref 1.9–2.7)
MCH RBC QN AUTO: 29.4 PG (ref 26.8–34.3)
MCHC RBC AUTO-ENTMCNC: 32.7 G/DL (ref 31.4–37.4)
MCV RBC AUTO: 90 FL (ref 82–98)
MUCOUS THREADS UR QL AUTO: ABNORMAL
NITRITE UR QL STRIP: NEGATIVE
NON-SQ EPI CELLS URNS QL MICRO: ABNORMAL /HPF
PH UR STRIP.AUTO: 6.5 [PH]
PHOSPHATE SERPL-MCNC: 3.8 MG/DL (ref 2.7–4.5)
PLATELET # BLD AUTO: 142 THOUSANDS/UL (ref 149–390)
PMV BLD AUTO: 12 FL (ref 8.9–12.7)
POTASSIUM SERPL-SCNC: 3.6 MMOL/L (ref 3.5–5.3)
PROT UR STRIP-MCNC: ABNORMAL MG/DL
PROT UR-MCNC: 15.3 MG/DL
PROT/CREAT UR: 0.1 MG/G{CREAT} (ref 0–0.1)
PTH-INTACT SERPL-MCNC: 123 PG/ML (ref 12–88)
RBC # BLD AUTO: 4.18 MILLION/UL (ref 3.81–5.12)
RBC #/AREA URNS AUTO: ABNORMAL /HPF
SODIUM SERPL-SCNC: 143 MMOL/L (ref 135–147)
SP GR UR STRIP.AUTO: 1.02 (ref 1–1.03)
TIBC SERPL-MCNC: 228 UG/DL (ref 250–450)
UIBC SERPL-MCNC: 159 UG/DL (ref 155–355)
UROBILINOGEN UR STRIP-ACNC: 3 MG/DL
WBC # BLD AUTO: 5.24 THOUSAND/UL (ref 4.31–10.16)
WBC #/AREA URNS AUTO: ABNORMAL /HPF

## 2024-09-13 PROCEDURE — 83735 ASSAY OF MAGNESIUM: CPT

## 2024-09-13 PROCEDURE — S0612 ANNUAL GYNECOLOGICAL EXAMINA: HCPCS | Performed by: OBSTETRICS & GYNECOLOGY

## 2024-09-13 PROCEDURE — 83970 ASSAY OF PARATHORMONE: CPT

## 2024-09-13 PROCEDURE — 82728 ASSAY OF FERRITIN: CPT

## 2024-09-13 PROCEDURE — 82570 ASSAY OF URINE CREATININE: CPT

## 2024-09-13 PROCEDURE — 84100 ASSAY OF PHOSPHORUS: CPT

## 2024-09-13 PROCEDURE — 36415 COLL VENOUS BLD VENIPUNCTURE: CPT

## 2024-09-13 PROCEDURE — 83540 ASSAY OF IRON: CPT

## 2024-09-13 PROCEDURE — 82670 ASSAY OF TOTAL ESTRADIOL: CPT

## 2024-09-13 PROCEDURE — 85027 COMPLETE CBC AUTOMATED: CPT

## 2024-09-13 PROCEDURE — 81001 URINALYSIS AUTO W/SCOPE: CPT

## 2024-09-13 PROCEDURE — 84156 ASSAY OF PROTEIN URINE: CPT

## 2024-09-13 PROCEDURE — 80048 BASIC METABOLIC PNL TOTAL CA: CPT

## 2024-09-13 PROCEDURE — 83001 ASSAY OF GONADOTROPIN (FSH): CPT

## 2024-09-13 PROCEDURE — 82306 VITAMIN D 25 HYDROXY: CPT

## 2024-09-13 PROCEDURE — 83550 IRON BINDING TEST: CPT

## 2024-09-13 NOTE — PROGRESS NOTES
Kinza CHAVEZ Kristine  1975      CC:  Yearly exam    S:  49 y.o. female here for yearly exam.   Amenorrheic with her IUD.      She denies vaginal discharge, itching, pelvic pain.   She has no urinary concerns, does not have incontinence.  No bowel concerns.  No breast concerns.     Sexual activity: She is sexually active.  Does have pain and dryness.     Contraception: She uses Mirena (2017)  for contraception.  Would like this replaced if needed.     Last Pap: 9/12/23 - NILM, Neg HPV  Last Mammo:  10/17/23 - BiRad 2, benign    We reviewed ASCCP guidelines for Pap testing today.     Family hx of breast cancer: no  Family hx of ovarian cancer: P Aunt  Family hx of colon cancer: MGM      Current Outpatient Medications:     acetaZOLAMIDE (DIAMOX) 250 mg tablet, TAKE 1 TABLET(250 MG) BY MOUTH TWICE DAILY, Disp: 180 tablet, Rfl: 1    Ajovy 225 MG/1.5ML auto-injector, INJECT 4.5 ML(675 MG TOTAL) UNDER THE SKIN EVERY 3 MONTHS, Disp: 4.5 mL, Rfl: 4    atorvastatin (LIPITOR) 10 mg tablet, Take 1 tablet (10 mg total) by mouth daily at bedtime, Disp: 90 tablet, Rfl: 0    BD Pen Needle Glory U/F 32G X 4 MM MISC, Inject under the skin 2 (two) times a day Use as directed, Disp: 180 each, Rfl: 0    Blood Glucose Monitoring Suppl (ONE TOUCH ULTRA 2) w/Device KIT, Use 1 each 2 (two) times a day Use as instructed, Disp: 1 kit, Rfl: 0    calcitriol (ROCALTROL) 0.25 mcg capsule, TAKE 1 CAPSULE BY MOUTH daily, Disp: 30 capsule, Rfl: 5    cetirizine (ZyrTEC) oral solution, Take 5 mL (5 mg total) by mouth daily, Disp: 450 mL, Rfl: 1    Cholecalciferol (Vitamin D3) 50 MCG (2000 UT) CAPS, TAKE 1 CAPSULE(2,000 UNITS TOTAL) BY MOUTH DAILY, Disp: 90 capsule, Rfl: 1    clonazePAM (KlonoPIN) 0.5 mg tablet, Take 1 tablet (0.5 mg total) by mouth 2 (two) times a day, Disp: 180 tablet, Rfl: 0    Continuous Blood Gluc Sensor (FreeStyle Filiberto 3 Sensor) MISC, Use 1 each every 14 (fourteen) days, Disp: 6 each, Rfl: 1    cycloSPORINE, PF, (Cequa) 0.09 %  SOLN, Apply 1 drop to eye 2 (two) times a day, Disp: , Rfl:     divalproex sodium (Depakote) 500 mg DR tablet, Take 1 tablet (500 mg total) by mouth daily at bedtime, Disp: 90 tablet, Rfl: 3    docusate sodium (COLACE) 100 mg capsule, TAKE 1 CAPSULE BY MOUTH TWICE A DAY, Disp: 60 capsule, Rfl: 0    DULoxetine (CYMBALTA) 60 mg delayed release capsule, Take 1 capsule (60 mg total) by mouth daily, Disp: 90 capsule, Rfl: 1    ferrous sulfate 324 (65 Fe) mg, Take 1 tablet (324 mg total) by mouth daily before breakfast, Disp: 90 tablet, Rfl: 3    insulin aspart (NovoLOG FlexPen) 100 UNIT/ML injection pen, Continue with sliding scale insulin regimen, Disp: , Rfl:     Insulin Glargine Solostar (Lantus SoloStar) 100 UNIT/ML SOPN, Inject 0.15 mL (15 Units total) under the skin daily at bedtime, Disp: 15 mL, Rfl: 0    Lancets MISC, Use 1 each 2 (two) times a day Use as instructed, Disp: , Rfl:     levonorgestrel (MIRENA) 20 MCG/24HR IUD, Mirena 20 mcg/24 hr (5 years) intrauterine device  Vaginally for 5 years., Disp: , Rfl:     lisdexamfetamine (VYVANSE) 30 MG capsule, Take 1 capsule (30 mg total) by mouth every morning Max Daily Amount: 30 mg, Disp: 30 capsule, Rfl: 0    losartan-hydrochlorothiazide (HYZAAR) 100-12.5 MG per tablet, TAKE ONE TABLET BY MOUTH ONE TIME DAILY, Disp: 90 tablet, Rfl: 0    methocarbamol (Robaxin-750) 750 mg tablet, Take 1 tablet (750 mg total) by mouth every 6 (six) hours as needed for muscle spasms (back pain), Disp: 30 tablet, Rfl: 3    metoprolol tartrate (LOPRESSOR) 100 mg tablet, Take 1 tablet (100 mg total) by mouth every 12 (twelve) hours, Disp: 180 tablet, Rfl: 0    Multiple Vitamins-Minerals (multivitamin with minerals) tablet, Take 1 tablet by mouth daily, Disp: , Rfl:     mupirocin (BACTROBAN) 2 % ointment, Apply topically 3 (three) times a day, Disp: 60 g, Rfl: 0    NON FORMULARY, Botox injections for HA every 3months (LD 3/20/23), Disp: , Rfl:     ondansetron (ZOFRAN) 4 mg tablet, Take 1  "tablet (4 mg total) by mouth every 8 (eight) hours as needed for nausea or vomiting, Disp: 120 tablet, Rfl: 0    Ophthalmic Irrigation Solution (OCUSOFT EYE WASH OP), Apply 1 Application to eye daily at bedtime, Disp: , Rfl:     Polyethyl Glycol-Propyl Glycol (Systane) 0.4-0.3 % GEL, Apply 1 drop to eye 2 (two) times a day Using Ivizia as an alternative, Disp: , Rfl:     polyethylene glycol (MIRALAX) 17 g packet, Take 17 g by mouth daily, Disp: 1 each, Rfl: 0    pregabalin (LYRICA) 100 mg capsule, Take 1 capsule (100 mg total) by mouth 3 (three) times a day, Disp: 270 capsule, Rfl: 0    rimegepant sulfate (Nurtec) 75 mg TBDP, Place one tab (75mg total) under the tongue as needed for migraine., Disp: 16 tablet, Rfl: 11    sodium bicarbonate 650 mg tablet, Take 1 tablet (650 mg total) by mouth 3 (three) times a day (Patient taking differently: Take 650 mg by mouth 2 (two) times a day), Disp: 360 tablet, Rfl: 3    Syringe/Needle, Disp, (SYRINGE 3CC/63XY9-9/2\") 25G X 1-1/2\" 3 ML MISC, Use once a week, Disp: 4 each, Rfl: 3    tirzepatide (Mounjaro) 15 MG/0.5ML, Inject 0.5 mL (15 mg total) under the skin every 7 days, Disp: 6 mL, Rfl: 0    cyanocobalamin 1,000 mcg/mL, Inject 1 mL (1,000 mcg total) into a muscle once a week (Patient not taking: Reported on 9/13/2024), Disp: 4 mL, Rfl: 3    cyproheptadine (PERIACTIN) 4 mg tablet, Take 1 tablet (4 mg total) by mouth daily at bedtime (Patient not taking: Reported on 11/3/2023), Disp: 90 tablet, Rfl: 1    diphenhydrAMINE (BENADRYL) 25 mg tablet, Take 1 tablet (25 mg total) by mouth every 6 (six) hours as needed for itching (Patient not taking: Reported on 11/3/2023), Disp: 30 tablet, Rfl: 0    Current Facility-Administered Medications:     cyanocobalamin injection 1,000 mcg, 1,000 mcg, Intramuscular, Weekly, Myla Norwood MD, 1,000 mcg at 07/01/24 1007  Patient Active Problem List   Diagnosis    ADHD    Bilateral chronic knee pain    Chronic renal insufficiency    Uncontrolled " type 2 diabetes mellitus with hyperglycemia (HCC)    Endometriosis    Facet arthritis of lumbosacral region    Fibromyalgia    Gastroesophageal reflux disease without esophagitis    Herniated nucleus pulposus, L1-2    Lumbar stenosis with neurogenic claudication    Morbid obesity with BMI of 40.0-44.9, adult (HCC)    Greater trochanteric bursitis of both hips    Sacroiliitis (HCC)    Intervertebral disc disorders with radiculopathy, lumbar region    Stage 3b chronic kidney disease (HCC)    Persistent proteinuria    Hypertensive kidney disease with stage 3b chronic kidney disease (HCC)    Hyponatremia    Cervical disc disorder with radiculopathy    Mixed hyperlipidemia    S/P cervical spinal fusion    Chronic kidney disease-mineral and bone disorder    Vitamin D deficiency    Chronic tension type headache    Chronic migraine w/o aura w/o status migrainosus, not intractable    Medical marijuana use    Snores    Headache upon awakening    Acute on chronic kidney failure  (HCC)    Hypokalemia    Near syncope    Weakness    Status post gastric bypass for obesity    Diarrhea    Type 2 diabetes mellitus with diabetic neuropathy (HCC)    Ventral hernia without obstruction or gangrene    Lumbar disc herniation with radiculopathy    Concussion with moderate (1-24 hours) loss of consciousness    Primary osteoarthritis of both knees    Diabetic nephropathy associated with type 2 diabetes mellitus (HCC)    Dysthymic disorder    Insulin dependent type 2 diabetes mellitus (HCC)    Intractable migraine with status migrainosus    Secondary hyperparathyroidism of renal origin (HCC)    Dizziness    Obesity, unspecified    Diabetes mellitus (HCC)    HTN (hypertension)    Other gastritis without bleeding    Annual physical exam    Depression    Cauda equina syndrome (HCC)    Wound infection    Anemia    Continuous opioid dependence (HCC)    Surgical wound, non healing    Diabetic polyneuropathy associated with diabetes mellitus due to  underlying condition (HCC)    Bipolar affective disorder, currently manic, mild (HCC)    Seasonal allergic rhinitis    Cortical age-related cataract of left eye    Benign hypertension with CKD (chronic kidney disease) stage IV ([facility])    Maceration of skin    Open wound of back    IIH (idiopathic intracranial hypertension)    Bilateral carpal tunnel syndrome    Neurogenic bladder    Metabolic acidosis    Hyperphosphatemia    Chest pain    Abnormal blood electrolyte level    Thrombocytopenia (HCC)    Benign hypertension with chronic kidney disease, stage III ([facility])    Cellulitis of right shoulder    Cellulitis of right toe     Past Medical History:   Diagnosis Date    ADD (attention deficit disorder)     Allergic rhinitis     ALS (amyotrophic lateral sclerosis) ([facility]) 11/22    Per neurological surgeon    Anemia 12/2021    Anxiety     Arthritis     Bipolar disorder ([facility])     Cervical disc disorder with radiculopathy of cervical region     Chronic depression     Chronic kidney disease     Stage 1    Chronic pain disorder     Cluster headache     Colitis     Last assessed - 11/25/14    Colon polyp     Concussion 2018    Condyloma acuminatum     Last assessed - 3/19/14    CTS (carpal tunnel syndrome) 2001    Depression     Diabetes mellitus ([facility])     Last assessed - 11/25/14    Diabetic nephropathy ([facility]) 2001    Diabetic neuropathy ([facility])     Diabetic retinopathy ([facility]) 2001    Difficulty walking 07/22    Fibromyalgia     Fibromyalgia, primary     GERD (gastroesophageal reflux disease)     Head injury 08/13/18    Fall    Headache(784.0) 1977    History of transfusion 12/2021    HTN (hypertension)     Last assessed - 11/25/14    Hyperlipidemia 07/22/2019    Hypertension     Intervertebral disc disorder with radiculopathy of lumbar region     Lupus ([facility]) 2003    Rhuematologist-Sheela Dasilva, [place]    Migraine     Miscarriage 1988    2001, 2004    Obesity 1980    Obesity, unspecified     Open wound of back 05/30/2023    Opioid  "abuse, in remission ([facility]) 2022    Only while in hospital and for about 10 days once home instead of one week.    Osteoporosis     Peripheral neuropathy     Postoperative wound infection 2021    Stop not healed    Preeclampsia     Last assessed - 14    Rheumatoid arthritis ([facility])     Wears dentures      Family History   Problem Relation Age of Onset    Cervical cancer Mother     Kidney disease Mother     Cancer Mother         Cervical    Diabetes Mother     Hypertension Mother             Neuropathy Mother             Suicidality Father     Depression Father     Mental illness Father         Most likely,, but doesat age22 without diagnosis    Anxiety disorder Father     Completed Suicide  Father         1978    Psychiatric Illness Father          age 21    Suicide Attempts Father         4    No Known Problems Sister     Mental illness Daughter         By-polar Disorder    Mental illness Daughter         Borderline Personality Disorder    Colon cancer Maternal Grandmother             Anemia Maternal Grandmother     No Known Problems Maternal Grandfather     Uterine cancer Paternal Grandmother     No Known Problems Paternal Grandfather     No Known Problems Maternal Aunt     Ovarian cancer Paternal Aunt     ADD / ADHD Cousin     ADD / ADHD Cousin     No Known Problems Family           Review of Systems   Respiratory: Negative.    Cardiovascular: Negative.    Gastrointestinal: Negative for constipation and diarrhea.     O:  Blood pressure 126/82, height 5' 1\" (1.549 m), weight 74.5 kg (164 lb 3.2 oz), not currently breastfeeding.    Patient appears well and is not in distress  Breasts are symmetrical without mass, tenderness, nipple discharge, skin changes or adenopathy.   Abdomen is soft and nontender without masses.   External genitals are normal without lesions or rashes.  Urethral meatus and urethra are normal  Bladder is normal to palpation  Vagina is normal without " discharge or bleeding.   Cervix is normal without discharge or lesion.   Uterus is normal, mobile, nontender without palpable mass.  Adnexa are normal, nontender, without palpable mass.     A:  Yearly exam.     P:   Pap & HPV up to date  Mammogram up to date  Will check FSH/estradiol to assess menopause status, may not need IUD replaced.       RTO one year for yearly exam or sooner as needed.

## 2024-09-13 NOTE — TELEPHONE ENCOUNTER
Pharmacy calling received faxed regarding Insulin Syringe 1 ml and Novolin Vial. Scanned in media chart on 8/16/24. Requesting scripts to be sent to pharmacy to fill for patient as there is missing information needed by provider.        Please review.  Thank you

## 2024-09-16 ENCOUNTER — TELEPHONE (OUTPATIENT)
Dept: NEPHROLOGY | Facility: CLINIC | Age: 49
End: 2024-09-16

## 2024-09-16 DIAGNOSIS — E53.8 B12 DEFICIENCY: ICD-10-CM

## 2024-09-16 NOTE — RESULT ENCOUNTER NOTE
Please inform patient that renal function improved to creatinine 1.7 mg/dL, potassium is now at normal range.  Continue same treatment.  Will discuss further during office visit next month

## 2024-09-16 NOTE — TELEPHONE ENCOUNTER
----- Message from Garret Palacios MD sent at 9/15/2024  9:05 PM EDT -----  Please inform patient that renal function improved to creatinine 1.7 mg/dL, potassium is now at normal range.  Continue same treatment.  Will discuss further during office visit next month

## 2024-09-18 DIAGNOSIS — G43.009 MIGRAINE WITHOUT AURA AND WITHOUT STATUS MIGRAINOSUS, NOT INTRACTABLE: ICD-10-CM

## 2024-09-18 RX ORDER — FREMANEZUMAB-VFRM 225 MG/1.5ML
INJECTION SUBCUTANEOUS
Qty: 4.5 ML | Refills: 4 | Status: SHIPPED | OUTPATIENT
Start: 2024-09-18

## 2024-09-20 DIAGNOSIS — I10 ESSENTIAL HYPERTENSION, BENIGN: ICD-10-CM

## 2024-09-20 DIAGNOSIS — E78.5 HYPERLIPIDEMIA, UNSPECIFIED: ICD-10-CM

## 2024-09-20 RX ORDER — METOPROLOL TARTRATE 100 MG
100 TABLET ORAL EVERY 12 HOURS
Qty: 180 TABLET | Refills: 1 | Status: SHIPPED | OUTPATIENT
Start: 2024-09-20

## 2024-09-20 RX ORDER — ATORVASTATIN CALCIUM 10 MG/1
10 TABLET, FILM COATED ORAL
Qty: 90 TABLET | Refills: 1 | Status: SHIPPED | OUTPATIENT
Start: 2024-09-20

## 2024-09-25 ENCOUNTER — PROCEDURE VISIT (OUTPATIENT)
Age: 49
End: 2024-09-25
Payer: COMMERCIAL

## 2024-09-25 VITALS — WEIGHT: 164 LBS | HEIGHT: 61 IN | BODY MASS INDEX: 30.96 KG/M2

## 2024-09-25 DIAGNOSIS — M47.812 CERVICAL SPONDYLOSIS: ICD-10-CM

## 2024-09-25 DIAGNOSIS — M99.04 SEGMENTAL DYSFUNCTION OF SACRAL REGION: ICD-10-CM

## 2024-09-25 DIAGNOSIS — M48.062 SPINAL STENOSIS OF LUMBAR REGION WITH NEUROGENIC CLAUDICATION: Primary | ICD-10-CM

## 2024-09-25 DIAGNOSIS — M99.01 SEGMENTAL DYSFUNCTION OF CERVICAL REGION: ICD-10-CM

## 2024-09-25 DIAGNOSIS — M99.02 SEGMENTAL DYSFUNCTION OF THORACIC REGION: ICD-10-CM

## 2024-09-25 DIAGNOSIS — M99.03 SEGMENTAL DYSFUNCTION OF LUMBAR REGION: ICD-10-CM

## 2024-09-25 DIAGNOSIS — M62.838 MUSCLE SPASM: ICD-10-CM

## 2024-09-25 PROCEDURE — 98941 CHIROPRACT MANJ 3-4 REGIONS: CPT | Performed by: CHIROPRACTOR

## 2024-09-25 PROCEDURE — 97110 THERAPEUTIC EXERCISES: CPT | Performed by: CHIROPRACTOR

## 2024-09-25 NOTE — PROGRESS NOTES
Initial date of service: 5/6/24    Diagnoses and all orders for this visit:    Spinal stenosis of lumbar region with neurogenic claudication    Segmental dysfunction of sacral region    Muscle spasm    Cervical spondylosis    Segmental dysfunction of lumbar region    Segmental dysfunction of thoracic region    Segmental dysfunction of cervical region    Pt suffered mild exacerbation but responded to tx with reduced pain and increased ROM. Overall improving with more mobility    TREATMENT: [id number]  Ther-ex: IASTM; discussed post procedure soreness and/or ecchymosis for up to 36 hrs, applied to affected mm hypertonicities; supine hamstring stretch, supine gluteal stretch, single knee to chest stretch, upper trap stretch, transitional mvmt education, abdominal bracing; greater than 15 min spent performing above mentioned ther-ex to improve ROM/flexibility. Cervical supine graded mobilization and traction, Thoracic mobilization/manipulation: prone P-A mob; Lumbar mobilization/manipulation: diversified side laying graded HVLA, flexion-traction; SIJ Manipulation/Mobilization: R/L SIJ HVLA - long axis distraction, mederos drop table maneuver to affected SIJ    HPI:  Kinza Meza is a 49 y.o. female  Chief Complaint   Patient presents with    Neck Pain     Neck about a 4 right side     Back Pain     Low back 4      Pt presents for tx for exacerbation of chronic neck/back pain. Pt has had back and neck surgery. Pt was utilizing cane and now using walker occasionally. 2022 MRI demonstrates multilevel degenerative changes of postsurgical lumbar spine status post L1-L2 posterior spinal fixation with varying degrees of canal stenosis (moderate L4-L5) and foraminal narrowing (mild left L1-L2 and left L2-L3 ). 2024 fl/ext xrays unremarkable for instability. 2021 C/S MRI demonstrates s/p ACDF from C4 to C7. Multilevel degenerative spondylosis as described. Possible ossification of the posterior longitudinal ligament at  C2-3 and C6-7  9/25: pt reports feeling and moving better since last tx      Neck Pain   This is a chronic problem. The current episode started more than 1 year ago. The problem occurs constantly. The problem has been waxing and waning. The pain is present in the left side, midline and right side. The quality of the pain is described as aching. The symptoms are aggravated by bending, position, stress and twisting. The pain is Worse during the day.   Back Pain  This is a chronic problem. The current episode started more than 1 year ago. The problem occurs constantly. The problem has been waxing and waning since onset. The pain is present in the gluteal, lumbar spine, sacro-iliac and thoracic spine. The quality of the pain is described as aching, burning and stabbing. The pain radiates to the left thigh, left knee and right thigh. Pain scale: 1-6/10. The pain is Worse during the day. The symptoms are aggravated by standing, twisting and bending (walking, laying supine, transitional mvmts; palliative includes sitting, massage). Pertinent negatives include no bladder incontinence or bowel incontinence. Risk factors include lack of exercise.     Past Medical History:   Diagnosis Date    ADD (attention deficit disorder)     Allergic rhinitis     ALS (amyotrophic lateral sclerosis) ([facility]) 11/22    Per neurological surgeon    Anemia 12/2021    Anxiety     Arthritis     Bipolar disorder ([facility])     Cervical disc disorder with radiculopathy of cervical region     Chronic depression     Chronic kidney disease     Stage 1    Chronic pain disorder     Cluster headache     Colitis     Last assessed - 11/25/14    Colon polyp     Concussion 2018    Condyloma acuminatum     Last assessed - 3/19/14    CTS (carpal tunnel syndrome) 2001    Depression     Diabetes mellitus ([facility])     Last assessed - 11/25/14    Diabetic nephropathy ([facility]) 2001    Diabetic neuropathy ([facility])     Diabetic retinopathy ([facility]) 2001    Difficulty walking 07/22     Fibromyalgia     Fibromyalgia, primary     GERD (gastroesophageal reflux disease)     Head injury 18    Fall    Headache(784.0) 1977    History of transfusion 2021    HTN (hypertension)     Last assessed - 14    Hyperlipidemia 2019    Hypertension     Intervertebral disc disorder with radiculopathy of lumbar region     Lupus (HCC)     Rhuematologist-Sheela Dasilva, PA    Migraine     Miscarriage 1988    ,     Obesity 1980    Obesity, unspecified     Open wound of back 2023    Opioid abuse, in remission (HCC) 2022    Only while in hospital and for about 10 days once home instead of one week.    Osteoporosis     Peripheral neuropathy     Postoperative wound infection 2021    Stop not healed    Preeclampsia     Last assessed - 14    Rheumatoid arthritis ([facility])     Wears dentures       Past Surgical History:   Procedure Laterality Date    APPENDECTOMY      Last assessed - 14    BARIATRIC SURGERY  2016    CARPAL TUNNEL RELEASE      CATARACT EXTRACTION      CERVICAL FUSION N/A 2019    Procedure: Anterior cervical discectomy w fusion C4-5, C5-6, C6-7; allograft and neuromonitoring;  Surgeon: Jose Gomez MD;  Location: [facility];  Service: Orthopedics     SECTION      Last assessed - 14    COLONOSCOPY      COLONOSCOPY      HEMORRHOID SURGERY      HERNIA REPAIR      Last assessed - 14    LUMBAR FUSION N/A 2021    Procedure: L1/2 laminectomy, discectomy with L1/2 MIS posterior fixation using neuromonitoring and neuronavigation;  Surgeon: Suhas Akbar MD;  Location: [facility];  Service: Neurosurgery    LUMBAR WOUND EXPLORATION Bilateral 2021    Procedure: Lumbar wound washout, debridement and intraoperative cultures;  Surgeon: Suhas Akbar MD;  Location: [facility];  Service: Neurosurgery    MAMMO (HISTORICAL)      AL DEBRIDEMENT BONE 1ST 20 SQ CM/< N/A 2023    Procedure: SURGICAL PREPARATION OF BACK  WOUND AND  LOCAL FLAP, PREVENA PLACEMENT;  Surgeon: Ady Rocha MD;  Location:  MAIN OR;  Service: Plastics    WOOD-EN-Y PROCEDURE      ULNAR TUNNEL RELEASE      VAC DRESSING APPLICATION Bilateral 12/22/2021    Procedure: APPLICATION VAC DRESSING;  Surgeon: Elbert Hawley MD;  Location: [facility];  Service: General    VAC DRESSING APPLICATION N/A 12/25/2021    Procedure: APPLICATION VAC DRESSING ABDOMEN/TRUNK;  Surgeon: Mani Ross DO;  Location: [facility];  Service: General    VENTRAL HERNIA REPAIR       The following portions of the patient's history were reviewed and updated as appropriate: allergies, past family history, past medical history, past social history, past surgical history, and problem list.  Review of Systems   Gastrointestinal:  Negative for bowel incontinence.   Genitourinary:  Negative for bladder incontinence.   Musculoskeletal:  Positive for back pain and neck pain.     Physical Exam  Neck:        Comments: Pnful and limited in Brot, Blf  Musculoskeletal:      Cervical back: Pain with movement and muscular tenderness present. Decreased range of motion.      Thoracic back: Spasms and tenderness present. Decreased range of motion.      Lumbar back: Spasms and tenderness present. Decreased range of motion. Negative right straight leg raise test and negative left straight leg raise test.        Back:       Comments: Pnful and limited in Brot, Blf, Ext centralizes    Lymphadenopathy:      Cervical: No cervical adenopathy.   Skin:     General: Skin is warm and dry.   Neurological:      Mental Status: She is alert and oriented to person, place, and time.      Gait: Gait is intact.   Psychiatric:         Mood and Affect: Mood and affect normal.         Behavior: Behavior normal.       SOFT TISSUE ASSESSMENT Hypertonicity and tenderness palpated B upper traps, B SCM, B T10-S1 erector spinae, glute med/min, QL, hamstring JOINT RESTRICTIONS: C4/5, T1/2, T10-S1 and R/L SIJ    Return in about 2  weeks (around 10/9/2024) for Next scheduled follow up.

## 2024-10-01 ENCOUNTER — PROCEDURE VISIT (OUTPATIENT)
Age: 49
End: 2024-10-01
Payer: COMMERCIAL

## 2024-10-01 VITALS
SYSTOLIC BLOOD PRESSURE: 121 MMHG | BODY MASS INDEX: 30.96 KG/M2 | HEIGHT: 61 IN | DIASTOLIC BLOOD PRESSURE: 82 MMHG | WEIGHT: 164 LBS

## 2024-10-01 DIAGNOSIS — Z30.432 ENCOUNTER FOR IUD REMOVAL: Primary | ICD-10-CM

## 2024-10-01 PROCEDURE — 58301 REMOVE INTRAUTERINE DEVICE: CPT | Performed by: OBSTETRICS & GYNECOLOGY

## 2024-10-01 NOTE — PROGRESS NOTES
IUD Procedure    Date/Time: 10/1/2024 1:45 PM    Performed by: Lilly Ramos MD  Authorized by: Lilly Ramos MD    Verbal consent obtained?: Yes    Risks and benefits: Risks, benefits and alternatives were discussed    Consent given by:  Patient  Patient identity confirmed:  Verbally with patient  Select procedure: IUD removal    IUD Removal:     Reason for removal comment:  Expiration    Removed without complications: yes      Tenaculum/Allis/Ring Forceps applied to cervix: no      Strings visualized: yes      IUD intact: yes      Performed with ultrasound guidance: no    Removal Comments:      Labs c/w menopause.   To call with any bleeding, hormonal concerns, other concerns.

## 2024-10-02 ENCOUNTER — TELEPHONE (OUTPATIENT)
Dept: NEUROLOGY | Facility: CLINIC | Age: 49
End: 2024-10-02

## 2024-10-02 ENCOUNTER — PROCEDURE VISIT (OUTPATIENT)
Dept: NEUROLOGY | Facility: CLINIC | Age: 49
End: 2024-10-02
Payer: COMMERCIAL

## 2024-10-02 VITALS — HEART RATE: 93 BPM | DIASTOLIC BLOOD PRESSURE: 76 MMHG | SYSTOLIC BLOOD PRESSURE: 123 MMHG | TEMPERATURE: 98.4 F

## 2024-10-02 DIAGNOSIS — G43.709 CHRONIC MIGRAINE WITHOUT AURA WITHOUT STATUS MIGRAINOSUS, NOT INTRACTABLE: Primary | ICD-10-CM

## 2024-10-02 PROCEDURE — 64615 CHEMODENERV MUSC MIGRAINE: CPT | Performed by: PHYSICIAN ASSISTANT

## 2024-10-02 NOTE — PROGRESS NOTES
"Universal Protocol   procedure performed by consultantConsent: Verbal consent obtained. Written consent obtained.  Risks and benefits: risks, benefits and alternatives were discussed  Consent given by: patient  Time out: Immediately prior to procedure a \"time out\" was called to verify the correct patient, procedure, equipment, support staff and site/side marked as required.  Patient understanding: patient states understanding of the procedure being performed  Patient consent: the patient's understanding of the procedure matches consent given  Procedure consent: procedure consent matches procedure scheduled  Relevant documents: relevant documents present and verified  Patient identity confirmed: verbally with patient      Chemodenervation     Date/Time  10/2/2024 3:30 PM     Performed by  Desiree Grigsby PA-C   Authorized by  Desiree Grigsby PA-C     Pre-procedure details      Prepped With: Alcohol     Anesthesia  (see MAR for exact dosages):     Anesthesia method:  None   Procedure details      Position:  Upright   Botox      Botox Type:  Type A    Brand:  Botox    mL's of Botulinum Toxin:  200    Final Concentration per CC:  50 units    Needle Gauge:  30 G 2.5 inch   Procedures      Botox Procedures: chronic headache      Indications: migraines     Injection Location      Head / Face:  L superior cervical paraspinal, R superior cervical paraspinal, L , R , L frontalis, R frontalis, L medial occipitalis, R medial occipitalis, procerus, R temporalis, L temporalis, R superior trapezius and L superior trapezius    L  injection amount:  5 unit(s)    R  injection amount:  5 unit(s)    L lateral frontalis:  5 unit(s)    R lateral frontalis:  5 unit(s)    L medial frontalis:  5 unit(s)    R medial frontalis:  5 unit(s)    L temporalis injection amount:  20 unit(s)    R temporalis injection amount:  20 unit(s)    Procerus injection amount:  5 unit(s)    L medial occipitalis injection " amount:  15 unit(s)    R medial occipitalis injection amount:  15 unit(s)    L superior cervical paraspinal injection amount:  10 unit(s)    R superior cervical paraspinal injection amount:  10 unit(s)    L superior trapezius injection amount:  15 unit(s)    R superior trapezius injection amount:  15 unit(s)   Total Units      Total units used:  200    Total units discarded:  0   Post-procedure details      Chemodenervation:  Chronic migraine    Facial Nerve Location::  Bilateral facial nerve    Patient tolerance of procedure:  Tolerated well, no immediate complications   Comments       5 units trapezius bilaterally   5 units splenius capitis bilaterally   25 units occipitalis   All medically necessary

## 2024-10-02 NOTE — TELEPHONE ENCOUNTER
Please check on status of PA for ajovy for continuation of care.  Hasn't done in a bit.  Last time obtained from pharmacy 8/21/2024

## 2024-10-03 NOTE — TELEPHONE ENCOUNTER
Cee PEDRAZA completed on [facility]  Key: [id number]  If you have any questions about your PA submission, please contact [employer] at [phone].

## 2024-10-08 ENCOUNTER — OFFICE VISIT (OUTPATIENT)
Dept: NEPHROLOGY | Facility: CLINIC | Age: 49
End: 2024-10-08
Payer: COMMERCIAL

## 2024-10-08 VITALS
DIASTOLIC BLOOD PRESSURE: 78 MMHG | HEART RATE: 88 BPM | SYSTOLIC BLOOD PRESSURE: 128 MMHG | WEIGHT: 159 LBS | HEIGHT: 61 IN | BODY MASS INDEX: 30.02 KG/M2

## 2024-10-08 DIAGNOSIS — E83.42 HYPOMAGNESEMIA: ICD-10-CM

## 2024-10-08 DIAGNOSIS — E11.22 TYPE 2 DIABETES MELLITUS WITH STAGE 4 CHRONIC KIDNEY DISEASE, WITH LONG-TERM CURRENT USE OF INSULIN (HCC): ICD-10-CM

## 2024-10-08 DIAGNOSIS — E55.9 VITAMIN D DEFICIENCY: ICD-10-CM

## 2024-10-08 DIAGNOSIS — N25.81 SECONDARY HYPERPARATHYROIDISM OF RENAL ORIGIN (HCC): ICD-10-CM

## 2024-10-08 DIAGNOSIS — R80.1 PERSISTENT PROTEINURIA: ICD-10-CM

## 2024-10-08 DIAGNOSIS — E87.20 METABOLIC ACIDOSIS: ICD-10-CM

## 2024-10-08 DIAGNOSIS — N18.4 STAGE 4 CHRONIC KIDNEY DISEASE (HCC): Primary | ICD-10-CM

## 2024-10-08 DIAGNOSIS — D50.9 IRON DEFICIENCY ANEMIA, UNSPECIFIED IRON DEFICIENCY ANEMIA TYPE: ICD-10-CM

## 2024-10-08 DIAGNOSIS — Z79.4 TYPE 2 DIABETES MELLITUS WITH STAGE 4 CHRONIC KIDNEY DISEASE, WITH LONG-TERM CURRENT USE OF INSULIN (HCC): ICD-10-CM

## 2024-10-08 DIAGNOSIS — N18.4 TYPE 2 DIABETES MELLITUS WITH STAGE 4 CHRONIC KIDNEY DISEASE, WITH LONG-TERM CURRENT USE OF INSULIN (HCC): ICD-10-CM

## 2024-10-08 DIAGNOSIS — N18.4 BENIGN HYPERTENSION WITH CKD (CHRONIC KIDNEY DISEASE) STAGE IV (HCC): ICD-10-CM

## 2024-10-08 DIAGNOSIS — I12.9 BENIGN HYPERTENSION WITH CKD (CHRONIC KIDNEY DISEASE) STAGE IV (HCC): ICD-10-CM

## 2024-10-08 DIAGNOSIS — R10.9 FLANK PAIN: ICD-10-CM

## 2024-10-08 PROCEDURE — 99214 OFFICE O/P EST MOD 30 MIN: CPT | Performed by: INTERNAL MEDICINE

## 2024-10-08 RX ORDER — MAGNESIUM L-LACTATE 84 MG
84 TABLET, EXTENDED RELEASE ORAL DAILY
Qty: 90 TABLET | Refills: 3 | Status: SHIPPED | OUTPATIENT
Start: 2024-10-08

## 2024-10-08 NOTE — PROGRESS NOTES
NEPHROLOGY OUTPATIENT PROGRESS NOTE   Kinza Meza 49 y.o. female MRN: [id number]  DATE: 10/8/2024  Reason for visit:   Chief Complaint   Patient presents with    Follow-up    CKD III       ASSESSMENT and PLAN:  Chronic kidney disease stage 4/3b  -recent baseline creatinine around 1.7 to 2.0 mg/dL with some CKD progression  -Past history of acute kidney injury due to urinary retention    --> Kidney ultrasound from September 2023 did not suggest any hydronephrosis.  Found to have significant residual volume in the urinary bladder, postvoid residual was 107 mL indicating urinary retention, she was referred to urology  -Etiology: Chronic kidney disease likely due to hypertensive nephrosclerosis as well as diabetic nephropathy.  past history of NSAIDs use  -SPEP and UPEP was negative for monoclonal bands, kappa lambda ratio was 1.7 in the setting of CKD.  SPEP was checked again in July 2021 and was found to be negative for monoclonal bands  -Plan:   Renal function is stable last blood work from 9/13 showed creatinine 1.76 mg/dL continue to monitor.  Continue to avoid nephrotoxins like NSAIDs and IV contrast.  Stressed on oral hydration 60 oz/day   Recently lost weight due to dietary modifications which is helping   LDL at goal less than 100, currently on statins.  Last LDL was 48  Reminded to check renal u/s ordered by Dr Norwood as she was complaining of flank pain   Avoid Nephrotoxins like NSAIDs and IV contrast.   [facility] : referred in November 2020 .  Completed on phone in December 2020   Follow up - 4 months with labs      Primary Hypertension with cKD stage 4:   -Current medication:  metoprolol 100 mg bid, losartan-hctz (100-12.5 mg )   -has been off verapamil  -Current blood pressure:  stable and is at goal.  Continue to monitor  -Recommend 2 g sodium diet.    -Recommend daily exercise and weight loss  -Discussed home monitoring of BP and maintaining a log of blood pressure.  -Recommend goal BP less than  130/80.     Persistent proteinuria- resolved   -likely due to diabetic nephropathy and hypertensive nephrosclerosis.    -Proteinuria resolved last UPC ratio was 0.1 and albumin creatinine ratio was 13 mg/gm   -SPEP/UPEP negative.  -Continue losartan. Continue to monitor last A1c was 5.0 in August 2024 and improved     Metabolic acidosis: Bicarb level currently at normal range at 23  -on diamox which could be contribute  -On sodium bicarbonate tablet 650 mg bid.  Continue current dose of sodium bicarbonate tablet      Secondary hyperparathyroidism of renal origin/ Vitamin D deficiency  -PTH level improved to 123.0 which is acceptable from prior level of 148.2.  Continue calcitriol 0.25 mcg daily    -Continue vitamin-D 2000 units daily, last level was 61.2-has been out a lot      hyperphosphatemia: Resolved with decreasing intake of cheese, last phosphorus level was 3.8, continue to monitor     hypomagnesemia magnesium level 1.8   -started on magnesium tablet once daily and monitor magnesium level    Anemia, iron deficiency  -Hemoglobin improved stable at 12.3 g/dL.  Iron saturation improved from 4% to 30% with use of iron tablets  -CT ab with spleenomegaly  -Continue oral iron every other day    Diabetes Mellitus Type 2 with CKD stage 3:  -recommend goal A1c less than 7.0 if possible  -Stressed on diabetic diet daily exercise  -continue monitoring of A1c per PCP, management per PCP and follow-up with PCP  -discussed about risk of CKD progression if diabetes is not controlled well.  -currently on:insulin- Mounjaro , insulin  -Last A1c: 5.0    Bilateral flank pain  - stressed on doing renal ultrasound ordered by PCP , ua with some wbc but no urinary symptoms.     H/o Urinary retention/neurogenic bladder  -CT abdomen in October 2023 showed unremarkable bladder, mild splenomegaly. GB stones  -stressed on frequent bladder emptying  -Reviewed last urology note from February 2024, was found to have decreased bladder  sensation and was told to continue timed voiding every 2 hours     Chronic migraine headaches- following with neurology.  Status post Botox injection. Headaches better           Patient Instructions   -Renal Function is stable   -You have Chronic Kidney Disease Stage 4 - continue oral hydration   -Avoid NSAIDs like Ibuprofen/Motrin, Naproxen/Aleve, Celebrex, meloxicam/Mobic, Diclofenac and other NSAIDs.  -Okay to take Acetaminophen/Tylenol if you do not have any liver problems  -Avoid IV contrast used for CT scan and cardiac catheterization.    -If plan for any study with IV contrast, please let me know so we could hydrate with fluids before and after IV contrast  -Dosage  of certain medications may need to be adjusted depending on Kidney function.     BP stable     Started on mag tablet     Follow up: 4 months with repeat Lab work within a week of the scheduled office visit. Will discuss the results of the previsit Labs during the office visit.    Diagnoses and all orders for this visit:    Stage 4 chronic kidney disease (HCC)  -     Basic metabolic panel; Future  -     Protein / creatinine ratio, urine; Future  -     PTH, intact; Future  -     Urinalysis with microscopic; Future  -     Phosphorus; Future  -     Magnesium; Future  -     CBC; Future    Benign hypertension with CKD (chronic kidney disease) stage IV ([facility])  -     Basic metabolic panel; Future    Hypomagnesemia  -     magnesium (MAGTAB) 84 MG (7MEQ) TBCR; Take 1 tablet (84 mg total) by mouth daily  -     Magnesium; Future    Metabolic acidosis  -     Basic metabolic panel; Future    Secondary hyperparathyroidism of renal origin ([facility])  -     PTH, intact; Future    Vitamin D deficiency    Persistent proteinuria  -     Protein / creatinine ratio, urine; Future    Iron deficiency anemia, unspecified iron deficiency anemia type  -     CBC; Future    Type 2 diabetes mellitus with stage 4 chronic kidney disease, with long-term current use of insulin ([facility])  -      Basic metabolic panel; Future  -     Protein / creatinine ratio, urine; Future    Flank pain                  SUBJECTIVE / HPI:  Kinza Meza is a 49 y.o.  female with history of diabetes mellitus type 2, hypertension, C6-C7 disc herniation presenting for follow-up of chronic kidney disease stage 3. .  She underwent anterior cervical diskectomy with fusion C4-C5, C5-C6, C6-C7 on January 22nd 2019. SPEP and UPEP negative for monoclonal protein in May 2019 with kappa lambda ratio 1.7      Patient had renal function worsened to creatinine 4.16 on 9/12/2023 but improved to creatinine 1.87 on 9/19/2023 and was stable at creatinine 1.6-1.7 in October 2023      Since December 2023, creatinine has been around 1.7 to 2.0 mg/dL     -was recently in the hospital in July 2024 For DKA    Reviewed blood work from 9/13/2024.  Renal function improved to creatinine 1.76 mg/dL from 2.13 from early September.  Potassium improved to 3.6.  Magnesium level still low at 1.8    C/o backache- has renal u/s ordered by Dr Norwood   No urinary symptoms     Lost 30 lbs since last visit    REVIEW OF SYSTEMS:    Review of Systems   Constitutional:  Negative for activity change, appetite change, chills, diaphoresis, fatigue and fever.   HENT:  Negative for congestion, facial swelling and nosebleeds.    Eyes:  Negative for pain and visual disturbance.   Respiratory:  Negative for cough, chest tightness and shortness of breath.    Cardiovascular:  Negative for chest pain and palpitations.   Gastrointestinal:  Negative for abdominal distention, abdominal pain, diarrhea, nausea and vomiting.   Genitourinary:  Negative for difficulty urinating, dysuria, flank pain, frequency and hematuria.   Musculoskeletal:  Negative for arthralgias, back pain and joint swelling.   Skin:  Negative for rash.   Neurological:  Negative for dizziness, seizures, syncope, weakness and headaches.   Psychiatric/Behavioral:  Negative for agitation and confusion. The  patient is not nervous/anxious.        More than 10 point review of systems were obtained and discussed in length with the patient. Complete review of systems were negative / unremarkable except mentioned above.       PAST MEDICAL HISTORY:  Past Medical History:   Diagnosis Date    ADD (attention deficit disorder)     Allergic rhinitis     ALS (amyotrophic lateral sclerosis) ([facility]) 11/22    Per neurological surgeon    Anemia 12/2021    Anxiety     Arthritis     Bipolar disorder ([facility])     Cervical disc disorder with radiculopathy of cervical region     Chronic depression     Chronic kidney disease     Stage 1    Chronic pain disorder     Cluster headache     Colitis     Last assessed - 11/25/14    Colon polyp     Concussion 2018    Condyloma acuminatum     Last assessed - 3/19/14    CTS (carpal tunnel syndrome) 2001    Depression     Diabetes mellitus ([facility])     Last assessed - 11/25/14    Diabetic nephropathy ([facility]) 2001    Diabetic neuropathy ([facility])     Diabetic retinopathy ([facility]) 2001    Difficulty walking 07/22    Fibromyalgia     Fibromyalgia, primary     GERD (gastroesophageal reflux disease)     Head injury 08/13/18    Fall    Headache(784.0) 1977    History of transfusion 12/2021    HTN (hypertension)     Last assessed - 11/25/14    Hyperlipidemia 07/22/2019    Hypertension     Intervertebral disc disorder with radiculopathy of lumbar region     Lupus 2003    [place]huematologist-Dr Rashid, [place]    Migraine     Miscarriage 1988    2001, 2004    Obesity 1980    Obesity, unspecified     Open wound of back 05/30/2023    Opioid abuse, in remission ([facility]) 01/2022    Only while in hospital and for about 10 days once home instead of one week.    Osteoporosis     Peripheral neuropathy     Postoperative wound infection 12/2021    Stop not healed    Preeclampsia     Last assessed - 11/25/14    Rheumatoid arthritis ([facility])     Wears dentures        PAST SURGICAL HISTORY:  Past Surgical History:   Procedure Laterality Date     APPENDECTOMY      Last assessed - 14    BARIATRIC SURGERY  2016    CARPAL TUNNEL RELEASE      CATARACT EXTRACTION      CERVICAL FUSION N/A 2019    Procedure: Anterior cervical discectomy w fusion C4-5, C5-6, C6-7; allograft and neuromonitoring;  Surgeon: Jose Gomez MD;  Location: [facility];  Service: Orthopedics     SECTION      Last assessed - 14    COLONOSCOPY      COLONOSCOPY      HEMORRHOID SURGERY      HERNIA REPAIR      Last assessed - 14    LUMBAR FUSION N/A 2021    Procedure: L1/2 laminectomy, discectomy with L1/2 MIS posterior fixation using neuromonitoring and neuronavigation;  Surgeon: Suhas Akbar MD;  Location: [facility];  Service: Neurosurgery    LUMBAR WOUND EXPLORATION Bilateral 2021    Procedure: Lumbar wound washout, debridement and intraoperative cultures;  Surgeon: Suhas Akbar MD;  Location: [facility];  Service: Neurosurgery    MAMMO (HISTORICAL)      WA DEBRIDEMENT BONE 1ST 20 SQ CM/< N/A 2023    Procedure: SURGICAL PREPARATION OF BACK  WOUND AND LOCAL FLAP, PREVENA PLACEMENT;  Surgeon: Ady Rocha MD;  Location:  MAIN OR;  Service: Plastics    WOOD-EN-Y PROCEDURE      ULNAR TUNNEL RELEASE      VAC DRESSING APPLICATION Bilateral 2021    Procedure: APPLICATION VAC DRESSING;  Surgeon: Elbert Hawley MD;  Location: [facility];  Service: General    VAC DRESSING APPLICATION N/A 2021    Procedure: APPLICATION VAC DRESSING ABDOMEN/TRUNK;  Surgeon: Mani Ross DO;  Location: [facility];  Service: General    VENTRAL HERNIA REPAIR         SOCIAL HISTORY:  Social History     Substance and Sexual Activity   Alcohol Use Not Currently     Social History     Substance and Sexual Activity   Drug Use Yes    Types: Marijuana    Comment: Medicinal     Social History     Tobacco Use   Smoking Status Never    Passive exposure: Never   Smokeless Tobacco Former   Tobacco Comments    Never used tabaco, don’t care to        FAMILY HISTORY:  Family History   Problem Relation Age of Onset    Cervical cancer Mother     Kidney disease Mother     Cancer Mother         Cervical    Diabetes Mother     Hypertension Mother             Neuropathy Mother             Suicidality Father     Depression Father     Mental illness Father         Most likely,, but doesat age22 without diagnosis    Anxiety disorder Father     Completed Suicide  Father         1978    Psychiatric Illness Father          age 21    Suicide Attempts Father         4    No Known Problems Sister     Mental illness Daughter         By-polar Disorder    Mental illness Daughter         Borderline Personality Disorder    Colon cancer Maternal Grandmother             Anemia Maternal Grandmother     No Known Problems Maternal Grandfather     Uterine cancer Paternal Grandmother     No Known Problems Paternal Grandfather     No Known Problems Maternal Aunt     Ovarian cancer Paternal Aunt     ADD / ADHD Cousin     ADD / ADHD Cousin     No Known Problems Family        MEDICATIONS:    Current Outpatient Medications:     acetaZOLAMIDE (DIAMOX) 250 mg tablet, TAKE 1 TABLET(250 MG) BY MOUTH TWICE DAILY, Disp: 180 tablet, Rfl: 1    Ajovy 225 MG/1.5ML auto-injector, INJECT 4.5 ML(675 MG TOTAL) UNDER THE SKIN EVERY 3 MONTHS, Disp: 4.5 mL, Rfl: 4    atorvastatin (LIPITOR) 10 mg tablet, TAKE 1 TABLET(10 MG) BY MOUTH DAILY AT BEDTIME, Disp: 90 tablet, Rfl: 1    BD Pen Needle Glory U/F 32G X 4 MM MISC, Inject under the skin 2 (two) times a day Use as directed, Disp: 180 each, Rfl: 0    Blood Glucose Monitoring Suppl (ONE TOUCH ULTRA 2) w/Device KIT, Use 1 each 2 (two) times a day Use as instructed, Disp: 1 kit, Rfl: 0    calcitriol (ROCALTROL) 0.25 mcg capsule, TAKE 1 CAPSULE BY MOUTH daily, Disp: 30 capsule, Rfl: 5    cetirizine (ZyrTEC) oral solution, Take 5 mL (5 mg total) by mouth daily, Disp: 450 mL, Rfl: 1    Cholecalciferol (Vitamin D3) 50 MCG (  UT) CAPS, TAKE 1 CAPSULE(2,000 UNITS TOTAL) BY MOUTH DAILY, Disp: 90 capsule, Rfl: 1    clonazePAM (KlonoPIN) 0.5 mg tablet, Take 1 tablet (0.5 mg total) by mouth 2 (two) times a day, Disp: 180 tablet, Rfl: 0    Continuous Blood Gluc Sensor (FreeStyle Filiberto 3 Sensor) MISC, Use 1 each every 14 (fourteen) days, Disp: 6 each, Rfl: 1    cycloSPORINE, PF, (Cequa) 0.09 % SOLN, Apply 1 drop to eye 2 (two) times a day, Disp: , Rfl:     divalproex sodium (Depakote) 500 mg DR tablet, Take 1 tablet (500 mg total) by mouth daily at bedtime, Disp: 90 tablet, Rfl: 3    docusate sodium (COLACE) 100 mg capsule, TAKE 1 CAPSULE BY MOUTH TWICE A DAY, Disp: 60 capsule, Rfl: 0    DULoxetine (CYMBALTA) 60 mg delayed release capsule, Take 1 capsule (60 mg total) by mouth daily, Disp: 90 capsule, Rfl: 1    ferrous sulfate 324 (65 Fe) mg, Take 1 tablet (324 mg total) by mouth daily before breakfast (Patient taking differently: Take 324 mg by mouth every other day), Disp: 90 tablet, Rfl: 3    insulin aspart (NovoLOG FlexPen) 100 UNIT/ML injection pen, Continue with sliding scale insulin regimen, Disp: , Rfl:     Insulin Glargine Solostar (Lantus SoloStar) 100 UNIT/ML SOPN, Inject 0.15 mL (15 Units total) under the skin daily at bedtime, Disp: 15 mL, Rfl: 0    Lancets MISC, Use 1 each 2 (two) times a day Use as instructed, Disp: , Rfl:     lisdexamfetamine (VYVANSE) 30 MG capsule, Take 1 capsule (30 mg total) by mouth every morning Max Daily Amount: 30 mg, Disp: 30 capsule, Rfl: 0    losartan-hydrochlorothiazide (HYZAAR) 100-12.5 MG per tablet, TAKE ONE TABLET BY MOUTH ONE TIME DAILY, Disp: 90 tablet, Rfl: 0    magnesium (MAGTAB) 84 MG (7MEQ) TBCR, Take 1 tablet (84 mg total) by mouth daily, Disp: 90 tablet, Rfl: 3    methocarbamol (Robaxin-750) 750 mg tablet, Take 1 tablet (750 mg total) by mouth every 6 (six) hours as needed for muscle spasms (back pain), Disp: 30 tablet, Rfl: 3    metoprolol tartrate (LOPRESSOR) 100 mg tablet, TAKE 1  TABLET(100 MG) BY MOUTH EVERY 12 HOURS, Disp: 180 tablet, Rfl: 1    Multiple Vitamins-Minerals (multivitamin with minerals) tablet, Take 1 tablet by mouth daily, Disp: , Rfl:     mupirocin (BACTROBAN) 2 % ointment, Apply topically 3 (three) times a day, Disp: 60 g, Rfl: 0    NON FORMULARY, Botox injections for HA every 3months (LD 3/20/23), Disp: , Rfl:     ondansetron (ZOFRAN) 4 mg tablet, Take 1 tablet (4 mg total) by mouth every 8 (eight) hours as needed for nausea or vomiting, Disp: 120 tablet, Rfl: 0    Ophthalmic Irrigation Solution (OCUSOFT EYE WASH OP), Apply 1 Application to eye daily at bedtime, Disp: , Rfl:     Polyethyl Glycol-Propyl Glycol (Systane) 0.4-0.3 % GEL, Apply 1 drop to eye 2 (two) times a day Using Ivizia as an alternative, Disp: , Rfl:     polyethylene glycol (MIRALAX) 17 g packet, Take 17 g by mouth daily, Disp: 1 each, Rfl: 0    pregabalin (LYRICA) 100 mg capsule, Take 1 capsule (100 mg total) by mouth 3 (three) times a day, Disp: 270 capsule, Rfl: 0    rimegepant sulfate (Nurtec) 75 mg TBDP, Place one tab (75mg total) under the tongue as needed for migraine., Disp: 16 tablet, Rfl: 11    sodium bicarbonate 650 mg tablet, Take 1 tablet (650 mg total) by mouth 3 (three) times a day (Patient taking differently: Take 650 mg by mouth 2 (two) times a day), Disp: 360 tablet, Rfl: 3    tirzepatide (Mounjaro) 15 MG/0.5ML, Inject 0.5 mL (15 mg total) under the skin every 7 days, Disp: 6 mL, Rfl: 0    cyanocobalamin 1,000 mcg/mL, Inject 1 mL (1,000 mcg total) into a muscle once a week (Patient not taking: Reported on 9/13/2024), Disp: 4 mL, Rfl: 3    cyproheptadine (PERIACTIN) 4 mg tablet, Take 1 tablet (4 mg total) by mouth daily at bedtime (Patient not taking: Reported on 11/3/2023), Disp: 90 tablet, Rfl: 1    diphenhydrAMINE (BENADRYL) 25 mg tablet, Take 1 tablet (25 mg total) by mouth every 6 (six) hours as needed for itching (Patient not taking: Reported on 11/3/2023), Disp: 30 tablet, Rfl: 0     "levonorgestrel (MIRENA) 20 MCG/24HR IUD, Mirena 20 mcg/24 hr (5 years) intrauterine device  Vaginally for 5 years. (Patient not taking: Reported on 10/8/2024), Disp: , Rfl:     Current Facility-Administered Medications:     cyanocobalamin injection 1,000 mcg, 1,000 mcg, Intramuscular, Weekly, Myla Norwood MD, 1,000 mcg at 07/01/24 1007      PHYSICAL EXAM:  Vitals:    10/08/24 1252   BP: 128/78   BP Location: Left arm   Patient Position: Sitting   Cuff Size: Standard   Pulse: 88   Weight: 72.1 kg (159 lb)   Height: 5' 1\" (1.549 m)           Body mass index is 30.04 kg/m².    Physical Exam  Constitutional:       General: She is not in acute distress.     Appearance: Normal appearance. She is well-developed.   HENT:      Head: Normocephalic and atraumatic.      Nose: Nose normal.      Mouth/Throat:      Mouth: Mucous membranes are moist.   Eyes:      General: No scleral icterus.     Conjunctiva/sclera: Conjunctivae normal.      Pupils: Pupils are equal, round, and reactive to light.   Neck:      Thyroid: No thyromegaly.      Vascular: No JVD.   Cardiovascular:      Rate and Rhythm: Normal rate and regular rhythm.      Heart sounds: Normal heart sounds. No murmur heard.     No friction rub.   Pulmonary:      Effort: Pulmonary effort is normal. No respiratory distress.      Breath sounds: Normal breath sounds. No wheezing or rales.   Abdominal:      General: Bowel sounds are normal. There is no distension.      Palpations: Abdomen is soft.      Tenderness: There is no abdominal tenderness.   Musculoskeletal:         General: No deformity.      Cervical back: Neck supple.      Right lower leg: No edema.      Left lower leg: No edema.   Skin:     General: Skin is warm and dry.      Findings: No rash.   Neurological:      Mental Status: She is alert and oriented to person, place, and time.   Psychiatric:         Mood and Affect: Mood normal.         Behavior: Behavior normal.         Thought Content: Thought content " normal.         Lab Results:   Results for orders placed or performed in visit on 09/13/24   Basic metabolic panel    Collection Time: 09/13/24 11:37 AM   Result Value Ref Range    Sodium 143 135 - 147 mmol/L    Potassium 3.6 3.5 - 5.3 mmol/L    Chloride 111 (H) 96 - 108 mmol/L    CO2 23 21 - 32 mmol/L    ANION GAP 9 4 - 13 mmol/L    BUN 27 (H) 5 - 25 mg/dL    Creatinine 1.76 (H) 0.60 - 1.30 mg/dL    Glucose 129 65 - 140 mg/dL    Calcium 8.4 8.4 - 10.2 mg/dL    eGFR 33 ml/min/1.73sq m   Protein / creatinine ratio, urine    Collection Time: 09/13/24 11:37 AM   Result Value Ref Range    Creatinine, Ur 183.5 Reference range not established. mg/dL    Protein Urine Random 15.3 Reference range not established. mg/dL    Prot/Creat Ratio, Ur 0.1 0.0 - 0.1   PTH, intact    Collection Time: 09/13/24 11:37 AM   Result Value Ref Range    .0 (H) 12.0 - 88.0 pg/mL   Urinalysis with microscopic    Collection Time: 09/13/24 11:37 AM   Result Value Ref Range    Color, UA Yellow     Clarity, UA Clear     Specific Gravity, UA 1.020 1.003 - 1.030    pH, UA 6.5 4.5, 5.0, 5.5, 6.0, 6.5, 7.0, 7.5, 8.0    Leukocytes, UA Moderate (A) Negative    Nitrite, UA Negative Negative    Protein, UA Trace (A) Negative mg/dl    Glucose, UA Negative Negative mg/dl    Ketones, UA Negative Negative mg/dl    Urobilinogen, UA 3.0 (A) <2.0 mg/dl mg/dl    Bilirubin, UA Negative Negative    Occult Blood, UA Negative Negative    RBC, UA 1-2 None Seen, 1-2 /hpf    WBC, UA 10-20 (A) None Seen, 1-2 /hpf    Epithelial Cells Occasional None Seen, Occasional /hpf    Bacteria, UA Occasional None Seen, Occasional /hpf    MUCUS THREADS Occasional (A) None Seen    Hyaline Casts, UA 10-25 (A) None Seen /lpf   Phosphorus    Collection Time: 09/13/24 11:37 AM   Result Value Ref Range    Phosphorus 3.8 2.7 - 4.5 mg/dL   Magnesium    Collection Time: 09/13/24 11:37 AM   Result Value Ref Range    Magnesium 1.8 (L) 1.9 - 2.7 mg/dL   CBC    Collection Time: 09/13/24  "11:37 AM   Result Value Ref Range    WBC 5.24 4.31 - 10.16 Thousand/uL    RBC 4.18 3.81 - 5.12 Million/uL    Hemoglobin 12.3 11.5 - 15.4 g/dL    Hematocrit 37.6 34.8 - 46.1 %    MCV 90 82 - 98 fL    MCH 29.4 26.8 - 34.3 pg    MCHC 32.7 31.4 - 37.4 g/dL    RDW 13.9 11.6 - 15.1 %    Platelets 142 (L) 149 - 390 Thousands/uL    MPV 12.0 8.9 - 12.7 fL   Vitamin D 25 hydroxy    Collection Time: 09/13/24 11:37 AM   Result Value Ref Range    Vit D, 25-Hydroxy 61.2 30.0 - 100.0 ng/mL   Follicle stimulating hormone    Collection Time: 09/13/24 11:37 AM   Result Value Ref Range    .8 See Comment mIU/mL   Estradiol    Collection Time: 09/13/24 11:37 AM   Result Value Ref Range    Estradiol <15.0 See Comment pg/mL   TIBC Panel (incl. Iron, TIBC, % Iron Saturation)    Collection Time: 09/13/24 11:37 AM   Result Value Ref Range    Iron Saturation 30 15 - 50 %    TIBC 228 (L) 250 - 450 ug/dL    Iron 69 50 - 212 ug/dL    UIBC 159 155 - 355 ug/dL   Ferritin    Collection Time: 09/13/24 11:37 AM   Result Value Ref Range    Ferritin 43 11 - 307 ng/mL     *Note: Due to a large number of results and/or encounters for the requested time period, some results have not been displayed. A complete set of results can be found in Results Review.     Lab Results:         Invalid input(s): \"ALBUMIN\"      Laboratory Results:  Lab Results   Component Value Date    WBC 5.24 09/13/2024    HGB 12.3 09/13/2024    HCT 37.6 09/13/2024    MCV 90 09/13/2024     (L) 09/13/2024     Lab Results   Component Value Date    SODIUM 143 09/13/2024    K 3.6 09/13/2024     (H) 09/13/2024    CO2 23 09/13/2024    BUN 27 (H) 09/13/2024    CREATININE 1.76 (H) 09/13/2024    GLUC 129 09/13/2024    CALCIUM 8.4 09/13/2024     Lab Results   Component Value Date    CALCIUM 8.4 09/13/2024    PHOS 3.8 09/13/2024     No results found for: \"LABPROT\"  [unfilled]  Lab Results   Component Value Date    .0 (H) 09/13/2024    CALCIUM 8.4 09/13/2024    PHOS 3.8 " 09/13/2024     @[facility]@

## 2024-10-08 NOTE — PATIENT INSTRUCTIONS
-Renal Function is stable   -You have Chronic Kidney Disease Stage 4 - continue oral hydration   -Avoid NSAIDs like Ibuprofen/Motrin, Naproxen/Aleve, Celebrex, meloxicam/Mobic, Diclofenac and other NSAIDs.  -Okay to take Acetaminophen/Tylenol if you do not have any liver problems  -Avoid IV contrast used for CT scan and cardiac catheterization.    -If plan for any study with IV contrast, please let me know so we could hydrate with fluids before and after IV contrast  -Dosage  of certain medications may need to be adjusted depending on Kidney function.     BP stable     Started on mag tablet     Follow up: 4 months with repeat Lab work within a week of the scheduled office visit. Will discuss the results of the previsit Labs during the office visit.

## 2024-10-09 ENCOUNTER — PROCEDURE VISIT (OUTPATIENT)
Age: 49
End: 2024-10-09
Payer: COMMERCIAL

## 2024-10-09 VITALS — HEIGHT: 61 IN | WEIGHT: 159 LBS | BODY MASS INDEX: 30.02 KG/M2

## 2024-10-09 DIAGNOSIS — M99.04 SEGMENTAL DYSFUNCTION OF SACRAL REGION: ICD-10-CM

## 2024-10-09 DIAGNOSIS — M48.062 SPINAL STENOSIS OF LUMBAR REGION WITH NEUROGENIC CLAUDICATION: Primary | ICD-10-CM

## 2024-10-09 DIAGNOSIS — M99.03 SEGMENTAL DYSFUNCTION OF LUMBAR REGION: ICD-10-CM

## 2024-10-09 DIAGNOSIS — M99.02 SEGMENTAL DYSFUNCTION OF THORACIC REGION: ICD-10-CM

## 2024-10-09 DIAGNOSIS — M47.812 CERVICAL SPONDYLOSIS: ICD-10-CM

## 2024-10-09 DIAGNOSIS — M99.01 SEGMENTAL DYSFUNCTION OF CERVICAL REGION: ICD-10-CM

## 2024-10-09 DIAGNOSIS — M62.838 MUSCLE SPASM: ICD-10-CM

## 2024-10-09 PROCEDURE — 98941 CHIROPRACT MANJ 3-4 REGIONS: CPT | Performed by: CHIROPRACTOR

## 2024-10-09 PROCEDURE — 97110 THERAPEUTIC EXERCISES: CPT | Performed by: CHIROPRACTOR

## 2024-10-09 NOTE — PROGRESS NOTES
Initial date of service: 5/6/24    Diagnoses and all orders for this visit:    Spinal stenosis of lumbar region with neurogenic claudication    Segmental dysfunction of sacral region    Muscle spasm    Cervical spondylosis    Segmental dysfunction of lumbar region    Segmental dysfunction of thoracic region    Segmental dysfunction of cervical region    Pt suffered mild exacerbation but responded to tx with reduced pain and increased ROM. Overall improving with more mobility    TREATMENT: [id number]  Ther-ex: IASTM; discussed post procedure soreness and/or ecchymosis for up to 36 hrs, applied to affected mm hypertonicities; supine hamstring stretch, supine gluteal stretch, single knee to chest stretch, upper trap stretch, transitional mvmt education, abdominal bracing; greater than 15 min spent performing above mentioned ther-ex to improve ROM/flexibility. Cervical supine graded mobilization and traction, Thoracic mobilization/manipulation: prone P-A mob; Lumbar mobilization/manipulation: diversified side laying graded HVLA, flexion-traction; SIJ Manipulation/Mobilization: R/L SIJ HVLA - long axis distraction, mederos drop table maneuver to affected SIJ    HPI:  Kinza Meza is a 49 y.o. female  Chief Complaint   Patient presents with    Neck - Pain     Neck pain score 3       Back Pain     Mid to lower lumbar pain score 4     patient states very sore        Pt presents for tx for exacerbation of chronic neck/back pain. Pt has had back and neck surgery. Pt was utilizing cane and now using walker occasionally. 2022 MRI demonstrates multilevel degenerative changes of postsurgical lumbar spine status post L1-L2 posterior spinal fixation with varying degrees of canal stenosis (moderate L4-L5) and foraminal narrowing (mild left L1-L2 and left L2-L3 ). 2024 fl/ext xrays unremarkable for instability. 2021 C/S MRI demonstrates s/p ACDF from C4 to C7. Multilevel degenerative spondylosis as described. Possible  ossification of the posterior longitudinal ligament at C2-3 and C6-7  10/9: pt reports feeling and moving better since last tx      Neck Pain   This is a chronic problem. The current episode started more than 1 year ago. The problem occurs constantly. The problem has been waxing and waning. The pain is present in the left side, midline and right side. The quality of the pain is described as aching. The symptoms are aggravated by bending, position, stress and twisting. The pain is Worse during the day.   Back Pain  This is a chronic problem. The current episode started more than 1 year ago. The problem occurs constantly. The problem has been waxing and waning since onset. The pain is present in the gluteal, lumbar spine, sacro-iliac and thoracic spine. The quality of the pain is described as aching, burning and stabbing. The pain radiates to the left thigh, left knee and right thigh. Pain scale: 1-6/10. The pain is Worse during the day. The symptoms are aggravated by standing, twisting and bending (walking, laying supine, transitional mvmts; palliative includes sitting, massage). Pertinent negatives include no bladder incontinence or bowel incontinence. Risk factors include lack of exercise.     Past Medical History:   Diagnosis Date    ADD (attention deficit disorder)     Allergic rhinitis     ALS (amyotrophic lateral sclerosis) ([facility]) 11/22    Per neurological surgeon    Anemia 12/2021    Anxiety     Arthritis     Bipolar disorder ([facility])     Cervical disc disorder with radiculopathy of cervical region     Chronic depression     Chronic kidney disease     Stage 1    Chronic pain disorder     Cluster headache     Colitis     Last assessed - 11/25/14    Colon polyp     Concussion 2018    Condyloma acuminatum     Last assessed - 3/19/14    CTS (carpal tunnel syndrome) 2001    Depression     Diabetes mellitus ([facility])     Last assessed - 11/25/14    Diabetic nephropathy ([facility]) 2001    Diabetic neuropathy ([facility])     Diabetic  retinopathy (HCC)     Difficulty walking     Fibromyalgia     Fibromyalgia, primary     GERD (gastroesophageal reflux disease)     Head injury 18    Fall    Headache(784.0) 1977    History of transfusion 2021    HTN (hypertension)     Last assessed - 14    Hyperlipidemia 2019    Hypertension     Intervertebral disc disorder with radiculopathy of lumbar region     Lupus     Rhuematologist-Sheela Dasilva, [place]    Migraine     Miscarriage 1988    ,     Obesity 1980    Obesity, unspecified     Open wound of back 2023    Opioid abuse, in remission ([facility]) 2022    Only while in hospital and for about 10 days once home instead of one week.    Osteoporosis     Peripheral neuropathy     Postoperative wound infection 2021    Stop not healed    Preeclampsia     Last assessed - 14    Rheumatoid arthritis ([facility])     Wears dentures       Past Surgical History:   Procedure Laterality Date    APPENDECTOMY      Last assessed - 14    BARIATRIC SURGERY  2016    CARPAL TUNNEL RELEASE      CATARACT EXTRACTION      CERVICAL FUSION N/A 2019    Procedure: Anterior cervical discectomy w fusion C4-5, C5-6, C6-7; allograft and neuromonitoring;  Surgeon: Jose Gomez MD;  [facility];  Service: Orthopedics     SECTION      Last assessed - 14    COLONOSCOPY      COLONOSCOPY      HEMORRHOID SURGERY      HERNIA REPAIR      Last assessed - 14    LUMBAR FUSION N/A 2021    Procedure: L1/2 laminectomy, discectomy with L1/2 MIS posterior fixation using neuromonitoring and neuronavigation;  Surgeon: Suhas Akbar MD;  [facility];  Service: Neurosurgery    LUMBAR WOUND EXPLORATION Bilateral 2021    Procedure: Lumbar wound washout, debridement and intraoperative cultures;  Surgeon: Suhas Akbar MD;  [facility];  Service: Neurosurgery    MAMMO (HISTORICAL)      WA DEBRIDEMENT BONE 1ST 20 SQ CM/< N/A 2023     Procedure: SURGICAL PREPARATION OF BACK  WOUND AND LOCAL FLAP, PREVENA PLACEMENT;  Surgeon: Ady Rocha MD;  Location:  MAIN OR;  Service: Plastics    WOOD-EN-Y PROCEDURE      ULNAR TUNNEL RELEASE      VAC DRESSING APPLICATION Bilateral 12/22/2021    Procedure: APPLICATION VAC DRESSING;  Surgeon: Elbert Hawley MD;  Location:  MAIN OR;  Service: General    VAC DRESSING APPLICATION N/A 12/25/2021    Procedure: APPLICATION VAC DRESSING ABDOMEN/TRUNK;  Surgeon: Mani Ross DO;  [facility];  Service: General    VENTRAL HERNIA REPAIR       The following portions of the patient's history were reviewed and updated as appropriate: allergies, past family history, past medical history, past social history, past surgical history, and problem list.  Review of Systems   Gastrointestinal:  Negative for bowel incontinence.   Genitourinary:  Negative for bladder incontinence.   Musculoskeletal:  Positive for back pain and neck pain.     Physical Exam  Neck:        Comments: Pnful and limited in Brot, Blf  Musculoskeletal:      Cervical back: Pain with movement and muscular tenderness present. Decreased range of motion.      Thoracic back: Spasms and tenderness present. Decreased range of motion.      Lumbar back: Spasms and tenderness present. Decreased range of motion. Negative right straight leg raise test and negative left straight leg raise test.        Back:       Comments: Pnful and limited in Brot, Blf, Ext centralizes    Lymphadenopathy:      Cervical: No cervical adenopathy.   Skin:     General: Skin is warm and dry.   Neurological:      Mental Status: She is alert and oriented to person, place, and time.      Gait: Gait is intact.   Psychiatric:         Mood and Affect: Mood and affect normal.         Behavior: Behavior normal.       SOFT TISSUE ASSESSMENT Hypertonicity and tenderness palpated B upper traps, B SCM, B T10-S1 erector spinae, glute med/min, QL, hamstring JOINT RESTRICTIONS:  C4/5, T1/2, T10-S1 and R/L SIJ    Return in about 2 weeks (around 10/23/2024) for Next scheduled follow up.

## 2024-10-10 ENCOUNTER — HOSPITAL ENCOUNTER (OUTPATIENT)
Dept: RADIOLOGY | Facility: HOSPITAL | Age: 49
Discharge: HOME | End: 2024-10-10
Attending: INTERNAL MEDICINE
Payer: COMMERCIAL

## 2024-10-10 ENCOUNTER — OFFICE VISIT (OUTPATIENT)
Dept: UROLOGY | Facility: AMBULATORY SURGERY CENTER | Age: 49
End: 2024-10-10
Payer: COMMERCIAL

## 2024-10-10 VITALS
DIASTOLIC BLOOD PRESSURE: 82 MMHG | BODY MASS INDEX: 29.64 KG/M2 | OXYGEN SATURATION: 95 % | HEART RATE: 86 BPM | HEIGHT: 61 IN | SYSTOLIC BLOOD PRESSURE: 146 MMHG | WEIGHT: 157 LBS

## 2024-10-10 DIAGNOSIS — N31.9 NEUROGENIC BLADDER: Primary | ICD-10-CM

## 2024-10-10 DIAGNOSIS — N18.32 STAGE 3B CHRONIC KIDNEY DISEASE (HCC): ICD-10-CM

## 2024-10-10 LAB — POST-VOID RESIDUAL VOLUME, ML POC: 0 ML

## 2024-10-10 PROCEDURE — 99213 OFFICE O/P EST LOW 20 MIN: CPT

## 2024-10-10 PROCEDURE — 76775 US EXAM ABDO BACK WALL LIM: CPT

## 2024-10-10 PROCEDURE — 51798 US URINE CAPACITY MEASURE: CPT

## 2024-10-10 RX ORDER — TERAZOSIN 1 MG/1
1 CAPSULE ORAL
Qty: 30 CAPSULE | Refills: 1 | Status: SHIPPED | OUTPATIENT
Start: 2024-10-10

## 2024-10-10 NOTE — PROGRESS NOTES
Office Visit- Urology  Kinza Carmona 1975 MRN: [id number]      Assessment/Discussion/Plan    49 y.o. female managed by     Neurogenic bladder  -History of diabetic neuropathy and possible ALS.  History of cauda equina syndrome 2021  -Status post urodynamics with evidence of adequate bladder emptying and no signs of overactivity.  Decreased bladder sensation  -PVR today 0 mL  -Ultrasound scheduled for today will CC myself to imaging to ensure no hydronephrosis is identified   -Creatinine last measured at 1.76 on 9/13/2024.  Follows with nephrology.  Recent baseline creatinine of 1.7-2.0  -Patient still notes some difficulty with urination.  Discussed option of trial of Hytrin 1 mg and it is possible effect on relaxation of the bladder neck.  Patient like to trial.  Discussed side effects and administration  -Medication check in 6 months      Chief Complaint:   Kinza is a 49 y.o. female presenting to the office for a follow up visit regarding neurogenic bladder        Subjective    Patient is a 48-year-old female with a history of diabetic polyneuropathy and what she reports is possible ALS with previous history of surgical intervention for cardiac 1 in 2021 who presents to the office today for follow-up regards to neurogenic bladder/lower urinary tract symptoms.  She had urodynamics was demonstrated decreased bladder sensation.  Patient was recommended to consider timed voiding and she felt that this has been significantly beneficial.  She still does note intermittent episodes where she has significant difficulty in urinating.  She states that she was recently seen by gynecology with no concern for prolapse.  She denies any dysuria or gross hematuria.  She does state that she has abdominal pain when she does not urinate for a while and this is her acute that she should go to the bathroom she sometimes will also experience flank pain and when she then subsequently urinates she has significant amount of  urine output        ROS:   Review of Systems   Constitutional: Negative.  Negative for chills, fatigue and fever.   HENT: Negative.     Respiratory:  Negative for shortness of breath.    Cardiovascular:  Negative for chest pain.   Gastrointestinal: Negative.  Negative for abdominal pain.   Endocrine: Negative.    Musculoskeletal: Negative.    Skin: Negative.    Neurological: Negative.  Negative for dizziness and light-headedness.   Hematological: Negative.    Psychiatric/Behavioral: Negative.           Past Medical History  Past Medical History:   Diagnosis Date    ADD (attention deficit disorder)     Allergic rhinitis     ALS (amyotrophic lateral sclerosis) ([facility]) 11/22    Per neurological surgeon    Anemia 12/2021    Anxiety     Arthritis     Bipolar disorder ([facility])     Cervical disc disorder with radiculopathy of cervical region     Chronic depression     Chronic kidney disease     Stage 1    Chronic pain disorder     Cluster headache     Colitis     Last assessed - 11/25/14    Colon polyp     Concussion 2018    Condyloma acuminatum     Last assessed - 3/19/14    CTS (carpal tunnel syndrome) 2001    Depression     Diabetes mellitus ([facility])     Last assessed - 11/25/14    Diabetic nephropathy ([facility]) 2001    Diabetic neuropathy ([facility])     Diabetic retinopathy ([facility]) 2001    Difficulty walking 07/22    Fibromyalgia     Fibromyalgia, primary     GERD (gastroesophageal reflux disease)     Head injury 08/13/18    Fall    Headache(784.0) 1977    History of transfusion 12/2021    HTN (hypertension)     Last assessed - 11/25/14    Hyperlipidemia 07/22/2019    Hypertension     Intervertebral disc disorder with radiculopathy of lumbar region     Lupus 2003    Rhuematologist-Sheela Dasilva, [place]    Migraine     Miscarriage 1988    2001, 2004    Obesity 1980    Obesity, unspecified     Open wound of back 05/30/2023    Opioid abuse, in remission ([facility]) 01/2022    Only while in hospital and for about 10 days once home instead of one  week.    Osteoporosis     Peripheral neuropathy     Postoperative wound infection 2021    Stop not healed    Preeclampsia     Last assessed - 14    Rheumatoid arthritis (HCC)     Wears dentures        Past Surgical History  Past Surgical History:   Procedure Laterality Date    APPENDECTOMY      Last assessed - 14    BARIATRIC SURGERY  2016    CARPAL TUNNEL RELEASE      CATARACT EXTRACTION      CERVICAL FUSION N/A 2019    Procedure: Anterior cervical discectomy w fusion C4-5, C5-6, C6-7; allograft and neuromonitoring;  Surgeon: Jose Gomez MD;  [facility];  Service: Orthopedics     SECTION      Last assessed - 14    COLONOSCOPY      COLONOSCOPY      HEMORRHOID SURGERY      HERNIA REPAIR      Last assessed - 14    LUMBAR FUSION N/A 2021    Procedure: L1/2 laminectomy, discectomy with L1/2 MIS posterior fixation using neuromonitoring and neuronavigation;  Surgeon: Suhas Akbar MD;  [facility];  Service: Neurosurgery    LUMBAR WOUND EXPLORATION Bilateral 2021    Procedure: Lumbar wound washout, debridement and intraoperative cultures;  Surgeon: Suhas Akbar MD;  [facility];  Service: Neurosurgery    MAMMO (HISTORICAL)  2016    MI DEBRIDEMENT BONE 1ST 20 SQ CM/< N/A 2023    Procedure: SURGICAL PREPARATION OF BACK  WOUND AND LOCAL FLAP, PREVENA PLACEMENT;  Surgeon: Ady Rocha MD;  Location:  MAIN OR;  Service: Plastics    WOOD-EN-Y PROCEDURE      ULNAR TUNNEL RELEASE      VAC DRESSING APPLICATION Bilateral 2021    Procedure: APPLICATION VAC DRESSING;  Surgeon: Elbert Hawley MD;  [facility];  Service: General    VAC DRESSING APPLICATION N/A 2021    Procedure: APPLICATION VAC DRESSING ABDOMEN/TRUNK;  Surgeon: Mani Ross DO;  [facility];  Service: General    VENTRAL HERNIA REPAIR         Past Family History  Family History   Problem Relation Age of Onset    Cervical cancer  Mother     Kidney disease Mother     Cancer Mother         Cervical    Diabetes Mother     Hypertension Mother             Neuropathy Mother             Suicidality Father     Depression Father     Mental illness Father         Most likely,, but doesat age22 without diagnosis    Anxiety disorder Father     Completed Suicide  Father         1978    Psychiatric Illness Father          age 21    Suicide Attempts Father         4    No Known Problems Sister     Mental illness Daughter         By-polar Disorder    Mental illness Daughter         Borderline Personality Disorder    Colon cancer Maternal Grandmother             Anemia Maternal Grandmother     No Known Problems Maternal Grandfather     Uterine cancer Paternal Grandmother     No Known Problems Paternal Grandfather     No Known Problems Maternal Aunt     Ovarian cancer Paternal Aunt     ADD / ADHD Cousin     ADD / ADHD Cousin     No Known Problems Family        Past Social history  Social History     Socioeconomic History    Marital status: /Civil Union     Spouse name: Not on file    Number of children: Not on file    Years of education: Not on file    Highest education level: Not on file   Occupational History    Not on file   Tobacco Use    Smoking status: Never     Passive exposure: Never    Smokeless tobacco: Former    Tobacco comments:     Never used tabaco, don’t care to   Vaping Use    Vaping status: Never Used   Substance and Sexual Activity    Alcohol use: Not Currently    Drug use: Yes     Types: Marijuana     Comment: Medicinal    Sexual activity: Yes     Partners: Male     Birth control/protection: I.U.D.     Comment: Mirena 2017   Other Topics Concern    Not on file   Social History Narrative    Daily caffeinated consumption     Exercise - Walking     Social Determinants of Health     Financial Resource Strain: Not on file   Food Insecurity: No Food Insecurity (2024)    Hunger Vital Sign      Worried About Running Out of Food in the Last Year: Never true     Ran Out of Food in the Last Year: Never true   Transportation Needs: No Transportation Needs (7/28/2024)    PRAPARE - Transportation     Lack of Transportation (Medical): No     Lack of Transportation (Non-Medical): No   Physical Activity: Not on file   Stress: Not on file   Social Connections: Not on file   Intimate Partner Violence: Not on file   Housing Stability: Low Risk  (7/28/2024)    Housing Stability Vital Sign     Unable to Pay for Housing in the Last Year: No     Number of Times Moved in the Last Year: 0     Homeless in the Last Year: No       Current Medications  Current Outpatient Medications   Medication Sig Dispense Refill    acetaZOLAMIDE (DIAMOX) 250 mg tablet TAKE 1 TABLET(250 MG) BY MOUTH TWICE DAILY 180 tablet 1    Ajovy 225 MG/1.5ML auto-injector INJECT 4.5 ML(675 MG TOTAL) UNDER THE SKIN EVERY 3 MONTHS 4.5 mL 4    atorvastatin (LIPITOR) 10 mg tablet TAKE 1 TABLET(10 MG) BY MOUTH DAILY AT BEDTIME 90 tablet 1    BD Pen Needle Glory U/F 32G X 4 MM MISC Inject under the skin 2 (two) times a day Use as directed 180 each 0    Blood Glucose Monitoring Suppl (ONE TOUCH ULTRA 2) w/Device KIT Use 1 each 2 (two) times a day Use as instructed 1 kit 0    calcitriol (ROCALTROL) 0.25 mcg capsule TAKE 1 CAPSULE BY MOUTH daily 30 capsule 5    cetirizine (ZyrTEC) oral solution Take 5 mL (5 mg total) by mouth daily 450 mL 1    Cholecalciferol (Vitamin D3) 50 MCG (2000 UT) CAPS TAKE 1 CAPSULE(2,000 UNITS TOTAL) BY MOUTH DAILY 90 capsule 1    clonazePAM (KlonoPIN) 0.5 mg tablet Take 1 tablet (0.5 mg total) by mouth 2 (two) times a day 180 tablet 0    Continuous Blood Gluc Sensor (FreeStyle Filiberto 3 Sensor) MISC Use 1 each every 14 (fourteen) days 6 each 1    cyanocobalamin 1,000 mcg/mL Inject 1 mL (1,000 mcg total) into a muscle once a week (Patient not taking: Reported on 9/13/2024) 4 mL 3    cycloSPORINE, PF, (Cequa) 0.09 % SOLN Apply 1 drop to eye  2 (two) times a day      cyproheptadine (PERIACTIN) 4 mg tablet Take 1 tablet (4 mg total) by mouth daily at bedtime (Patient not taking: Reported on 11/3/2023) 90 tablet 1    diphenhydrAMINE (BENADRYL) 25 mg tablet Take 1 tablet (25 mg total) by mouth every 6 (six) hours as needed for itching (Patient not taking: Reported on 11/3/2023) 30 tablet 0    divalproex sodium (Depakote) 500 mg DR tablet Take 1 tablet (500 mg total) by mouth daily at bedtime 90 tablet 3    docusate sodium (COLACE) 100 mg capsule TAKE 1 CAPSULE BY MOUTH TWICE A DAY 60 capsule 0    DULoxetine (CYMBALTA) 60 mg delayed release capsule Take 1 capsule (60 mg total) by mouth daily 90 capsule 1    ferrous sulfate 324 (65 Fe) mg Take 1 tablet (324 mg total) by mouth daily before breakfast (Patient taking differently: Take 324 mg by mouth every other day) 90 tablet 3    insulin aspart (NovoLOG FlexPen) 100 UNIT/ML injection pen Continue with sliding scale insulin regimen      Insulin Glargine Solostar (Lantus SoloStar) 100 UNIT/ML SOPN Inject 0.15 mL (15 Units total) under the skin daily at bedtime 15 mL 0    Lancets MISC Use 1 each 2 (two) times a day Use as instructed      levonorgestrel (MIRENA) 20 MCG/24HR IUD Mirena 20 mcg/24 hr (5 years) intrauterine device   Vaginally for 5 years. (Patient not taking: Reported on 10/8/2024)      lisdexamfetamine (VYVANSE) 30 MG capsule Take 1 capsule (30 mg total) by mouth every morning Max Daily Amount: 30 mg 30 capsule 0    losartan-hydrochlorothiazide (HYZAAR) 100-12.5 MG per tablet TAKE ONE TABLET BY MOUTH ONE TIME DAILY 90 tablet 0    magnesium (MAGTAB) 84 MG (7MEQ) TBCR Take 1 tablet (84 mg total) by mouth daily 90 tablet 3    methocarbamol (Robaxin-750) 750 mg tablet Take 1 tablet (750 mg total) by mouth every 6 (six) hours as needed for muscle spasms (back pain) 30 tablet 3    metoprolol tartrate (LOPRESSOR) 100 mg tablet TAKE 1 TABLET(100 MG) BY MOUTH EVERY 12 HOURS 180 tablet 1    Multiple  Vitamins-Minerals (multivitamin with minerals) tablet Take 1 tablet by mouth daily      mupirocin (BACTROBAN) 2 % ointment Apply topically 3 (three) times a day 60 g 0    NON FORMULARY Botox injections for HA every 3months (LD 3/20/23)      ondansetron (ZOFRAN) 4 mg tablet Take 1 tablet (4 mg total) by mouth every 8 (eight) hours as needed for nausea or vomiting 120 tablet 0    Ophthalmic Irrigation Solution (OCUSOFT EYE WASH OP) Apply 1 Application to eye daily at bedtime      Polyethyl Glycol-Propyl Glycol (Systane) 0.4-0.3 % GEL Apply 1 drop to eye 2 (two) times a day Using Ivizia as an alternative      polyethylene glycol (MIRALAX) 17 g packet Take 17 g by mouth daily 1 each 0    pregabalin (LYRICA) 100 mg capsule Take 1 capsule (100 mg total) by mouth 3 (three) times a day 270 capsule 0    rimegepant sulfate (Nurtec) 75 mg TBDP Place one tab (75mg total) under the tongue as needed for migraine. 16 tablet 11    sodium bicarbonate 650 mg tablet Take 1 tablet (650 mg total) by mouth 3 (three) times a day (Patient taking differently: Take 650 mg by mouth 2 (two) times a day) 360 tablet 3    tirzepatide (Mounjaro) 15 MG/0.5ML Inject 0.5 mL (15 mg total) under the skin every 7 days 6 mL 0     Current Facility-Administered Medications   Medication Dose Route Frequency Provider Last Rate Last Admin    cyanocobalamin injection 1,000 mcg  1,000 mcg Intramuscular Weekly Myla Norwood MD   1,000 mcg at 07/01/24 1007       Allergies  Allergies   Allergen Reactions    Ace Inhibitors Cough    Pollen Extract Other (See Comments)     SNEEZING, COUGHING    Short Ragweed Pollen Ext Cough    Sodium Hyaluronate (Eron) Other (See Comments)     Edema at injection site  Edema at injection site    Levonorgestrel-Eth Estradiol [Levonorgestrel-Ethinyl Estrad] Other (See Comments)     burning    Latex Rash       OBJECTIVE    Vitals   There were no vitals filed for this visit.    PVR:    Physical Exam  Constitutional:       General: She  is not in acute distress.     Appearance: Normal appearance. She is normal weight. She is not ill-appearing or toxic-appearing.   HENT:      Head: Normocephalic and atraumatic.   Eyes:      Conjunctiva/sclera: Conjunctivae normal.   Cardiovascular:      Rate and Rhythm: Normal rate.   Pulmonary:      Effort: Pulmonary effort is normal. No respiratory distress.   Skin:     General: Skin is warm and dry.   Neurological:      General: No focal deficit present.      Mental Status: She is alert and oriented to person, place, and time.      Cranial Nerves: No cranial nerve deficit.   Psychiatric:         Mood and Affect: Mood normal.         Behavior: Behavior normal.         Thought Content: Thought content normal.          Labs:     Lab Results   Component Value Date    CREATININE 1.76 (H) 09/13/2024      Lab Results   Component Value Date    HGBA1C 5.0 08/05/2024     Lab Results   Component Value Date    GLUCOSE 72 07/27/2024    CALCIUM 8.4 09/13/2024     (L) 10/28/2014    K 3.6 09/13/2024    CO2 23 09/13/2024     (H) 09/13/2024    BUN 27 (H) 09/13/2024    CREATININE 1.76 (H) 09/13/2024       I have personally reviewed all pertinent lab results and reviewed with patient    Imaging       Minesh De Anda PA-C  Date: 10/10/2024 Time: 9:52 AM  [facility]    This note was written using fluency dictation software. Please excuse any resulting minor grammatical errors.

## 2024-10-14 ENCOUNTER — OFFICE VISIT (OUTPATIENT)
Dept: INTERNAL MEDICINE CLINIC | Facility: CLINIC | Age: 49
End: 2024-10-14
Payer: COMMERCIAL

## 2024-10-14 VITALS
TEMPERATURE: 98.3 F | WEIGHT: 156 LBS | HEART RATE: 85 BPM | BODY MASS INDEX: 29.45 KG/M2 | HEIGHT: 61 IN | DIASTOLIC BLOOD PRESSURE: 76 MMHG | SYSTOLIC BLOOD PRESSURE: 118 MMHG | OXYGEN SATURATION: 99 %

## 2024-10-14 DIAGNOSIS — M17.0 PRIMARY OSTEOARTHRITIS OF BOTH KNEES: ICD-10-CM

## 2024-10-14 DIAGNOSIS — M79.7 FIBROMYALGIA: ICD-10-CM

## 2024-10-14 DIAGNOSIS — E78.2 MIXED HYPERLIPIDEMIA: ICD-10-CM

## 2024-10-14 DIAGNOSIS — E11.21 DIABETIC NEPHROPATHY ASSOCIATED WITH TYPE 2 DIABETES MELLITUS (HCC): ICD-10-CM

## 2024-10-14 DIAGNOSIS — F90.0 ATTENTION DEFICIT HYPERACTIVITY DISORDER (ADHD), PREDOMINANTLY INATTENTIVE TYPE: ICD-10-CM

## 2024-10-14 DIAGNOSIS — E08.42 DIABETIC POLYNEUROPATHY ASSOCIATED WITH DIABETES MELLITUS DUE TO UNDERLYING CONDITION (HCC): ICD-10-CM

## 2024-10-14 DIAGNOSIS — F34.1 DYSTHYMIC DISORDER: ICD-10-CM

## 2024-10-14 DIAGNOSIS — Z23 NEED FOR VACCINATION: ICD-10-CM

## 2024-10-14 DIAGNOSIS — K21.9 GASTROESOPHAGEAL REFLUX DISEASE WITHOUT ESOPHAGITIS: ICD-10-CM

## 2024-10-14 DIAGNOSIS — M51.16 LUMBAR DISC HERNIATION WITH RADICULOPATHY: ICD-10-CM

## 2024-10-14 DIAGNOSIS — I10 PRIMARY HYPERTENSION: ICD-10-CM

## 2024-10-14 DIAGNOSIS — E11.65 UNCONTROLLED TYPE 2 DIABETES MELLITUS WITH HYPERGLYCEMIA (HCC): Primary | ICD-10-CM

## 2024-10-14 DIAGNOSIS — N18.4 STAGE 4 CHRONIC KIDNEY DISEASE (HCC): ICD-10-CM

## 2024-10-14 DIAGNOSIS — F31.11 BIPOLAR AFFECTIVE DISORDER, CURRENTLY MANIC, MILD (HCC): ICD-10-CM

## 2024-10-14 PROCEDURE — 99214 OFFICE O/P EST MOD 30 MIN: CPT | Performed by: INTERNAL MEDICINE

## 2024-10-14 RX ORDER — LISDEXAMFETAMINE DIMESYLATE 30 MG/1
30 CAPSULE ORAL EVERY MORNING
Qty: 30 CAPSULE | Refills: 0 | Status: SHIPPED | OUTPATIENT
Start: 2024-10-14

## 2024-10-14 RX ORDER — CLONAZEPAM 0.5 MG/1
0.5 TABLET ORAL 2 TIMES DAILY
Qty: 180 TABLET | Refills: 0 | Status: SHIPPED | OUTPATIENT
Start: 2024-10-14

## 2024-10-14 NOTE — PROGRESS NOTES
Assessment/Plan:             1. Uncontrolled type 2 diabetes mellitus with hyperglycemia (HCC)  Comments:  continue same med  Orders:  -     Albumin / creatinine urine ratio; Future  -     CBC and differential; Future  -     Comprehensive metabolic panel; Future  -     Lipid Panel with Direct LDL reflex; Future  -     TSH, 3rd generation; Future  -     Hemoglobin A1C; Future  2. Diabetic nephropathy associated with type 2 diabetes mellitus (HCC)  3. Diabetic polyneuropathy associated with diabetes mellitus due to underlying condition (HCC)  4. Gastroesophageal reflux disease without esophagitis  5. Primary hypertension  Comments:  continue same med  Orders:  -     CBC and differential; Future  -     Comprehensive metabolic panel; Future  -     Lipid Panel with Direct LDL reflex; Future  -     TSH, 3rd generation; Future  6. Lumbar disc herniation with radiculopathy  7. Primary osteoarthritis of both knees  8. Stage 4 chronic kidney disease (HCC)  9. Dysthymic disorder  -     clonazePAM (KlonoPIN) 0.5 mg tablet; Take 1 tablet (0.5 mg total) by mouth 2 (two) times a day  10. Bipolar affective disorder, currently manic, mild (HCC)  Comments:  continue same med  11. Fibromyalgia  12. Mixed hyperlipidemia  Comments:  continue same med  Orders:  -     Comprehensive metabolic panel; Future  -     Lipid Panel with Direct LDL reflex; Future  -     TSH, 3rd generation; Future  13. Attention deficit hyperactivity disorder (ADHD), predominantly inattentive type  Comments:  Continue same medication  Orders:  -     lisdexamfetamine (VYVANSE) 30 MG capsule; Take 1 capsule (30 mg total) by mouth every morning Max Daily Amount: 30 mg         Subjective:      Patient ID: Kinza Meza is a 49 y.o. female.    Follow-up on multiple medical problems to ensure they are stable on current medications        The following portions of the patient's history were reviewed and updated as appropriate: She  has a past medical history of ADD  (attention deficit disorder), Allergic rhinitis, ALS (amyotrophic lateral sclerosis) ([facility]) (11/22), Anemia (12/2021), Anxiety, Arthritis, Bipolar disorder ([facility]), Cervical disc disorder with radiculopathy of cervical region, Chronic depression, Chronic kidney disease, Chronic pain disorder, Cluster headache, Colitis, Colon polyp, Concussion (2018), Condyloma acuminatum, CTS (carpal tunnel syndrome) (2001), Depression, Diabetes mellitus ([facility]), Diabetic nephropathy ([facility]) (2001), Diabetic neuropathy ([facility]), Diabetic retinopathy ([facility]) (2001), Difficulty walking (07/22), Fibromyalgia, Fibromyalgia, primary, GERD (gastroesophageal reflux disease), Head injury (08/13/18), Headache(784.0) (1977), History of transfusion (12/2021), HTN (hypertension), Hyperlipidemia (07/22/2019), Hypertension, Intervertebral disc disorder with radiculopathy of lumbar region, Lupus (2003), Migraine, Miscarriage (1988), Obesity (1980), Obesity, unspecified, Open wound of back (05/30/2023), Opioid abuse, in remission ([facility]) (01/2022), Osteoporosis, Peripheral neuropathy, Postoperative wound infection (12/2021), Preeclampsia, Rheumatoid arthritis ([facility]), and Wears dentures.  She   Patient Active Problem List    Diagnosis Date Noted    Cellulitis of right toe 07/01/2024    Cellulitis of right shoulder 03/28/2024    Benign hypertension with chronic kidney disease, stage III ([facility]) 01/31/2024    Abnormal blood electrolyte level 10/30/2023    Thrombocytopenia ([facility]) 10/30/2023    Chest pain 10/29/2023    Metabolic acidosis 09/27/2023    Hyperphosphatemia 09/27/2023    Neurogenic bladder 09/26/2023    Bilateral carpal tunnel syndrome     IIH (idiopathic intracranial hypertension) 08/15/2023    Open wound of back 05/30/2023    Stage 4 chronic kidney disease ([facility]) 02/14/2023    Maceration of skin 02/14/2023    Cortical age-related cataract of left eye 01/04/2023    Bipolar affective disorder, currently manic, mild ([facility]) 03/30/2022    Seasonal allergic  rhinitis 03/30/2022    Surgical wound, non healing 02/08/2022    Diabetic polyneuropathy associated with diabetes mellitus due to underlying condition ([facility]) 02/08/2022    Continuous opioid dependence ([facility]) 01/07/2022    Anemia 12/24/2021    Wound infection 12/09/2021    Cauda equina syndrome ([facility]) 12/08/2021    Depression 10/28/2021    Annual physical exam 08/27/2021    Other gastritis without bleeding 08/11/2021    Obesity, unspecified     Diabetes mellitus ([facility])     Primary hypertension     Dizziness 12/07/2020    Secondary hyperparathyroidism of renal origin ([facility]) 11/16/2020    Insulin dependent type 2 diabetes mellitus ([facility])     Intractable migraine with status migrainosus     Type 2 diabetes mellitus with diabetic neuropathy ([facility]) 09/11/2020    Ventral hernia without obstruction or gangrene 09/11/2020    Lumbar disc herniation with radiculopathy 09/11/2020    Concussion with moderate (1-24 hours) loss of consciousness 09/11/2020    Primary osteoarthritis of both knees 09/11/2020    Diabetic nephropathy associated with type 2 diabetes mellitus ([facility]) 09/11/2020    Dysthymic disorder 09/11/2020    Acute on chronic kidney failure  ([facility]) 07/13/2020    Hypokalemia 07/13/2020    Near syncope 07/13/2020    Weakness 07/13/2020    Status post gastric bypass for obesity 07/13/2020    Diarrhea 07/13/2020    Snores     Headache upon awakening     Chronic tension type headache 12/16/2019    Chronic migraine w/o aura w/o status migrainosus, not intractable 12/16/2019    Medical marijuana use 12/16/2019    Vitamin D deficiency 08/23/2019    Chronic kidney disease-mineral and bone disorder 08/20/2019    S/P cervical spinal fusion 07/25/2019    Mixed hyperlipidemia 07/22/2019    Cervical disc disorder with radiculopathy     Stage 3b chronic kidney disease ([facility]) 05/07/2019    Persistent proteinuria 05/07/2019    Hypertensive kidney disease with stage 3b chronic kidney disease ([facility]) 05/07/2019    Hyponatremia 05/07/2019     Intervertebral disc disorders with radiculopathy, lumbar region     Sacroiliitis (HCC)     Greater trochanteric bursitis of both hips     Herniated nucleus pulposus, L1-2 2017    Bilateral chronic knee pain 2017    Facet arthritis of lumbosacral region 2017    Fibromyalgia 2017    Lumbar stenosis with neurogenic claudication 2017    ADHD 2016    Uncontrolled type 2 diabetes mellitus with hyperglycemia (HCC) 2016    Gastroesophageal reflux disease without esophagitis 2016    Morbid obesity with BMI of 40.0-44.9, adult (HCC) 2016    Chronic renal insufficiency 2014    Endometriosis 2012     She  has a past surgical history that includes Appendectomy;  section; Hernia repair; Cervical fusion (N/A, 2019); Colonoscopy; Carpal tunnel release; Hemorrhoid surgery; Ventral hernia repair; Shelby-en-y procedure; Ulnar tunnel release; Mammo (historical) (); Colonoscopy; Lumbar fusion (N/A, 2021); LUMBAR WOUND EXPLORATION (Bilateral, 2021); VAC DRESSING APPLICATION (Bilateral, 2021); VAC DRESSING APPLICATION (N/A, 2021); Bariatric Surgery (2016); Cataract extraction; and pr debridement bone 1st 20 sq cm/< (N/A, 2023).  Her family history includes ADD / ADHD in her cousin and cousin; Anemia in her maternal grandmother; Anxiety disorder in her father; Cancer in her mother; Cervical cancer in her mother; Colon cancer in her maternal grandmother; Completed Suicide  in her father; Depression in her father; Diabetes in her mother; Hypertension in her mother; Kidney disease in her mother; Mental illness in her daughter, daughter, and father; Neuropathy in her mother; No Known Problems in her family, maternal aunt, maternal grandfather, paternal grandfather, and sister; Ovarian cancer in her paternal aunt; Psychiatric Illness in her father; Suicidality in her father; Suicide Attempts in her father; Uterine cancer in her  paternal grandmother.  She  reports that she has never smoked. She has never been exposed to tobacco smoke. She has quit using smokeless tobacco. She reports that she does not currently use alcohol. She reports current drug use. Drug: Marijuana.  Current Outpatient Medications   Medication Sig Dispense Refill    acetaZOLAMIDE (DIAMOX) 250 mg tablet TAKE 1 TABLET(250 MG) BY MOUTH TWICE DAILY 180 tablet 1    Ajovy 225 MG/1.5ML auto-injector INJECT 4.5 ML(675 MG TOTAL) UNDER THE SKIN EVERY 3 MONTHS 4.5 mL 4    atorvastatin (LIPITOR) 10 mg tablet TAKE 1 TABLET(10 MG) BY MOUTH DAILY AT BEDTIME 90 tablet 1    BD Pen Needle Glory U/F 32G X 4 MM MISC Inject under the skin 2 (two) times a day Use as directed 180 each 0    Blood Glucose Monitoring Suppl (ONE TOUCH ULTRA 2) w/Device KIT Use 1 each 2 (two) times a day Use as instructed 1 kit 0    calcitriol (ROCALTROL) 0.25 mcg capsule TAKE 1 CAPSULE BY MOUTH daily 30 capsule 5    cetirizine (ZyrTEC) oral solution Take 5 mL (5 mg total) by mouth daily 450 mL 1    Cholecalciferol (Vitamin D3) 50 MCG (2000 UT) CAPS TAKE 1 CAPSULE(2,000 UNITS TOTAL) BY MOUTH DAILY 90 capsule 1    clonazePAM (KlonoPIN) 0.5 mg tablet Take 1 tablet (0.5 mg total) by mouth 2 (two) times a day 180 tablet 0    Continuous Blood Gluc Sensor (FreeStyle Filiberto 3 Sensor) MISC Use 1 each every 14 (fourteen) days 6 each 1    cycloSPORINE, PF, (Cequa) 0.09 % SOLN Apply 1 drop to eye 2 (two) times a day      divalproex sodium (Depakote) 500 mg DR tablet Take 1 tablet (500 mg total) by mouth daily at bedtime 90 tablet 3    docusate sodium (COLACE) 100 mg capsule TAKE 1 CAPSULE BY MOUTH TWICE A DAY 60 capsule 0    DULoxetine (CYMBALTA) 60 mg delayed release capsule Take 1 capsule (60 mg total) by mouth daily 90 capsule 1    ferrous sulfate 324 (65 Fe) mg Take 1 tablet (324 mg total) by mouth daily before breakfast (Patient taking differently: Take 324 mg by mouth every other day) 90 tablet 3    insulin aspart  (NovoLOG FlexPen) 100 UNIT/ML injection pen Continue with sliding scale insulin regimen      Insulin Glargine Solostar (Lantus SoloStar) 100 UNIT/ML SOPN Inject 0.15 mL (15 Units total) under the skin daily at bedtime (Patient taking differently: Inject 15 Units under the skin daily at bedtime Taking 8-10 units) 15 mL 0    Lancets MISC Use 1 each 2 (two) times a day Use as instructed      lisdexamfetamine (VYVANSE) 30 MG capsule Take 1 capsule (30 mg total) by mouth every morning Max Daily Amount: 30 mg 30 capsule 0    losartan-hydrochlorothiazide (HYZAAR) 100-12.5 MG per tablet TAKE ONE TABLET BY MOUTH ONE TIME DAILY 90 tablet 0    magnesium (MAGTAB) 84 MG (7MEQ) TBCR Take 1 tablet (84 mg total) by mouth daily 90 tablet 3    methocarbamol (Robaxin-750) 750 mg tablet Take 1 tablet (750 mg total) by mouth every 6 (six) hours as needed for muscle spasms (back pain) 30 tablet 3    metoprolol tartrate (LOPRESSOR) 100 mg tablet TAKE 1 TABLET(100 MG) BY MOUTH EVERY 12 HOURS 180 tablet 1    Multiple Vitamins-Minerals (multivitamin with minerals) tablet Take 1 tablet by mouth daily      mupirocin (BACTROBAN) 2 % ointment Apply topically 3 (three) times a day 60 g 0    NON FORMULARY Botox injections for HA every 3months (LD 3/20/23)      ondansetron (ZOFRAN) 4 mg tablet Take 1 tablet (4 mg total) by mouth every 8 (eight) hours as needed for nausea or vomiting 120 tablet 0    Ophthalmic Irrigation Solution (OCUSOFT EYE WASH OP) Apply 1 Application to eye daily at bedtime      Polyethyl Glycol-Propyl Glycol (Systane) 0.4-0.3 % GEL Apply 1 drop to eye 2 (two) times a day Using Ivizia as an alternative      pregabalin (LYRICA) 100 mg capsule Take 1 capsule (100 mg total) by mouth 3 (three) times a day 270 capsule 0    rimegepant sulfate (Nurtec) 75 mg TBDP Place one tab (75mg total) under the tongue as needed for migraine. 16 tablet 11    sodium bicarbonate 650 mg tablet Take 1 tablet (650 mg total) by mouth 3 (three) times a  day (Patient taking differently: Take 650 mg by mouth 2 (two) times a day) 360 tablet 3    terazosin (HYTRIN) 1 mg capsule Take 1 capsule (1 mg total) by mouth daily at bedtime 30 capsule 1    tirzepatide (Mounjaro) 15 MG/0.5ML Inject 0.5 mL (15 mg total) under the skin every 7 days 6 mL 0    cyanocobalamin 1,000 mcg/mL Inject 1 mL (1,000 mcg total) into a muscle once a week (Patient not taking: Reported on 9/13/2024) 4 mL 3    cyproheptadine (PERIACTIN) 4 mg tablet Take 1 tablet (4 mg total) by mouth daily at bedtime (Patient not taking: Reported on 11/3/2023) 90 tablet 1    diphenhydrAMINE (BENADRYL) 25 mg tablet Take 1 tablet (25 mg total) by mouth every 6 (six) hours as needed for itching (Patient not taking: Reported on 11/3/2023) 30 tablet 0    polyethylene glycol (MIRALAX) 17 g packet Take 17 g by mouth daily 1 each 0     Current Facility-Administered Medications   Medication Dose Route Frequency Provider Last Rate Last Admin    cyanocobalamin injection 1,000 mcg  1,000 mcg Intramuscular Weekly Myla Norwood MD   1,000 mcg at 07/01/24 1007     Current Outpatient Medications on File Prior to Visit   Medication Sig    acetaZOLAMIDE (DIAMOX) 250 mg tablet TAKE 1 TABLET(250 MG) BY MOUTH TWICE DAILY    Ajovy 225 MG/1.5ML auto-injector INJECT 4.5 ML(675 MG TOTAL) UNDER THE SKIN EVERY 3 MONTHS    atorvastatin (LIPITOR) 10 mg tablet TAKE 1 TABLET(10 MG) BY MOUTH DAILY AT BEDTIME    BD Pen Needle Glory U/F 32G X 4 MM MISC Inject under the skin 2 (two) times a day Use as directed    Blood Glucose Monitoring Suppl (ONE TOUCH ULTRA 2) w/Device KIT Use 1 each 2 (two) times a day Use as instructed    calcitriol (ROCALTROL) 0.25 mcg capsule TAKE 1 CAPSULE BY MOUTH daily    cetirizine (ZyrTEC) oral solution Take 5 mL (5 mg total) by mouth daily    Cholecalciferol (Vitamin D3) 50 MCG (2000 UT) CAPS TAKE 1 CAPSULE(2,000 UNITS TOTAL) BY MOUTH DAILY    Continuous Blood Gluc Sensor (FreeStyle Filiberto 3 Sensor) MISC Use 1 each every  14 (fourteen) days    cycloSPORINE, PF, (Cequa) 0.09 % SOLN Apply 1 drop to eye 2 (two) times a day    divalproex sodium (Depakote) 500 mg DR tablet Take 1 tablet (500 mg total) by mouth daily at bedtime    docusate sodium (COLACE) 100 mg capsule TAKE 1 CAPSULE BY MOUTH TWICE A DAY    DULoxetine (CYMBALTA) 60 mg delayed release capsule Take 1 capsule (60 mg total) by mouth daily    ferrous sulfate 324 (65 Fe) mg Take 1 tablet (324 mg total) by mouth daily before breakfast (Patient taking differently: Take 324 mg by mouth every other day)    insulin aspart (NovoLOG FlexPen) 100 UNIT/ML injection pen Continue with sliding scale insulin regimen    Insulin Glargine Solostar (Lantus SoloStar) 100 UNIT/ML SOPN Inject 0.15 mL (15 Units total) under the skin daily at bedtime (Patient taking differently: Inject 15 Units under the skin daily at bedtime Taking 8-10 units)    Lancets MISC Use 1 each 2 (two) times a day Use as instructed    losartan-hydrochlorothiazide (HYZAAR) 100-12.5 MG per tablet TAKE ONE TABLET BY MOUTH ONE TIME DAILY    magnesium (MAGTAB) 84 MG (7MEQ) TBCR Take 1 tablet (84 mg total) by mouth daily    methocarbamol (Robaxin-750) 750 mg tablet Take 1 tablet (750 mg total) by mouth every 6 (six) hours as needed for muscle spasms (back pain)    metoprolol tartrate (LOPRESSOR) 100 mg tablet TAKE 1 TABLET(100 MG) BY MOUTH EVERY 12 HOURS    Multiple Vitamins-Minerals (multivitamin with minerals) tablet Take 1 tablet by mouth daily    mupirocin (BACTROBAN) 2 % ointment Apply topically 3 (three) times a day    NON FORMULARY Botox injections for HA every 3months (LD 3/20/23)    ondansetron (ZOFRAN) 4 mg tablet Take 1 tablet (4 mg total) by mouth every 8 (eight) hours as needed for nausea or vomiting    Ophthalmic Irrigation Solution (OCUSOFT EYE WASH OP) Apply 1 Application to eye daily at bedtime    Polyethyl Glycol-Propyl Glycol (Systane) 0.4-0.3 % GEL Apply 1 drop to eye 2 (two) times a day Using Ivizia as an  alternative    pregabalin (LYRICA) 100 mg capsule Take 1 capsule (100 mg total) by mouth 3 (three) times a day    rimegepant sulfate (Nurtec) 75 mg TBDP Place one tab (75mg total) under the tongue as needed for migraine.    sodium bicarbonate 650 mg tablet Take 1 tablet (650 mg total) by mouth 3 (three) times a day (Patient taking differently: Take 650 mg by mouth 2 (two) times a day)    terazosin (HYTRIN) 1 mg capsule Take 1 capsule (1 mg total) by mouth daily at bedtime    tirzepatide (Mounjaro) 15 MG/0.5ML Inject 0.5 mL (15 mg total) under the skin every 7 days    [DISCONTINUED] clonazePAM (KlonoPIN) 0.5 mg tablet Take 1 tablet (0.5 mg total) by mouth 2 (two) times a day    [DISCONTINUED] lisdexamfetamine (VYVANSE) 30 MG capsule Take 1 capsule (30 mg total) by mouth every morning Max Daily Amount: 30 mg    cyanocobalamin 1,000 mcg/mL Inject 1 mL (1,000 mcg total) into a muscle once a week (Patient not taking: Reported on 9/13/2024)    cyproheptadine (PERIACTIN) 4 mg tablet Take 1 tablet (4 mg total) by mouth daily at bedtime (Patient not taking: Reported on 11/3/2023)    diphenhydrAMINE (BENADRYL) 25 mg tablet Take 1 tablet (25 mg total) by mouth every 6 (six) hours as needed for itching (Patient not taking: Reported on 11/3/2023)    polyethylene glycol (MIRALAX) 17 g packet Take 17 g by mouth daily    [DISCONTINUED] levonorgestrel (MIRENA) 20 MCG/24HR IUD Mirena 20 mcg/24 hr (5 years) intrauterine device   Vaginally for 5 years. (Patient not taking: Reported on 10/8/2024)     Current Facility-Administered Medications on File Prior to Visit   Medication    cyanocobalamin injection 1,000 mcg     She is allergic to ace inhibitors, pollen extract, short ragweed pollen ext, sodium hyaluronate (yoav), levonorgestrel-eth estradiol [levonorgestrel-ethinyl estrad], and latex..    Review of Systems   Constitutional:  Negative for chills and fever.   HENT:  Negative for congestion, ear pain and sore throat.    Eyes:   "Negative for pain.   Respiratory:  Negative for cough and shortness of breath.    Cardiovascular:  Negative for chest pain and leg swelling.   Gastrointestinal:  Negative for abdominal pain, nausea and vomiting.   Endocrine: Negative for polyuria.   Genitourinary:  Negative for difficulty urinating, frequency and urgency.   Musculoskeletal:  Positive for arthralgias and back pain.   Skin:  Negative for rash.   Neurological:  Negative for weakness and headaches.   Psychiatric/Behavioral:  Negative for sleep disturbance. The patient is not nervous/anxious.          Objective:      /76 (BP Location: Left arm, Patient Position: Sitting, Cuff Size: Standard)   Pulse 85   Temp 98.3 °F (36.8 °C) (Tympanic)   Ht 5' 1\" (1.549 m)   Wt 70.8 kg (156 lb)   LMP  (LMP Unknown)   SpO2 99%   BMI 29.48 kg/m²     Recent Results (from the past 1344 hour(s))   Basic metabolic panel    Collection Time: 09/04/24  8:06 AM   Result Value Ref Range    Sodium 145 135 - 147 mmol/L    Potassium 3.4 (L) 3.5 - 5.3 mmol/L    Chloride 109 (H) 96 - 108 mmol/L    CO2 23 21 - 32 mmol/L    ANION GAP 13 4 - 13 mmol/L    BUN 37 (H) 5 - 25 mg/dL    Creatinine 2.13 (H) 0.60 - 1.30 mg/dL    Glucose, Fasting 63 (L) 65 - 99 mg/dL    Calcium 8.4 8.4 - 10.2 mg/dL    eGFR 26 ml/min/1.73sq m   Basic metabolic panel    Collection Time: 09/13/24 11:37 AM   Result Value Ref Range    Sodium 143 135 - 147 mmol/L    Potassium 3.6 3.5 - 5.3 mmol/L    Chloride 111 (H) 96 - 108 mmol/L    CO2 23 21 - 32 mmol/L    ANION GAP 9 4 - 13 mmol/L    BUN 27 (H) 5 - 25 mg/dL    Creatinine 1.76 (H) 0.60 - 1.30 mg/dL    Glucose 129 65 - 140 mg/dL    Calcium 8.4 8.4 - 10.2 mg/dL    eGFR 33 ml/min/1.73sq m   Protein / creatinine ratio, urine    Collection Time: 09/13/24 11:37 AM   Result Value Ref Range    Creatinine, Ur 183.5 Reference range not established. mg/dL    Protein Urine Random 15.3 Reference range not established. mg/dL    Prot/Creat Ratio, Ur 0.1 0.0 - 0.1 "   PTH, intact    Collection Time: 09/13/24 11:37 AM   Result Value Ref Range    .0 (H) 12.0 - 88.0 pg/mL   Urinalysis with microscopic    Collection Time: 09/13/24 11:37 AM   Result Value Ref Range    Color, UA Yellow     Clarity, UA Clear     Specific Gravity, UA 1.020 1.003 - 1.030    pH, UA 6.5 4.5, 5.0, 5.5, 6.0, 6.5, 7.0, 7.5, 8.0    Leukocytes, UA Moderate (A) Negative    Nitrite, UA Negative Negative    Protein, UA Trace (A) Negative mg/dl    Glucose, UA Negative Negative mg/dl    Ketones, UA Negative Negative mg/dl    Urobilinogen, UA 3.0 (A) <2.0 mg/dl mg/dl    Bilirubin, UA Negative Negative    Occult Blood, UA Negative Negative    RBC, UA 1-2 None Seen, 1-2 /hpf    WBC, UA 10-20 (A) None Seen, 1-2 /hpf    Epithelial Cells Occasional None Seen, Occasional /hpf    Bacteria, UA Occasional None Seen, Occasional /hpf    MUCUS THREADS Occasional (A) None Seen    Hyaline Casts, UA 10-25 (A) None Seen /lpf   Phosphorus    Collection Time: 09/13/24 11:37 AM   Result Value Ref Range    Phosphorus 3.8 2.7 - 4.5 mg/dL   Magnesium    Collection Time: 09/13/24 11:37 AM   Result Value Ref Range    Magnesium 1.8 (L) 1.9 - 2.7 mg/dL   CBC    Collection Time: 09/13/24 11:37 AM   Result Value Ref Range    WBC 5.24 4.31 - 10.16 Thousand/uL    RBC 4.18 3.81 - 5.12 Million/uL    Hemoglobin 12.3 11.5 - 15.4 g/dL    Hematocrit 37.6 34.8 - 46.1 %    MCV 90 82 - 98 fL    MCH 29.4 26.8 - 34.3 pg    MCHC 32.7 31.4 - 37.4 g/dL    RDW 13.9 11.6 - 15.1 %    Platelets 142 (L) 149 - 390 Thousands/uL    MPV 12.0 8.9 - 12.7 fL   Vitamin D 25 hydroxy    Collection Time: 09/13/24 11:37 AM   Result Value Ref Range    Vit D, 25-Hydroxy 61.2 30.0 - 100.0 ng/mL   Follicle stimulating hormone    Collection Time: 09/13/24 11:37 AM   Result Value Ref Range    .8 See Comment mIU/mL   Estradiol    Collection Time: 09/13/24 11:37 AM   Result Value Ref Range    Estradiol <15.0 See Comment pg/mL   TIBC Panel (incl. Iron, TIBC, % Iron  Saturation)    Collection Time: 09/13/24 11:37 AM   Result Value Ref Range    Iron Saturation 30 15 - 50 %    TIBC 228 (L) 250 - 450 ug/dL    Iron 69 50 - 212 ug/dL    UIBC 159 155 - 355 ug/dL   Ferritin    Collection Time: 09/13/24 11:37 AM   Result Value Ref Range    Ferritin 43 11 - 307 ng/mL   POCT Measure PVR    Collection Time: 10/10/24  9:57 AM   Result Value Ref Range    POST-VOID RESIDUAL VOLUME, ML POC 0 mL        Physical Exam  Constitutional:       Appearance: Normal appearance.   HENT:      Head: Normocephalic.      Right Ear: Tympanic membrane, ear canal and external ear normal.      Left Ear: Tympanic membrane, ear canal and external ear normal.      Nose: Nose normal. No congestion.      Mouth/Throat:      Mouth: Mucous membranes are moist.      Pharynx: Oropharynx is clear. No oropharyngeal exudate or posterior oropharyngeal erythema.   Eyes:      Extraocular Movements: Extraocular movements intact.      Conjunctiva/sclera: Conjunctivae normal.   Cardiovascular:      Rate and Rhythm: Normal rate and regular rhythm.      Heart sounds: Normal heart sounds. No murmur heard.  Pulmonary:      Effort: Pulmonary effort is normal.      Breath sounds: Normal breath sounds. No wheezing or rales.   Abdominal:      General: Abdomen is flat. There is no distension.      Palpations: Abdomen is soft.      Tenderness: There is no abdominal tenderness.   Musculoskeletal:      Cervical back: Normal range of motion and neck supple.      Right lower leg: No edema.      Left lower leg: No edema.   Lymphadenopathy:      Cervical: No cervical adenopathy.   Skin:     General: Skin is warm.   Neurological:      General: No focal deficit present.      Mental Status: She is alert and oriented to person, place, and time.

## 2024-10-16 ENCOUNTER — TELEPHONE (OUTPATIENT)
Dept: UROLOGY | Facility: CLINIC | Age: 49
End: 2024-10-16

## 2024-10-16 NOTE — TELEPHONE ENCOUNTER
Called and spoke directly with patient. Office advised of provider note below:      ----- Message from Minesh De Anda PA-C sent at 10/15/2024  8:43 PM EDT -----  Normal ultrasound.  No hydronephrosis     Patient verbalized understanding and thanked the office for the call.

## 2024-10-20 DIAGNOSIS — F31.11 BIPOLAR AFFECTIVE DISORDER, CURRENTLY MANIC, MILD (HCC): ICD-10-CM

## 2024-10-20 RX ORDER — DULOXETIN HYDROCHLORIDE 60 MG/1
CAPSULE, DELAYED RELEASE ORAL
Qty: 90 CAPSULE | Refills: 1 | Status: SHIPPED | OUTPATIENT
Start: 2024-10-20

## 2024-10-22 ENCOUNTER — HOSPITAL ENCOUNTER (OUTPATIENT)
Dept: RADIOLOGY | Age: 49
Discharge: HOME | End: 2024-10-22
Payer: COMMERCIAL

## 2024-10-22 DIAGNOSIS — Z12.31 VISIT FOR SCREENING MAMMOGRAM: ICD-10-CM

## 2024-10-22 PROCEDURE — 77063 BREAST TOMOSYNTHESIS BI: CPT

## 2024-10-22 PROCEDURE — 77067 SCR MAMMO BI INCL CAD: CPT

## 2024-10-23 ENCOUNTER — PROCEDURE VISIT (OUTPATIENT)
Age: 49
End: 2024-10-23
Payer: COMMERCIAL

## 2024-10-23 VITALS
BODY MASS INDEX: 29.45 KG/M2 | WEIGHT: 156 LBS | HEART RATE: 90 BPM | SYSTOLIC BLOOD PRESSURE: 130 MMHG | DIASTOLIC BLOOD PRESSURE: 91 MMHG | HEIGHT: 61 IN

## 2024-10-23 DIAGNOSIS — M47.812 CERVICAL SPONDYLOSIS: ICD-10-CM

## 2024-10-23 DIAGNOSIS — M48.062 SPINAL STENOSIS OF LUMBAR REGION WITH NEUROGENIC CLAUDICATION: Primary | ICD-10-CM

## 2024-10-23 DIAGNOSIS — M99.01 SEGMENTAL DYSFUNCTION OF CERVICAL REGION: ICD-10-CM

## 2024-10-23 DIAGNOSIS — M99.02 SEGMENTAL DYSFUNCTION OF THORACIC REGION: ICD-10-CM

## 2024-10-23 DIAGNOSIS — M62.838 MUSCLE SPASM: ICD-10-CM

## 2024-10-23 DIAGNOSIS — M99.03 SEGMENTAL DYSFUNCTION OF LUMBAR REGION: ICD-10-CM

## 2024-10-23 DIAGNOSIS — M99.04 SEGMENTAL DYSFUNCTION OF SACRAL REGION: ICD-10-CM

## 2024-10-23 PROCEDURE — 98941 CHIROPRACT MANJ 3-4 REGIONS: CPT | Performed by: CHIROPRACTOR

## 2024-10-23 PROCEDURE — 97110 THERAPEUTIC EXERCISES: CPT | Performed by: CHIROPRACTOR

## 2024-10-23 NOTE — PROGRESS NOTES
Initial date of service: 5/6/24    Diagnoses and all orders for this visit:    Spinal stenosis of lumbar region with neurogenic claudication    Segmental dysfunction of sacral region    Muscle spasm    Cervical spondylosis    Segmental dysfunction of lumbar region    Segmental dysfunction of thoracic region    Segmental dysfunction of cervical region    Pt suffered mild exacerbation but responded to tx with reduced pain and increased ROM. Overall improving with more mobility and less falls    TREATMENT: [id number]  Ther-ex: IASTM; discussed post procedure soreness and/or ecchymosis for up to 36 hrs, applied to affected mm hypertonicities; supine hamstring stretch, supine gluteal stretch, single knee to chest stretch, upper trap stretch, transitional mvmt education, abdominal bracing; greater than 15 min spent performing above mentioned ther-ex to improve ROM/flexibility. Cervical supine graded mobilization and traction, Thoracic mobilization/manipulation: prone P-A mob; Lumbar mobilization/manipulation: diversified side laying graded HVLA, flexion-traction; SIJ Manipulation/Mobilization: R/L SIJ HVLA - long axis distraction, mederos drop table maneuver to affected SIJ    HPI:  Kinza Meza is a 49 y.o. female  Chief Complaint   Patient presents with    Neck - Pain     Neck pain in the center sore from falling on Monday night.  Pain score 3    Back Pain     Lower lumbar pain score 3     Pt presents for tx for exacerbation of chronic neck/back pain. Pt has had back and neck surgery. Pt was utilizing cane and now using walker occasionally. 2022 MRI demonstrates multilevel degenerative changes of postsurgical lumbar spine status post L1-L2 posterior spinal fixation with varying degrees of canal stenosis (moderate L4-L5) and foraminal narrowing (mild left L1-L2 and left L2-L3 ). 2024 fl/ext xrays unremarkable for instability. 2021 C/S MRI demonstrates s/p ACDF from C4 to C7. Multilevel degenerative spondylosis as  described. Possible ossification of the posterior longitudinal ligament at C2-3 and C6-7  10/23: pt reports feeling and moving better since last tx; had a fall onto her left side with mild flare-up; feels like she is falling much less in general since start of tx; used to be 3-4 times a week      Neck Pain   This is a chronic problem. The current episode started more than 1 year ago. The problem occurs constantly. The problem has been waxing and waning. The pain is present in the left side, midline and right side. The quality of the pain is described as aching. The symptoms are aggravated by bending, position, stress and twisting. The pain is Worse during the day.   Back Pain  This is a chronic problem. The current episode started more than 1 year ago. The problem occurs constantly. The problem has been waxing and waning since onset. The pain is present in the gluteal, lumbar spine, sacro-iliac and thoracic spine. The quality of the pain is described as aching, burning and stabbing. The pain radiates to the left thigh, left knee and right thigh. Pain scale: 1-6/10. The pain is Worse during the day. The symptoms are aggravated by standing, twisting and bending (walking, laying supine, transitional mvmts; palliative includes sitting, massage). Pertinent negatives include no bladder incontinence or bowel incontinence. Risk factors include lack of exercise.     Past Medical History:   Diagnosis Date    ADD (attention deficit disorder)     Allergic rhinitis     ALS (amyotrophic lateral sclerosis) ([facility]) 11/22    Per neurological surgeon    Anemia 12/2021    Anxiety     Arthritis     Bipolar disorder ([facility])     Cervical disc disorder with radiculopathy of cervical region     Chronic depression     Chronic kidney disease     Stage 1    Chronic pain disorder     Cluster headache     Colitis     Last assessed - 11/25/14    Colon polyp     Concussion 2018    Condyloma acuminatum     Last assessed - 3/19/14    CTS (carpal tunnel  syndrome)     Depression     Diabetes mellitus (HCC)     Last assessed - 14    Diabetic nephropathy (HCC)     Diabetic neuropathy (HCC)     Diabetic retinopathy (HCC)     Difficulty walking     Fibromyalgia     Fibromyalgia, primary     GERD (gastroesophageal reflux disease)     Head injury 18    Fall    Headache(784.0) 1977    History of transfusion 2021    HTN (hypertension)     Last assessed - 14    Hyperlipidemia 2019    Hypertension     Intervertebral disc disorder with radiculopathy of lumbar region     Lupus     Rhuematologist-Sheela Dasilva, [place]    Migraine     Miscarriage 1988    ,     Obesity 1980    Obesity, unspecified     Open wound of back 2023    Opioid abuse, in remission ([facility]) 2022    Only while in hospital and for about 10 days once home instead of one week.    Osteoporosis     Peripheral neuropathy     Postoperative wound infection 2021    Stop not healed    Preeclampsia     Last assessed - 14    Rheumatoid arthritis (HCC)     Wears dentures       Past Surgical History:   Procedure Laterality Date    APPENDECTOMY      Last assessed - 14    BARIATRIC SURGERY  2016    CARPAL TUNNEL RELEASE      CATARACT EXTRACTION      CERVICAL FUSION N/A 2019    Procedure: Anterior cervical discectomy w fusion C4-5, C5-6, C6-7; allograft and neuromonitoring;  Surgeon: Jose Gomez MD;  [facility];  Service: Orthopedics     SECTION      Last assessed - 14    COLONOSCOPY      COLONOSCOPY      HEMORRHOID SURGERY      HERNIA REPAIR      Last assessed - 14    LUMBAR FUSION N/A 2021    Procedure: L1/2 laminectomy, discectomy with L1/2 MIS posterior fixation using neuromonitoring and neuronavigation;  Surgeon: Suhas Akbar MD;  [facility];  Service: Neurosurgery    LUMBAR WOUND EXPLORATION Bilateral 2021    Procedure: Lumbar wound washout, debridement and intraoperative  cultures;  Surgeon: Suhas Akbar MD;  [facility];  Service: Neurosurgery    MAMMO (HISTORICAL)  2016    [place] DEBRIDEMENT BONE 1ST 20 SQ CM/< N/A 5/30/2023    Procedure: SURGICAL PREPARATION OF BACK  WOUND AND LOCAL FLAP, PREVENA PLACEMENT;  Surgeon: Ady Rocha MD;  Location:  MAIN OR;  Service: Plastics    WOOD-EN-Y PROCEDURE      ULNAR TUNNEL RELEASE      VAC DRESSING APPLICATION Bilateral 12/22/2021    Procedure: APPLICATION VAC DRESSING;  Surgeon: Elbert Hawley MD;  [facility];  Service: General    VAC DRESSING APPLICATION N/A 12/25/2021    Procedure: APPLICATION VAC DRESSING ABDOMEN/TRUNK;  Surgeon: Mani Ross DO;  [facility];  Service: General    VENTRAL HERNIA REPAIR       The following portions of the patient's history were reviewed and updated as appropriate: allergies, past family history, past medical history, past social history, past surgical history, and problem list.  Review of Systems   Gastrointestinal:  Negative for bowel incontinence.   Genitourinary:  Negative for bladder incontinence.   Musculoskeletal:  Positive for back pain and neck pain.     Physical Exam  Neck:        Comments: Pnful and limited in Brot, Blf  Musculoskeletal:      Cervical back: Pain with movement and muscular tenderness present. Decreased range of motion.      Thoracic back: Spasms and tenderness present. Decreased range of motion.      Lumbar back: Spasms and tenderness present. Decreased range of motion. Negative right straight leg raise test and negative left straight leg raise test.        Back:       Comments: Pnful and limited in Brot, Blf, Ext centralizes    Lymphadenopathy:      Cervical: No cervical adenopathy.   Skin:     General: Skin is warm and dry.   Neurological:      Mental Status: She is alert and oriented to person, place, and time.      Gait: Gait is intact.   Psychiatric:         Mood and Affect: Mood and affect normal.         Behavior: Behavior normal.        SOFT TISSUE ASSESSMENT Hypertonicity and tenderness palpated B upper traps, B SCM, B T10-S1 erector spinae, glute med/min, QL, hamstring JOINT RESTRICTIONS: C4/5, T1/2, T10-S1 and R/L SIJ    Return in about 1 week (around 10/30/2024) for Next scheduled follow up.

## 2024-10-25 NOTE — RESULT ENCOUNTER NOTE
Normal mammogram.   Continue with monthly breast self exam, annual clinical breast exam and annual mammogram.

## 2024-10-30 ENCOUNTER — PROCEDURE VISIT (OUTPATIENT)
Age: 49
End: 2024-10-30
Payer: COMMERCIAL

## 2024-10-30 VITALS — WEIGHT: 156 LBS | HEIGHT: 61 IN | BODY MASS INDEX: 29.45 KG/M2

## 2024-10-30 DIAGNOSIS — M99.04 SEGMENTAL DYSFUNCTION OF SACRAL REGION: ICD-10-CM

## 2024-10-30 DIAGNOSIS — M99.02 SEGMENTAL DYSFUNCTION OF THORACIC REGION: ICD-10-CM

## 2024-10-30 DIAGNOSIS — M99.01 SEGMENTAL DYSFUNCTION OF CERVICAL REGION: ICD-10-CM

## 2024-10-30 DIAGNOSIS — M99.03 SEGMENTAL DYSFUNCTION OF LUMBAR REGION: ICD-10-CM

## 2024-10-30 DIAGNOSIS — M48.062 SPINAL STENOSIS OF LUMBAR REGION WITH NEUROGENIC CLAUDICATION: Primary | ICD-10-CM

## 2024-10-30 DIAGNOSIS — M47.812 CERVICAL SPONDYLOSIS: ICD-10-CM

## 2024-10-30 DIAGNOSIS — M62.838 MUSCLE SPASM: ICD-10-CM

## 2024-10-30 PROCEDURE — 98941 CHIROPRACT MANJ 3-4 REGIONS: CPT | Performed by: CHIROPRACTOR

## 2024-10-30 PROCEDURE — 97110 THERAPEUTIC EXERCISES: CPT | Performed by: CHIROPRACTOR

## 2024-10-30 NOTE — PROGRESS NOTES
Initial date of service: 5/6/24    Diagnoses and all orders for this visit:    Spinal stenosis of lumbar region with neurogenic claudication    Segmental dysfunction of sacral region    Muscle spasm    Cervical spondylosis    Segmental dysfunction of lumbar region    Segmental dysfunction of thoracic region    Segmental dysfunction of cervical region    Pt suffered mild exacerbation but responded to tx with reduced pain and increased ROM. Overall improving with more mobility and less falls    TREATMENT: [id number]  Ther-ex: IASTM; discussed post procedure soreness and/or ecchymosis for up to 36 hrs, applied to affected mm hypertonicities; supine hamstring stretch, supine gluteal stretch, single knee to chest stretch, upper trap stretch, transitional mvmt education, abdominal bracing; greater than 15 min spent performing above mentioned ther-ex to improve ROM/flexibility. Cervical supine graded mobilization and traction, Thoracic mobilization/manipulation: prone P-A mob; Lumbar mobilization/manipulation: diversified side laying graded HVLA, flexion-traction; SIJ Manipulation/Mobilization: R/L SIJ HVLA - long axis distraction, mederos drop table maneuver to affected SIJ    HPI:  Kinza Meza is a 49 y.o. female  Chief Complaint   Patient presents with    Neck Pain     Neck pain about a 3     Back Pain     Roberto back pain about a 4 in the center      Pt presents for tx for exacerbation of chronic neck/back pain. Pt has had back and neck surgery. Pt was utilizing cane and now using walker occasionally. 2022 MRI demonstrates multilevel degenerative changes of postsurgical lumbar spine status post L1-L2 posterior spinal fixation with varying degrees of canal stenosis (moderate L4-L5) and foraminal narrowing (mild left L1-L2 and left L2-L3 ). 2024 fl/ext xrays unremarkable for instability. 2021 C/S MRI demonstrates s/p ACDF from C4 to C7. Multilevel degenerative spondylosis as described. Possible ossification of the  posterior longitudinal ligament at C2-3 and C6-7  10/30: pt reports feeling and moving better since last tx      Neck Pain   This is a chronic problem. The current episode started more than 1 year ago. The problem occurs constantly. The problem has been waxing and waning. The pain is present in the left side, midline and right side. The quality of the pain is described as aching. The symptoms are aggravated by bending, position, stress and twisting. The pain is Worse during the day.   Back Pain  This is a chronic problem. The current episode started more than 1 year ago. The problem occurs constantly. The problem has been waxing and waning since onset. The pain is present in the gluteal, lumbar spine, sacro-iliac and thoracic spine. The quality of the pain is described as aching, burning and stabbing. The pain radiates to the left thigh, left knee and right thigh. Pain scale: 1-6/10. The pain is Worse during the day. The symptoms are aggravated by standing, twisting and bending (walking, laying supine, transitional mvmts; palliative includes sitting, massage). Pertinent negatives include no bladder incontinence or bowel incontinence. Risk factors include lack of exercise.     Past Medical History:   Diagnosis Date    ADD (attention deficit disorder)     Allergic rhinitis     ALS (amyotrophic lateral sclerosis) ([facility]) 11/22    Per neurological surgeon    Anemia 12/2021    Anxiety     Arthritis     Bipolar disorder ([facility])     Cervical disc disorder with radiculopathy of cervical region     Chronic depression     Chronic kidney disease     Stage 1    Chronic pain disorder     Cluster headache     Colitis     Last assessed - 11/25/14    Colon polyp     Concussion 2018    Condyloma acuminatum     Last assessed - 3/19/14    CTS (carpal tunnel syndrome) 2001    Depression     Diabetes mellitus ([facility])     Last assessed - 11/25/14    Diabetic nephropathy ([facility]) 2001    Diabetic neuropathy ([facility])     Diabetic retinopathy ([facility])      Difficulty walking     Fibromyalgia     Fibromyalgia, primary     GERD (gastroesophageal reflux disease)     Head injury 18    Fall    Headache(784.0) 1977    History of transfusion 2021    HTN (hypertension)     Last assessed - 14    Hyperlipidemia 2019    Hypertension     Intervertebral disc disorder with radiculopathy of lumbar region     Lupus     Rhuematologist-Sheela Dasilva, PA    Migraine     Miscarriage 1988    ,     Obesity 1980    Obesity, unspecified     Open wound of back 2023    Opioid abuse, in remission (HCC) 2022    Only while in hospital and for about 10 days once home instead of one week.    Osteoporosis     Peripheral neuropathy     Postoperative wound infection 2021    Stop not healed    Preeclampsia     Last assessed - 14    Rheumatoid arthritis (HCC)     Wears dentures       Past Surgical History:   Procedure Laterality Date    APPENDECTOMY      Last assessed - 14    BARIATRIC SURGERY  2016    CARPAL TUNNEL RELEASE      CATARACT EXTRACTION      CERVICAL FUSION N/A 2019    Procedure: Anterior cervical discectomy w fusion C4-5, C5-6, C6-7; allograft and neuromonitoring;  Surgeon: Jose Gomez MD;  [facility];  Service: Orthopedics     SECTION      Last assessed - 14    COLONOSCOPY      COLONOSCOPY      HEMORRHOID SURGERY      HERNIA REPAIR      Last assessed - 14    LUMBAR FUSION N/A 2021    Procedure: L1/2 laminectomy, discectomy with L1/2 MIS posterior fixation using neuromonitoring and neuronavigation;  Surgeon: Suhas Akbar MD;  [facility];  Service: Neurosurgery    LUMBAR WOUND EXPLORATION Bilateral 2021    Procedure: Lumbar wound washout, debridement and intraoperative cultures;  Surgeon: Suhas Akbar MD;  [facility];  Service: Neurosurgery    MAMMO (HISTORICAL)      [place] DEBRIDEMENT BONE 1ST 20 SQ CM/< N/A 2023    Procedure: SURGICAL  PREPARATION OF BACK  WOUND AND LOCAL FLAP, PREVENA PLACEMENT;  Surgeon: Ady Rocha MD;  Location:  MAIN OR;  Service: Plastics    WOOD-EN-Y PROCEDURE      ULNAR TUNNEL RELEASE      VAC DRESSING APPLICATION Bilateral 12/22/2021    Procedure: APPLICATION VAC DRESSING;  Surgeon: Elbert Hawley MD;  [facility];  Service: General    VAC DRESSING APPLICATION N/A 12/25/2021    Procedure: APPLICATION VAC DRESSING ABDOMEN/TRUNK;  Surgeon: Mani Ross DO;  [facility];  Service: General    VENTRAL HERNIA REPAIR       The following portions of the patient's history were reviewed and updated as appropriate: allergies, past family history, past medical history, past social history, past surgical history, and problem list.  Review of Systems   Gastrointestinal:  Negative for bowel incontinence.   Genitourinary:  Negative for bladder incontinence.   Musculoskeletal:  Positive for back pain and neck pain.     Physical Exam  Neck:        Comments: Pnful and limited in Brot, Blf  Musculoskeletal:      Cervical back: Pain with movement and muscular tenderness present. Decreased range of motion.      Thoracic back: Spasms and tenderness present. Decreased range of motion.      Lumbar back: Spasms and tenderness present. Decreased range of motion. Negative right straight leg raise test and negative left straight leg raise test.        Back:       Comments: Pnful and limited in Brot, Blf, Ext centralizes    Lymphadenopathy:      Cervical: No cervical adenopathy.   Skin:     General: Skin is warm and dry.   Neurological:      Mental Status: She is alert and oriented to person, place, and time.      Gait: Gait is intact.   Psychiatric:         Mood and Affect: Mood and affect normal.         Behavior: Behavior normal.       SOFT TISSUE ASSESSMENT Hypertonicity and tenderness palpated B upper traps, B SCM, B T10-S1 erector spinae, glute med/min, QL, hamstring JOINT RESTRICTIONS: C4/5, T1/2, T10-S1 and  R/L SIJ    Return in about 2 weeks (around 11/13/2024) for Next scheduled follow up.

## 2024-11-03 DIAGNOSIS — E11.65 UNCONTROLLED TYPE 2 DIABETES MELLITUS WITH HYPERGLYCEMIA (HCC): ICD-10-CM

## 2024-11-04 ENCOUNTER — TELEPHONE (OUTPATIENT)
Age: 49
End: 2024-11-04

## 2024-11-04 DIAGNOSIS — E11.65 UNCONTROLLED TYPE 2 DIABETES MELLITUS WITH HYPERGLYCEMIA (HCC): ICD-10-CM

## 2024-11-05 RX ORDER — BLOOD-GLUCOSE SENSOR
1 EACH MISCELLANEOUS
Qty: 6 EACH | Refills: 1 | Status: SHIPPED | OUTPATIENT
Start: 2024-11-05

## 2024-11-06 ENCOUNTER — OFFICE VISIT (OUTPATIENT)
Dept: NEUROLOGY | Facility: CLINIC | Age: 49
End: 2024-11-06
Payer: COMMERCIAL

## 2024-11-06 ENCOUNTER — TELEPHONE (OUTPATIENT)
Age: 49
End: 2024-11-06

## 2024-11-06 VITALS
HEIGHT: 61 IN | SYSTOLIC BLOOD PRESSURE: 128 MMHG | BODY MASS INDEX: 28.42 KG/M2 | WEIGHT: 150.5 LBS | TEMPERATURE: 98.1 F | HEART RATE: 86 BPM | DIASTOLIC BLOOD PRESSURE: 80 MMHG

## 2024-11-06 DIAGNOSIS — G43.009 MIGRAINE WITHOUT AURA AND WITHOUT STATUS MIGRAINOSUS, NOT INTRACTABLE: ICD-10-CM

## 2024-11-06 DIAGNOSIS — G43.709 CHRONIC MIGRAINE W/O AURA W/O STATUS MIGRAINOSUS, NOT INTRACTABLE: Primary | ICD-10-CM

## 2024-11-06 PROCEDURE — 99213 OFFICE O/P EST LOW 20 MIN: CPT | Performed by: PHYSICIAN ASSISTANT

## 2024-11-06 RX ORDER — FREMANEZUMAB-VFRM 225 MG/1.5ML
675 INJECTION SUBCUTANEOUS
Qty: 4.5 ML | Refills: 4 | Status: SHIPPED | OUTPATIENT
Start: 2024-11-06 | End: 2024-11-07 | Stop reason: SDUPTHER

## 2024-11-06 NOTE — PATIENT INSTRUCTIONS
1. Chronic migraine w/o aura w/o status migrainosus, not intractable  2. Migraine without aura and without status migrainosus, not intractable  -     fremanezumab-vfrm (Ajovy) 225 MG/1.5ML auto-injector; Inject 4.5 mL (675 mg total) under the skin every 3 (three) months

## 2024-11-06 NOTE — TELEPHONE ENCOUNTER
Inbound call received from [facility].    Pharm tech stated that the current Ajovy Rx; inject 4.5 mL (675 mg total) under the skin every 3 (three) months is not covered by pt's insurance and insurance is requesting the Rx be sent in as a 30 day supply. Pharm tech stated that the medication would be processed through/able to be filled with the Ajovy 1 month supply Rx for patient.     LOV 11/6/24: Desiree Ramírez: [facility] requesting updated Ajovy Rx be sent for 1 month supply due to pt's insurance requirements. Please advise. Pharmacy aware that it is end of day and the Rx may not be sent until tomorrow.

## 2024-11-06 NOTE — ASSESSMENT & PLAN NOTE
Preventive therapy for headaches:   Reproductive age women: Should take folic acid daily when taking anti-seizure drugs especially Depakote.  Over-the-counter supplements: to decrease intensity and frequency of migraines  Magnesium Oxide 400mg a day.  If any diarrhea or upset stomach, decrease dose  as tolerated  Vitamin B2 (riboflavin) 200 mg twice a day. May cause the urine to turn yellow which is normal for B 2 to do and is not a sign that you are dehydrated.   multivitamin use  Continue Botox 200 units for chronic migraines every 3 months  Depakote 500 mg at bedtime  Ajovy continue for now 1 injection monthly.  May change at a later time to 3 injections every 3 months  Acetazolamide 250 mg  twice a day  Ajovy 3 injections every 3 months  Continue all medication per other providers    Abortive therapy for headaches:   At onset of migraine take Nurtec 75 mg.  Limit of 1 in 24 hours  May use tylenol but less than 3 doses

## 2024-11-06 NOTE — PROGRESS NOTES
Neurology Ambulatory Visit  Name: Kinza Meza       : 1975       MRN: [id number]   Encounter Provider: Desiree Grigsby PA-C   Encounter Date: 2024  Encounter department: [facility]    1. Chronic migraine w/o aura w/o status migrainosus, not intractable  Assessment & Plan:  Preventive therapy for headaches:   Reproductive age women: Should take folic acid daily when taking anti-seizure drugs especially Depakote.  Over-the-counter supplements: to decrease intensity and frequency of migraines  Magnesium Oxide 400mg a day.  If any diarrhea or upset stomach, decrease dose  as tolerated  Vitamin B2 (riboflavin) 200 mg twice a day. May cause the urine to turn yellow which is normal for B 2 to do and is not a sign that you are dehydrated.   multivitamin use  Continue Botox 200 units for chronic migraines every 3 months  Depakote 500 mg at bedtime  Ajovy continue for now 1 injection monthly.  May change at a later time to 3 injections every 3 months  Acetazolamide 250 mg  twice a day  Ajovy 3 injections every 3 months  Continue all medication per other providers    Abortive therapy for headaches:   At onset of migraine take Nurtec 75 mg.  Limit of 1 in 24 hours  May use tylenol but less than 3 doses  2. Migraine without aura and without status migrainosus, not intractable  -     fremanezumab-vfrm (Ajovy) 225 MG/1.5ML auto-injector; Inject 4.5 mL (675 mg total) under the skin every 3 (three) months      History of Present Illness     HPI   Kinza Meza is a 49 y.o. 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  She is here today for evaluation of her headaches. was getting her master's degree in clinical counseling but had to stop.       Other presenting illness:  QTC:2010 - 409 ms  Last time pt had colonoscopy: 2021  Tobacco use: never     GERD  DM type 2  Essential Hypertension  Type 3  Kidney disease  Secondary hyperparathyroidism  Arthritis of the Lumbosacral region  Cauda equina syndrome  ADHD  Endometriosis and Has  IUD in place  Fibromyalgia  Hyperlipidemia  Morbidly obese  Status post cervical fusion  Vitamin-D deficiency  Depression,   bipolar affective disorder     Mood:   Depression: Yes  Anxiety: No   Seeing a psychiatrist/ How often ? Yes, will be seeing one in [place]   Seeing at therapist/ how often? Yes and will see them twice a week     Headaches:   What medications do you take or have you taken for your headaches?      Current Preventative:  Acetazolamide  Ajovy  Vitamin D, B12, iron, multivitamin  Cyproheptadine  Depakote  Duloxetine, Vyvanse  Losartan-hydrochlorothiazide, metoprolol  Methocarbamol  Pregabalin  Botox    Current Abortive:  Nurtec 75 mg prn  reyvow 200 mg prn  Zofran     Prior preventative:   calcium citrate,  gabapentin 300 mg,Topamax 100 mg BID, zonisamide  amlodipine 10 mg daily, losartan 100 mg daily, verapamil  Wellbutrin 150 mg twice a day, Citalopram  Aimovig 3/31/2020, Ajovy 6/2020     Abortive Therapy:   Decadron 10 mg, methylprednisolone 80 mg,  Tylenol 975,  Haldol  Reglan, Zofran, prochlorperazine  Sumatriptan        Headaches started at what age?   Yes when she was younger and remembers having a bad headaches when she was 2 and recall her last nan at age 17.   Head injury: 8/13/2018- she was at work when she fell and hit her head and LOC. Since then she has had daily headaches.       What is your current pain level? 5-6/10     How often do the headaches occur?    Mild headaches: 3-4 a week (since has not been able to obtain her Ajovy); prior was daily    Moderate to severe headaches: missing the Ajovy 2 a week but 3-4 a month (with Botox and Ajovy) , improved from daily, continuous       Are you ever headache free? No, previously yes when did the injectable (Aimovig/Ajovy) with Botox     Aura/Warning and how long does it last?   -zonisamide has helped to  decrease the consistency of these, will now only get them with a severe headache, felt this aura sensation was most constant prior  - diffuse weakness for seconds to minutes  - may have flashing light in her vision either right before or hours before. Will last for seconds to minutes     What time of the day do the headaches start?   Mild headaches: evening time (dusk) or occasionally wakes up with one  Moderate to severe headaches: varies but usually 2-5 pm     How long do the headaches last?   Mild headaches: into the next day  Moderate to severe headaches: varies-sometimes severe pain will last 15 mins, usually 1-2 days     Describe your usual headache?  Mild headaches: pressure, achy senstion  Moderate to severe headaches: throbbing, burning, searing, shooting, electrical, stabbing     Where is your headache located?   Mild headaches: tight band on her crown, base of her skull (mostly)  Moderate to severe headaches: frontal, temples, base of her skull (mostly), will typically wrap around her head on her crown     What is the intensity of pain?   Mild headaches: 3/10  Moderate to severe headaches:  6-8/10     Associated symptoms:   - Decreased appetite, Nausea, Vomiting, Diarrhea  - Photophobia, Phonophobia, Osmophobia  - Lacrimation, Nasal congestion/rhinorrhea, Flushing of face,Red ear   - Stiff or sore neck   - Dizziness, light headed  - Problems with concentration  - Blurred vision, Change in pupil size     - Facial droop, Hands or feet tingle or feel numb/paresthesias  - Tinnitus   - Insomnia  - Worse with lying down   better with lying down  - Prefer to be in a cool, quiet, dark room     Number of days missed per month because of headaches:  Work (or school) days: not working-has been affecting schoolwork recently  Social or Family activities: often, however she has noticed she is able to do more with her family over the past year.     Headache are worse if the patient: cough, sneeze, bending over,  exertion  Headache triggers:  Chewing, stress, sexual intercourse, coughing, weather changes, talking sunlight, fatigue, certain foods  What time of the year do headaches occur more frequently? Change in weather, worse after rain storm     Have you had trigger point injection performed and how often? Yes helps with pressure headache  Have you had Botox injection performed and how often? Yes, every 3 months  Have you had epidural injections or transforaminal injections performed? Yes but to lumbar spine     Alternative therapies used in the past for headaches?   Massage, physical therapy, chiropractor, biofeedback,  Have you used CBD or THC for your headaches and how often? Yes and has a card. Helps with sleep and headache  How many caffeine products to drink a day? 24 oz tea or coffee  How much water to drink a day? 48 oz a day     Are you current pregnant or planning on getting pregnant? IUD in place      Imagin2019 MRI C-spine  Multilevel degenerative cervical spondylosis, consistent with x-rays     Straightening of normal lordosis suspicious for muscle spasm     Mild central canal stenosis, moderate right foraminal stenosis, small right-sided disc protrusion C4-5 and C5-6     Small left central disc protrusion, mild central canal stenosis C6-7     2021 MRI C-spine  Status post ACDF from C4 to C7. Multilevel degenerative spondylosis as described. Possible ossification of the posterior longitudinal ligament at C2-3 and C6-7 that could be further evaluated with noncontrast CT     2019 MRI brain  No mass effect, acute intracranial hemorrhage or evidence of recent infarction.  Mild nonspecific scattered white matter FLAIR signal hyperintensity can be seen in setting of migraines and/or chronic microvascular ischemic change.  No involvement of the callosal septal interface     2021 MRI brain  There are a few nonspecific white matter foci identified unchanged from the prior study.  Upon  "review with patient today on 8/15 2023, there appears to be pointed cerebellar tonsils as well as slight flattening on the right globe and slightly paler optic nerve     I personally reviewed these images     Reviewed old notes from physician seen in the past- see above HPI for summary of previous encounters.      With botox has had a reduction of at least 7 migraine days with less abortive medication, less ER visits which correlates to headache diary    Review of Systems      Objective     /80 (BP Location: Left arm, Patient Position: Sitting, Cuff Size: Standard)   Pulse 86   Temp 98.1 °F (36.7 °C) (Temporal)   Ht 5' 1\" (1.549 m)   Wt 68.3 kg (150 lb 8 oz)   LMP  (LMP Unknown)   BMI 28.44 kg/m²    Physical Exam  Neurologic Exam  CONSTITUTIONAL: Well developed, well nourished, well groomed. No dysmorphic features.     HEENT:  Normocephalic atraumatic.    Chest:  Respirations regular and unlabored.    Psychiatric:  Normal behavior and appropriate affect      MENTAL STATUS  Orientation: Alert and oriented x 3  Fund of knowledge: Intact.    Administrative Statements   I have spent a total time of 27 minutes in caring for this patient on the day of the visit/encounter including Prognosis, Instructions for management, Impressions, Counseling / Coordination of care, Documenting in the medical record, Reviewing / ordering tests, medicine, procedures  , and Obtaining or reviewing history  .          "

## 2024-11-07 RX ORDER — FREMANEZUMAB-VFRM 225 MG/1.5ML
225 INJECTION SUBCUTANEOUS
Qty: 1.5 ML | Refills: 11 | Status: SHIPPED | OUTPATIENT
Start: 2024-11-07 | End: 2024-11-07 | Stop reason: SDUPTHER

## 2024-11-07 RX ORDER — FREMANEZUMAB-VFRM 225 MG/1.5ML
225 INJECTION SUBCUTANEOUS
Qty: 1.5 ML | Refills: 11 | Status: SHIPPED | OUTPATIENT
Start: 2024-11-07

## 2024-11-08 ENCOUNTER — TELEPHONE (OUTPATIENT)
Age: 49
End: 2024-11-08

## 2024-11-08 ENCOUNTER — PROCEDURE VISIT (OUTPATIENT)
Age: 49
End: 2024-11-08
Payer: COMMERCIAL

## 2024-11-08 VITALS — BODY MASS INDEX: 28.42 KG/M2 | WEIGHT: 150.5 LBS | HEIGHT: 61 IN

## 2024-11-08 DIAGNOSIS — M47.812 CERVICAL SPONDYLOSIS: ICD-10-CM

## 2024-11-08 DIAGNOSIS — M62.838 MUSCLE SPASM: ICD-10-CM

## 2024-11-08 DIAGNOSIS — M99.04 SEGMENTAL DYSFUNCTION OF SACRAL REGION: ICD-10-CM

## 2024-11-08 DIAGNOSIS — M99.01 SEGMENTAL DYSFUNCTION OF CERVICAL REGION: ICD-10-CM

## 2024-11-08 DIAGNOSIS — M48.062 SPINAL STENOSIS OF LUMBAR REGION WITH NEUROGENIC CLAUDICATION: Primary | ICD-10-CM

## 2024-11-08 DIAGNOSIS — M99.03 SEGMENTAL DYSFUNCTION OF LUMBAR REGION: ICD-10-CM

## 2024-11-08 DIAGNOSIS — M99.02 SEGMENTAL DYSFUNCTION OF THORACIC REGION: ICD-10-CM

## 2024-11-08 PROCEDURE — 97110 THERAPEUTIC EXERCISES: CPT | Performed by: CHIROPRACTOR

## 2024-11-08 PROCEDURE — 98941 CHIROPRACT MANJ 3-4 REGIONS: CPT | Performed by: CHIROPRACTOR

## 2024-11-08 NOTE — PROGRESS NOTES
Initial date of service: 5/6/24    Diagnoses and all orders for this visit:    Spinal stenosis of lumbar region with neurogenic claudication    Segmental dysfunction of sacral region    Muscle spasm    Cervical spondylosis    Segmental dysfunction of lumbar region    Segmental dysfunction of thoracic region    Segmental dysfunction of cervical region    Pt suffered mild exacerbation but responded to tx with reduced pain and increased ROM. Reducing tx frequency    TREATMENT: [id number]  Ther-ex: IASTM; discussed post procedure soreness and/or ecchymosis for up to 36 hrs, applied to affected mm hypertonicities; supine hamstring stretch, supine gluteal stretch, single knee to chest stretch, upper trap stretch, transitional mvmt education, abdominal bracing; greater than 15 min spent performing above mentioned ther-ex to improve ROM/flexibility. Cervical supine graded mobilization and traction, Thoracic mobilization/manipulation: prone P-A mob; Lumbar mobilization/manipulation: diversified side laying graded HVLA, flexion-traction; SIJ Manipulation/Mobilization: R/L SIJ HVLA - long axis distraction, mederos drop table maneuver to affected SIJ    HPI:  Kinza Meza is a 49 y.o. female  Chief Complaint   Patient presents with    Neck Pain     Better than last time, tightness     Back Pain     Low back, soreness, 3/10     Pt presents for tx for exacerbation of chronic neck/back pain. Pt has had back and neck surgery. Pt was utilizing cane and now using walker occasionally. 2022 MRI demonstrates multilevel degenerative changes of postsurgical lumbar spine status post L1-L2 posterior spinal fixation with varying degrees of canal stenosis (moderate L4-L5) and foraminal narrowing (mild left L1-L2 and left L2-L3 ). 2024 fl/ext xrays unremarkable for instability. 2021 C/S MRI demonstrates s/p ACDF from C4 to C7. Multilevel degenerative spondylosis as described. Possible ossification of the posterior longitudinal ligament  at C2-3 and C6-7  11/8: pt reports feeling and moving better since last tx      Neck Pain   This is a chronic problem. The current episode started more than 1 year ago. The problem occurs constantly. The problem has been waxing and waning. The pain is present in the left side, midline and right side. The quality of the pain is described as aching. The symptoms are aggravated by bending, position, stress and twisting. The pain is Worse during the day.   Back Pain  This is a chronic problem. The current episode started more than 1 year ago. The problem occurs constantly. The problem has been waxing and waning since onset. The pain is present in the gluteal, lumbar spine, sacro-iliac and thoracic spine. The quality of the pain is described as aching, burning and stabbing. The pain radiates to the left thigh, left knee and right thigh. Pain scale: 1-6/10. The pain is Worse during the day. The symptoms are aggravated by standing, twisting and bending (walking, laying supine, transitional mvmts; palliative includes sitting, massage). Pertinent negatives include no bladder incontinence or bowel incontinence. Risk factors include lack of exercise.     Past Medical History:   Diagnosis Date    ADD (attention deficit disorder)     Allergic rhinitis     ALS (amyotrophic lateral sclerosis) ([facility]) 11/22    Per neurological surgeon    Anemia 12/2021    Anxiety     Arthritis     Bipolar disorder ([facility])     Cervical disc disorder with radiculopathy of cervical region     Chronic depression     Chronic kidney disease     Stage 1    Chronic pain disorder     Cluster headache     Colitis     Last assessed - 11/25/14    Colon polyp     Concussion 2018    Condyloma acuminatum     Last assessed - 3/19/14    CTS (carpal tunnel syndrome) 2001    Depression     Diabetes mellitus ([facility])     Last assessed - 11/25/14    Diabetic nephropathy ([facility]) 2001    Diabetic neuropathy ([facility])     Diabetic retinopathy ([facility]) 2001    Difficulty walking 07/22     Fibromyalgia     Fibromyalgia, primary     GERD (gastroesophageal reflux disease)     Head injury 18    Fall    Headache(784.0) 1977    History of transfusion 2021    HTN (hypertension)     Last assessed - 14    Hyperlipidemia 2019    Hypertension     Intervertebral disc disorder with radiculopathy of lumbar region     Lupus     Rhuematologist-Sheela Dasilva, PA    Migraine     Miscarriage 1988    ,     Obesity 1980    Obesity, unspecified     Open wound of back 2023    Opioid abuse, in remission (HCC) 2022    Only while in hospital and for about 10 days once home instead of one week.    Osteoporosis     Peripheral neuropathy     Postoperative wound infection 2021    Stop not healed    Preeclampsia     Last assessed - 14    Rheumatoid arthritis (HCC)     Wears dentures       Past Surgical History:   Procedure Laterality Date    APPENDECTOMY      Last assessed - 14    BARIATRIC SURGERY  2016    CARPAL TUNNEL RELEASE      CATARACT EXTRACTION      CERVICAL FUSION N/A 2019    Procedure: Anterior cervical discectomy w fusion C4-5, C5-6, C6-7; allograft and neuromonitoring;  Surgeon: Jose Gomez MD;  Location: [facility];  Service: Orthopedics     SECTION      Last assessed - 14    COLONOSCOPY      COLONOSCOPY      HEMORRHOID SURGERY      HERNIA REPAIR      Last assessed - 14    LUMBAR FUSION N/A 2021    Procedure: L1/2 laminectomy, discectomy with L1/2 MIS posterior fixation using neuromonitoring and neuronavigation;  Surgeon: Suhas Akbar MD;  Location: [facility];  Service: Neurosurgery    LUMBAR WOUND EXPLORATION Bilateral 2021    Procedure: Lumbar wound washout, debridement and intraoperative cultures;  Surgeon: Suhas Akbar MD;  Location: [facility];  Service: Neurosurgery    MAMMO (HISTORICAL)  2016    PA DEBRIDEMENT BONE 1ST 20 SQ CM/< N/A 2023    Procedure: SURGICAL PREPARATION OF BACK  WOUND AND LOCAL  FLAP, PREVENA PLACEMENT;  Surgeon: Ady Rocha MD;  Location:  MAIN OR;  Service: Plastics    WOOD-EN-Y PROCEDURE      ULNAR TUNNEL RELEASE      VAC DRESSING APPLICATION Bilateral 12/22/2021    Procedure: APPLICATION VAC DRESSING;  Surgeon: Elbert Hawley MD;  Location: [facility];  Service: General    VAC DRESSING APPLICATION N/A 12/25/2021    Procedure: APPLICATION VAC DRESSING ABDOMEN/TRUNK;  Surgeon: Mani Ross DO;  Location: [facility];  Service: General    VENTRAL HERNIA REPAIR       The following portions of the patient's history were reviewed and updated as appropriate: allergies, past family history, past medical history, past social history, past surgical history, and problem list.  Review of Systems   Gastrointestinal:  Negative for bowel incontinence.   Genitourinary:  Negative for bladder incontinence.   Musculoskeletal:  Positive for back pain and neck pain.     Physical Exam  Neck:        Comments: Pnful and limited in Brot, Blf  Musculoskeletal:      Cervical back: Pain with movement and muscular tenderness present. Decreased range of motion.      Thoracic back: Spasms and tenderness present. Decreased range of motion.      Lumbar back: Spasms and tenderness present. Decreased range of motion. Negative right straight leg raise test and negative left straight leg raise test.        Back:       Comments: Pnful and limited in Brot, Blf, Ext centralizes    Lymphadenopathy:      Cervical: No cervical adenopathy.   Skin:     General: Skin is warm and dry.   Neurological:      Mental Status: She is alert and oriented to person, place, and time.      Gait: Gait is intact.   Psychiatric:         Mood and Affect: Mood and affect normal.         Behavior: Behavior normal.       SOFT TISSUE ASSESSMENT Hypertonicity and tenderness palpated B upper traps, B SCM, B T10-S1 erector spinae, glute med/min, QL, hamstring JOINT RESTRICTIONS: C4/5, T1/2, T10-S1 and R/L SIJ    Return in about 3 weeks  (around 11/29/2024) for Next scheduled follow up.

## 2024-11-08 NOTE — TELEPHONE ENCOUNTER
[facility] called to inform provider that our patient qualifies for their free care management program, to help with her medications, apts, ease of medical care. At the patients next scheduled apt they advised that if provider feels patient would benefit from this service to give them a call and they would send the appropriate forms for provider to complete.  Routing message to office clinical just as documentation purposes, no further action required at this time.

## 2024-11-11 DIAGNOSIS — E11.65 UNCONTROLLED TYPE 2 DIABETES MELLITUS WITH HYPERGLYCEMIA (HCC): ICD-10-CM

## 2024-11-12 RX ORDER — TIRZEPATIDE 15 MG/.5ML
INJECTION, SOLUTION SUBCUTANEOUS
Qty: 6 ML | OUTPATIENT
Start: 2024-11-12

## 2024-11-13 ENCOUNTER — TELEPHONE (OUTPATIENT)
Age: 49
End: 2024-11-13

## 2024-11-13 NOTE — TELEPHONE ENCOUNTER
Pharmacy called regarding the bruno 3. This is no longer available. The company changed the name to Urtak 3 plus. They will need new script and it has to say to change every 15 days instead. Please advise.

## 2024-11-13 NOTE — TELEPHONE ENCOUNTER
Janice called to check if the mounjaro has been titrated to current dose as thi sis the first time coming to this pharmacy. Was able to advise yes.

## 2024-11-14 DIAGNOSIS — E11.65 UNCONTROLLED TYPE 2 DIABETES MELLITUS WITH HYPERGLYCEMIA (HCC): Primary | ICD-10-CM

## 2024-11-14 RX ORDER — HYDROCHLOROTHIAZIDE 12.5 MG/1
1 CAPSULE ORAL
Qty: 7 EACH | Refills: 3 | Status: SHIPPED | OUTPATIENT
Start: 2024-11-14 | End: 2024-11-20

## 2024-11-18 DIAGNOSIS — N31.9 NEUROGENIC BLADDER: ICD-10-CM

## 2024-11-19 RX ORDER — TERAZOSIN 1 MG/1
1 CAPSULE ORAL
Qty: 30 CAPSULE | Refills: 5 | Status: SHIPPED | OUTPATIENT
Start: 2024-11-19

## 2024-11-20 ENCOUNTER — OFFICE VISIT (OUTPATIENT)
Dept: INTERNAL MEDICINE CLINIC | Facility: CLINIC | Age: 49
End: 2024-11-20
Payer: COMMERCIAL

## 2024-11-20 ENCOUNTER — TELEPHONE (OUTPATIENT)
Age: 49
End: 2024-11-20

## 2024-11-20 ENCOUNTER — PROCEDURE VISIT (OUTPATIENT)
Age: 49
End: 2024-11-20
Payer: COMMERCIAL

## 2024-11-20 VITALS
SYSTOLIC BLOOD PRESSURE: 132 MMHG | DIASTOLIC BLOOD PRESSURE: 80 MMHG | HEART RATE: 92 BPM | HEIGHT: 61 IN | TEMPERATURE: 98.5 F | WEIGHT: 150 LBS | BODY MASS INDEX: 28.32 KG/M2 | OXYGEN SATURATION: 99 %

## 2024-11-20 VITALS
WEIGHT: 150 LBS | HEIGHT: 61 IN | BODY MASS INDEX: 28.32 KG/M2 | SYSTOLIC BLOOD PRESSURE: 117 MMHG | DIASTOLIC BLOOD PRESSURE: 73 MMHG

## 2024-11-20 DIAGNOSIS — M99.03 SEGMENTAL DYSFUNCTION OF LUMBAR REGION: ICD-10-CM

## 2024-11-20 DIAGNOSIS — M99.02 SEGMENTAL DYSFUNCTION OF THORACIC REGION: ICD-10-CM

## 2024-11-20 DIAGNOSIS — M99.04 SEGMENTAL DYSFUNCTION OF SACRAL REGION: ICD-10-CM

## 2024-11-20 DIAGNOSIS — F90.0 ATTENTION DEFICIT HYPERACTIVITY DISORDER (ADHD), PREDOMINANTLY INATTENTIVE TYPE: ICD-10-CM

## 2024-11-20 DIAGNOSIS — M48.062 SPINAL STENOSIS OF LUMBAR REGION WITH NEUROGENIC CLAUDICATION: Primary | ICD-10-CM

## 2024-11-20 DIAGNOSIS — L98.491 ABDOMINAL WALL SKIN ULCER, LIMITED TO BREAKDOWN OF SKIN (HCC): ICD-10-CM

## 2024-11-20 DIAGNOSIS — M62.838 MUSCLE SPASM: ICD-10-CM

## 2024-11-20 DIAGNOSIS — L03.032 CELLULITIS OF LEFT TOE: Primary | ICD-10-CM

## 2024-11-20 DIAGNOSIS — L05.92 PILONIDAL SINUS: ICD-10-CM

## 2024-11-20 DIAGNOSIS — M47.812 CERVICAL SPONDYLOSIS: ICD-10-CM

## 2024-11-20 DIAGNOSIS — M99.01 SEGMENTAL DYSFUNCTION OF CERVICAL REGION: ICD-10-CM

## 2024-11-20 DIAGNOSIS — E11.65 UNCONTROLLED TYPE 2 DIABETES MELLITUS WITH HYPERGLYCEMIA (HCC): ICD-10-CM

## 2024-11-20 DIAGNOSIS — L03.311 ABDOMINAL WALL CELLULITIS: ICD-10-CM

## 2024-11-20 PROCEDURE — 97110 THERAPEUTIC EXERCISES: CPT | Performed by: CHIROPRACTOR

## 2024-11-20 PROCEDURE — 98941 CHIROPRACT MANJ 3-4 REGIONS: CPT | Performed by: CHIROPRACTOR

## 2024-11-20 PROCEDURE — 99214 OFFICE O/P EST MOD 30 MIN: CPT | Performed by: INTERNAL MEDICINE

## 2024-11-20 RX ORDER — CEPHALEXIN 500 MG/1
500 CAPSULE ORAL EVERY 6 HOURS SCHEDULED
Qty: 40 CAPSULE | Refills: 0 | Status: SHIPPED | OUTPATIENT
Start: 2024-11-20 | End: 2024-11-30

## 2024-11-20 NOTE — PROGRESS NOTES
Initial date of service: 5/6/24    Diagnoses and all orders for this visit:    Spinal stenosis of lumbar region with neurogenic claudication    Segmental dysfunction of sacral region    Muscle spasm    Cervical spondylosis    Segmental dysfunction of lumbar region    Segmental dysfunction of thoracic region    Segmental dysfunction of cervical region    Pt suffered mild exacerbation but responded to tx with reduced pain and increased ROM.     TREATMENT: [id number]  Ther-ex: IASTM; discussed post procedure soreness and/or ecchymosis for up to 36 hrs, applied to affected mm hypertonicities; supine hamstring stretch, supine gluteal stretch, single knee to chest stretch, upper trap stretch, transitional mvmt education, abdominal bracing; greater than 15 min spent performing above mentioned ther-ex to improve ROM/flexibility. Cervical supine graded mobilization and traction, Thoracic mobilization/manipulation: prone P-A mob; Lumbar mobilization/manipulation: diversified side laying graded HVLA, flexion-traction; SIJ Manipulation/Mobilization: R/L SIJ HVLA - long axis distraction, mederos drop table maneuver to affected SIJ    HPI:  Kinza Meza is a 49 y.o. female  Chief Complaint   Patient presents with    Neck Pain     Neck pain in the center about a 4     Back Pain     Low back pain about a 5      Pt presents for tx for exacerbation of chronic neck/back pain. Pt has had back and neck surgery. Pt was utilizing cane and now using walker occasionally. 2022 MRI demonstrates multilevel degenerative changes of postsurgical lumbar spine status post L1-L2 posterior spinal fixation with varying degrees of canal stenosis (moderate L4-L5) and foraminal narrowing (mild left L1-L2 and left L2-L3 ). 2024 fl/ext xrays unremarkable for instability. 2021 C/S MRI demonstrates s/p ACDF from C4 to C7. Multilevel degenerative spondylosis as described. Possible ossification of the posterior longitudinal ligament at C2-3 and  C6-7  11/20: pt reports feeling and moving better since last tx      Neck Pain   This is a chronic problem. The current episode started more than 1 year ago. The problem occurs constantly. The problem has been waxing and waning. The pain is present in the left side, midline and right side. The quality of the pain is described as aching. The symptoms are aggravated by bending, position, stress and twisting. The pain is Worse during the day.   Back Pain  This is a chronic problem. The current episode started more than 1 year ago. The problem occurs constantly. The problem has been waxing and waning since onset. The pain is present in the gluteal, lumbar spine, sacro-iliac and thoracic spine. The quality of the pain is described as aching, burning and stabbing. The pain radiates to the left thigh, left knee and right thigh. Pain scale: 1-6/10. The pain is Worse during the day. The symptoms are aggravated by standing, twisting and bending (walking, laying supine, transitional mvmts; palliative includes sitting, massage). Pertinent negatives include no bladder incontinence or bowel incontinence. Risk factors include lack of exercise.     Past Medical History:   Diagnosis Date    ADD (attention deficit disorder)     Allergic rhinitis     ALS (amyotrophic lateral sclerosis) ([facility]) 11/22    Per neurological surgeon    Anemia 12/2021    Anxiety     Arthritis     Bipolar disorder ([facility])     Cervical disc disorder with radiculopathy of cervical region     Chronic depression     Chronic kidney disease     Stage 1    Chronic pain disorder     Cluster headache     Colitis     Last assessed - 11/25/14    Colon polyp     Concussion 2018    Condyloma acuminatum     Last assessed - 3/19/14    CTS (carpal tunnel syndrome) 2001    Depression     Diabetes mellitus ([facility])     Last assessed - 11/25/14    Diabetic nephropathy ([facility]) 2001    Diabetic neuropathy ([facility])     Diabetic retinopathy ([facility]) 2001    Difficulty walking 07/22     Fibromyalgia     Fibromyalgia, primary     GERD (gastroesophageal reflux disease)     Head injury 18    Fall    Headache(784.0) 1977    History of transfusion 2021    HTN (hypertension)     Last assessed - 14    Hyperlipidemia 2019    Hypertension     Intervertebral disc disorder with radiculopathy of lumbar region     Lupus     Rhuematologist-Sheela Dasilva, [place]    Migraine     Miscarriage 1988    ,     Obesity 1980    Obesity, unspecified     Open wound of back 2023    Opioid abuse, in remission (HCC) 2022    Only while in hospital and for about 10 days once home instead of one week.    Osteoporosis     Peripheral neuropathy     Postoperative wound infection 2021    Stop not healed    Preeclampsia     Last assessed - 14    Rheumatoid arthritis (HCC)     Wears dentures       Past Surgical History:   Procedure Laterality Date    APPENDECTOMY      Last assessed - 14    BARIATRIC SURGERY  2016    CARPAL TUNNEL RELEASE      CATARACT EXTRACTION      CERVICAL FUSION N/A 2019    Procedure: Anterior cervical discectomy w fusion C4-5, C5-6, C6-7; allograft and neuromonitoring;  Surgeon: Jose Gomez MD;  Location: [facility];  Service: Orthopedics     SECTION      Last assessed - 14    COLONOSCOPY      COLONOSCOPY      HEMORRHOID SURGERY      HERNIA REPAIR      Last assessed - 14    LUMBAR FUSION N/A 2021    Procedure: L1/2 laminectomy, discectomy with L1/2 MIS posterior fixation using neuromonitoring and neuronavigation;  Surgeon: Suhas Akbar MD;  Location: [facility];  Service: Neurosurgery    LUMBAR WOUND EXPLORATION Bilateral 2021    Procedure: Lumbar wound washout, debridement and intraoperative cultures;  Surgeon: Suhas Akbar MD;  Location: [facility];  Service: Neurosurgery    MAMMO (HISTORICAL)  2016    MI DEBRIDEMENT BONE 1ST 20 SQ CM/< N/A 2023    Procedure: SURGICAL PREPARATION OF BACK  WOUND AND LOCAL  FLAP, PREVENA PLACEMENT;  Surgeon: Ady Rocha MD;  Location:  MAIN OR;  Service: Plastics    WOOD-EN-Y PROCEDURE      ULNAR TUNNEL RELEASE      VAC DRESSING APPLICATION Bilateral 12/22/2021    Procedure: APPLICATION VAC DRESSING;  Surgeon: Elbert Hawley MD;  Location: [facility];  Service: General    VAC DRESSING APPLICATION N/A 12/25/2021    Procedure: APPLICATION VAC DRESSING ABDOMEN/TRUNK;  Surgeon: Mani Ross DO;  Location: [facility];  Service: General    VENTRAL HERNIA REPAIR       The following portions of the patient's history were reviewed and updated as appropriate: allergies, past family history, past medical history, past social history, past surgical history, and problem list.  Review of Systems   Gastrointestinal:  Negative for bowel incontinence.   Genitourinary:  Negative for bladder incontinence.   Musculoskeletal:  Positive for back pain and neck pain.     Physical Exam  Neck:        Comments: Pnful and limited in Brot, Blf  Musculoskeletal:      Cervical back: Pain with movement and muscular tenderness present. Decreased range of motion.      Thoracic back: Spasms and tenderness present. Decreased range of motion.      Lumbar back: Spasms and tenderness present. Decreased range of motion. Negative right straight leg raise test and negative left straight leg raise test.        Back:       Comments: Pnful and limited in Brot, Blf, Ext centralizes    Lymphadenopathy:      Cervical: No cervical adenopathy.   Skin:     General: Skin is warm and dry.   Neurological:      Mental Status: She is alert and oriented to person, place, and time.      Gait: Gait is intact.   Psychiatric:         Mood and Affect: Mood and affect normal.         Behavior: Behavior normal.       SOFT TISSUE ASSESSMENT Hypertonicity and tenderness palpated B upper traps, B SCM, B T10-S1 erector spinae, glute med/min, QL, hamstring JOINT RESTRICTIONS: C4/5, T1/2, T10-S1 and R/L SIJ    Return in about 1 week  (around 11/27/2024) for Next scheduled follow up.

## 2024-11-20 NOTE — TELEPHONE ENCOUNTER
Patient looking to have PANN has referral in chart       L03.311 (ICD-10-CM) - Abdominal wall   cellulitis    L98.491 (ICD-10-CM) - Abdominal wall skin   ulcer, limited to breakdown of skin (HCC)       Please contact patient

## 2024-11-20 NOTE — PROGRESS NOTES
Assessment/Plan:             1. Cellulitis of left toe  -     cephalexin (KEFLEX) 500 mg capsule; Take 1 capsule (500 mg total) by mouth every 6 (six) hours for 10 days  2. Abdominal wall cellulitis  -     cephalexin (KEFLEX) 500 mg capsule; Take 1 capsule (500 mg total) by mouth every 6 (six) hours for 10 days  -     Ambulatory Referral to Plastic Surgery; Future  3. Pilonidal sinus  4. Uncontrolled type 2 diabetes mellitus with hyperglycemia ([facility])  5. Attention deficit hyperactivity disorder (ADHD), predominantly inattentive type  6. Abdominal wall skin ulcer, limited to breakdown of skin ([facility])  -     Ambulatory Referral to Plastic Surgery; Future         Subjective:      Patient ID: Kinza Meza is a 49 y.o. female.    Left big toe had a hangnail, she removed, noted skin is red, also lower back she has a pain, and bump area, also lower abdominal wall redness, papule,        The following portions of the patient's history were reviewed and updated as appropriate: She  has a past medical history of ADD (attention deficit disorder), Allergic rhinitis, ALS (amyotrophic lateral sclerosis) ([facility]) (11/22), Anemia (12/2021), Anxiety, Arthritis, Bipolar disorder (HCC), Cervical disc disorder with radiculopathy of cervical region, Chronic depression, Chronic kidney disease, Chronic pain disorder, Cluster headache, Colitis, Colon polyp, Concussion (2018), Condyloma acuminatum, CTS (carpal tunnel syndrome) (2001), Depression, Diabetes mellitus ([facility]), Diabetic nephropathy ([facility]) (2001), Diabetic neuropathy ([facility]), Diabetic retinopathy ([facility]) (2001), Difficulty walking (07/22), Fibromyalgia, Fibromyalgia, primary, GERD (gastroesophageal reflux disease), Head injury (08/13/18), Headache(784.0) (1977), History of transfusion (12/2021), HTN (hypertension), Hyperlipidemia (07/22/2019), Hypertension, Intervertebral disc disorder with radiculopathy of lumbar region, Lupus (2003), Migraine, Miscarriage (1988), Obesity (1980),  Obesity, unspecified, Open wound of back (05/30/2023), Opioid abuse, in remission ([facility]) (01/2022), Osteoporosis, Peripheral neuropathy, Postoperative wound infection (12/2021), Preeclampsia, Rheumatoid arthritis ([facility]), and Wears dentures.  She   Patient Active Problem List    Diagnosis Date Noted    Migraine without aura and without status migrainosus, not intractable 11/06/2024    Cellulitis of right toe 07/01/2024    Cellulitis of right shoulder 03/28/2024    Benign hypertension with chronic kidney disease, stage III ([facility]) 01/31/2024    Abnormal blood electrolyte level 10/30/2023    Thrombocytopenia ([facility]) 10/30/2023    Chest pain 10/29/2023    Metabolic acidosis 09/27/2023    Hyperphosphatemia 09/27/2023    Neurogenic bladder 09/26/2023    Bilateral carpal tunnel syndrome     IIH (idiopathic intracranial hypertension) 08/15/2023    Open wound of back 05/30/2023    Stage 4 chronic kidney disease ([facility]) 02/14/2023    Maceration of skin 02/14/2023    Cortical age-related cataract of left eye 01/04/2023    Bipolar affective disorder, currently manic, mild ([facility]) 03/30/2022    Seasonal allergic rhinitis 03/30/2022    Surgical wound, non healing 02/08/2022    Diabetic polyneuropathy associated with diabetes mellitus due to underlying condition ([facility]) 02/08/2022    Continuous opioid dependence ([facility]) 01/07/2022    Anemia 12/24/2021    Wound infection 12/09/2021    Cauda equina syndrome ([facility]) 12/08/2021    Depression 10/28/2021    Annual physical exam 08/27/2021    Other gastritis without bleeding 08/11/2021    Obesity, unspecified     Diabetes mellitus ([facility])     Primary hypertension     Dizziness 12/07/2020    Secondary hyperparathyroidism of renal origin ([facility]) 11/16/2020    Insulin dependent type 2 diabetes mellitus ([facility])     Intractable migraine with status migrainosus     Type 2 diabetes mellitus with diabetic neuropathy ([facility]) 09/11/2020    Ventral hernia without obstruction or gangrene 09/11/2020    Lumbar disc  herniation with radiculopathy 2020    Concussion with moderate (1-24 hours) loss of consciousness 2020    Primary osteoarthritis of both knees 2020    Diabetic nephropathy associated with type 2 diabetes mellitus ([facility]) 2020    Dysthymic disorder 2020    Acute on chronic kidney failure  ([facility]) 2020    Hypokalemia 2020    Near syncope 2020    Weakness 2020    Status post gastric bypass for obesity 2020    Diarrhea 2020    Snores     Headache upon awakening     Chronic tension type headache 2019    Chronic migraine w/o aura w/o status migrainosus, not intractable 2019    Medical marijuana use 2019    Vitamin D deficiency 2019    Chronic kidney disease-mineral and bone disorder 2019    S/P cervical spinal fusion 2019    Mixed hyperlipidemia 2019    Cervical disc disorder with radiculopathy     Stage 3b chronic kidney disease ([facility]) 2019    Persistent proteinuria 2019    Hypertensive kidney disease with stage 3b chronic kidney disease ([facility]) 2019    Hyponatremia 2019    Intervertebral disc disorders with radiculopathy, lumbar region     Sacroiliitis ([facility])     Greater trochanteric bursitis of both hips     Herniated nucleus pulposus, L1-2 2017    Bilateral chronic knee pain 2017    Facet arthritis of lumbosacral region 2017    Fibromyalgia 2017    Lumbar stenosis with neurogenic claudication 2017    ADHD 2016    Uncontrolled type 2 diabetes mellitus with hyperglycemia ([facility]) 2016    Gastroesophageal reflux disease without esophagitis 2016    Morbid obesity with BMI of 40.0-44.9, adult ([facility]) 2016    Chronic renal insufficiency 2014    Endometriosis 2012     She  has a past surgical history that includes Appendectomy;  section; Hernia repair; Cervical fusion (N/A, 2019); Colonoscopy; Carpal tunnel release; Hemorrhoid  surgery; Ventral hernia repair; Shelby-en-y procedure; Ulnar tunnel release; Mammo (historical) (2016); Colonoscopy; Lumbar fusion (N/A, 11/22/2021); LUMBAR WOUND EXPLORATION (Bilateral, 12/22/2021); VAC DRESSING APPLICATION (Bilateral, 12/22/2021); VAC DRESSING APPLICATION (N/A, 12/25/2021); Bariatric Surgery (08/2016); Cataract extraction; and pr debridement bone 1st 20 sq cm/< (N/A, 5/30/2023).  Her family history includes ADD / ADHD in her cousin and cousin; Anemia in her maternal grandmother; Anxiety disorder in her father; Cancer in her mother; Cervical cancer in her mother; Colon cancer in her maternal grandmother; Completed Suicide  in her father; Depression in her father; Diabetes in her mother; Hypertension in her mother; Kidney disease in her mother; Mental illness in her daughter, daughter, and father; Neuropathy in her mother; No Known Problems in her family, maternal aunt, maternal grandfather, paternal grandfather, and sister; Ovarian cancer in her paternal aunt; Psychiatric Illness in her father; Suicidality in her father; Suicide Attempts in her father; Uterine cancer in her paternal grandmother.  She  reports that she has never smoked. She has never been exposed to tobacco smoke. She has never used smokeless tobacco. She reports that she does not currently use alcohol. She reports current drug use. Drug: Marijuana.  Current Outpatient Medications   Medication Sig Dispense Refill    acetaZOLAMIDE (DIAMOX) 250 mg tablet TAKE 1 TABLET(250 MG) BY MOUTH TWICE DAILY 180 tablet 1    atorvastatin (LIPITOR) 10 mg tablet TAKE 1 TABLET(10 MG) BY MOUTH DAILY AT BEDTIME 90 tablet 1    BD Pen Needle Glory U/F 32G X 4 MM MISC Inject under the skin 2 (two) times a day Use as directed 180 each 0    Blood Glucose Monitoring Suppl (ONE TOUCH ULTRA 2) w/Device KIT Use 1 each 2 (two) times a day Use as instructed 1 kit 0    calcitriol (ROCALTROL) 0.25 mcg capsule TAKE 1 CAPSULE BY MOUTH daily 30 capsule 5    cephalexin  (KEFLEX) 500 mg capsule Take 1 capsule (500 mg total) by mouth every 6 (six) hours for 10 days 40 capsule 0    cetirizine (ZyrTEC) oral solution Take 5 mL (5 mg total) by mouth daily 450 mL 1    Cholecalciferol (Vitamin D3) 50 MCG (2000 UT) CAPS TAKE 1 CAPSULE(2,000 UNITS TOTAL) BY MOUTH DAILY 90 capsule 1    clonazePAM (KlonoPIN) 0.5 mg tablet Take 1 tablet (0.5 mg total) by mouth 2 (two) times a day 180 tablet 0    Continuous Glucose Sensor (FreeStyle Filiberto 3 Sensor) MISC Use 1 each every 14 (fourteen) days 6 each 1    cycloSPORINE, PF, (Cequa) 0.09 % SOLN Administer 1 drop to both eyes 2 (two) times a day      cyproheptadine (PERIACTIN) 4 mg tablet Take 1 tablet (4 mg total) by mouth daily at bedtime 90 tablet 1    diphenhydrAMINE (BENADRYL) 25 mg tablet Take 1 tablet (25 mg total) by mouth every 6 (six) hours as needed for itching 30 tablet 0    divalproex sodium (Depakote) 500 mg DR tablet Take 1 tablet (500 mg total) by mouth daily at bedtime 90 tablet 3    docusate sodium (COLACE) 100 mg capsule TAKE 1 CAPSULE BY MOUTH TWICE A DAY 60 capsule 0    DULoxetine (CYMBALTA) 60 mg delayed release capsule TAKE 1 CAPSULE(60 MG) BY MOUTH DAILY 90 capsule 1    ferrous sulfate 324 (65 Fe) mg Take 1 tablet (324 mg total) by mouth daily before breakfast 90 tablet 3    fremanezumab-vfrm (Ajovy) 225 MG/1.5ML auto-injector Inject 1.5 mL (225 mg total) under the skin every 30 (thirty) days 1.5 mL 11    insulin aspart (NovoLOG FlexPen) 100 UNIT/ML injection pen Continue with sliding scale insulin regimen      Insulin Glargine Solostar (Lantus SoloStar) 100 UNIT/ML SOPN Inject 0.15 mL (15 Units total) under the skin daily at bedtime 15 mL 0    Lancets MISC Use 1 each 2 (two) times a day Use as instructed      lisdexamfetamine (VYVANSE) 30 MG capsule Take 1 capsule (30 mg total) by mouth every morning Max Daily Amount: 30 mg 30 capsule 0    losartan-hydrochlorothiazide (HYZAAR) 100-12.5 MG per tablet TAKE ONE TABLET BY MOUTH ONE  TIME DAILY 90 tablet 0    magnesium (MAGTAB) 84 MG (7MEQ) TBCR Take 1 tablet (84 mg total) by mouth daily 90 tablet 3    methocarbamol (Robaxin-750) 750 mg tablet Take 1 tablet (750 mg total) by mouth every 6 (six) hours as needed for muscle spasms (back pain) 30 tablet 3    metoprolol tartrate (LOPRESSOR) 100 mg tablet TAKE 1 TABLET(100 MG) BY MOUTH EVERY 12 HOURS 180 tablet 1    Multiple Vitamins-Minerals (multivitamin with minerals) tablet Take 1 tablet by mouth daily      mupirocin (BACTROBAN) 2 % ointment Apply topically 3 (three) times a day 60 g 0    NON FORMULARY Botox injections for HA every 3months (LD 3/20/23)      ondansetron (ZOFRAN) 4 mg tablet Take 1 tablet (4 mg total) by mouth every 8 (eight) hours as needed for nausea or vomiting 120 tablet 0    Ophthalmic Irrigation Solution (OCUSOFT EYE WASH OP) Apply 1 Application to eye daily at bedtime      Polyethyl Glycol-Propyl Glycol (Systane) 0.4-0.3 % GEL Apply 1 drop to eye 2 (two) times a day Using Ivizia as an alternative      polyethylene glycol (MIRALAX) 17 g packet Take 17 g by mouth daily 1 each 0    pregabalin (LYRICA) 100 mg capsule Take 1 capsule (100 mg total) by mouth 3 (three) times a day 270 capsule 0    rimegepant sulfate (Nurtec) 75 mg TBDP Place one tab (75mg total) under the tongue as needed for migraine. 16 tablet 11    sodium bicarbonate 650 mg tablet Take 1 tablet (650 mg total) by mouth 3 (three) times a day 360 tablet 3    terazosin (HYTRIN) 1 mg capsule Take 1 capsule (1 mg total) by mouth daily at bedtime 30 capsule 5    Tirzepatide 15 MG/0.5ML SOAJ Inject 15 mg under the skin every 7 days for 28 days 6 mL 0    cyanocobalamin 1,000 mcg/mL Inject 1 mL (1,000 mcg total) into a muscle once a week (Patient not taking: Reported on 9/13/2024) 4 mL 3     Current Facility-Administered Medications   Medication Dose Route Frequency Provider Last Rate Last Admin    cyanocobalamin injection 1,000 mcg  1,000 mcg Intramuscular Weekly Myla  MD Cuco   1,000 mcg at 07/01/24 1007     Current Outpatient Medications on File Prior to Visit   Medication Sig    acetaZOLAMIDE (DIAMOX) 250 mg tablet TAKE 1 TABLET(250 MG) BY MOUTH TWICE DAILY    atorvastatin (LIPITOR) 10 mg tablet TAKE 1 TABLET(10 MG) BY MOUTH DAILY AT BEDTIME    BD Pen Needle Glory U/F 32G X 4 MM MISC Inject under the skin 2 (two) times a day Use as directed    Blood Glucose Monitoring Suppl (ONE TOUCH ULTRA 2) w/Device KIT Use 1 each 2 (two) times a day Use as instructed    calcitriol (ROCALTROL) 0.25 mcg capsule TAKE 1 CAPSULE BY MOUTH daily    cetirizine (ZyrTEC) oral solution Take 5 mL (5 mg total) by mouth daily    Cholecalciferol (Vitamin D3) 50 MCG (2000 UT) CAPS TAKE 1 CAPSULE(2,000 UNITS TOTAL) BY MOUTH DAILY    clonazePAM (KlonoPIN) 0.5 mg tablet Take 1 tablet (0.5 mg total) by mouth 2 (two) times a day    Continuous Glucose Sensor (FreeStyle Filiberto 3 Sensor) MISC Use 1 each every 14 (fourteen) days    cycloSPORINE, PF, (Cequa) 0.09 % SOLN Administer 1 drop to both eyes 2 (two) times a day    cyproheptadine (PERIACTIN) 4 mg tablet Take 1 tablet (4 mg total) by mouth daily at bedtime    diphenhydrAMINE (BENADRYL) 25 mg tablet Take 1 tablet (25 mg total) by mouth every 6 (six) hours as needed for itching    divalproex sodium (Depakote) 500 mg DR tablet Take 1 tablet (500 mg total) by mouth daily at bedtime    docusate sodium (COLACE) 100 mg capsule TAKE 1 CAPSULE BY MOUTH TWICE A DAY    DULoxetine (CYMBALTA) 60 mg delayed release capsule TAKE 1 CAPSULE(60 MG) BY MOUTH DAILY    ferrous sulfate 324 (65 Fe) mg Take 1 tablet (324 mg total) by mouth daily before breakfast    fremanezumab-vfrm (Ajovy) 225 MG/1.5ML auto-injector Inject 1.5 mL (225 mg total) under the skin every 30 (thirty) days    insulin aspart (NovoLOG FlexPen) 100 UNIT/ML injection pen Continue with sliding scale insulin regimen    Insulin Glargine Solostar (Lantus SoloStar) 100 UNIT/ML SOPN Inject 0.15 mL (15 Units total)  under the skin daily at bedtime    Lancets MISC Use 1 each 2 (two) times a day Use as instructed    lisdexamfetamine (VYVANSE) 30 MG capsule Take 1 capsule (30 mg total) by mouth every morning Max Daily Amount: 30 mg    losartan-hydrochlorothiazide (HYZAAR) 100-12.5 MG per tablet TAKE ONE TABLET BY MOUTH ONE TIME DAILY    magnesium (MAGTAB) 84 MG (7MEQ) TBCR Take 1 tablet (84 mg total) by mouth daily    methocarbamol (Robaxin-750) 750 mg tablet Take 1 tablet (750 mg total) by mouth every 6 (six) hours as needed for muscle spasms (back pain)    metoprolol tartrate (LOPRESSOR) 100 mg tablet TAKE 1 TABLET(100 MG) BY MOUTH EVERY 12 HOURS    Multiple Vitamins-Minerals (multivitamin with minerals) tablet Take 1 tablet by mouth daily    mupirocin (BACTROBAN) 2 % ointment Apply topically 3 (three) times a day    NON FORMULARY Botox injections for HA every 3months (LD 3/20/23)    ondansetron (ZOFRAN) 4 mg tablet Take 1 tablet (4 mg total) by mouth every 8 (eight) hours as needed for nausea or vomiting    Ophthalmic Irrigation Solution (OCUSOFT EYE WASH OP) Apply 1 Application to eye daily at bedtime    Polyethyl Glycol-Propyl Glycol (Systane) 0.4-0.3 % GEL Apply 1 drop to eye 2 (two) times a day Using Ivizia as an alternative    polyethylene glycol (MIRALAX) 17 g packet Take 17 g by mouth daily    pregabalin (LYRICA) 100 mg capsule Take 1 capsule (100 mg total) by mouth 3 (three) times a day    rimegepant sulfate (Nurtec) 75 mg TBDP Place one tab (75mg total) under the tongue as needed for migraine.    sodium bicarbonate 650 mg tablet Take 1 tablet (650 mg total) by mouth 3 (three) times a day    terazosin (HYTRIN) 1 mg capsule Take 1 capsule (1 mg total) by mouth daily at bedtime    Tirzepatide 15 MG/0.5ML SOAJ Inject 15 mg under the skin every 7 days for 28 days    cyanocobalamin 1,000 mcg/mL Inject 1 mL (1,000 mcg total) into a muscle once a week (Patient not taking: Reported on 9/13/2024)    [DISCONTINUED] Continuous  "Glucose Sensor (FreeStyle Filiberto 3 Plus Sensor) MISC Use 1 Application every 14 (fourteen) days     Current Facility-Administered Medications on File Prior to Visit   Medication    cyanocobalamin injection 1,000 mcg     She is allergic to ace inhibitors, pollen extract, short ragweed pollen ext, sodium hyaluronate (yoav), levonorgestrel-eth estradiol [levonorgestrel-ethinyl estrad], and latex..    Review of Systems   Constitutional:  Negative for chills and fever.   HENT:  Negative for congestion, ear pain and sore throat.    Eyes:  Negative for pain.   Respiratory:  Negative for cough and shortness of breath.    Cardiovascular:  Negative for chest pain and leg swelling.   Gastrointestinal:  Negative for abdominal pain, nausea and vomiting.   Endocrine: Negative for polyuria.   Genitourinary:  Negative for difficulty urinating, frequency and urgency.   Musculoskeletal:  Positive for arthralgias and back pain.   Skin:  Positive for rash.   Neurological:  Negative for weakness and headaches.   Psychiatric/Behavioral:  Negative for sleep disturbance. The patient is not nervous/anxious.          Objective:      /80 (BP Location: Left arm, Patient Position: Sitting, Cuff Size: Large)   Pulse 92   Temp 98.5 °F (36.9 °C)   Ht 5' 1\" (1.549 m)   Wt 68 kg (150 lb)   LMP  (LMP Unknown)   SpO2 99%   BMI 28.34 kg/m²     Recent Results (from the past 8 weeks)   POCT Measure PVR    Collection Time: 10/10/24  9:57 AM   Result Value Ref Range    POST-VOID RESIDUAL VOLUME, ML POC 0 mL        Physical Exam  Constitutional:       Appearance: Normal appearance.   HENT:      Head: Normocephalic.      Right Ear: External ear normal.      Left Ear: External ear normal.      Nose: Nose normal. No congestion.      Mouth/Throat:      Mouth: Mucous membranes are moist.      Pharynx: Oropharynx is clear. No oropharyngeal exudate or posterior oropharyngeal erythema.   Eyes:      Extraocular Movements: Extraocular movements intact. "      Conjunctiva/sclera: Conjunctivae normal.   Cardiovascular:      Rate and Rhythm: Normal rate and regular rhythm.      Heart sounds: Normal heart sounds. No murmur heard.  Pulmonary:      Effort: Pulmonary effort is normal.      Breath sounds: Normal breath sounds. No wheezing or rales.   Abdominal:      General: Abdomen is flat. There is no distension.      Palpations: Abdomen is soft.      Tenderness: There is no abdominal tenderness.   Musculoskeletal:         General: Normal range of motion.      Cervical back: Normal range of motion and neck supple.      Right lower leg: No edema.      Left lower leg: No edema.   Lymphadenopathy:      Cervical: No cervical adenopathy.   Skin:     General: Skin is warm.      Comments: Left big toe, loss of nail, skin looks redness, superficial ulceration, also lower abdominal wall skin redness, no active discharge, lower back pilonidal sinus present, no active discharge, skin scar tissue, induration present, no active discharge   Neurological:      General: No focal deficit present.      Mental Status: She is alert and oriented to person, place, and time.

## 2024-11-21 ENCOUNTER — TELEPHONE (OUTPATIENT)
Dept: PLASTIC SURGERY | Facility: CLINIC | Age: 49
End: 2024-11-21

## 2024-11-21 DIAGNOSIS — B49 FUNGAL INFECTION: Primary | ICD-10-CM

## 2024-11-21 RX ORDER — NYSTATIN 100000 [USP'U]/G
POWDER TOPICAL 2 TIMES DAILY
Qty: 120 G | Refills: 3 | Status: SHIPPED | OUTPATIENT
Start: 2024-11-21

## 2024-11-21 NOTE — TELEPHONE ENCOUNTER
Spoke to patient and reviewed criteria for panniculectomy consultation.  Has been on several courses of Keflex.  Needs documentation of being on prescription cream/powder for 3 months and will advise when complete.  Had wt loss sx 2016.

## 2024-11-21 NOTE — TELEPHONE ENCOUNTER
Called pt to inquire if she wanted the PANN through Ins or to pay on her own as she had made a Cosmetic Consult through [employer]. Pt would like to try to submit to ins so I advised her information had been sent to Nancy who will reach out to her with th criteria she needs to meet in order for it to be covered  by ins.
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
## 2024-12-04 ENCOUNTER — OFFICE VISIT (OUTPATIENT)
Dept: INTERNAL MEDICINE CLINIC | Facility: CLINIC | Age: 49
End: 2024-12-04
Payer: COMMERCIAL

## 2024-12-04 ENCOUNTER — PROCEDURE VISIT (OUTPATIENT)
Age: 49
End: 2024-12-04
Payer: COMMERCIAL

## 2024-12-04 ENCOUNTER — OFFICE VISIT (OUTPATIENT)
Dept: SURGERY | Facility: CLINIC | Age: 49
End: 2024-12-04
Payer: COMMERCIAL

## 2024-12-04 VITALS
OXYGEN SATURATION: 99 % | DIASTOLIC BLOOD PRESSURE: 76 MMHG | WEIGHT: 152.6 LBS | SYSTOLIC BLOOD PRESSURE: 128 MMHG | BODY MASS INDEX: 28.81 KG/M2 | HEIGHT: 61 IN | TEMPERATURE: 97.8 F | HEART RATE: 89 BPM

## 2024-12-04 VITALS
DIASTOLIC BLOOD PRESSURE: 90 MMHG | HEIGHT: 61 IN | HEART RATE: 89 BPM | SYSTOLIC BLOOD PRESSURE: 158 MMHG | BODY MASS INDEX: 28.7 KG/M2 | WEIGHT: 152 LBS

## 2024-12-04 VITALS — WEIGHT: 152 LBS | BODY MASS INDEX: 28.7 KG/M2 | HEIGHT: 61 IN

## 2024-12-04 DIAGNOSIS — N25.81 SECONDARY HYPERPARATHYROIDISM OF RENAL ORIGIN (HCC): ICD-10-CM

## 2024-12-04 DIAGNOSIS — F34.1 DYSTHYMIC DISORDER: ICD-10-CM

## 2024-12-04 DIAGNOSIS — E78.2 MIXED HYPERLIPIDEMIA: ICD-10-CM

## 2024-12-04 DIAGNOSIS — G43.009 MIGRAINE WITHOUT AURA AND WITHOUT STATUS MIGRAINOSUS, NOT INTRACTABLE: ICD-10-CM

## 2024-12-04 DIAGNOSIS — M47.812 CERVICAL SPONDYLOSIS: ICD-10-CM

## 2024-12-04 DIAGNOSIS — K21.9 GASTROESOPHAGEAL REFLUX DISEASE WITHOUT ESOPHAGITIS: ICD-10-CM

## 2024-12-04 DIAGNOSIS — L03.311 ABDOMINAL WALL CELLULITIS: ICD-10-CM

## 2024-12-04 DIAGNOSIS — L89.90 PRESSURE SORE: ICD-10-CM

## 2024-12-04 DIAGNOSIS — M99.03 SEGMENTAL DYSFUNCTION OF LUMBAR REGION: ICD-10-CM

## 2024-12-04 DIAGNOSIS — M99.02 SEGMENTAL DYSFUNCTION OF THORACIC REGION: ICD-10-CM

## 2024-12-04 DIAGNOSIS — M99.04 SEGMENTAL DYSFUNCTION OF SACRAL REGION: ICD-10-CM

## 2024-12-04 DIAGNOSIS — M51.16 LUMBAR DISC HERNIATION WITH RADICULOPATHY: ICD-10-CM

## 2024-12-04 DIAGNOSIS — F90.0 ATTENTION DEFICIT HYPERACTIVITY DISORDER (ADHD), PREDOMINANTLY INATTENTIVE TYPE: ICD-10-CM

## 2024-12-04 DIAGNOSIS — M62.838 MUSCLE SPASM: ICD-10-CM

## 2024-12-04 DIAGNOSIS — E11.65 UNCONTROLLED TYPE 2 DIABETES MELLITUS WITH HYPERGLYCEMIA (HCC): ICD-10-CM

## 2024-12-04 DIAGNOSIS — I10 PRIMARY HYPERTENSION: ICD-10-CM

## 2024-12-04 DIAGNOSIS — E55.9 VITAMIN D DEFICIENCY: ICD-10-CM

## 2024-12-04 DIAGNOSIS — L03.032 CELLULITIS OF LEFT TOE: Primary | ICD-10-CM

## 2024-12-04 DIAGNOSIS — M48.062 SPINAL STENOSIS OF LUMBAR REGION WITH NEUROGENIC CLAUDICATION: Primary | ICD-10-CM

## 2024-12-04 DIAGNOSIS — M50.10 CERVICAL DISC DISORDER WITH RADICULOPATHY: ICD-10-CM

## 2024-12-04 DIAGNOSIS — L05.92 PILONIDAL SINUS: ICD-10-CM

## 2024-12-04 DIAGNOSIS — M48.062 LUMBAR STENOSIS WITH NEUROGENIC CLAUDICATION: ICD-10-CM

## 2024-12-04 DIAGNOSIS — N18.32 STAGE 3B CHRONIC KIDNEY DISEASE (HCC): ICD-10-CM

## 2024-12-04 DIAGNOSIS — M99.01 SEGMENTAL DYSFUNCTION OF CERVICAL REGION: ICD-10-CM

## 2024-12-04 PROCEDURE — 99243 OFF/OP CNSLTJ NEW/EST LOW 30: CPT | Performed by: SURGERY

## 2024-12-04 PROCEDURE — 99214 OFFICE O/P EST MOD 30 MIN: CPT | Performed by: INTERNAL MEDICINE

## 2024-12-04 PROCEDURE — 97110 THERAPEUTIC EXERCISES: CPT | Performed by: CHIROPRACTOR

## 2024-12-04 PROCEDURE — 98941 CHIROPRACT MANJ 3-4 REGIONS: CPT | Performed by: CHIROPRACTOR

## 2024-12-04 RX ORDER — PREGABALIN 100 MG/1
100 CAPSULE ORAL 3 TIMES DAILY
Qty: 270 CAPSULE | Refills: 0 | Status: SHIPPED | OUTPATIENT
Start: 2024-12-04

## 2024-12-04 RX ORDER — LISDEXAMFETAMINE DIMESYLATE 30 MG/1
30 CAPSULE ORAL EVERY MORNING
Qty: 90 CAPSULE | Refills: 0 | Status: SHIPPED | OUTPATIENT
Start: 2024-12-04

## 2024-12-04 RX ORDER — CALCITRIOL 0.25 UG/1
CAPSULE, LIQUID FILLED ORAL
Qty: 90 CAPSULE | Refills: 1 | Status: SHIPPED | OUTPATIENT
Start: 2024-12-04

## 2024-12-04 RX ORDER — CLONAZEPAM 0.5 MG/1
0.5 TABLET ORAL 2 TIMES DAILY
Qty: 180 TABLET | Refills: 0 | Status: SHIPPED | OUTPATIENT
Start: 2024-12-04

## 2024-12-04 NOTE — PROGRESS NOTES
Assessment/Plan:             1. Cellulitis of left toe  Comments:  better  2. Abdominal wall cellulitis  Comments:  better  3. Pilonidal sinus  -     Ambulatory Referral to General Surgery; Future  4. Uncontrolled type 2 diabetes mellitus with hyperglycemia ([facility])  Comments:  Continues same medication  5. Primary hypertension  6. Migraine without aura and without status migrainosus, not intractable  7. Gastroesophageal reflux disease without esophagitis  8. Lumbar disc herniation with radiculopathy  -     tiZANidine (ZANAFLEX) 4 mg tablet; Take 1 tablet (4 mg total) by mouth every 8 (eight) hours as needed for muscle spasms  9. Stage 3b chronic kidney disease ([facility])  10. Cervical disc disorder with radiculopathy  11. Mixed hyperlipidemia  12. Vitamin D deficiency  13. Attention deficit hyperactivity disorder (ADHD), predominantly inattentive type  Comments:  Continue same medication  Orders:  -     lisdexamfetamine (VYVANSE) 30 MG capsule; Take 1 capsule (30 mg total) by mouth every morning Max Daily Amount: 30 mg  14. Dysthymic disorder  -     clonazePAM (KlonoPIN) 0.5 mg tablet; Take 1 tablet (0.5 mg total) by mouth 2 (two) times a day  15. Lumbar stenosis with neurogenic claudication  -     pregabalin (LYRICA) 100 mg capsule; Take 1 capsule (100 mg total) by mouth 3 (three) times a day  16. Secondary hyperparathyroidism of renal origin ([facility])  -     calcitriol (ROCALTROL) 0.25 mcg capsule; TAKE 1 CAPSULE BY MOUTH daily         Subjective:      Patient ID: Kinza Meza is a 49 y.o. female.    Follow-up on multiple medical problems to ensure they are stable on current medication, needs wound check on left toe, abdominal wall skin, better        The following portions of the patient's history were reviewed and updated as appropriate: She  has a past medical history of ADD (attention deficit disorder), Allergic rhinitis, ALS (amyotrophic lateral sclerosis) ([facility]) (11/22), Anemia (12/2021), Anxiety, Arthritis,  Bipolar disorder (HCC), Cervical disc disorder with radiculopathy of cervical region, Chronic depression, Chronic kidney disease, Chronic pain disorder, Cluster headache, Colitis, Colon polyp, Concussion (2018), Condyloma acuminatum, CTS (carpal tunnel syndrome) (2001), Depression, Diabetes mellitus (HCC), Diabetic nephropathy ([facility]) (2001), Diabetic neuropathy (HCC), Diabetic retinopathy ([facility]) (2001), Difficulty walking (07/22), Fibromyalgia, Fibromyalgia, primary, GERD (gastroesophageal reflux disease), Head injury (08/13/18), Headache(784.0) (1977), History of transfusion (12/2021), HTN (hypertension), Hyperlipidemia (07/22/2019), Hypertension, Intervertebral disc disorder with radiculopathy of lumbar region, Lupus (2003), Migraine, Miscarriage (1988), Obesity (1980), Obesity, unspecified, Open wound of back (05/30/2023), Opioid abuse, in remission ([facility]) (01/2022), Osteoporosis, Peripheral neuropathy, Postoperative wound infection (12/2021), Preeclampsia, Rheumatoid arthritis ([facility]), and Wears dentures.  She   Patient Active Problem List    Diagnosis Date Noted    Migraine without aura and without status migrainosus, not intractable 11/06/2024    Cellulitis of right toe 07/01/2024    Cellulitis of right shoulder 03/28/2024    Benign hypertension with chronic kidney disease, stage III ([facility]) 01/31/2024    Abnormal blood electrolyte level 10/30/2023    Thrombocytopenia ([facility]) 10/30/2023    Chest pain 10/29/2023    Metabolic acidosis 09/27/2023    Hyperphosphatemia 09/27/2023    Neurogenic bladder 09/26/2023    Bilateral carpal tunnel syndrome     IIH (idiopathic intracranial hypertension) 08/15/2023    Open wound of back 05/30/2023    Stage 4 chronic kidney disease ([facility]) 02/14/2023    Maceration of skin 02/14/2023    Cortical age-related cataract of left eye 01/04/2023    Bipolar affective disorder, currently manic, mild ([facility]) 03/30/2022    Seasonal allergic rhinitis 03/30/2022    Surgical wound, non healing 02/08/2022     Diabetic polyneuropathy associated with diabetes mellitus due to underlying condition ([facility]) 02/08/2022    Continuous opioid dependence ([facility]) 01/07/2022    Anemia 12/24/2021    Wound infection 12/09/2021    Cauda equina syndrome ([facility]) 12/08/2021    Depression 10/28/2021    Annual physical exam 08/27/2021    Other gastritis without bleeding 08/11/2021    Obesity, unspecified     Diabetes mellitus ([facility])     Primary hypertension     Dizziness 12/07/2020    Secondary hyperparathyroidism of renal origin ([facility]) 11/16/2020    Insulin dependent type 2 diabetes mellitus ([facility])     Intractable migraine with status migrainosus     Type 2 diabetes mellitus with diabetic neuropathy ([facility]) 09/11/2020    Ventral hernia without obstruction or gangrene 09/11/2020    Lumbar disc herniation with radiculopathy 09/11/2020    Concussion with moderate (1-24 hours) loss of consciousness 09/11/2020    Primary osteoarthritis of both knees 09/11/2020    Diabetic nephropathy associated with type 2 diabetes mellitus ([facility]) 09/11/2020    Dysthymic disorder 09/11/2020    Acute on chronic kidney failure  ([facility]) 07/13/2020    Hypokalemia 07/13/2020    Near syncope 07/13/2020    Weakness 07/13/2020    Status post gastric bypass for obesity 07/13/2020    Diarrhea 07/13/2020    Snores     Headache upon awakening     Chronic tension type headache 12/16/2019    Chronic migraine w/o aura w/o status migrainosus, not intractable 12/16/2019    Medical marijuana use 12/16/2019    Vitamin D deficiency 08/23/2019    Chronic kidney disease-mineral and bone disorder 08/20/2019    S/P cervical spinal fusion 07/25/2019    Mixed hyperlipidemia 07/22/2019    Cervical disc disorder with radiculopathy     Stage 3b chronic kidney disease (HCC) 05/07/2019    Persistent proteinuria 05/07/2019    Hypertensive kidney disease with stage 3b chronic kidney disease ([facility]) 05/07/2019    Hyponatremia 05/07/2019    Intervertebral disc disorders with radiculopathy, lumbar region      Sacroiliitis (HCC)     Greater trochanteric bursitis of both hips     Herniated nucleus pulposus, L1-2 2017    Bilateral chronic knee pain 2017    Facet arthritis of lumbosacral region 2017    Fibromyalgia 2017    Lumbar stenosis with neurogenic claudication 2017    ADHD 2016    Uncontrolled type 2 diabetes mellitus with hyperglycemia (HCC) 2016    Gastroesophageal reflux disease without esophagitis 2016    Morbid obesity with BMI of 40.0-44.9, adult ([facility]) 2016    Chronic renal insufficiency 2014    Endometriosis 2012     She  has a past surgical history that includes Appendectomy;  section; Hernia repair; Cervical fusion (N/A, 2019); Colonoscopy; Carpal tunnel release; Hemorrhoid surgery; Ventral hernia repair; Shelby-en-y procedure; Ulnar tunnel release; Mammo (historical) (); Colonoscopy; Lumbar fusion (N/A, 2021); LUMBAR WOUND EXPLORATION (Bilateral, 2021); VAC DRESSING APPLICATION (Bilateral, 2021); VAC DRESSING APPLICATION (N/A, 2021); Bariatric Surgery (2016); Cataract extraction; and pr debridement bone 1st 20 sq cm/< (N/A, 2023).  Her family history includes ADD / ADHD in her cousin and cousin; Anemia in her maternal grandmother; Anxiety disorder in her father; Cancer in her mother; Cervical cancer in her mother; Colon cancer in her maternal grandmother; Completed Suicide  in her father; Depression in her father; Diabetes in her mother; Hypertension in her mother; Kidney disease in her mother; Mental illness in her daughter, daughter, and father; Neuropathy in her mother; No Known Problems in her family, maternal aunt, maternal grandfather, paternal grandfather, and sister; Ovarian cancer in her paternal aunt; Psychiatric Illness in her father; Suicidality in her father; Suicide Attempts in her father; Uterine cancer in her paternal grandmother.  She  reports that she has never smoked. She has  never been exposed to tobacco smoke. She has never used smokeless tobacco. She reports that she does not currently use alcohol. She reports current drug use. Drug: Marijuana.  Current Outpatient Medications   Medication Sig Dispense Refill    acetaZOLAMIDE (DIAMOX) 250 mg tablet TAKE 1 TABLET(250 MG) BY MOUTH TWICE DAILY 180 tablet 1    atorvastatin (LIPITOR) 10 mg tablet TAKE 1 TABLET(10 MG) BY MOUTH DAILY AT BEDTIME 90 tablet 1    BD Pen Needle Glory U/F 32G X 4 MM MISC Inject under the skin 2 (two) times a day Use as directed 180 each 0    Blood Glucose Monitoring Suppl (ONE TOUCH ULTRA 2) w/Device KIT Use 1 each 2 (two) times a day Use as instructed 1 kit 0    calcitriol (ROCALTROL) 0.25 mcg capsule TAKE 1 CAPSULE BY MOUTH daily 90 capsule 1    cetirizine (ZyrTEC) oral solution Take 5 mL (5 mg total) by mouth daily 450 mL 1    Cholecalciferol (Vitamin D3) 50 MCG (2000 UT) CAPS TAKE 1 CAPSULE(2,000 UNITS TOTAL) BY MOUTH DAILY 90 capsule 1    clonazePAM (KlonoPIN) 0.5 mg tablet Take 1 tablet (0.5 mg total) by mouth 2 (two) times a day 180 tablet 0    Continuous Glucose Sensor (FreeStyle Filiberto 3 Sensor) MISC Use 1 each every 14 (fourteen) days 6 each 1    cyproheptadine (PERIACTIN) 4 mg tablet Take 1 tablet (4 mg total) by mouth daily at bedtime 90 tablet 1    diphenhydrAMINE (BENADRYL) 25 mg tablet Take 1 tablet (25 mg total) by mouth every 6 (six) hours as needed for itching 30 tablet 0    divalproex sodium (Depakote) 500 mg DR tablet Take 1 tablet (500 mg total) by mouth daily at bedtime 90 tablet 3    docusate sodium (COLACE) 100 mg capsule TAKE 1 CAPSULE BY MOUTH TWICE A DAY 60 capsule 0    DULoxetine (CYMBALTA) 60 mg delayed release capsule TAKE 1 CAPSULE(60 MG) BY MOUTH DAILY 90 capsule 1    ferrous sulfate 324 (65 Fe) mg Take 1 tablet (324 mg total) by mouth daily before breakfast 90 tablet 3    fremanezumab-vfrm (Ajovy) 225 MG/1.5ML auto-injector Inject 1.5 mL (225 mg total) under the skin every 30 (thirty)  days 1.5 mL 11    insulin aspart (NovoLOG FlexPen) 100 UNIT/ML injection pen Continue with sliding scale insulin regimen      Insulin Glargine Solostar (Lantus SoloStar) 100 UNIT/ML SOPN Inject 0.15 mL (15 Units total) under the skin daily at bedtime 15 mL 0    Lancets MISC Use 1 each 2 (two) times a day Use as instructed      lisdexamfetamine (VYVANSE) 30 MG capsule Take 1 capsule (30 mg total) by mouth every morning Max Daily Amount: 30 mg 90 capsule 0    losartan-hydrochlorothiazide (HYZAAR) 100-12.5 MG per tablet TAKE ONE TABLET BY MOUTH ONE TIME DAILY 90 tablet 0    magnesium (MAGTAB) 84 MG (7MEQ) TBCR Take 1 tablet (84 mg total) by mouth daily 90 tablet 3    methocarbamol (Robaxin-750) 750 mg tablet Take 1 tablet (750 mg total) by mouth every 6 (six) hours as needed for muscle spasms (back pain) 30 tablet 3    metoprolol tartrate (LOPRESSOR) 100 mg tablet TAKE 1 TABLET(100 MG) BY MOUTH EVERY 12 HOURS 180 tablet 1    Multiple Vitamins-Minerals (multivitamin with minerals) tablet Take 1 tablet by mouth daily      mupirocin (BACTROBAN) 2 % ointment Apply topically 3 (three) times a day 60 g 0    NON FORMULARY Botox injections for HA every 3months (LD 3/20/23)      nystatin (MYCOSTATIN) powder Apply topically 2 (two) times a day 120 g 3    ondansetron (ZOFRAN) 4 mg tablet Take 1 tablet (4 mg total) by mouth every 8 (eight) hours as needed for nausea or vomiting 120 tablet 0    Ophthalmic Irrigation Solution (OCUSOFT EYE WASH OP) Apply 1 Application to eye daily at bedtime      Polyethyl Glycol-Propyl Glycol (Systane) 0.4-0.3 % GEL Apply 1 drop to eye 2 (two) times a day Using Ivizia as an alternative      polyethylene glycol (MIRALAX) 17 g packet Take 17 g by mouth daily 1 each 0    pregabalin (LYRICA) 100 mg capsule Take 1 capsule (100 mg total) by mouth 3 (three) times a day 270 capsule 0    rimegepant sulfate (Nurtec) 75 mg TBDP Place one tab (75mg total) under the tongue as needed for migraine. 16 tablet 11     sodium bicarbonate 650 mg tablet Take 1 tablet (650 mg total) by mouth 3 (three) times a day 360 tablet 3    terazosin (HYTRIN) 1 mg capsule Take 1 capsule (1 mg total) by mouth daily at bedtime 30 capsule 5    Tirzepatide 15 MG/0.5ML SOAJ Inject 15 mg under the skin every 7 days for 28 days 6 mL 0    tiZANidine (ZANAFLEX) 4 mg tablet Take 1 tablet (4 mg total) by mouth every 8 (eight) hours as needed for muscle spasms 90 tablet 0    cyanocobalamin 1,000 mcg/mL Inject 1 mL (1,000 mcg total) into a muscle once a week (Patient not taking: Reported on 9/13/2024) 4 mL 3    cycloSPORINE, PF, (Cequa) 0.09 % SOLN Administer 1 drop to both eyes 2 (two) times a day (Patient not taking: Reported on 12/4/2024)       Current Facility-Administered Medications   Medication Dose Route Frequency Provider Last Rate Last Admin    cyanocobalamin injection 1,000 mcg  1,000 mcg Intramuscular Weekly Myla Norwood MD   1,000 mcg at 07/01/24 1007     Current Outpatient Medications on File Prior to Visit   Medication Sig    acetaZOLAMIDE (DIAMOX) 250 mg tablet TAKE 1 TABLET(250 MG) BY MOUTH TWICE DAILY    atorvastatin (LIPITOR) 10 mg tablet TAKE 1 TABLET(10 MG) BY MOUTH DAILY AT BEDTIME    BD Pen Needle Glory U/F 32G X 4 MM MISC Inject under the skin 2 (two) times a day Use as directed    Blood Glucose Monitoring Suppl (ONE TOUCH ULTRA 2) w/Device KIT Use 1 each 2 (two) times a day Use as instructed    cetirizine (ZyrTEC) oral solution Take 5 mL (5 mg total) by mouth daily    Cholecalciferol (Vitamin D3) 50 MCG (2000 UT) CAPS TAKE 1 CAPSULE(2,000 UNITS TOTAL) BY MOUTH DAILY    Continuous Glucose Sensor (FreeStyle Filiberto 3 Sensor) MISC Use 1 each every 14 (fourteen) days    cyproheptadine (PERIACTIN) 4 mg tablet Take 1 tablet (4 mg total) by mouth daily at bedtime    diphenhydrAMINE (BENADRYL) 25 mg tablet Take 1 tablet (25 mg total) by mouth every 6 (six) hours as needed for itching    divalproex sodium (Depakote) 500 mg DR tablet Take 1  tablet (500 mg total) by mouth daily at bedtime    docusate sodium (COLACE) 100 mg capsule TAKE 1 CAPSULE BY MOUTH TWICE A DAY    DULoxetine (CYMBALTA) 60 mg delayed release capsule TAKE 1 CAPSULE(60 MG) BY MOUTH DAILY    ferrous sulfate 324 (65 Fe) mg Take 1 tablet (324 mg total) by mouth daily before breakfast    fremanezumab-vfrm (Ajovy) 225 MG/1.5ML auto-injector Inject 1.5 mL (225 mg total) under the skin every 30 (thirty) days    insulin aspart (NovoLOG FlexPen) 100 UNIT/ML injection pen Continue with sliding scale insulin regimen    Insulin Glargine Solostar (Lantus SoloStar) 100 UNIT/ML SOPN Inject 0.15 mL (15 Units total) under the skin daily at bedtime    Lancets MISC Use 1 each 2 (two) times a day Use as instructed    losartan-hydrochlorothiazide (HYZAAR) 100-12.5 MG per tablet TAKE ONE TABLET BY MOUTH ONE TIME DAILY    magnesium (MAGTAB) 84 MG (7MEQ) TBCR Take 1 tablet (84 mg total) by mouth daily    methocarbamol (Robaxin-750) 750 mg tablet Take 1 tablet (750 mg total) by mouth every 6 (six) hours as needed for muscle spasms (back pain)    metoprolol tartrate (LOPRESSOR) 100 mg tablet TAKE 1 TABLET(100 MG) BY MOUTH EVERY 12 HOURS    Multiple Vitamins-Minerals (multivitamin with minerals) tablet Take 1 tablet by mouth daily    mupirocin (BACTROBAN) 2 % ointment Apply topically 3 (three) times a day    NON FORMULARY Botox injections for HA every 3months (LD 3/20/23)    nystatin (MYCOSTATIN) powder Apply topically 2 (two) times a day    ondansetron (ZOFRAN) 4 mg tablet Take 1 tablet (4 mg total) by mouth every 8 (eight) hours as needed for nausea or vomiting    Ophthalmic Irrigation Solution (OCUSOFT EYE WASH OP) Apply 1 Application to eye daily at bedtime    Polyethyl Glycol-Propyl Glycol (Systane) 0.4-0.3 % GEL Apply 1 drop to eye 2 (two) times a day Using Ivizia as an alternative    polyethylene glycol (MIRALAX) 17 g packet Take 17 g by mouth daily    rimegepant sulfate (Nurtec) 75 mg TBDP Place one tab  (75mg total) under the tongue as needed for migraine.    sodium bicarbonate 650 mg tablet Take 1 tablet (650 mg total) by mouth 3 (three) times a day    terazosin (HYTRIN) 1 mg capsule Take 1 capsule (1 mg total) by mouth daily at bedtime    Tirzepatide 15 MG/0.5ML SOAJ Inject 15 mg under the skin every 7 days for 28 days    [DISCONTINUED] calcitriol (ROCALTROL) 0.25 mcg capsule TAKE 1 CAPSULE BY MOUTH daily    [DISCONTINUED] clonazePAM (KlonoPIN) 0.5 mg tablet Take 1 tablet (0.5 mg total) by mouth 2 (two) times a day    [DISCONTINUED] lisdexamfetamine (VYVANSE) 30 MG capsule Take 1 capsule (30 mg total) by mouth every morning Max Daily Amount: 30 mg    [DISCONTINUED] pregabalin (LYRICA) 100 mg capsule Take 1 capsule (100 mg total) by mouth 3 (three) times a day    [DISCONTINUED] tiZANidine (ZANAFLEX) 4 mg tablet Take 4 mg by mouth every 8 (eight) hours as needed for muscle spasms    cyanocobalamin 1,000 mcg/mL Inject 1 mL (1,000 mcg total) into a muscle once a week (Patient not taking: Reported on 9/13/2024)    cycloSPORINE, PF, (Cequa) 0.09 % SOLN Administer 1 drop to both eyes 2 (two) times a day (Patient not taking: Reported on 12/4/2024)     Current Facility-Administered Medications on File Prior to Visit   Medication    cyanocobalamin injection 1,000 mcg     She is allergic to ace inhibitors, pollen extract, short ragweed pollen ext, sodium hyaluronate (yoav), levonorgestrel-eth estradiol [levonorgestrel-ethinyl estrad], and latex..    Review of Systems   Constitutional:  Negative for chills and fever.   HENT:  Negative for congestion, ear pain and sore throat.    Eyes:  Negative for pain.   Respiratory:  Negative for cough and shortness of breath.    Cardiovascular:  Negative for chest pain and leg swelling.   Gastrointestinal:  Negative for abdominal pain, nausea and vomiting.   Endocrine: Negative for polyuria.   Genitourinary:  Negative for difficulty urinating, frequency and urgency.   Musculoskeletal:   "Positive for arthralgias and back pain.   Skin:  Negative for rash.   Neurological:  Negative for weakness and headaches.   Psychiatric/Behavioral:  Negative for sleep disturbance. The patient is not nervous/anxious.          Objective:      /76 (BP Location: Right arm, Patient Position: Sitting, Cuff Size: Standard)   Pulse 89   Temp 97.8 °F (36.6 °C) (Temporal)   Ht 5' 1\" (1.549 m)   Wt 69.2 kg (152 lb 9.6 oz)   LMP  (LMP Unknown)   SpO2 99%   BMI 28.83 kg/m²     Recent Results (from the past 8 weeks)   POCT Measure PVR    Collection Time: 10/10/24  9:57 AM   Result Value Ref Range    POST-VOID RESIDUAL VOLUME, ML POC 0 mL        Physical Exam  Constitutional:       Appearance: Normal appearance.   HENT:      Head: Normocephalic.      Right Ear: External ear normal.      Left Ear: External ear normal.      Nose: Nose normal. No congestion.      Mouth/Throat:      Mouth: Mucous membranes are moist.      Pharynx: Oropharynx is clear. No oropharyngeal exudate or posterior oropharyngeal erythema.   Eyes:      Extraocular Movements: Extraocular movements intact.      Conjunctiva/sclera: Conjunctivae normal.   Cardiovascular:      Rate and Rhythm: Normal rate and regular rhythm.      Heart sounds: Normal heart sounds. No murmur heard.  Pulmonary:      Effort: Pulmonary effort is normal.      Breath sounds: Normal breath sounds. No wheezing or rales.   Abdominal:      General: Bowel sounds are normal. There is no distension.      Palpations: Abdomen is soft.      Tenderness: There is no abdominal tenderness.   Musculoskeletal:         General: Normal range of motion.      Cervical back: Normal range of motion and neck supple.      Right lower leg: No edema.      Left lower leg: No edema.   Lymphadenopathy:      Cervical: No cervical adenopathy.   Skin:     General: Skin is warm.   Neurological:      General: No focal deficit present.      Mental Status: She is alert and oriented to person, place, and time. "

## 2024-12-04 NOTE — LETTER
2024     Myla Norwood MD  [address] [facility] [address]    Patient: Kinza Meza   YOB: 1975   Date of Visit: 2024       Dear Dr. Norwood:    Thank you for referring Kinza Meza to me for evaluation. Below are my notes for this consultation.    If you have questions, please do not hesitate to call me. I look forward to following your patient along with you.         Sincerely,        Jose Hawley DO        CC: No Recipients    Jose Hawley DO  2024 12:54 PM  Sign when Signing Visit  Name: Kinza Meza      : 1975      MRN: [id number]  Encounter Provider: Jose Hawley DO  Encounter Date: 2024   Encounter department: [facility] GENERAL SURGERY [facility]  :  Assessment & Plan  Pressure sore    Orders:  •  Ambulatory Referral to General Surgery  No signs of a pilonidal cyst tract.  It appears to be chronic skin changes consistent with a pressure sore.  No signs of infection.  Nothing needs to be opened or drained.      Encouraged her to avoid direct pressure to the sacrum.  She did state she is dealing with residual issues of cauda equina syndrome      History of Present Illness    HPI  Kinza Meza is a 49 y.o. female who presents for evaluation of a pilonidal cyst.  States she has had the cyst for 3 months.  Patient been dealing with soreness for the last several months.  Was on Bactrim recently but has not really seen any improvement.  Denies any overt drainage.    She did add that she has been dealing with residual symptoms of cauda equina syndrome.  She does use a cane.  She does have some bowel and bladder issues.  She did add that at one point she was in the ICU and felt she had a pressure sore in this region.  She is never needed any type of incision and drainage over the sacral area..    History obtained from: patient    Review of Systems   All other systems reviewed and are negative.    Medical History  "Reviewed by provider this encounter:     .     Objective  Ht 5' 1\" (1.549 m)   Wt 68.9 kg (152 lb)   LMP  (LMP Unknown)   BMI 28.72 kg/m²      Physical Exam  Skin:     General: Skin is warm and dry.      Comments: Appears to have an area about 2 cm in size of scarring over the lower sacrum/coccyx area.  No signs of infection.  No signs of any ballotable fluid.  1 to 2 mm abrasion.  It is tender upon palpation.  Likely this appears to be more consistent with chronic changes of pressure sore and not a pilonidal cyst.           "

## 2024-12-04 NOTE — PROGRESS NOTES
Initial date of service: 5/6/24    Diagnoses and all orders for this visit:    Spinal stenosis of lumbar region with neurogenic claudication    Segmental dysfunction of sacral region    Muscle spasm    Cervical spondylosis    Segmental dysfunction of lumbar region    Segmental dysfunction of thoracic region    Segmental dysfunction of cervical region    Pt suffered mild exacerbation but responded to tx with reduced pain and increased ROM.     TREATMENT: [id number]  Ther-ex: IASTM; discussed post procedure soreness and/or ecchymosis for up to 36 hrs, applied to affected mm hypertonicities; supine hamstring stretch, supine gluteal stretch, single knee to chest stretch, upper trap stretch, transitional mvmt education, abdominal bracing; greater than 15 min spent performing above mentioned ther-ex to improve ROM/flexibility. Cervical supine graded mobilization and traction, Thoracic mobilization/manipulation: prone P-A mob; Lumbar mobilization/manipulation: diversified side laying graded HVLA, flexion-traction; SIJ Manipulation/Mobilization: R/L SIJ HVLA - long axis distraction, mederos drop table maneuver to affected SIJ    HPI:  Kinza Meza is a 49 y.o. female  Chief Complaint   Patient presents with    Neck - Pain     Neck pain that radiates down left shoulder . Pain score 4-5     Back Pain     Lower lumbar pain score 4-5       Patient states very sore      Pt presents for tx for exacerbation of chronic neck/back pain. Pt has had back and neck surgery. Pt was utilizing cane and now using walker occasionally. 2022 MRI demonstrates multilevel degenerative changes of postsurgical lumbar spine status post L1-L2 posterior spinal fixation with varying degrees of canal stenosis (moderate L4-L5) and foraminal narrowing (mild left L1-L2 and left L2-L3 ). 2024 fl/ext xrays unremarkable for instability. 2021 C/S MRI demonstrates s/p ACDF from C4 to C7. Multilevel degenerative spondylosis as described. Possible  ossification of the posterior longitudinal ligament at C2-3 and C6-7  12/4: pt reports feeling and moving better since last tx, but suffered flare-up      Neck Pain   This is a chronic problem. The current episode started more than 1 year ago. The problem occurs constantly. The problem has been waxing and waning. The pain is present in the left side, midline and right side. The quality of the pain is described as aching. The symptoms are aggravated by bending, position, stress and twisting. The pain is Worse during the day.   Back Pain  This is a chronic problem. The current episode started more than 1 year ago. The problem occurs constantly. The problem has been waxing and waning since onset. The pain is present in the gluteal, lumbar spine, sacro-iliac and thoracic spine. The quality of the pain is described as aching, burning and stabbing. The pain radiates to the left thigh, left knee and right thigh. Pain scale: 1-6/10. The pain is Worse during the day. The symptoms are aggravated by standing, twisting and bending (walking, laying supine, transitional mvmts; palliative includes sitting, massage). Pertinent negatives include no bladder incontinence or bowel incontinence. Risk factors include lack of exercise.     Past Medical History:   Diagnosis Date    ADD (attention deficit disorder)     Allergic rhinitis     ALS (amyotrophic lateral sclerosis) ([facility]) 11/22    Per neurological surgeon    Anemia 12/2021    Anxiety     Arthritis     Bipolar disorder ([facility])     Cervical disc disorder with radiculopathy of cervical region     Chronic depression     Chronic kidney disease     Stage 1    Chronic pain disorder     Cluster headache     Colitis     Last assessed - 11/25/14    Colon polyp     Concussion 2018    Condyloma acuminatum     Last assessed - 3/19/14    CTS (carpal tunnel syndrome) 2001    Depression     Diabetes mellitus ([facility])     Last assessed - 11/25/14    Diabetic nephropathy ([facility]) 2001    Diabetic  neuropathy (HCC)     Diabetic retinopathy (HCC)     Difficulty walking     Fibromyalgia     Fibromyalgia, primary     GERD (gastroesophageal reflux disease)     Head injury 18    Fall    Headache(784.0) 1977    History of transfusion 2021    HTN (hypertension)     Last assessed - 14    Hyperlipidemia 2019    Hypertension     Intervertebral disc disorder with radiculopathy of lumbar region     Lupus     Rhuematologist-Sheela Dasilva, [place]    Migraine     Miscarriage 1988    ,     Obesity 1980    Obesity, unspecified     Open wound of back 2023    Opioid abuse, in remission (HCC) 2022    Only while in hospital and for about 10 days once home instead of one week.    Osteoporosis     Peripheral neuropathy     Postoperative wound infection 2021    Stop not healed    Preeclampsia     Last assessed - 14    Rheumatoid arthritis (HCC)     Wears dentures       Past Surgical History:   Procedure Laterality Date    APPENDECTOMY      Last assessed - 14    BARIATRIC SURGERY  2016    CARPAL TUNNEL RELEASE      CATARACT EXTRACTION      CERVICAL FUSION N/A 2019    Procedure: Anterior cervical discectomy w fusion C4-5, C5-6, C6-7; allograft and neuromonitoring;  Surgeon: Jose Gomez MD;  [facility];  Service: Orthopedics     SECTION      Last assessed - 14    COLONOSCOPY      COLONOSCOPY      HEMORRHOID SURGERY      HERNIA REPAIR      Last assessed - 14    LUMBAR FUSION N/A 2021    Procedure: L1/2 laminectomy, discectomy with L1/2 MIS posterior fixation using neuromonitoring and neuronavigation;  Surgeon: Suhas Akbar MD;  [facility];  Service: Neurosurgery    LUMBAR WOUND EXPLORATION Bilateral 2021    Procedure: Lumbar wound washout, debridement and intraoperative cultures;  Surgeon: Suhas Akbar MD;  [facility];  Service: Neurosurgery    MAMMO (HISTORICAL)      NM DEBRIDEMENT BONE 1ST  20 SQ CM/< N/A 5/30/2023    Procedure: SURGICAL PREPARATION OF BACK  WOUND AND LOCAL FLAP, PREVENA PLACEMENT;  Surgeon: Ady Rocha MD;  Location:  MAIN OR;  Service: Plastics    WOOD-EN-Y PROCEDURE      ULNAR TUNNEL RELEASE      VAC DRESSING APPLICATION Bilateral 12/22/2021    Procedure: APPLICATION VAC DRESSING;  Surgeon: Elbert Hawley MD;  Location:  MAIN OR;  Service: General    VAC DRESSING APPLICATION N/A 12/25/2021    Procedure: APPLICATION VAC DRESSING ABDOMEN/TRUNK;  Surgeon: Mani Ross DO;  [facility]: [facility];  Service: General    VENTRAL HERNIA REPAIR       The following portions of the patient's history were reviewed and updated as appropriate: allergies, past family history, past medical history, past social history, past surgical history, and problem list.  Review of Systems   Gastrointestinal:  Negative for bowel incontinence.   Genitourinary:  Negative for bladder incontinence.   Musculoskeletal:  Positive for back pain and neck pain.     Physical Exam  Neck:        Comments: Pnful and limited in Brot, Blf  Musculoskeletal:      Cervical back: Pain with movement and muscular tenderness present. Decreased range of motion.      Thoracic back: Spasms and tenderness present. Decreased range of motion.      Lumbar back: Spasms and tenderness present. Decreased range of motion. Negative right straight leg raise test and negative left straight leg raise test.        Back:       Comments: Pnful and limited in Brot, Blf, Ext centralizes    Lymphadenopathy:      Cervical: No cervical adenopathy.   Skin:     General: Skin is warm and dry.   Neurological:      Mental Status: She is alert and oriented to person, place, and time.      Gait: Gait is intact.   Psychiatric:         Mood and Affect: Mood and affect normal.         Behavior: Behavior normal.       SOFT TISSUE ASSESSMENT Hypertonicity and tenderness palpated B upper traps, B SCM, B T10-S1 erector spinae, glute med/min, QL,  hamstring JOINT RESTRICTIONS: C4/5, T1/2, T10-S1 and R/L SIJ    Return in about 2 weeks (around 12/18/2024) for Next scheduled follow up.

## 2024-12-04 NOTE — PROGRESS NOTES
"Name: Kinza Meza      : 1975      MRN: [id number]  Encounter Provider: Jose Hawley DO  Encounter Date: 2024   Encounter department: [facility] GENERAL SURGERY [facility]  :  Assessment & Plan  Pressure sore    Orders:    Ambulatory Referral to General Surgery  No signs of a pilonidal cyst tract.  It appears to be chronic skin changes consistent with a pressure sore.  No signs of infection.  Nothing needs to be opened or drained.      Encouraged her to avoid direct pressure to the sacrum.  She did state she is dealing with residual issues of cauda equina syndrome      History of Present Illness     HPI  Kinza Meza is a 49 y.o. female who presents for evaluation of a pilonidal cyst.  States she has had the cyst for 3 months.  Patient been dealing with soreness for the last several months.  Was on Bactrim recently but has not really seen any improvement.  Denies any overt drainage.    She did add that she has been dealing with residual symptoms of cauda equina syndrome.  She does use a cane.  She does have some bowel and bladder issues.  She did add that at one point she was in the ICU and felt she had a pressure sore in this region.  She is never needed any type of incision and drainage over the sacral area..    History obtained from: patient    Review of Systems   All other systems reviewed and are negative.    Medical History Reviewed by provider this encounter:     .     Objective   Ht 5' 1\" (1.549 m)   Wt 68.9 kg (152 lb)   LMP  (LMP Unknown)   BMI 28.72 kg/m²      Physical Exam  Skin:     General: Skin is warm and dry.      Comments: Appears to have an area about 2 cm in size of scarring over the lower sacrum/coccyx area.  No signs of infection.  No signs of any ballotable fluid.  1 to 2 mm abrasion.  It is tender upon palpation.  Likely this appears to be more consistent with chronic changes of pressure sore and not a pilonidal cyst.           "

## 2024-12-18 ENCOUNTER — PROCEDURE VISIT (OUTPATIENT)
Age: 49
End: 2024-12-18
Payer: COMMERCIAL

## 2024-12-18 ENCOUNTER — APPOINTMENT (OUTPATIENT)
Dept: LAB | Facility: AMBULARY SURGERY CENTER | Age: 49
End: 2024-12-18
Payer: COMMERCIAL

## 2024-12-18 VITALS — BODY MASS INDEX: 28.7 KG/M2 | WEIGHT: 152 LBS | HEIGHT: 61 IN

## 2024-12-18 DIAGNOSIS — M99.03 SEGMENTAL DYSFUNCTION OF LUMBAR REGION: ICD-10-CM

## 2024-12-18 DIAGNOSIS — M47.812 CERVICAL SPONDYLOSIS: ICD-10-CM

## 2024-12-18 DIAGNOSIS — E11.65 UNCONTROLLED TYPE 2 DIABETES MELLITUS WITH HYPERGLYCEMIA (HCC): ICD-10-CM

## 2024-12-18 DIAGNOSIS — M99.04 SEGMENTAL DYSFUNCTION OF SACRAL REGION: ICD-10-CM

## 2024-12-18 DIAGNOSIS — E78.2 MIXED HYPERLIPIDEMIA: ICD-10-CM

## 2024-12-18 DIAGNOSIS — M48.062 SPINAL STENOSIS OF LUMBAR REGION WITH NEUROGENIC CLAUDICATION: Primary | ICD-10-CM

## 2024-12-18 DIAGNOSIS — M99.02 SEGMENTAL DYSFUNCTION OF THORACIC REGION: ICD-10-CM

## 2024-12-18 DIAGNOSIS — M62.838 MUSCLE SPASM: ICD-10-CM

## 2024-12-18 DIAGNOSIS — I10 PRIMARY HYPERTENSION: ICD-10-CM

## 2024-12-18 DIAGNOSIS — M99.01 SEGMENTAL DYSFUNCTION OF CERVICAL REGION: ICD-10-CM

## 2024-12-18 LAB
ALBUMIN SERPL BCG-MCNC: 4.1 G/DL (ref 3.5–5)
ALP SERPL-CCNC: 42 U/L (ref 34–104)
ALT SERPL W P-5'-P-CCNC: 7 U/L (ref 7–52)
ANION GAP SERPL CALCULATED.3IONS-SCNC: 9 MMOL/L (ref 4–13)
AST SERPL W P-5'-P-CCNC: 9 U/L (ref 13–39)
BASOPHILS # BLD AUTO: 0.04 THOUSANDS/ÂΜL (ref 0–0.1)
BASOPHILS NFR BLD AUTO: 1 % (ref 0–1)
BILIRUB SERPL-MCNC: 0.54 MG/DL (ref 0.2–1)
BUN SERPL-MCNC: 28 MG/DL (ref 5–25)
CALCIUM SERPL-MCNC: 8.8 MG/DL (ref 8.4–10.2)
CHLORIDE SERPL-SCNC: 110 MMOL/L (ref 96–108)
CHOLEST SERPL-MCNC: 124 MG/DL (ref ?–200)
CO2 SERPL-SCNC: 24 MMOL/L (ref 21–32)
CREAT SERPL-MCNC: 1.37 MG/DL (ref 0.6–1.3)
CREAT UR-MCNC: 191.3 MG/DL
EOSINOPHIL # BLD AUTO: 0.03 THOUSAND/ÂΜL (ref 0–0.61)
EOSINOPHIL NFR BLD AUTO: 1 % (ref 0–6)
ERYTHROCYTE [DISTWIDTH] IN BLOOD BY AUTOMATED COUNT: 13.8 % (ref 11.6–15.1)
EST. AVERAGE GLUCOSE BLD GHB EST-MCNC: 88 MG/DL
GFR SERPL CREATININE-BSD FRML MDRD: 44 ML/MIN/1.73SQ M
GLUCOSE P FAST SERPL-MCNC: 76 MG/DL (ref 65–99)
HBA1C MFR BLD: 4.7 %
HCT VFR BLD AUTO: 36.2 % (ref 34.8–46.1)
HDLC SERPL-MCNC: 46 MG/DL
HGB BLD-MCNC: 11.6 G/DL (ref 11.5–15.4)
IMM GRANULOCYTES # BLD AUTO: 0.03 THOUSAND/UL (ref 0–0.2)
IMM GRANULOCYTES NFR BLD AUTO: 1 % (ref 0–2)
LDLC SERPL CALC-MCNC: 55 MG/DL (ref 0–100)
LYMPHOCYTES # BLD AUTO: 1.33 THOUSANDS/ÂΜL (ref 0.6–4.47)
LYMPHOCYTES NFR BLD AUTO: 34 % (ref 14–44)
MCH RBC QN AUTO: 28.4 PG (ref 26.8–34.3)
MCHC RBC AUTO-ENTMCNC: 32 G/DL (ref 31.4–37.4)
MCV RBC AUTO: 89 FL (ref 82–98)
MICROALBUMIN UR-MCNC: 51.8 MG/L
MICROALBUMIN/CREAT 24H UR: 27 MG/G CREATININE (ref 0–30)
MONOCYTES # BLD AUTO: 0.3 THOUSAND/ÂΜL (ref 0.17–1.22)
MONOCYTES NFR BLD AUTO: 8 % (ref 4–12)
NEUTROPHILS # BLD AUTO: 2.23 THOUSANDS/ÂΜL (ref 1.85–7.62)
NEUTS SEG NFR BLD AUTO: 55 % (ref 43–75)
NRBC BLD AUTO-RTO: 0 /100 WBCS
PLATELET # BLD AUTO: 138 THOUSANDS/UL (ref 149–390)
PMV BLD AUTO: 11.3 FL (ref 8.9–12.7)
POTASSIUM SERPL-SCNC: 4 MMOL/L (ref 3.5–5.3)
PROT SERPL-MCNC: 6.1 G/DL (ref 6.4–8.4)
RBC # BLD AUTO: 4.08 MILLION/UL (ref 3.81–5.12)
SODIUM SERPL-SCNC: 143 MMOL/L (ref 135–147)
TRIGL SERPL-MCNC: 116 MG/DL (ref ?–150)
TSH SERPL DL<=0.05 MIU/L-ACNC: 1.52 UIU/ML (ref 0.45–4.5)
WBC # BLD AUTO: 3.96 THOUSAND/UL (ref 4.31–10.16)

## 2024-12-18 PROCEDURE — 80053 COMPREHEN METABOLIC PANEL: CPT

## 2024-12-18 PROCEDURE — 98941 CHIROPRACT MANJ 3-4 REGIONS: CPT | Performed by: CHIROPRACTOR

## 2024-12-18 PROCEDURE — 85025 COMPLETE CBC W/AUTO DIFF WBC: CPT

## 2024-12-18 PROCEDURE — 84443 ASSAY THYROID STIM HORMONE: CPT

## 2024-12-18 PROCEDURE — 82043 UR ALBUMIN QUANTITATIVE: CPT

## 2024-12-18 PROCEDURE — 82570 ASSAY OF URINE CREATININE: CPT

## 2024-12-18 PROCEDURE — 36415 COLL VENOUS BLD VENIPUNCTURE: CPT

## 2024-12-18 PROCEDURE — 80061 LIPID PANEL: CPT

## 2024-12-18 PROCEDURE — 97110 THERAPEUTIC EXERCISES: CPT | Performed by: CHIROPRACTOR

## 2024-12-18 PROCEDURE — 83036 HEMOGLOBIN GLYCOSYLATED A1C: CPT

## 2024-12-18 RX ORDER — TIRZEPATIDE 15 MG/.5ML
15 INJECTION, SOLUTION SUBCUTANEOUS WEEKLY
COMMUNITY
Start: 2024-11-12

## 2024-12-18 NOTE — PROGRESS NOTES
Initial date of service: 5/6/24    Diagnoses and all orders for this visit:    Spinal stenosis of lumbar region with neurogenic claudication    Segmental dysfunction of sacral region    Muscle spasm    Cervical spondylosis    Segmental dysfunction of lumbar region    Segmental dysfunction of thoracic region    Segmental dysfunction of cervical region    Other orders  -     Mounjaro 15 MG/0.5ML SOAJ; Inject 15 mg as directed once a week    Pt suffered mild exacerbation but responded to tx with reduced pain and increased ROM.     TREATMENT: [id number]  Ther-ex: IASTM; discussed post procedure soreness and/or ecchymosis for up to 36 hrs, applied to affected mm hypertonicities; supine hamstring stretch, supine gluteal stretch, single knee to chest stretch, upper trap stretch, transitional mvmt education, abdominal bracing; greater than 15 min spent performing above mentioned ther-ex to improve ROM/flexibility. Cervical supine graded mobilization and traction, Thoracic mobilization/manipulation: prone P-A mob; Lumbar mobilization/manipulation: diversified side laying graded HVLA, flexion-traction; SIJ Manipulation/Mobilization: R/L SIJ HVLA - long axis distraction, mederos drop table maneuver to affected SIJ    HPI:  Kinza Meza is a 49 y.o. female  Chief Complaint   Patient presents with    Neck - Follow-up     Neck pain on the left side is sore and pinching into left shoulder    Pain score 2-3      Back Pain     Lower lumbar in the middle is very sore  pain score 3-4     Pt presents for tx for exacerbation of chronic neck/back pain. Pt has had back and neck surgery. Pt was utilizing cane and now using walker occasionally. 2022 MRI demonstrates multilevel degenerative changes of postsurgical lumbar spine status post L1-L2 posterior spinal fixation with varying degrees of canal stenosis (moderate L4-L5) and foraminal narrowing (mild left L1-L2 and left L2-L3 ). 2024 fl/ext xrays unremarkable for instability. 2021  C/S MRI demonstrates s/p ACDF from C4 to C7. Multilevel degenerative spondylosis as described. Possible ossification of the posterior longitudinal ligament at C2-3 and C6-7  12/18: pt reports feeling and moving better since last tx, but suffered flare-up      Neck Pain   This is a chronic problem. The current episode started more than 1 year ago. The problem occurs constantly. The problem has been waxing and waning. The pain is present in the left side, midline and right side. The quality of the pain is described as aching. The symptoms are aggravated by bending, position, stress and twisting. The pain is Worse during the day.   Back Pain  This is a chronic problem. The current episode started more than 1 year ago. The problem occurs constantly. The problem has been waxing and waning since onset. The pain is present in the gluteal, lumbar spine, sacro-iliac and thoracic spine. The quality of the pain is described as aching, burning and stabbing. The pain radiates to the left thigh, left knee and right thigh. Pain scale: 0-5/10. The pain is Worse during the day. The symptoms are aggravated by standing, twisting and bending (walking, laying supine, transitional mvmts; palliative includes sitting, massage). Pertinent negatives include no bladder incontinence or bowel incontinence. Risk factors include lack of exercise.     Past Medical History:   Diagnosis Date    ADD (attention deficit disorder)     Allergic rhinitis     ALS (amyotrophic lateral sclerosis) ([facility]) 11/22    Per neurological surgeon    Anemia 12/2021    Anxiety     Arthritis     Bipolar disorder ([facility])     Cervical disc disorder with radiculopathy of cervical region     Chronic depression     Chronic kidney disease     Stage 1    Chronic pain disorder     Cluster headache     Colitis     Last assessed - 11/25/14    Colon polyp     Concussion 2018    Condyloma acuminatum     Last assessed - 3/19/14    CTS (carpal tunnel syndrome) 2001    Depression      Diabetes mellitus (HCC)     Last assessed - 14    Diabetic nephropathy (HCC)     Diabetic neuropathy ([facility])     Diabetic retinopathy ([facility])     Difficulty walking     Fibromyalgia     Fibromyalgia, primary     GERD (gastroesophageal reflux disease)     Head injury 18    Fall    Headache(784.0) 1977    History of transfusion 2021    HTN (hypertension)     Last assessed - 14    Hyperlipidemia 2019    Hypertension     Intervertebral disc disorder with radiculopathy of lumbar region     Lupus     Rhuematologist-Sheela Dasilva, [place]    Migraine     Miscarriage 1988    ,     Obesity 1980    Obesity, unspecified     Open wound of back 2023    Opioid abuse, in remission ([facility]) 2022    Only while in hospital and for about 10 days once home instead of one week.    Osteoporosis     Peripheral neuropathy     Postoperative wound infection 2021    Stop not healed    Preeclampsia     Last assessed - 14    Rheumatoid arthritis ([facility])     Wears dentures       Past Surgical History:   Procedure Laterality Date    APPENDECTOMY      Last assessed - 14    BARIATRIC SURGERY  2016    CARPAL TUNNEL RELEASE      CATARACT EXTRACTION      CERVICAL FUSION N/A 2019    Procedure: Anterior cervical discectomy w fusion C4-5, C5-6, C6-7; allograft and neuromonitoring;  Surgeon: Jose Gomez MD;  [facility];  Service: Orthopedics     SECTION      Last assessed - 14    COLONOSCOPY      COLONOSCOPY      HEMORRHOID SURGERY      HERNIA REPAIR      Last assessed - 14    LUMBAR FUSION N/A 2021    Procedure: L1/2 laminectomy, discectomy with L1/2 MIS posterior fixation using neuromonitoring and neuronavigation;  Surgeon: Suhas Akbar MD;  [facility];  Service: Neurosurgery    LUMBAR WOUND EXPLORATION Bilateral 2021    Procedure: Lumbar wound washout, debridement and intraoperative cultures;  Surgeon: Suhas Akbar MD;   [facility];  Service: Neurosurgery    MAMMO (HISTORICAL)  2016    NV DEBRIDEMENT BONE 1ST 20 SQ CM/< N/A 5/30/2023    Procedure: SURGICAL PREPARATION OF BACK  WOUND AND LOCAL FLAP, PREVENA PLACEMENT;  Surgeon: Ady Rocha MD;  Location: [facility];  Service: Plastics    WOOD-EN-Y PROCEDURE      ULNAR TUNNEL RELEASE      VAC DRESSING APPLICATION Bilateral 12/22/2021    Procedure: APPLICATION VAC DRESSING;  Surgeon: Elbert Hawley MD;  [facility];  Service: General    VAC DRESSING APPLICATION N/A 12/25/2021    Procedure: APPLICATION VAC DRESSING ABDOMEN/TRUNK;  Surgeon: Mani Ross DO;  [facility]: [facility];  Service: General    VENTRAL HERNIA REPAIR       The following portions of the patient's history were reviewed and updated as appropriate: allergies, past family history, past medical history, past social history, past surgical history, and problem list.  Review of Systems   Gastrointestinal:  Negative for bowel incontinence.   Genitourinary:  Negative for bladder incontinence.   Musculoskeletal:  Positive for back pain and neck pain.     Physical Exam  Neck:        Comments: Pnful and limited in Brot, Blf  Musculoskeletal:      Cervical back: Pain with movement and muscular tenderness present. Decreased range of motion.      Thoracic back: Spasms and tenderness present. Decreased range of motion.      Lumbar back: Spasms and tenderness present. Decreased range of motion. Negative right straight leg raise test and negative left straight leg raise test.        Back:       Comments: Pnful and limited in Brot, Blf, Ext centralizes    Lymphadenopathy:      Cervical: No cervical adenopathy.   Skin:     General: Skin is warm and dry.   Neurological:      Mental Status: She is alert and oriented to person, place, and time.      Gait: Gait is intact.   Psychiatric:         Mood and Affect: Mood and affect normal.         Behavior: Behavior normal.       SOFT TISSUE ASSESSMENT  Hypertonicity and tenderness palpated B upper traps, B SCM, B T10-S1 erector spinae, glute med/min, QL, hamstring JOINT RESTRICTIONS: C4/5, T1/2, T10-S1 and R/L SIJ    Return in about 2 weeks (around 1/1/2025) for Next scheduled follow up.

## 2024-12-19 ENCOUNTER — OFFICE VISIT (OUTPATIENT)
Dept: INTERNAL MEDICINE CLINIC | Facility: CLINIC | Age: 49
End: 2024-12-19
Payer: COMMERCIAL

## 2024-12-19 VITALS
OXYGEN SATURATION: 98 % | HEIGHT: 61 IN | SYSTOLIC BLOOD PRESSURE: 126 MMHG | DIASTOLIC BLOOD PRESSURE: 80 MMHG | TEMPERATURE: 98.5 F | WEIGHT: 147 LBS | BODY MASS INDEX: 27.75 KG/M2 | HEART RATE: 84 BPM

## 2024-12-19 DIAGNOSIS — G43.009 MIGRAINE WITHOUT AURA AND WITHOUT STATUS MIGRAINOSUS, NOT INTRACTABLE: ICD-10-CM

## 2024-12-19 DIAGNOSIS — M17.0 PRIMARY OSTEOARTHRITIS OF BOTH KNEES: ICD-10-CM

## 2024-12-19 DIAGNOSIS — K21.9 GASTROESOPHAGEAL REFLUX DISEASE WITHOUT ESOPHAGITIS: ICD-10-CM

## 2024-12-19 DIAGNOSIS — E11.65 UNCONTROLLED TYPE 2 DIABETES MELLITUS WITH HYPERGLYCEMIA (HCC): Primary | ICD-10-CM

## 2024-12-19 DIAGNOSIS — F31.11 BIPOLAR AFFECTIVE DISORDER, CURRENTLY MANIC, MILD (HCC): ICD-10-CM

## 2024-12-19 DIAGNOSIS — N18.32 STAGE 3B CHRONIC KIDNEY DISEASE (HCC): ICD-10-CM

## 2024-12-19 DIAGNOSIS — Z23 ENCOUNTER FOR IMMUNIZATION: ICD-10-CM

## 2024-12-19 DIAGNOSIS — F34.1 DYSTHYMIC DISORDER: ICD-10-CM

## 2024-12-19 DIAGNOSIS — E78.2 MIXED HYPERLIPIDEMIA: ICD-10-CM

## 2024-12-19 DIAGNOSIS — E11.40 TYPE 2 DIABETES MELLITUS WITH DIABETIC NEUROPATHY, WITH LONG-TERM CURRENT USE OF INSULIN (HCC): ICD-10-CM

## 2024-12-19 DIAGNOSIS — Z79.4 TYPE 2 DIABETES MELLITUS WITH DIABETIC NEUROPATHY, WITH LONG-TERM CURRENT USE OF INSULIN (HCC): ICD-10-CM

## 2024-12-19 DIAGNOSIS — M51.16 LUMBAR DISC HERNIATION WITH RADICULOPATHY: ICD-10-CM

## 2024-12-19 DIAGNOSIS — N31.9 NEUROGENIC BLADDER: ICD-10-CM

## 2024-12-19 DIAGNOSIS — I10 PRIMARY HYPERTENSION: ICD-10-CM

## 2024-12-19 DIAGNOSIS — G93.2 IIH (IDIOPATHIC INTRACRANIAL HYPERTENSION): ICD-10-CM

## 2024-12-19 DIAGNOSIS — B49 FUNGAL INFECTION: ICD-10-CM

## 2024-12-19 PROCEDURE — 90677 PCV20 VACCINE IM: CPT

## 2024-12-19 PROCEDURE — 99214 OFFICE O/P EST MOD 30 MIN: CPT | Performed by: INTERNAL MEDICINE

## 2024-12-19 PROCEDURE — 90471 IMMUNIZATION ADMIN: CPT

## 2024-12-19 RX ORDER — DULOXETIN HYDROCHLORIDE 60 MG/1
60 CAPSULE, DELAYED RELEASE ORAL DAILY
Qty: 90 CAPSULE | Refills: 1 | Status: SHIPPED | OUTPATIENT
Start: 2024-12-19

## 2024-12-19 RX ORDER — CLOTRIMAZOLE AND BETAMETHASONE DIPROPIONATE 10; .64 MG/G; MG/G
CREAM TOPICAL 2 TIMES DAILY
Qty: 45 G | Refills: 1 | Status: SHIPPED | OUTPATIENT
Start: 2024-12-19

## 2024-12-19 NOTE — PROGRESS NOTES
Assessment/Plan:             1. Uncontrolled type 2 diabetes mellitus with hyperglycemia (Formerly [facility])  Comments:  continue same med  2. Primary hypertension  Comments:  continue same med  3. Migraine without aura and without status migrainosus, not intractable  Comments:  continue same med  4. Gastroesophageal reflux disease without esophagitis  5. Type 2 diabetes mellitus with diabetic neuropathy, with long-term current use of insulin (Formerly [facility])  6. Lumbar disc herniation with radiculopathy  7. Primary osteoarthritis of both knees  8. Stage 3b chronic kidney disease (Formerly [facility])  9. Neurogenic bladder  10. Dysthymic disorder  Comments:  continue same med  11. Mixed hyperlipidemia  Comments:  continue same med  12. Bipolar affective disorder, currently manic, mild (Formerly [facility])  -     DULoxetine (CYMBALTA) 60 mg delayed release capsule; Take 1 capsule (60 mg total) by mouth daily  13. IIH (idiopathic intracranial hypertension)  14. Fungal infection  -     clotrimazole-betamethasone (LOTRISONE) 1-0.05 % cream; Apply topically 2 (two) times a day         Subjective:      Patient ID: Kinza Meza is a 49 y.o. female.    Follow-up on blood test done yesterday, test discussed with her        The following portions of the patient's history were reviewed and updated as appropriate: She  has a past medical history of ADD (attention deficit disorder), Allergic rhinitis, ALS (amyotrophic lateral sclerosis) (Formerly [facility]) (11/22), Anemia (12/2021), Anxiety, Arthritis, Bipolar disorder ([facility]), Cervical disc disorder with radiculopathy of cervical region, Chronic depression, Chronic kidney disease, Chronic pain disorder, Cluster headache, Colitis, Colon polyp, Concussion (2018), Condyloma acuminatum, CTS (carpal tunnel syndrome) (2001), Depression, Diabetes mellitus ([facility]), Diabetic nephropathy ([facility]) (2001), Diabetic neuropathy ([facility]), Diabetic retinopathy ([facility]) (2001), Difficulty walking (07/22), Fibromyalgia, Fibromyalgia, primary, GERD (gastroesophageal  reflux disease), Head injury (08/13/18), Headache(784.0) (1977), History of transfusion (12/2021), HTN (hypertension), Hyperlipidemia (07/22/2019), Hypertension, Intervertebral disc disorder with radiculopathy of lumbar region, Lupus (2003), Migraine, Miscarriage (1988), Obesity (1980), Obesity, unspecified, Open wound of back (05/30/2023), Opioid abuse, in remission ([facility]) (01/2022), Osteoporosis, Peripheral neuropathy, Postoperative wound infection (12/2021), Preeclampsia, Rheumatoid arthritis ([facility]), and Wears dentures.  She   Patient Active Problem List    Diagnosis Date Noted    Migraine without aura and without status migrainosus, not intractable 11/06/2024    Cellulitis of right toe 07/01/2024    Cellulitis of right shoulder 03/28/2024    Benign hypertension with chronic kidney disease, stage III ([facility]) 01/31/2024    Abnormal blood electrolyte level 10/30/2023    Thrombocytopenia ([facility]) 10/30/2023    Chest pain 10/29/2023    Metabolic acidosis 09/27/2023    Hyperphosphatemia 09/27/2023    Neurogenic bladder 09/26/2023    Bilateral carpal tunnel syndrome     IIH (idiopathic intracranial hypertension) 08/15/2023    Open wound of back 05/30/2023    Stage 4 chronic kidney disease ([facility]) 02/14/2023    Maceration of skin 02/14/2023    Cortical age-related cataract of left eye 01/04/2023    Bipolar affective disorder, currently manic, mild ([facility]) 03/30/2022    Seasonal allergic rhinitis 03/30/2022    Surgical wound, non healing 02/08/2022    Diabetic polyneuropathy associated with diabetes mellitus due to underlying condition ([facility]) 02/08/2022    Continuous opioid dependence ([facility]) 01/07/2022    Anemia 12/24/2021    Wound infection 12/09/2021    Cauda equina syndrome ([facility]) 12/08/2021    Depression 10/28/2021    Annual physical exam 08/27/2021    Other gastritis without bleeding 08/11/2021    Obesity, unspecified     Diabetes mellitus ([facility])     Primary hypertension     Dizziness 12/07/2020    Secondary hyperparathyroidism of  renal origin (H[facility]) 11/16/2020    Insulin dependent type 2 diabetes mellitus ([facility])     Intractable migraine with status migrainosus     Type 2 diabetes mellitus with diabetic neuropathy ([facility]) 09/11/2020    Ventral hernia without obstruction or gangrene 09/11/2020    Lumbar disc herniation with radiculopathy 09/11/2020    Concussion with moderate (1-24 hours) loss of consciousness 09/11/2020    Primary osteoarthritis of both knees 09/11/2020    Diabetic nephropathy associated with type 2 diabetes mellitus ([facility]) 09/11/2020    Dysthymic disorder 09/11/2020    Acute on chronic kidney failure  ([facility]) 07/13/2020    Hypokalemia 07/13/2020    Near syncope 07/13/2020    Weakness 07/13/2020    Status post gastric bypass for obesity 07/13/2020    Diarrhea 07/13/2020    Snores     Headache upon awakening     Chronic tension type headache 12/16/2019    Chronic migraine w/o aura w/o status migrainosus, not intractable 12/16/2019    Medical marijuana use 12/16/2019    Vitamin D deficiency 08/23/2019    Chronic kidney disease-mineral and bone disorder 08/20/2019    S/P cervical spinal fusion 07/25/2019    Mixed hyperlipidemia 07/22/2019    Cervical disc disorder with radiculopathy     Stage 3b chronic kidney disease ([facility]) 05/07/2019    Persistent proteinuria 05/07/2019    Hypertensive kidney disease with stage 3b chronic kidney disease ([facility]) 05/07/2019    Hyponatremia 05/07/2019    Intervertebral disc disorders with radiculopathy, lumbar region     Sacroiliitis ([facility])     Greater trochanteric bursitis of both hips     Herniated nucleus pulposus, L1-2 12/12/2017    Bilateral chronic knee pain 12/01/2017    Facet arthritis of lumbosacral region 12/01/2017    Fibromyalgia 12/01/2017    Lumbar stenosis with neurogenic claudication 12/01/2017    ADHD 08/22/2016    Uncontrolled type 2 diabetes mellitus with hyperglycemia ([facility]) 08/22/2016    Gastroesophageal reflux disease without esophagitis 08/22/2016    Morbid obesity with BMI of  40.0-44.9, adult ([facility]ly [facility]) 2016    Chronic renal insufficiency 2014    Endometriosis 2012     She  has a past surgical history that includes Appendectomy;  section; Hernia repair; Cervical fusion (N/A, 2019); Colonoscopy; Carpal tunnel release; Hemorrhoid surgery; Ventral hernia repair; Shelby-en-y procedure; Ulnar tunnel release; Mammo (historical) (); Colonoscopy; Lumbar fusion (N/A, 2021); LUMBAR WOUND EXPLORATION (Bilateral, 2021); VAC DRESSING APPLICATION (Bilateral, 2021); VAC DRESSING APPLICATION (N/A, 2021); Bariatric Surgery (2016); Cataract extraction; and pr debridement bone 1st 20 sq cm/< (N/A, 2023).  Her family history includes ADD / ADHD in her cousin and cousin; Anemia in her maternal grandmother; Anxiety disorder in her father; Cancer in her mother; Cervical cancer in her mother; Colon cancer in her maternal grandmother; Completed Suicide  in her father; Depression in her father; Diabetes in her mother; Hypertension in her mother; Kidney disease in her mother; Mental illness in her daughter, daughter, and father; Neuropathy in her mother; No Known Problems in her family, maternal aunt, maternal grandfather, paternal grandfather, and sister; Ovarian cancer in her paternal aunt; Psychiatric Illness in her father; Suicidality in her father; Suicide Attempts in her father; Uterine cancer in her paternal grandmother.  She  reports that she has never smoked. She has never been exposed to tobacco smoke. She has never used smokeless tobacco. She reports that she does not currently use alcohol. She reports current drug use. Drug: Marijuana.  Current Outpatient Medications   Medication Sig Dispense Refill    acetaZOLAMIDE (DIAMOX) 250 mg tablet TAKE 1 TABLET(250 MG) BY MOUTH TWICE DAILY 180 tablet 1    atorvastatin (LIPITOR) 10 mg tablet TAKE 1 TABLET(10 MG) BY MOUTH DAILY AT BEDTIME 90 tablet 1    BD Pen Needle Glory U/F 32G X 4 MM MISC Inject  under the skin 2 (two) times a day Use as directed 180 each 0    Blood Glucose Monitoring Suppl (ONE TOUCH ULTRA 2) w/Device KIT Use 1 each 2 (two) times a day Use as instructed 1 kit 0    calcitriol (ROCALTROL) 0.25 mcg capsule TAKE 1 CAPSULE BY MOUTH daily 90 capsule 1    cetirizine (ZyrTEC) oral solution Take 5 mL (5 mg total) by mouth daily 450 mL 1    Cholecalciferol (Vitamin D3) 50 MCG (2000 UT) CAPS TAKE 1 CAPSULE(2,000 UNITS TOTAL) BY MOUTH DAILY 90 capsule 1    clonazePAM (KlonoPIN) 0.5 mg tablet Take 1 tablet (0.5 mg total) by mouth 2 (two) times a day 180 tablet 0    clotrimazole-betamethasone (LOTRISONE) 1-0.05 % cream Apply topically 2 (two) times a day 45 g 1    Continuous Glucose Sensor (FreeStyle Filiberto 3 Sensor) [facility] Use 1 each every 14 (fourteen) days 6 each 1    cyproheptadine (PERIACTIN) 4 mg tablet Take 1 tablet (4 mg total) by mouth daily at bedtime 90 tablet 1    diphenhydrAMINE (BENADRYL) 25 mg tablet Take 1 tablet (25 mg total) by mouth every 6 (six) hours as needed for itching 30 tablet 0    divalproex sodium (Depakote) 500 mg DR tablet Take 1 tablet (500 mg total) by mouth daily at bedtime 90 tablet 3    docusate sodium (COLACE) 100 mg capsule TAKE 1 CAPSULE BY MOUTH TWICE A DAY 60 capsule 0    DULoxetine (CYMBALTA) 60 mg delayed release capsule Take 1 capsule (60 mg total) by mouth daily 90 capsule 1    ferrous sulfate 324 (65 Fe) mg Take 1 tablet (324 mg total) by mouth daily before breakfast 90 tablet 3    fremanezumab-vfrm (Ajovy) 225 MG/1.5ML auto-injector Inject 1.5 mL (225 mg total) under the skin every 30 (thirty) days 1.5 mL 11    insulin aspart (NovoLOG FlexPen) 100 UNIT/ML injection pen Continue with sliding scale insulin regimen      Insulin Glargine Solostar (Lantus SoloStar) 100 UNIT/ML SOPN Inject 0.15 mL (15 Units total) under the skin daily at bedtime 15 mL 0    Lancets MISC Use 1 each 2 (two) times a day Use as instructed      lisdexamfetamine (VYVANSE) 30 MG capsule Take  1 capsule (30 mg total) by mouth every morning Max Daily Amount: 30 mg 90 capsule 0    losartan-hydrochlorothiazide (HYZAAR) 100-12.5 MG per tablet TAKE ONE TABLET BY MOUTH ONE TIME DAILY 90 tablet 0    magnesium (MAGTAB) 84 MG (7MEQ) TBCR Take 1 tablet (84 mg total) by mouth daily 90 tablet 3    methocarbamol (Robaxin-750) 750 mg tablet Take 1 tablet (750 mg total) by mouth every 6 (six) hours as needed for muscle spasms (back pain) 30 tablet 3    metoprolol tartrate (LOPRESSOR) 100 mg tablet TAKE 1 TABLET(100 MG) BY MOUTH EVERY 12 HOURS 180 tablet 1    Mounjaro 15 MG/0.5ML SOAJ Inject 15 mg as directed once a week      Multiple Vitamins-Minerals (multivitamin with minerals) tablet Take 1 tablet by mouth daily      mupirocin (BACTROBAN) 2 % ointment Apply topically 3 (three) times a day 60 g 0    NON FORMULARY Botox injections for HA every 3months (LD 3/20/23)      nystatin (MYCOSTATIN) powder Apply topically 2 (two) times a day 120 g 3    ondansetron (ZOFRAN) 4 mg tablet Take 1 tablet (4 mg total) by mouth every 8 (eight) hours as needed for nausea or vomiting 120 tablet 0    Ophthalmic Irrigation Solution (OCUSOFT EYE WASH OP) Apply 1 Application to eye daily at bedtime      Polyethyl Glycol-Propyl Glycol (Systane) 0.4-0.3 % GEL Apply 1 drop to eye 2 (two) times a day Using Ivizia as an alternative      polyethylene glycol (MIRALAX) 17 g packet Take 17 g by mouth daily 1 each 0    pregabalin (LYRICA) 100 mg capsule Take 1 capsule (100 mg total) by mouth 3 (three) times a day 270 capsule 0    rimegepant sulfate (Nurtec) 75 mg TBDP Place one tab (75mg total) under the tongue as needed for migraine. 16 tablet 11    sodium bicarbonate 650 mg tablet Take 1 tablet (650 mg total) by mouth 3 (three) times a day 360 tablet 3    terazosin (HYTRIN) 1 mg capsule Take 1 capsule (1 mg total) by mouth daily at bedtime 30 capsule 5    tiZANidine (ZANAFLEX) 4 mg tablet Take 1 tablet (4 mg total) by mouth every 8 (eight) hours as  needed for muscle spasms 90 tablet 0    cyanocobalamin 1,000 mcg/mL Inject 1 mL (1,000 mcg total) into a muscle once a week (Patient not taking: Reported on 9/13/2024) 4 mL 3    cycloSPORINE, PF, (Cequa) 0.09 % SOLN Administer 1 drop to both eyes 2 (two) times a day (Patient not taking: Reported on 12/4/2024)       Current Facility-Administered Medications   Medication Dose Route Frequency Provider Last Rate Last Admin    cyanocobalamin injection 1,000 mcg  1,000 mcg Intramuscular Weekly Myla Norwood MD   1,000 mcg at 07/01/24 1007     Current Outpatient Medications on File Prior to Visit   Medication Sig    acetaZOLAMIDE (DIAMOX) 250 mg tablet TAKE 1 TABLET(250 MG) BY MOUTH TWICE DAILY    atorvastatin (LIPITOR) 10 mg tablet TAKE 1 TABLET(10 MG) BY MOUTH DAILY AT BEDTIME    BD Pen Needle Glory U/F 32G X 4 MM MISC Inject under the skin 2 (two) times a day Use as directed    Blood Glucose Monitoring Suppl (ONE TOUCH ULTRA 2) w/Device KIT Use 1 each 2 (two) times a day Use as instructed    calcitriol (ROCALTROL) 0.25 mcg capsule TAKE 1 CAPSULE BY MOUTH daily    cetirizine (ZyrTEC) oral solution Take 5 mL (5 mg total) by mouth daily    Cholecalciferol (Vitamin D3) 50 MCG (2000 UT) CAPS TAKE 1 CAPSULE(2,000 UNITS TOTAL) BY MOUTH DAILY    clonazePAM (KlonoPIN) 0.5 mg tablet Take 1 tablet (0.5 mg total) by mouth 2 (two) times a day    Continuous Glucose Sensor (FreeStyle Filiberto 3 Sensor) MISC Use 1 each every 14 (fourteen) days    cyproheptadine (PERIACTIN) 4 mg tablet Take 1 tablet (4 mg total) by mouth daily at bedtime    diphenhydrAMINE (BENADRYL) 25 mg tablet Take 1 tablet (25 mg total) by mouth every 6 (six) hours as needed for itching    divalproex sodium (Depakote) 500 mg DR tablet Take 1 tablet (500 mg total) by mouth daily at bedtime    docusate sodium (COLACE) 100 mg capsule TAKE 1 CAPSULE BY MOUTH TWICE A DAY    ferrous sulfate 324 (65 Fe) mg Take 1 tablet (324 mg total) by mouth daily before breakfast     fremanezumab-vfrm (Ajovy) 225 MG/1.5ML auto-injector Inject 1.5 mL (225 mg total) under the skin every 30 (thirty) days    insulin aspart (NovoLOG FlexPen) 100 UNIT/ML injection pen Continue with sliding scale insulin regimen    Insulin Glargine Solostar (Lantus SoloStar) 100 UNIT/ML SOPN Inject 0.15 mL (15 Units total) under the skin daily at bedtime    Lancets MISC Use 1 each 2 (two) times a day Use as instructed    lisdexamfetamine (VYVANSE) 30 MG capsule Take 1 capsule (30 mg total) by mouth every morning Max Daily Amount: 30 mg    losartan-hydrochlorothiazide (HYZAAR) 100-12.5 MG per tablet TAKE ONE TABLET BY MOUTH ONE TIME DAILY    magnesium (MAGTAB) 84 MG (7MEQ) TBCR Take 1 tablet (84 mg total) by mouth daily    methocarbamol (Robaxin-750) 750 mg tablet Take 1 tablet (750 mg total) by mouth every 6 (six) hours as needed for muscle spasms (back pain)    metoprolol tartrate (LOPRESSOR) 100 mg tablet TAKE 1 TABLET(100 MG) BY MOUTH EVERY 12 HOURS    Mounjaro 15 MG/0.5ML SOAJ Inject 15 mg as directed once a week    Multiple Vitamins-Minerals (multivitamin with minerals) tablet Take 1 tablet by mouth daily    mupirocin (BACTROBAN) 2 % ointment Apply topically 3 (three) times a day    NON FORMULARY Botox injections for HA every 3months (LD 3/20/23)    nystatin (MYCOSTATIN) powder Apply topically 2 (two) times a day    ondansetron (ZOFRAN) 4 mg tablet Take 1 tablet (4 mg total) by mouth every 8 (eight) hours as needed for nausea or vomiting    Ophthalmic Irrigation Solution (OCUSOFT EYE WASH OP) Apply 1 Application to eye daily at bedtime    Polyethyl Glycol-Propyl Glycol (Systane) 0.4-0.3 % GEL Apply 1 drop to eye 2 (two) times a day Using Ivizia as an alternative    polyethylene glycol (MIRALAX) 17 g packet Take 17 g by mouth daily    pregabalin (LYRICA) 100 mg capsule Take 1 capsule (100 mg total) by mouth 3 (three) times a day    rimegepant sulfate (Nurtec) 75 mg TBDP Place one tab (75mg total) under the tongue  "as needed for migraine.    sodium bicarbonate 650 mg tablet Take 1 tablet (650 mg total) by mouth 3 (three) times a day    terazosin (HYTRIN) 1 mg capsule Take 1 capsule (1 mg total) by mouth daily at bedtime    tiZANidine (ZANAFLEX) 4 mg tablet Take 1 tablet (4 mg total) by mouth every 8 (eight) hours as needed for muscle spasms    [DISCONTINUED] DULoxetine (CYMBALTA) 60 mg delayed release capsule TAKE 1 CAPSULE(60 MG) BY MOUTH DAILY    cyanocobalamin 1,000 mcg/mL Inject 1 mL (1,000 mcg total) into a muscle once a week (Patient not taking: Reported on 9/13/2024)    cycloSPORINE, PF, (Cequa) 0.09 % SOLN Administer 1 drop to both eyes 2 (two) times a day (Patient not taking: Reported on 12/4/2024)     Current Facility-Administered Medications on File Prior to Visit   Medication    cyanocobalamin injection 1,000 mcg     She is allergic to ace inhibitors, pollen extract, short ragweed pollen ext, sodium hyaluronate (yoav), levonorgestrel-eth estradiol [levonorgestrel-ethinyl estrad], and latex..    Review of Systems   Constitutional:  Negative for chills and fever.   HENT:  Negative for congestion, ear pain and sore throat.    Eyes:  Negative for pain.   Respiratory:  Negative for cough and shortness of breath.    Cardiovascular:  Negative for chest pain and leg swelling.   Gastrointestinal:  Negative for abdominal pain, nausea and vomiting.   Endocrine: Negative for polyuria.   Genitourinary:  Negative for difficulty urinating, frequency and urgency.   Musculoskeletal:  Positive for arthralgias and back pain.   Skin:  Positive for rash.   Neurological:  Negative for weakness and headaches.   Psychiatric/Behavioral:  Negative for sleep disturbance. The patient is not nervous/anxious.          Objective:      /80 (BP Location: Left arm, Patient Position: Sitting, Cuff Size: Standard)   Pulse 84   Temp 98.5 °F (36.9 °C) (Temporal)   Ht 5' 1\" (1.549 m)   Wt 66.7 kg (147 lb)   LMP  (LMP Unknown)   SpO2 98%   " BMI 27.78 kg/m²     Recent Results (from the past 8 weeks)   Albumin / creatinine urine ratio    Collection Time: 12/18/24 10:19 AM   Result Value Ref Range    Creatinine, Ur 191.3 Reference range not established. mg/dL    Albumin,U,Random 51.8 (H) <20.0 mg/L    Albumin Creat Ratio 27 0 - 30 mg/g creatinine   CBC and differential    Collection Time: 12/18/24 10:19 AM   Result Value Ref Range    WBC 3.96 (L) 4.31 - 10.16 Thousand/uL    RBC 4.08 3.81 - 5.12 Million/uL    Hemoglobin 11.6 11.5 - 15.4 g/dL    Hematocrit 36.2 34.8 - 46.1 %    MCV 89 82 - 98 fL    MCH 28.4 26.8 - 34.3 pg    MCHC 32.0 31.4 - 37.4 g/dL    RDW 13.8 11.6 - 15.1 %    MPV 11.3 8.9 - 12.7 fL    Platelets 138 (L) 149 - 390 Thousands/uL    nRBC 0 /100 WBCs    Segmented % 55 43 - 75 %    Immature Grans % 1 0 - 2 %    Lymphocytes % 34 14 - 44 %    Monocytes % 8 4 - 12 %    Eosinophils Relative 1 0 - 6 %    Basophils Relative 1 0 - 1 %    Absolute Neutrophils 2.23 1.85 - 7.62 Thousands/µL    Absolute Immature Grans 0.03 0.00 - 0.20 Thousand/uL    Absolute Lymphocytes 1.33 0.60 - 4.47 Thousands/µL    Absolute Monocytes 0.30 0.17 - 1.22 Thousand/µL    Eosinophils Absolute 0.03 0.00 - 0.61 Thousand/µL    Basophils Absolute 0.04 0.00 - 0.10 Thousands/µL   Comprehensive metabolic panel    Collection Time: 12/18/24 10:19 AM   Result Value Ref Range    Sodium 143 135 - 147 mmol/L    Potassium 4.0 3.5 - 5.3 mmol/L    Chloride 110 (H) 96 - 108 mmol/L    CO2 24 21 - 32 mmol/L    ANION GAP 9 4 - 13 mmol/L    BUN 28 (H) 5 - 25 mg/dL    Creatinine 1.37 (H) 0.60 - 1.30 mg/dL    Glucose, Fasting 76 65 - 99 mg/dL    Calcium 8.8 8.4 - 10.2 mg/dL    AST 9 (L) 13 - 39 U/L    ALT 7 7 - 52 U/L    Alkaline Phosphatase 42 34 - 104 U/L    Total Protein 6.1 (L) 6.4 - 8.4 g/dL    Albumin 4.1 3.5 - 5.0 g/dL    Total Bilirubin 0.54 0.20 - 1.00 mg/dL    eGFR 44 ml/min/1.73sq m   Lipid Panel with Direct LDL reflex    Collection Time: 12/18/24 10:19 AM   Result Value Ref Range     Cholesterol 124 See Comment mg/dL    Triglycerides 116 See Comment mg/dL    HDL, Direct 46 (L) >=50 mg/dL    LDL Calculated 55 0 - 100 mg/dL   TSH, 3rd generation    Collection Time: 12/18/24 10:19 AM   Result Value Ref Range    TSH 3RD GENERATON 1.520 0.450 - 4.500 uIU/mL   Hemoglobin A1C    Collection Time: 12/18/24 10:19 AM   Result Value Ref Range    Hemoglobin A1C 4.7 Normal 4.0-5.6%; PreDiabetic 5.7-6.4%; Diabetic >=6.5%; Glycemic control for adults with diabetes <7.0% %    EAG 88 mg/dl        Physical Exam  Constitutional:       Appearance: Normal appearance.   HENT:      Head: Normocephalic.      Right Ear: External ear normal.      Left Ear: External ear normal.      Nose: Nose normal. No congestion.      Mouth/Throat:      Mouth: Mucous membranes are moist.      Pharynx: Oropharynx is clear. No oropharyngeal exudate or posterior oropharyngeal erythema.   Eyes:      Extraocular Movements: Extraocular movements intact.      Conjunctiva/sclera: Conjunctivae normal.   Cardiovascular:      Rate and Rhythm: Normal rate and regular rhythm.      Heart sounds: Normal heart sounds. No murmur heard.  Pulmonary:      Effort: Pulmonary effort is normal.      Breath sounds: Normal breath sounds. No wheezing or rales.   Abdominal:      General: Abdomen is flat. There is no distension.      Palpations: Abdomen is soft.      Tenderness: There is no abdominal tenderness.   Musculoskeletal:      Cervical back: Normal range of motion and neck supple.      Right lower leg: No edema.      Left lower leg: No edema.   Lymphadenopathy:      Cervical: No cervical adenopathy.   Skin:     General: Skin is warm.   Neurological:      General: No focal deficit present.      Mental Status: She is alert and oriented to person, place, and time.

## 2024-12-31 ENCOUNTER — TELEPHONE (OUTPATIENT)
Age: 49
End: 2024-12-31

## 2024-12-31 NOTE — TELEPHONE ENCOUNTER
[facility] care coordinator call about the patient care if you have nay question please feel free to reach out to Faith at [phone] Ext [phone]. Thank you

## 2025-01-03 ENCOUNTER — OFFICE VISIT (OUTPATIENT)
Age: 50
End: 2025-01-03
Payer: COMMERCIAL

## 2025-01-03 VITALS — WEIGHT: 147 LBS | HEIGHT: 61 IN | BODY MASS INDEX: 27.75 KG/M2

## 2025-01-03 DIAGNOSIS — M99.01 SEGMENTAL DYSFUNCTION OF CERVICAL REGION: ICD-10-CM

## 2025-01-03 DIAGNOSIS — M99.04 SEGMENTAL DYSFUNCTION OF SACRAL REGION: ICD-10-CM

## 2025-01-03 DIAGNOSIS — M47.812 CERVICAL SPONDYLOSIS: ICD-10-CM

## 2025-01-03 DIAGNOSIS — M48.062 SPINAL STENOSIS OF LUMBAR REGION WITH NEUROGENIC CLAUDICATION: Primary | ICD-10-CM

## 2025-01-03 DIAGNOSIS — M99.03 SEGMENTAL DYSFUNCTION OF LUMBAR REGION: ICD-10-CM

## 2025-01-03 DIAGNOSIS — M62.838 MUSCLE SPASM: ICD-10-CM

## 2025-01-03 DIAGNOSIS — M99.02 SEGMENTAL DYSFUNCTION OF THORACIC REGION: ICD-10-CM

## 2025-01-03 PROCEDURE — 98941 CHIROPRACT MANJ 3-4 REGIONS: CPT | Performed by: CHIROPRACTOR

## 2025-01-03 PROCEDURE — 97110 THERAPEUTIC EXERCISES: CPT | Performed by: CHIROPRACTOR

## 2025-01-03 NOTE — PROGRESS NOTES
Initial date of service: 5/6/24    Diagnoses and all orders for this visit:    Spinal stenosis of lumbar region with neurogenic claudication    Segmental dysfunction of sacral region    Muscle spasm    Cervical spondylosis    Segmental dysfunction of lumbar region    Segmental dysfunction of thoracic region    Segmental dysfunction of cervical region    Pt suffered mild exacerbation but responded to tx with reduced pain and increased ROM.     TREATMENT: [id number]  Ther-ex: IASTM; discussed post procedure soreness and/or ecchymosis for up to 36 hrs, applied to affected mm hypertonicities; supine hamstring stretch, supine gluteal stretch, single knee to chest stretch, upper trap stretch, transitional mvmt education, abdominal bracing; greater than 15 min spent performing above mentioned ther-ex to improve ROM/flexibility. Cervical supine graded mobilization and traction, Thoracic mobilization/manipulation: prone P-A mob; Lumbar mobilization/manipulation: diversified side laying graded HVLA, flexion-traction; SIJ Manipulation/Mobilization: R/L SIJ HVLA - long axis distraction, mederos drop table maneuver to affected SIJ    HPI:  Kinza Meza is a 49 y.o. female  Chief Complaint   Patient presents with    Neck - Follow-up     Neck in the center is sore . Pain score 3      Back Pain     Mid back having spasms . Pain score 4     Pt presents for tx for exacerbation of chronic neck/back pain. Pt has had back and neck surgery. Pt was utilizing cane and now using walker occasionally. 2022 MRI demonstrates multilevel degenerative changes of postsurgical lumbar spine status post L1-L2 posterior spinal fixation with varying degrees of canal stenosis (moderate L4-L5) and foraminal narrowing (mild left L1-L2 and left L2-L3 ). 2024 fl/ext xrays unremarkable for instability. 2021 C/S MRI demonstrates s/p ACDF from C4 to C7. Multilevel degenerative spondylosis as described. Possible ossification of the posterior longitudinal  ligament at C2-3 and C6-7  1/3: pt reports feeling and moving better since last tx, but suffered flare-up      Neck Pain   This is a chronic problem. The current episode started more than 1 year ago. The problem occurs constantly. The problem has been waxing and waning. The pain is present in the left side, midline and right side. The quality of the pain is described as aching. The symptoms are aggravated by bending, position, stress and twisting. The pain is Worse during the day.   Back Pain  This is a chronic problem. The current episode started more than 1 year ago. The problem occurs constantly. The problem has been waxing and waning since onset. The pain is present in the gluteal, lumbar spine, sacro-iliac and thoracic spine. The quality of the pain is described as aching, burning and stabbing. The pain radiates to the left thigh, left knee and right thigh. Pain scale: 0-5/10. The pain is Worse during the day. The symptoms are aggravated by standing, twisting and bending (walking, laying supine, transitional mvmts; palliative includes sitting, massage). Pertinent negatives include no bladder incontinence or bowel incontinence. Risk factors include lack of exercise.     Past Medical History:   Diagnosis Date    ADD (attention deficit disorder)     Allergic rhinitis     ALS (amyotrophic lateral sclerosis) (Formerly [facility]) 11/22    Per neurological surgeon    Anemia 12/2021    Anxiety     Arthritis     Bipolar disorder (Formerly [facility])     Cervical disc disorder with radiculopathy of cervical region     Chronic depression     Chronic kidney disease     Stage 1    Chronic pain disorder     Cluster headache     Colitis     Last assessed - 11/25/14    Colon polyp     Concussion 2018    Condyloma acuminatum     Last assessed - 3/19/14    CTS (carpal tunnel syndrome) 2001    Depression     Diabetes mellitus ([facility]ly [facility])     Last assessed - 11/25/14    Diabetic nephropathy (Formerly [facility]) 2001    Diabetic neuropathy ([facility]ly [facility])     Diabetic retinopathy ([facility]ly [facility]) 2001     Difficulty walking     Fibromyalgia     Fibromyalgia, primary     GERD (gastroesophageal reflux disease)     Head injury 18    Fall    Headache(784.0) 1977    History of transfusion 2021    HTN (hypertension)     Last assessed - 14    Hyperlipidemia 2019    Hypertension     Intervertebral disc disorder with radiculopathy of lumbar region     Lupus     Rhuematologist-Sheela Dasilva, [place]    Migraine     Miscarriage 1988    ,     Obesity 1980    Obesity, unspecified     Open wound of back 2023    Opioid abuse, in remission (HCC) 2022    Only while in hospital and for about 10 days once home instead of one week.    Osteoporosis     Peripheral neuropathy     Postoperative wound infection 2021    Stop not healed    Preeclampsia     Last assessed - 14    Rheumatoid arthritis (HCC)     Wears dentures       Past Surgical History:   Procedure Laterality Date    APPENDECTOMY      Last assessed - 14    BARIATRIC SURGERY  2016    CARPAL TUNNEL RELEASE      CATARACT EXTRACTION      CERVICAL FUSION N/A 2019    Procedure: Anterior cervical discectomy w fusion C4-5, C5-6, C6-7; allograft and neuromonitoring;  Surgeon: Jose Gomez MD;  [facility];  Service: Orthopedics     SECTION      Last assessed - 14    COLONOSCOPY      COLONOSCOPY      HEMORRHOID SURGERY      HERNIA REPAIR      Last assessed - 14    LUMBAR FUSION N/A 2021    Procedure: L1/2 laminectomy, discectomy with L1/2 MIS posterior fixation using neuromonitoring and neuronavigation;  Surgeon: Suhas Akbar MD;  [facility];  Service: Neurosurgery    LUMBAR WOUND EXPLORATION Bilateral 2021    Procedure: Lumbar wound washout, debridement and intraoperative cultures;  Surgeon: Suhas Akbar MD;  [facility];  Service: Neurosurgery    MAMMO (HISTORICAL)      MD DEBRIDEMENT BONE 1ST 20 SQ CM/< N/A 2023    Procedure: SURGICAL  PREPARATION OF BACK  WOUND AND LOCAL FLAP, PREVENA PLACEMENT;  Surgeon: Ady Rocha MD;  Location:  MAIN OR;  Service: Plastics    WOOD-EN-Y PROCEDURE      ULNAR TUNNEL RELEASE      VAC DRESSING APPLICATION Bilateral 12/22/2021    Procedure: APPLICATION VAC DRESSING;  Surgeon: Elbert Hawley MD;  [facility];  Service: General    VAC DRESSING APPLICATION N/A 12/25/2021    Procedure: APPLICATION VAC DRESSING ABDOMEN/TRUNK;  Surgeon: Mani Ross DO;  [facility];  Service: General    VENTRAL HERNIA REPAIR       The following portions of the patient's history were reviewed and updated as appropriate: allergies, past family history, past medical history, past social history, past surgical history, and problem list.  Review of Systems   Gastrointestinal:  Negative for bowel incontinence.   Genitourinary:  Negative for bladder incontinence.   Musculoskeletal:  Positive for back pain and neck pain.     Physical Exam  Neck:        Comments: Pnful and limited in Brot, Blf  Musculoskeletal:      Cervical back: Pain with movement and muscular tenderness present. Decreased range of motion.      Thoracic back: Spasms and tenderness present. Decreased range of motion.      Lumbar back: Spasms and tenderness present. Decreased range of motion. Negative right straight leg raise test and negative left straight leg raise test.        Back:       Comments: Pnful and limited in Brot, Blf, Ext centralizes    Lymphadenopathy:      Cervical: No cervical adenopathy.   Skin:     General: Skin is warm and dry.   Neurological:      Mental Status: She is alert and oriented to person, place, and time.      Gait: Gait is intact.   Psychiatric:         Mood and Affect: Mood and affect normal.         Behavior: Behavior normal.       SOFT TISSUE ASSESSMENT Hypertonicity and tenderness palpated B upper traps, B SCM, B T10-S1 erector spinae, glute med/min, QL, hamstring JOINT RESTRICTIONS: C4/5, T1/2, T10-S1 and  R/L SIJ    Return in about 2 weeks (around 1/17/2025) for Next scheduled follow up.

## 2025-01-07 ENCOUNTER — PROCEDURE VISIT (OUTPATIENT)
Dept: NEUROLOGY | Facility: CLINIC | Age: 50
End: 2025-01-07
Payer: COMMERCIAL

## 2025-01-07 VITALS — TEMPERATURE: 98 F | SYSTOLIC BLOOD PRESSURE: 122 MMHG | DIASTOLIC BLOOD PRESSURE: 80 MMHG | HEART RATE: 73 BPM

## 2025-01-07 DIAGNOSIS — G43.709 CHRONIC MIGRAINE WITHOUT AURA WITHOUT STATUS MIGRAINOSUS, NOT INTRACTABLE: Primary | ICD-10-CM

## 2025-01-07 PROCEDURE — 64615 CHEMODENERV MUSC MIGRAINE: CPT | Performed by: PHYSICIAN ASSISTANT

## 2025-01-07 NOTE — PROGRESS NOTES
"Universal Protocol   procedure performed by consultantConsent: Verbal consent obtained. Written consent obtained.  Risks and benefits: risks, benefits and alternatives were discussed  Consent given by: patient  Time out: Immediately prior to procedure a \"time out\" was called to verify the correct patient, procedure, equipment, support staff and site/side marked as required.  Patient understanding: patient states understanding of the procedure being performed  Patient consent: the patient's understanding of the procedure matches consent given  Procedure consent: procedure consent matches procedure scheduled  Relevant documents: relevant documents present and verified  Patient identity confirmed: verbally with patient      Chemodenervation     Date/Time  1/7/2025 9:30 AM     Performed by  Desiree Grigsby PA-C   Authorized by  Desiree Grigsby PA-C     Pre-procedure details      Prepped With: Alcohol     Anesthesia  (see MAR for exact dosages):     Anesthesia method:  None   Procedure details      Position:  Upright   Botox      Botox Type:  Type A    Brand:  Botox    mL's of Botulinum Toxin:  200    Final Concentration per CC:  50 units    Needle Gauge:  30 G 2.5 inch   Procedures      Botox Procedures: chronic headache      Indications: migraines     Injection Location      Head / Face:  L superior cervical paraspinal, R superior cervical paraspinal, L , R , L frontalis, R frontalis, L medial occipitalis, R medial occipitalis, procerus, R temporalis, L temporalis, R superior trapezius and L superior trapezius    L  injection amount:  5 unit(s)    R  injection amount:  5 unit(s)    L lateral frontalis:  5 unit(s)    R lateral frontalis:  5 unit(s)    L medial frontalis:  5 unit(s)    R medial frontalis:  5 unit(s)    L temporalis injection amount:  20 unit(s)    R temporalis injection amount:  20 unit(s)    Procerus injection amount:  5 unit(s)    L medial occipitalis injection " amount:  15 unit(s)    R medial occipitalis injection amount:  15 unit(s)    L superior cervical paraspinal injection amount:  10 unit(s)    R superior cervical paraspinal injection amount:  10 unit(s)    L superior trapezius injection amount:  15 unit(s)    R superior trapezius injection amount:  15 unit(s)   Total Units      Total units used:  200    Total units discarded:  0   Post-procedure details      Chemodenervation:  Chronic migraine    Facial Nerve Location::  Bilateral facial nerve    Patient tolerance of procedure:  Tolerated well, no immediate complications   Comments       5 units trapezius bilaterally   5 units splenius capitis bilaterally   25 units occipitalis   All medically necessary

## 2025-01-20 ENCOUNTER — PROCEDURE VISIT (OUTPATIENT)
Age: 50
End: 2025-01-20
Payer: COMMERCIAL

## 2025-01-20 VITALS — HEIGHT: 61 IN | BODY MASS INDEX: 27.75 KG/M2 | WEIGHT: 147 LBS

## 2025-01-20 DIAGNOSIS — M99.01 SEGMENTAL DYSFUNCTION OF CERVICAL REGION: ICD-10-CM

## 2025-01-20 DIAGNOSIS — M48.062 SPINAL STENOSIS OF LUMBAR REGION WITH NEUROGENIC CLAUDICATION: Primary | ICD-10-CM

## 2025-01-20 DIAGNOSIS — M99.03 SEGMENTAL DYSFUNCTION OF LUMBAR REGION: ICD-10-CM

## 2025-01-20 DIAGNOSIS — M62.838 MUSCLE SPASM: ICD-10-CM

## 2025-01-20 DIAGNOSIS — M99.04 SEGMENTAL DYSFUNCTION OF SACRAL REGION: ICD-10-CM

## 2025-01-20 DIAGNOSIS — M99.02 SEGMENTAL DYSFUNCTION OF THORACIC REGION: ICD-10-CM

## 2025-01-20 DIAGNOSIS — M47.812 CERVICAL SPONDYLOSIS: ICD-10-CM

## 2025-01-20 PROCEDURE — 98941 CHIROPRACT MANJ 3-4 REGIONS: CPT | Performed by: CHIROPRACTOR

## 2025-01-20 PROCEDURE — 97110 THERAPEUTIC EXERCISES: CPT | Performed by: CHIROPRACTOR

## 2025-01-21 NOTE — PROGRESS NOTES
Initial date of service: 5/6/24    Diagnoses and all orders for this visit:    Spinal stenosis of lumbar region with neurogenic claudication    Segmental dysfunction of sacral region    Muscle spasm    Cervical spondylosis    Segmental dysfunction of lumbar region    Segmental dysfunction of thoracic region    Segmental dysfunction of cervical region      Pt suffered exacerbation but responded to tx with reduced pain and increased ROM.     TREATMENT: [id number]  Ther-ex: IASTM; discussed post procedure soreness and/or ecchymosis for up to 36 hrs, applied to affected mm hypertonicities; supine hamstring stretch, supine gluteal stretch, single knee to chest stretch, upper trap stretch, transitional mvmt education, abdominal bracing; greater than 15 min spent performing above mentioned ther-ex to improve ROM/flexibility. Cervical supine graded mobilization and traction, Thoracic mobilization/manipulation: prone P-A mob; Lumbar mobilization/manipulation: diversified side laying graded HVLA, flexion-traction; SIJ Manipulation/Mobilization: R/L SIJ HVLA - long axis distraction, mederos drop table maneuver to affected SIJ    HPI:  Kinza Meza is a 49 y.o. female  Chief Complaint   Patient presents with    Neck - Follow-up     Neck pain score 6    Back Pain     Lower lumbar pain score 7         Pt presents for tx for exacerbation of chronic neck/back pain. Pt has had back and neck surgery. Pt was utilizing cane and now using walker occasionally. 2022 MRI demonstrates multilevel degenerative changes of postsurgical lumbar spine status post L1-L2 posterior spinal fixation with varying degrees of canal stenosis (moderate L4-L5) and foraminal narrowing (mild left L1-L2 and left L2-L3 ). 2024 fl/ext xrays unremarkable for instability. 2021 C/S MRI demonstrates s/p ACDF from C4 to C7. Multilevel degenerative spondylosis as described. Possible ossification of the posterior longitudinal ligament at C2-3 and C6-7  1/20: pt  reports feeling and moving better since last tx, but suffered flare-up caring for grandchild      Neck Pain   This is a chronic problem. The current episode started more than 1 year ago. The problem occurs constantly. The problem has been waxing and waning. The pain is present in the left side, midline and right side. The quality of the pain is described as aching. The symptoms are aggravated by bending, position, stress and twisting. The pain is Worse during the day.   Back Pain  This is a chronic problem. The current episode started more than 1 year ago. The problem occurs constantly. The problem has been waxing and waning since onset. The pain is present in the gluteal, lumbar spine, sacro-iliac and thoracic spine. The quality of the pain is described as aching, burning and stabbing. The pain radiates to the left thigh, left knee and right thigh. Pain scale: 0-5/10. The pain is Worse during the day. The symptoms are aggravated by standing, twisting and bending (walking, laying supine, transitional mvmts; palliative includes sitting, massage). Pertinent negatives include no bladder incontinence or bowel incontinence. Risk factors include lack of exercise.     Past Medical History:   Diagnosis Date    ADD (attention deficit disorder)     Allergic rhinitis     ALS (amyotrophic lateral sclerosis) (Formerly [facility]) 11/22    Per neurological surgeon    Anemia 12/2021    Anxiety     Arthritis     Bipolar disorder ([facility])     Cervical disc disorder with radiculopathy of cervical region     Chronic depression     Chronic kidney disease     Stage 1    Chronic pain disorder     Cluster headache     Colitis     Last assessed - 11/25/14    Colon polyp     Concussion 2018    Condyloma acuminatum     Last assessed - 3/19/14    CTS (carpal tunnel syndrome) 2001    Depression     Diabetes mellitus ([facility])     Last assessed - 11/25/14    Diabetic nephropathy ([facility]) 2001    Diabetic neuropathy ([facility])     Diabetic retinopathy ([facility]) 2001    Difficulty  walking     Fibromyalgia     Fibromyalgia, primary     GERD (gastroesophageal reflux disease)     Head injury 18    Fall    Headache(784.0) 1977    History of transfusion 2021    HTN (hypertension)     Last assessed - 14    Hyperlipidemia 2019    Hypertension     Intervertebral disc disorder with radiculopathy of lumbar region     Lupus     Rhuematologist-Sheela Dasilva, PA    Migraine     Miscarriage 1988    ,     Obesity 1980    Obesity, unspecified     Open wound of back 2023    Opioid abuse, in remission (HCC) 2022    Only while in hospital and for about 10 days once home instead of one week.    Osteoporosis     Peripheral neuropathy     Postoperative wound infection 2021    Stop not healed    Preeclampsia     Last assessed - 14    Rheumatoid arthritis (HCC)     Wears dentures       Past Surgical History:   Procedure Laterality Date    APPENDECTOMY      Last assessed - 14    BARIATRIC SURGERY  2016    CARPAL TUNNEL RELEASE      CATARACT EXTRACTION      CERVICAL FUSION N/A 2019    Procedure: Anterior cervical discectomy w fusion C4-5, C5-6, C6-7; allograft and neuromonitoring;  Surgeon: Jose Gomez MD;  [facility];  Service: Orthopedics     SECTION      Last assessed - 14    COLONOSCOPY      COLONOSCOPY      HEMORRHOID SURGERY      HERNIA REPAIR      Last assessed - 14    LUMBAR FUSION N/A 2021    Procedure: L1/2 laminectomy, discectomy with L1/2 MIS posterior fixation using neuromonitoring and neuronavigation;  Surgeon: Suhas Akbar MD;  [facility];  Service: Neurosurgery    LUMBAR WOUND EXPLORATION Bilateral 2021    Procedure: Lumbar wound washout, debridement and intraoperative cultures;  Surgeon: Suhas Akbar MD;  [facility];  Service: Neurosurgery    MAMMO (HISTORICAL)      FL DEBRIDEMENT BONE 1ST 20 SQ CM/< N/A 2023    Procedure: SURGICAL PREPARATION OF BACK   WOUND AND LOCAL FLAP, PREVENA PLACEMENT;  Surgeon: Ady Rocha MD;  Location: [facility];  Service: Plastics    WOOD-EN-Y PROCEDURE      ULNAR TUNNEL RELEASE      VAC DRESSING APPLICATION Bilateral 12/22/2021    Procedure: APPLICATION VAC DRESSING;  Surgeon: Elbert Hawley MD;  [facility];  Service: General    VAC DRESSING APPLICATION N/A 12/25/2021    Procedure: APPLICATION VAC DRESSING ABDOMEN/TRUNK;  Surgeon: Mani Ross DO;  [facility];  Service: General    VENTRAL HERNIA REPAIR       The following portions of the patient's history were reviewed and updated as appropriate: allergies, past family history, past medical history, past social history, past surgical history, and problem list.  Review of Systems   Gastrointestinal:  Negative for bowel incontinence.   Genitourinary:  Negative for bladder incontinence.   Musculoskeletal:  Positive for back pain and neck pain.     Physical Exam  Neck:        Comments: Pnful and limited in Brot, Blf  Musculoskeletal:      Cervical back: Pain with movement and muscular tenderness present. Decreased range of motion.      Thoracic back: Spasms and tenderness present. Decreased range of motion.      Lumbar back: Spasms and tenderness present. Decreased range of motion. Negative right straight leg raise test and negative left straight leg raise test.        Back:       Comments: Pnful and limited in Brot, Blf, Ext centralizes    Lymphadenopathy:      Cervical: No cervical adenopathy.   Skin:     General: Skin is warm and dry.   Neurological:      Mental Status: She is alert and oriented to person, place, and time.      Gait: Gait is intact.   Psychiatric:         Mood and Affect: Mood and affect normal.         Behavior: Behavior normal.       SOFT TISSUE ASSESSMENT Hypertonicity and tenderness palpated B upper traps, B SCM, B T10-S1 erector spinae, glute med/min, QL, hamstring JOINT RESTRICTIONS: C4/5, T1/2, T10-S1 and R/L SIJ    Return in  about 1 week (around 1/27/2025) for Next scheduled follow up.

## 2025-01-24 ENCOUNTER — OFFICE VISIT (OUTPATIENT)
Dept: INTERNAL MEDICINE CLINIC | Facility: CLINIC | Age: 50
End: 2025-01-24
Payer: COMMERCIAL

## 2025-01-24 VITALS
BODY MASS INDEX: 27.56 KG/M2 | HEART RATE: 83 BPM | TEMPERATURE: 97.4 F | DIASTOLIC BLOOD PRESSURE: 72 MMHG | HEIGHT: 61 IN | WEIGHT: 146 LBS | OXYGEN SATURATION: 99 % | SYSTOLIC BLOOD PRESSURE: 120 MMHG

## 2025-01-24 DIAGNOSIS — N30.00 ACUTE CYSTITIS WITHOUT HEMATURIA: Primary | ICD-10-CM

## 2025-01-24 LAB
SL AMB  POCT GLUCOSE, UA: NEGATIVE
SL AMB LEUKOCYTE ESTERASE,UA: NEGATIVE
SL AMB POCT BILIRUBIN,UA: NEGATIVE
SL AMB POCT BLOOD,UA: NORMAL
SL AMB POCT CLARITY,UA: NORMAL
SL AMB POCT COLOR,UA: NORMAL
SL AMB POCT KETONES,UA: NEGATIVE
SL AMB POCT NITRITE,UA: POSITIVE
SL AMB POCT PH,UA: 6
SL AMB POCT SPECIFIC GRAVITY,UA: 1.01
SL AMB POCT URINE PROTEIN: NEGATIVE
SL AMB POCT UROBILINOGEN: NORMAL

## 2025-01-24 PROCEDURE — 87077 CULTURE AEROBIC IDENTIFY: CPT | Performed by: INTERNAL MEDICINE

## 2025-01-24 PROCEDURE — 81003 URINALYSIS AUTO W/O SCOPE: CPT | Performed by: INTERNAL MEDICINE

## 2025-01-24 PROCEDURE — 99213 OFFICE O/P EST LOW 20 MIN: CPT | Performed by: INTERNAL MEDICINE

## 2025-01-24 PROCEDURE — 87186 SC STD MICRODIL/AGAR DIL: CPT | Performed by: INTERNAL MEDICINE

## 2025-01-24 PROCEDURE — 87086 URINE CULTURE/COLONY COUNT: CPT | Performed by: INTERNAL MEDICINE

## 2025-01-24 RX ORDER — CEPHALEXIN 500 MG/1
500 CAPSULE ORAL 3 TIMES DAILY
Qty: 15 CAPSULE | Refills: 0 | Status: SHIPPED | OUTPATIENT
Start: 2025-01-24 | End: 2025-01-29

## 2025-01-24 NOTE — PROGRESS NOTES
Assessment/Plan:           1. Acute cystitis without hematuria  -     POCT urine dip  -     Urine culture  -     cephalexin (KEFLEX) 500 mg capsule; Take 1 capsule (500 mg total) by mouth 3 (three) times a day for 5 days         1. Acute cystitis without hematuria (Primary)    - POCT urine dip  - Urine culture  - cephalexin (KEFLEX) 500 mg capsule; Take 1 capsule (500 mg total) by mouth 3 (three) times a day for 5 days  Dispense: 15 capsule; Refill: 0       No problem-specific Assessment & Plan notes found for this encounter.           Subjective:      Patient ID: Kinza Meza is a 49 y.o. female.    HPI    The following portions of the patient's history were reviewed and updated as appropriate: She  has a past medical history of ADD (attention deficit disorder), Allergic rhinitis, ALS (amyotrophic lateral sclerosis) (Formerly [facility]) (11/22), Anemia (12/2021), Anxiety, Arthritis, Bipolar disorder (HCC), Cervical disc disorder with radiculopathy of cervical region, Chronic depression, Chronic kidney disease, Chronic pain disorder, Cluster headache, Colitis, Colon polyp, Concussion (2018), Condyloma acuminatum, CTS (carpal tunnel syndrome) (2001), Depression, Diabetes mellitus ([facility]), Diabetic nephropathy ([facility]) (2001), Diabetic neuropathy ([facility]), Diabetic retinopathy ([facility]) (2001), Difficulty walking (07/22), Fibromyalgia, Fibromyalgia, primary, GERD (gastroesophageal reflux disease), Head injury (08/13/18), Headache(784.0) (1977), History of transfusion (12/2021), HTN (hypertension), Hyperlipidemia (07/22/2019), Hypertension, Intervertebral disc disorder with radiculopathy of lumbar region, Lupus (2003), Migraine, Miscarriage (1988), Obesity (1980), Obesity, unspecified, Open wound of back (05/30/2023), Opioid abuse, in remission ([facility]) (01/2022), Osteoporosis, Peripheral neuropathy, Postoperative wound infection (12/2021), Preeclampsia, Rheumatoid arthritis ([facility]), and Wears dentures.  She   Patient Active Problem List     Diagnosis Date Noted    Migraine without aura and without status migrainosus, not intractable 11/06/2024    Cellulitis of right toe 07/01/2024    Cellulitis of right shoulder 03/28/2024    Benign hypertension with chronic kidney disease, stage III ([facility]) 01/31/2024    Abnormal blood electrolyte level 10/30/2023    Thrombocytopenia ([facility]) 10/30/2023    Chest pain 10/29/2023    Metabolic acidosis 09/27/2023    Hyperphosphatemia 09/27/2023    Neurogenic bladder 09/26/2023    Bilateral carpal tunnel syndrome     IIH (idiopathic intracranial hypertension) 08/15/2023    Open wound of back 05/30/2023    Stage 4 chronic kidney disease ([facility]) 02/14/2023    Maceration of skin 02/14/2023    Cortical age-related cataract of left eye 01/04/2023    Bipolar affective disorder, currently manic, mild ([facility]) 03/30/2022    Seasonal allergic rhinitis 03/30/2022    Surgical wound, non healing 02/08/2022    Diabetic polyneuropathy associated with diabetes mellitus due to underlying condition ([facility]) 02/08/2022    Continuous opioid dependence ([facility]) 01/07/2022    Anemia 12/24/2021    Wound infection 12/09/2021    Cauda equina syndrome ([facility]) 12/08/2021    Depression 10/28/2021    Annual physical exam 08/27/2021    Other gastritis without bleeding 08/11/2021    Obesity, unspecified     Diabetes mellitus ([facility])     Primary hypertension     Dizziness 12/07/2020    Secondary hyperparathyroidism of renal origin ([facility]) 11/16/2020    Insulin dependent type 2 diabetes mellitus ([facility])     Intractable migraine with status migrainosus     Type 2 diabetes mellitus with diabetic neuropathy ([facility]) 09/11/2020    Ventral hernia without obstruction or gangrene 09/11/2020    Lumbar disc herniation with radiculopathy 09/11/2020    Concussion with moderate (1-24 hours) loss of consciousness 09/11/2020    Primary osteoarthritis of both knees 09/11/2020    Diabetic nephropathy associated with type 2 diabetes mellitus ([facility]) 09/11/2020    Dysthymic disorder 09/11/2020     Acute on chronic kidney failure  (HCC) 2020    Hypokalemia 2020    Near syncope 2020    Weakness 2020    Status post gastric bypass for obesity 2020    Diarrhea 2020    Snores     Headache upon awakening     Chronic tension type headache 2019    Chronic migraine w/o aura w/o status migrainosus, not intractable 2019    Medical marijuana use 2019    Vitamin D deficiency 2019    Chronic kidney disease-mineral and bone disorder 2019    S/P cervical spinal fusion 2019    Mixed hyperlipidemia 2019    Cervical disc disorder with radiculopathy     Stage 3b chronic kidney disease ([facility]) 2019    Persistent proteinuria 2019    Hypertensive kidney disease with stage 3b chronic kidney disease ([facility]) 2019    Hyponatremia 2019    Intervertebral disc disorders with radiculopathy, lumbar region     Sacroiliitis ([facility])     Greater trochanteric bursitis of both hips     Herniated nucleus pulposus, L1-2 2017    Bilateral chronic knee pain 2017    Facet arthritis of lumbosacral region 2017    Fibromyalgia 2017    Lumbar stenosis with neurogenic claudication 2017    ADHD 2016    Uncontrolled type 2 diabetes mellitus with hyperglycemia ([facility]) 2016    Gastroesophageal reflux disease without esophagitis 2016    Morbid obesity with BMI of 40.0-44.9, adult ([facility]) 2016    Chronic renal insufficiency 2014    Endometriosis 2012     She  has a past surgical history that includes Appendectomy;  section; Hernia repair; Cervical fusion (N/A, 2019); Colonoscopy; Carpal tunnel release; Hemorrhoid surgery; Ventral hernia repair; Shelby-en-y procedure; Ulnar tunnel release; Mammo (historical) (); Colonoscopy; Lumbar fusion (N/A, 2021); LUMBAR WOUND EXPLORATION (Bilateral, 2021); VAC DRESSING APPLICATION (Bilateral, 2021); VAC DRESSING APPLICATION (N/A,  12/25/2021); Bariatric Surgery (08/2016); Cataract extraction; and pr debridement bone 1st 20 sq cm/< (N/A, 5/30/2023).  Her family history includes ADD / ADHD in her cousin and cousin; Anemia in her maternal grandmother; Anxiety disorder in her father; Cancer in her mother; Cervical cancer in her mother; Colon cancer in her maternal grandmother; Completed Suicide  in her father; Depression in her father; Diabetes in her mother; Hypertension in her mother; Kidney disease in her mother; Mental illness in her daughter, daughter, and father; Neuropathy in her mother; No Known Problems in her family, maternal aunt, maternal grandfather, paternal grandfather, and sister; Ovarian cancer in her paternal aunt; Psychiatric Illness in her father; Suicidality in her father; Suicide Attempts in her father; Uterine cancer in her paternal grandmother.  She  reports that she has never smoked. She has never been exposed to tobacco smoke. She has never used smokeless tobacco. She reports that she does not currently use alcohol. She reports current drug use. Drug: Marijuana.  Current Outpatient Medications   Medication Sig Dispense Refill    acetaZOLAMIDE (DIAMOX) 250 mg tablet TAKE 1 TABLET(250 MG) BY MOUTH TWICE DAILY 180 tablet 1    atorvastatin (LIPITOR) 10 mg tablet TAKE 1 TABLET(10 MG) BY MOUTH DAILY AT BEDTIME 90 tablet 1    BD Pen Needle Glory U/F 32G X 4 MM MISC Inject under the skin 2 (two) times a day Use as directed 180 each 0    Blood Glucose Monitoring Suppl (ONE TOUCH ULTRA 2) w/Device KIT Use 1 each 2 (two) times a day Use as instructed 1 kit 0    calcitriol (ROCALTROL) 0.25 mcg capsule TAKE 1 CAPSULE BY MOUTH daily 90 capsule 1    cephalexin (KEFLEX) 500 mg capsule Take 1 capsule (500 mg total) by mouth 3 (three) times a day for 5 days 15 capsule 0    cetirizine (ZyrTEC) oral solution Take 5 mL (5 mg total) by mouth daily 450 mL 1    Cholecalciferol (Vitamin D3) 50 MCG (2000 UT) CAPS TAKE 1 CAPSULE(2,000 UNITS TOTAL)  BY MOUTH DAILY 90 capsule 1    clonazePAM (KlonoPIN) 0.5 mg tablet Take 1 tablet (0.5 mg total) by mouth 2 (two) times a day 180 tablet 0    clotrimazole-betamethasone (LOTRISONE) 1-0.05 % cream Apply topically 2 (two) times a day 45 g 1    Continuous Glucose Sensor (FreeStyle Filiberto 3 Sensor) MISC Use 1 each every 14 (fourteen) days 6 each 1    cyproheptadine (PERIACTIN) 4 mg tablet Take 1 tablet (4 mg total) by mouth daily at bedtime 90 tablet 1    diphenhydrAMINE (BENADRYL) 25 mg tablet Take 1 tablet (25 mg total) by mouth every 6 (six) hours as needed for itching 30 tablet 0    divalproex sodium (Depakote) 500 mg DR tablet Take 1 tablet (500 mg total) by mouth daily at bedtime 90 tablet 3    docusate sodium (COLACE) 100 mg capsule TAKE 1 CAPSULE BY MOUTH TWICE A DAY 60 capsule 0    DULoxetine (CYMBALTA) 60 mg delayed release capsule Take 1 capsule (60 mg total) by mouth daily 90 capsule 1    ferrous sulfate 324 (65 Fe) mg Take 1 tablet (324 mg total) by mouth daily before breakfast 90 tablet 3    fremanezumab-vfrm (Ajovy) 225 MG/1.5ML auto-injector Inject 1.5 mL (225 mg total) under the skin every 30 (thirty) days 1.5 mL 11    insulin aspart (NovoLOG FlexPen) 100 UNIT/ML injection pen Continue with sliding scale insulin regimen      Insulin Glargine Solostar (Lantus SoloStar) 100 UNIT/ML SOPN Inject 0.15 mL (15 Units total) under the skin daily at bedtime 15 mL 0    Lancets MISC Use 1 each 2 (two) times a day Use as instructed      lisdexamfetamine (VYVANSE) 30 MG capsule Take 1 capsule (30 mg total) by mouth every morning Max Daily Amount: 30 mg 90 capsule 0    losartan-hydrochlorothiazide (HYZAAR) 100-12.5 MG per tablet TAKE ONE TABLET BY MOUTH ONE TIME DAILY 90 tablet 0    magnesium (MAGTAB) 84 MG (7MEQ) TBCR Take 1 tablet (84 mg total) by mouth daily 90 tablet 3    methocarbamol (Robaxin-750) 750 mg tablet Take 1 tablet (750 mg total) by mouth every 6 (six) hours as needed for muscle spasms (back pain) 30  tablet 3    metoprolol tartrate (LOPRESSOR) 100 mg tablet TAKE 1 TABLET(100 MG) BY MOUTH EVERY 12 HOURS 180 tablet 1    Mounjaro 15 MG/0.5ML SOAJ Inject 15 mg as directed once a week      Multiple Vitamins-Minerals (multivitamin with minerals) tablet Take 1 tablet by mouth daily      mupirocin (BACTROBAN) 2 % ointment Apply topically 3 (three) times a day 60 g 0    NON FORMULARY Botox injections for HA every 3months (LD 3/20/23)      nystatin (MYCOSTATIN) powder Apply topically 2 (two) times a day 120 g 3    ondansetron (ZOFRAN) 4 mg tablet Take 1 tablet (4 mg total) by mouth every 8 (eight) hours as needed for nausea or vomiting 120 tablet 0    Ophthalmic Irrigation Solution (OCUSOFT EYE WASH OP) Apply 1 Application to eye daily at bedtime      Polyethyl Glycol-Propyl Glycol (Systane) 0.4-0.3 % GEL Apply 1 drop to eye 2 (two) times a day Using Ivizia as an alternative      polyethylene glycol (MIRALAX) 17 g packet Take 17 g by mouth daily 1 each 0    pregabalin (LYRICA) 100 mg capsule Take 1 capsule (100 mg total) by mouth 3 (three) times a day 270 capsule 0    rimegepant sulfate (Nurtec) 75 mg TBDP Place one tab (75mg total) under the tongue as needed for migraine. 16 tablet 11    sodium bicarbonate 650 mg tablet Take 1 tablet (650 mg total) by mouth 3 (three) times a day 360 tablet 3    terazosin (HYTRIN) 1 mg capsule Take 1 capsule (1 mg total) by mouth daily at bedtime 30 capsule 5    tiZANidine (ZANAFLEX) 4 mg tablet Take 1 tablet (4 mg total) by mouth every 8 (eight) hours as needed for muscle spasms 90 tablet 0    cyanocobalamin 1,000 mcg/mL Inject 1 mL (1,000 mcg total) into a muscle once a week (Patient not taking: Reported on 9/13/2024) 4 mL 3    cycloSPORINE, PF, (Cequa) 0.09 % SOLN Administer 1 drop to both eyes 2 (two) times a day (Patient not taking: Reported on 12/4/2024)       Current Facility-Administered Medications   Medication Dose Route Frequency Provider Last Rate Last Admin    cyanocobalamin  injection 1,000 mcg  1,000 mcg Intramuscular Weekly Myla Norwood MD   1,000 mcg at 07/01/24 1007     Current Outpatient Medications on File Prior to Visit   Medication Sig    acetaZOLAMIDE (DIAMOX) 250 mg tablet TAKE 1 TABLET(250 MG) BY MOUTH TWICE DAILY    atorvastatin (LIPITOR) 10 mg tablet TAKE 1 TABLET(10 MG) BY MOUTH DAILY AT BEDTIME    BD Pen Needle Glory U/F 32G X 4 MM MISC Inject under the skin 2 (two) times a day Use as directed    Blood Glucose Monitoring Suppl (ONE TOUCH ULTRA 2) w/Device KIT Use 1 each 2 (two) times a day Use as instructed    calcitriol (ROCALTROL) 0.25 mcg capsule TAKE 1 CAPSULE BY MOUTH daily    cetirizine (ZyrTEC) oral solution Take 5 mL (5 mg total) by mouth daily    Cholecalciferol (Vitamin D3) 50 MCG (2000 UT) CAPS TAKE 1 CAPSULE(2,000 UNITS TOTAL) BY MOUTH DAILY    clonazePAM (KlonoPIN) 0.5 mg tablet Take 1 tablet (0.5 mg total) by mouth 2 (two) times a day    clotrimazole-betamethasone (LOTRISONE) 1-0.05 % cream Apply topically 2 (two) times a day    Continuous Glucose Sensor (FreeStyle Filiberto 3 Sensor) MISC Use 1 each every 14 (fourteen) days    cyproheptadine (PERIACTIN) 4 mg tablet Take 1 tablet (4 mg total) by mouth daily at bedtime    diphenhydrAMINE (BENADRYL) 25 mg tablet Take 1 tablet (25 mg total) by mouth every 6 (six) hours as needed for itching    divalproex sodium (Depakote) 500 mg DR tablet Take 1 tablet (500 mg total) by mouth daily at bedtime    docusate sodium (COLACE) 100 mg capsule TAKE 1 CAPSULE BY MOUTH TWICE A DAY    DULoxetine (CYMBALTA) 60 mg delayed release capsule Take 1 capsule (60 mg total) by mouth daily    ferrous sulfate 324 (65 Fe) mg Take 1 tablet (324 mg total) by mouth daily before breakfast    fremanezumab-vfrm (Ajovy) 225 MG/1.5ML auto-injector Inject 1.5 mL (225 mg total) under the skin every 30 (thirty) days    insulin aspart (NovoLOG FlexPen) 100 UNIT/ML injection pen Continue with sliding scale insulin regimen    Insulin Glargine Solostar  (Lantus SoloStar) 100 UNIT/ML SOPN Inject 0.15 mL (15 Units total) under the skin daily at bedtime    Lancets MISC Use 1 each 2 (two) times a day Use as instructed    lisdexamfetamine (VYVANSE) 30 MG capsule Take 1 capsule (30 mg total) by mouth every morning Max Daily Amount: 30 mg    losartan-hydrochlorothiazide (HYZAAR) 100-12.5 MG per tablet TAKE ONE TABLET BY MOUTH ONE TIME DAILY    magnesium (MAGTAB) 84 MG (7MEQ) TBCR Take 1 tablet (84 mg total) by mouth daily    methocarbamol (Robaxin-750) 750 mg tablet Take 1 tablet (750 mg total) by mouth every 6 (six) hours as needed for muscle spasms (back pain)    metoprolol tartrate (LOPRESSOR) 100 mg tablet TAKE 1 TABLET(100 MG) BY MOUTH EVERY 12 HOURS    Mounjaro 15 MG/0.5ML SOAJ Inject 15 mg as directed once a week    Multiple Vitamins-Minerals (multivitamin with minerals) tablet Take 1 tablet by mouth daily    mupirocin (BACTROBAN) 2 % ointment Apply topically 3 (three) times a day    NON FORMULARY Botox injections for HA every 3months (LD 3/20/23)    nystatin (MYCOSTATIN) powder Apply topically 2 (two) times a day    ondansetron (ZOFRAN) 4 mg tablet Take 1 tablet (4 mg total) by mouth every 8 (eight) hours as needed for nausea or vomiting    Ophthalmic Irrigation Solution (OCUSOFT EYE WASH OP) Apply 1 Application to eye daily at bedtime    Polyethyl Glycol-Propyl Glycol (Systane) 0.4-0.3 % GEL Apply 1 drop to eye 2 (two) times a day Using Ivizia as an alternative    polyethylene glycol (MIRALAX) 17 g packet Take 17 g by mouth daily    pregabalin (LYRICA) 100 mg capsule Take 1 capsule (100 mg total) by mouth 3 (three) times a day    rimegepant sulfate (Nurtec) 75 mg TBDP Place one tab (75mg total) under the tongue as needed for migraine.    sodium bicarbonate 650 mg tablet Take 1 tablet (650 mg total) by mouth 3 (three) times a day    terazosin (HYTRIN) 1 mg capsule Take 1 capsule (1 mg total) by mouth daily at bedtime    tiZANidine (ZANAFLEX) 4 mg tablet Take 1  "tablet (4 mg total) by mouth every 8 (eight) hours as needed for muscle spasms    cyanocobalamin 1,000 mcg/mL Inject 1 mL (1,000 mcg total) into a muscle once a week (Patient not taking: Reported on 9/13/2024)    cycloSPORINE, PF, (Cequa) 0.09 % SOLN Administer 1 drop to both eyes 2 (two) times a day (Patient not taking: Reported on 12/4/2024)     Current Facility-Administered Medications on File Prior to Visit   Medication    cyanocobalamin injection 1,000 mcg     There are no discontinued medications.   She is allergic to ace inhibitors, pollen extract, short ragweed pollen ext, sodium hyaluronate (yoav), levonorgestrel-eth estradiol [levonorgestrel-ethinyl estrad], and latex..    Review of Systems   Constitutional:  Positive for chills. Negative for fever.   Genitourinary:  Positive for dysuria.         Objective:      /72 (BP Location: Left arm, Patient Position: Sitting, Cuff Size: Standard)   Pulse 83   Temp (!) 97.4 °F (36.3 °C) (Temporal)   Ht 5' 1\" (1.549 m)   Wt 66.2 kg (146 lb)   LMP  (LMP Unknown)   SpO2 99%   BMI 27.59 kg/m²     Results Reviewed       None            Recent Results (from the past 8 weeks)   Albumin / creatinine urine ratio    Collection Time: 12/18/24 10:19 AM   Result Value Ref Range    Creatinine, Ur 191.3 Reference range not established. mg/dL    Albumin,U,Random 51.8 (H) <20.0 mg/L    Albumin Creat Ratio 27 0 - 30 mg/g creatinine   CBC and differential    Collection Time: 12/18/24 10:19 AM   Result Value Ref Range    WBC 3.96 (L) 4.31 - 10.16 Thousand/uL    RBC 4.08 3.81 - 5.12 Million/uL    Hemoglobin 11.6 11.5 - 15.4 g/dL    Hematocrit 36.2 34.8 - 46.1 %    MCV 89 82 - 98 fL    MCH 28.4 26.8 - 34.3 pg    MCHC 32.0 31.4 - 37.4 g/dL    RDW 13.8 11.6 - 15.1 %    MPV 11.3 8.9 - 12.7 fL    Platelets 138 (L) 149 - 390 Thousands/uL    nRBC 0 /100 WBCs    Segmented % 55 43 - 75 %    Immature Grans % 1 0 - 2 %    Lymphocytes % 34 14 - 44 %    Monocytes % 8 4 - 12 %    " Eosinophils Relative 1 0 - 6 %    Basophils Relative 1 0 - 1 %    Absolute Neutrophils 2.23 1.85 - 7.62 Thousands/µL    Absolute Immature Grans 0.03 0.00 - 0.20 Thousand/uL    Absolute Lymphocytes 1.33 0.60 - 4.47 Thousands/µL    Absolute Monocytes 0.30 0.17 - 1.22 Thousand/µL    Eosinophils Absolute 0.03 0.00 - 0.61 Thousand/µL    Basophils Absolute 0.04 0.00 - 0.10 Thousands/µL   Comprehensive metabolic panel    Collection Time: 12/18/24 10:19 AM   Result Value Ref Range    Sodium 143 135 - 147 mmol/L    Potassium 4.0 3.5 - 5.3 mmol/L    Chloride 110 (H) 96 - 108 mmol/L    CO2 24 21 - 32 mmol/L    ANION GAP 9 4 - 13 mmol/L    BUN 28 (H) 5 - 25 mg/dL    Creatinine 1.37 (H) 0.60 - 1.30 mg/dL    Glucose, Fasting 76 65 - 99 mg/dL    Calcium 8.8 8.4 - 10.2 mg/dL    AST 9 (L) 13 - 39 U/L    ALT 7 7 - 52 U/L    Alkaline Phosphatase 42 34 - 104 U/L    Total Protein 6.1 (L) 6.4 - 8.4 g/dL    Albumin 4.1 3.5 - 5.0 g/dL    Total Bilirubin 0.54 0.20 - 1.00 mg/dL    eGFR 44 ml/min/1.73sq m   Lipid Panel with Direct LDL reflex    Collection Time: 12/18/24 10:19 AM   Result Value Ref Range    Cholesterol 124 See Comment mg/dL    Triglycerides 116 See Comment mg/dL    HDL, Direct 46 (L) >=50 mg/dL    LDL Calculated 55 0 - 100 mg/dL   TSH, 3rd generation    Collection Time: 12/18/24 10:19 AM   Result Value Ref Range    TSH 3RD GENERATON 1.520 0.450 - 4.500 uIU/mL   Hemoglobin A1C    Collection Time: 12/18/24 10:19 AM   Result Value Ref Range    Hemoglobin A1C 4.7 Normal 4.0-5.6%; PreDiabetic 5.7-6.4%; Diabetic >=6.5%; Glycemic control for adults with diabetes <7.0% %    EAG 88 mg/dl   POCT urine dip    Collection Time: 01/24/25 12:11 PM   Result Value Ref Range    LEUKOCYTE ESTERASE,UA negative     NITRITE,UA positive     SL AMB POCT UROBILINOGEN 0.2mg/dL     POCT URINE PROTEIN negative      PH,UA 6.0     BLOOD,UA +- 10 Chase/uL     SPECIFIC GRAVITY,UA 1.015     KETONES,UA negative     BILIRUBIN,UA negative     GLUCOSE, UA negative       COLOR,UA dark yellow     CLARITY,UA cloudy         Physical Exam  HENT:      Head: Normocephalic.      Mouth/Throat:      Mouth: Mucous membranes are moist.   Eyes:      Extraocular Movements: Extraocular movements intact.      Pupils: Pupils are equal, round, and reactive to light.   Cardiovascular:      Rate and Rhythm: Normal rate.   Pulmonary:      Effort: Pulmonary effort is normal.      Breath sounds: Normal breath sounds.   Musculoskeletal:      Cervical back: Normal range of motion.   Neurological:      Gait: Gait abnormal.

## 2025-01-26 LAB — BACTERIA UR CULT: ABNORMAL

## 2025-01-27 DIAGNOSIS — Z79.4 TYPE 2 DIABETES MELLITUS WITH DIABETIC NEUROPATHY, WITH LONG-TERM CURRENT USE OF INSULIN (HCC): Primary | ICD-10-CM

## 2025-01-27 DIAGNOSIS — E11.40 TYPE 2 DIABETES MELLITUS WITH DIABETIC NEUROPATHY, WITH LONG-TERM CURRENT USE OF INSULIN (HCC): Primary | ICD-10-CM

## 2025-01-27 DIAGNOSIS — F34.1 DYSTHYMIC DISORDER: ICD-10-CM

## 2025-01-27 DIAGNOSIS — M51.16 LUMBAR DISC HERNIATION WITH RADICULOPATHY: ICD-10-CM

## 2025-01-27 DIAGNOSIS — B49 FUNGAL INFECTION: ICD-10-CM

## 2025-01-27 DIAGNOSIS — F90.0 ATTENTION DEFICIT HYPERACTIVITY DISORDER (ADHD), PREDOMINANTLY INATTENTIVE TYPE: ICD-10-CM

## 2025-01-28 RX ORDER — TIRZEPATIDE 15 MG/.5ML
15 INJECTION, SOLUTION SUBCUTANEOUS WEEKLY
Qty: 2 ML | Refills: 3 | Status: SHIPPED | OUTPATIENT
Start: 2025-01-28

## 2025-01-28 RX ORDER — LISDEXAMFETAMINE DIMESYLATE 30 MG/1
30 CAPSULE ORAL EVERY MORNING
Qty: 90 CAPSULE | Refills: 0 | Status: SHIPPED | OUTPATIENT
Start: 2025-01-28

## 2025-01-28 RX ORDER — CLONAZEPAM 0.5 MG/1
0.5 TABLET ORAL 2 TIMES DAILY
Qty: 180 TABLET | Refills: 0 | Status: SHIPPED | OUTPATIENT
Start: 2025-01-28

## 2025-01-28 RX ORDER — CLOTRIMAZOLE AND BETAMETHASONE DIPROPIONATE 10; .64 MG/G; MG/G
CREAM TOPICAL 2 TIMES DAILY
Qty: 45 G | Refills: 0 | Status: SHIPPED | OUTPATIENT
Start: 2025-01-28

## 2025-01-29 ENCOUNTER — PROCEDURE VISIT (OUTPATIENT)
Age: 50
End: 2025-01-29
Payer: COMMERCIAL

## 2025-01-29 VITALS
HEIGHT: 61 IN | WEIGHT: 146 LBS | DIASTOLIC BLOOD PRESSURE: 83 MMHG | BODY MASS INDEX: 27.56 KG/M2 | HEART RATE: 82 BPM | SYSTOLIC BLOOD PRESSURE: 130 MMHG

## 2025-01-29 DIAGNOSIS — M48.062 SPINAL STENOSIS OF LUMBAR REGION WITH NEUROGENIC CLAUDICATION: Primary | ICD-10-CM

## 2025-01-29 DIAGNOSIS — M99.01 SEGMENTAL DYSFUNCTION OF CERVICAL REGION: ICD-10-CM

## 2025-01-29 DIAGNOSIS — M47.812 CERVICAL SPONDYLOSIS: ICD-10-CM

## 2025-01-29 DIAGNOSIS — M62.838 MUSCLE SPASM: ICD-10-CM

## 2025-01-29 DIAGNOSIS — M99.04 SEGMENTAL DYSFUNCTION OF SACRAL REGION: ICD-10-CM

## 2025-01-29 DIAGNOSIS — M99.03 SEGMENTAL DYSFUNCTION OF LUMBAR REGION: ICD-10-CM

## 2025-01-29 DIAGNOSIS — M99.02 SEGMENTAL DYSFUNCTION OF THORACIC REGION: ICD-10-CM

## 2025-01-29 PROCEDURE — 97110 THERAPEUTIC EXERCISES: CPT | Performed by: CHIROPRACTOR

## 2025-01-29 PROCEDURE — 98941 CHIROPRACT MANJ 3-4 REGIONS: CPT | Performed by: CHIROPRACTOR

## 2025-01-29 NOTE — PROGRESS NOTES
Initial date of service: 5/6/24    Diagnoses and all orders for this visit:    Spinal stenosis of lumbar region with neurogenic claudication    Segmental dysfunction of sacral region    Muscle spasm    Cervical spondylosis    Segmental dysfunction of lumbar region  -     X-ray lumbar spine complete 4+ views; Future  -     X-ray thoracic spine 2 views; Future    Segmental dysfunction of thoracic region  -     X-ray lumbar spine complete 4+ views; Future  -     X-ray thoracic spine 2 views; Future    Segmental dysfunction of cervical region      Pt suffered exacerbation due to slip and fall. Sending for xrays to r/o fx and assess for progression of DJD/DDD    TREATMENT: [id number]  Ther-ex: IASTM; discussed post procedure soreness and/or ecchymosis for up to 36 hrs, applied to affected mm hypertonicities; supine hamstring stretch, supine gluteal stretch, single knee to chest stretch, upper trap stretch, transitional mvmt education, abdominal bracing; greater than 15 min spent performing above mentioned ther-ex to improve ROM/flexibility. Cervical supine graded mobilization and traction, Thoracic mobilization/manipulation: prone P-A mob; Lumbar mobilization/manipulation: diversified side laying graded HVLA, flexion-traction; SIJ Manipulation/Mobilization: R/L SIJ HVLA - long axis distraction, mederos drop table maneuver to affected SIJ    HPI:  Kinza Meza is a 49 y.o. female  Chief Complaint   Patient presents with    Neck - Follow-up     Neck pain with headaches and left shoulder. Pain score 3-4    Back Pain     Lower lumbar pain when sitting too long. Patient states sharp pain. Pain score 6     Pt presents for tx for exacerbation of chronic neck/back pain. Pt has had back and neck surgery. Pt was utilizing cane and now using walker occasionally. 2022 MRI demonstrates multilevel degenerative changes of postsurgical lumbar spine status post L1-L2 posterior spinal fixation with varying degrees of canal stenosis  (moderate L4-L5) and foraminal narrowing (mild left L1-L2 and left L2-L3 ). 2024 fl/ext xrays unremarkable for instability. 2021 C/S MRI demonstrates s/p ACDF from C4 to C7. Multilevel degenerative spondylosis as described. Possible ossification of the posterior longitudinal ligament at C2-3 and C6-7  1/29: pt reports feeling and moving better since last tx, but suffered flare-up slipping and falling onto back onto tile floor      Neck Pain   This is a chronic problem. The current episode started more than 1 year ago. The problem occurs constantly. The problem has been waxing and waning. The pain is present in the left side, midline and right side. The quality of the pain is described as aching. The symptoms are aggravated by bending, position, stress and twisting. The pain is Worse during the day.   Back Pain  This is a chronic problem. The current episode started more than 1 year ago. The problem occurs constantly. The problem has been waxing and waning since onset. The pain is present in the gluteal, lumbar spine, sacro-iliac and thoracic spine. The quality of the pain is described as aching, burning and stabbing. The pain radiates to the left thigh, left knee and right thigh. Pain scale: 0-5/10. The pain is Worse during the day. The symptoms are aggravated by standing, twisting and bending (walking, laying supine, transitional mvmts; palliative includes sitting, massage). Pertinent negatives include no bladder incontinence or bowel incontinence. Risk factors include lack of exercise.     Past Medical History:   Diagnosis Date    ADD (attention deficit disorder)     Allergic rhinitis     ALS (amyotrophic lateral sclerosis) ([facility]) 11/22    Per neurological surgeon    Anemia 12/2021    Anxiety     Arthritis     Bipolar disorder ([facility])     Cervical disc disorder with radiculopathy of cervical region     Chronic depression     Chronic kidney disease     Stage 1    Chronic pain disorder     Cluster headache     Colitis      Last assessed - 14    Colon polyp     Concussion 2018    Condyloma acuminatum     Last assessed - 3/19/14    CTS (carpal tunnel syndrome)     Depression     Diabetes mellitus (HCC)     Last assessed - 14    Diabetic nephropathy (HCC)     Diabetic neuropathy (HCC)     Diabetic retinopathy (HCC)     Difficulty walking     Fibromyalgia     Fibromyalgia, primary     GERD (gastroesophageal reflux disease)     Head injury 18    Fall    Headache(784.0) 1977    History of transfusion 2021    HTN (hypertension)     Last assessed - 14    Hyperlipidemia 2019    Hypertension     Intervertebral disc disorder with radiculopathy of lumbar region     Lupus     Rhuematologist-Sheela Dasilva, PA    Migraine     Miscarriage 1988,     Obesity 1980    Obesity, unspecified     Open wound of back 2023    Opioid abuse, in remission ([facility]) 2022    Only while in hospital and for about 10 days once home instead of one week.    Osteoporosis     Peripheral neuropathy     Postoperative wound infection 2021    Stop not healed    Preeclampsia     Last assessed - 14    Rheumatoid arthritis ([facility])     Wears dentures       Past Surgical History:   Procedure Laterality Date    APPENDECTOMY      Last assessed - 14    BARIATRIC SURGERY  2016    CARPAL TUNNEL RELEASE      CATARACT EXTRACTION      CERVICAL FUSION N/A 2019    Procedure: Anterior cervical discectomy w fusion C4-5, C5-6, C6-7; allograft and neuromonitoring;  Surgeon: Jose Gomez MD;  Location: [facility];  Service: Orthopedics     SECTION      Last assessed - 14    COLONOSCOPY      COLONOSCOPY      HEMORRHOID SURGERY      HERNIA REPAIR      Last assessed - 14    LUMBAR FUSION N/A 2021    Procedure: L1/2 laminectomy, discectomy with L1/2 MIS posterior fixation using neuromonitoring and neuronavigation;  Surgeon: Suhas Akbar MD;  Location: [facility];   Service: Neurosurgery    LUMBAR WOUND EXPLORATION Bilateral 12/22/2021    Procedure: Lumbar wound washout, debridement and intraoperative cultures;  Surgeon: Suhas Akbar MD;  Location: [facility];  Service: Neurosurgery    MAMMO (HISTORICAL)  2016    TN DEBRIDEMENT BONE 1ST 20 SQ CM/< N/A 5/30/2023    Procedure: SURGICAL PREPARATION OF BACK  WOUND AND LOCAL FLAP, PREVENA PLACEMENT;  Surgeon: Ady Rocha MD;  Location:  MAIN OR;  Service: Plastics    WOOD-EN-Y PROCEDURE      ULNAR TUNNEL RELEASE      VAC DRESSING APPLICATION Bilateral 12/22/2021    Procedure: APPLICATION VAC DRESSING;  Surgeon: Elbert Hawley MD;  Location: [facility];  Service: General    VAC DRESSING APPLICATION N/A 12/25/2021    Procedure: APPLICATION VAC DRESSING ABDOMEN/TRUNK;  Surgeon: Mani Ross DO;  Location: [facility];  Service: General    VENTRAL HERNIA REPAIR       The following portions of the patient's history were reviewed and updated as appropriate: allergies, past family history, past medical history, past social history, past surgical history, and problem list.  Review of Systems   Gastrointestinal:  Negative for bowel incontinence.   Genitourinary:  Negative for bladder incontinence.   Musculoskeletal:  Positive for back pain and neck pain.     Physical Exam  Neck:        Comments: Pnful and limited in Brot, Blf  Musculoskeletal:      Cervical back: Pain with movement and muscular tenderness present. Decreased range of motion.      Thoracic back: Spasms and tenderness present. Decreased range of motion.      Lumbar back: Spasms and tenderness present. Decreased range of motion. Negative right straight leg raise test and negative left straight leg raise test.        Back:       Comments: Pnful and limited in Brot, Blf, Ext centralizes    Lymphadenopathy:      Cervical: No cervical adenopathy.   Skin:     General: Skin is warm and dry.   Neurological:      Mental Status: She is alert and oriented to person,  place, and time.      Gait: Gait is intact.   Psychiatric:         Mood and Affect: Mood and affect normal.         Behavior: Behavior normal.       SOFT TISSUE ASSESSMENT Hypertonicity and tenderness palpated B upper traps, B SCM, B T10-S1 erector spinae, glute med/min, QL, hamstring JOINT RESTRICTIONS: C4/5, T1/2, T10-S1 and R/L SIJ    Return in about 1 week (around 2/5/2025) for Next scheduled follow up.

## 2025-02-05 ENCOUNTER — TELEPHONE (OUTPATIENT)
Dept: NEPHROLOGY | Facility: CLINIC | Age: 50
End: 2025-02-05

## 2025-02-11 ENCOUNTER — APPOINTMENT (OUTPATIENT)
Dept: RADIOLOGY | Facility: MEDICAL CENTER | Age: 50
End: 2025-02-11
Payer: COMMERCIAL

## 2025-02-11 ENCOUNTER — APPOINTMENT (OUTPATIENT)
Dept: LAB | Facility: MEDICAL CENTER | Age: 50
End: 2025-02-11
Payer: COMMERCIAL

## 2025-02-11 DIAGNOSIS — M99.02 SEGMENTAL DYSFUNCTION OF THORACIC REGION: ICD-10-CM

## 2025-02-11 DIAGNOSIS — E83.42 HYPOMAGNESEMIA: ICD-10-CM

## 2025-02-11 DIAGNOSIS — Z79.4 TYPE 2 DIABETES MELLITUS WITH STAGE 4 CHRONIC KIDNEY DISEASE, WITH LONG-TERM CURRENT USE OF INSULIN (HCC): ICD-10-CM

## 2025-02-11 DIAGNOSIS — N18.4 BENIGN HYPERTENSION WITH CKD (CHRONIC KIDNEY DISEASE) STAGE IV (HCC): ICD-10-CM

## 2025-02-11 DIAGNOSIS — N18.4 TYPE 2 DIABETES MELLITUS WITH STAGE 4 CHRONIC KIDNEY DISEASE, WITH LONG-TERM CURRENT USE OF INSULIN (HCC): ICD-10-CM

## 2025-02-11 DIAGNOSIS — N25.81 SECONDARY HYPERPARATHYROIDISM OF RENAL ORIGIN (HCC): ICD-10-CM

## 2025-02-11 DIAGNOSIS — E87.20 METABOLIC ACIDOSIS: ICD-10-CM

## 2025-02-11 DIAGNOSIS — R80.1 PERSISTENT PROTEINURIA: ICD-10-CM

## 2025-02-11 DIAGNOSIS — E11.22 TYPE 2 DIABETES MELLITUS WITH STAGE 4 CHRONIC KIDNEY DISEASE, WITH LONG-TERM CURRENT USE OF INSULIN (HCC): ICD-10-CM

## 2025-02-11 DIAGNOSIS — N18.4 STAGE 4 CHRONIC KIDNEY DISEASE (HCC): ICD-10-CM

## 2025-02-11 DIAGNOSIS — I12.9 BENIGN HYPERTENSION WITH CKD (CHRONIC KIDNEY DISEASE) STAGE IV (HCC): ICD-10-CM

## 2025-02-11 DIAGNOSIS — D50.9 IRON DEFICIENCY ANEMIA, UNSPECIFIED IRON DEFICIENCY ANEMIA TYPE: ICD-10-CM

## 2025-02-11 DIAGNOSIS — M99.03 SEGMENTAL DYSFUNCTION OF LUMBAR REGION: ICD-10-CM

## 2025-02-11 LAB
ANION GAP SERPL CALCULATED.3IONS-SCNC: 13 MMOL/L (ref 4–13)
BACTERIA UR QL AUTO: ABNORMAL /HPF
BILIRUB UR QL STRIP: NEGATIVE
BUN SERPL-MCNC: 22 MG/DL (ref 5–25)
CALCIUM SERPL-MCNC: 9.1 MG/DL (ref 8.4–10.2)
CAOX CRY URNS QL MICRO: ABNORMAL /HPF
CHLORIDE SERPL-SCNC: 104 MMOL/L (ref 96–108)
CLARITY UR: ABNORMAL
CO2 SERPL-SCNC: 23 MMOL/L (ref 21–32)
COLOR UR: YELLOW
CREAT SERPL-MCNC: 1.76 MG/DL (ref 0.6–1.3)
CREAT UR-MCNC: 185.1 MG/DL
ERYTHROCYTE [DISTWIDTH] IN BLOOD BY AUTOMATED COUNT: 13.5 % (ref 11.6–15.1)
GFR SERPL CREATININE-BSD FRML MDRD: 33 ML/MIN/1.73SQ M
GLUCOSE P FAST SERPL-MCNC: 93 MG/DL (ref 65–99)
GLUCOSE UR STRIP-MCNC: NEGATIVE MG/DL
HCT VFR BLD AUTO: 36.9 % (ref 34.8–46.1)
HGB BLD-MCNC: 12.1 G/DL (ref 11.5–15.4)
HGB UR QL STRIP.AUTO: NEGATIVE
KETONES UR STRIP-MCNC: ABNORMAL MG/DL
LEUKOCYTE ESTERASE UR QL STRIP: ABNORMAL
MAGNESIUM SERPL-MCNC: 1.7 MG/DL (ref 1.9–2.7)
MCH RBC QN AUTO: 28.5 PG (ref 26.8–34.3)
MCHC RBC AUTO-ENTMCNC: 32.8 G/DL (ref 31.4–37.4)
MCV RBC AUTO: 87 FL (ref 82–98)
MUCOUS THREADS UR QL AUTO: ABNORMAL
NITRITE UR QL STRIP: NEGATIVE
NON-SQ EPI CELLS URNS QL MICRO: ABNORMAL /HPF
PH UR STRIP.AUTO: 6.5 [PH]
PHOSPHATE SERPL-MCNC: 5.3 MG/DL (ref 2.7–4.5)
PLATELET # BLD AUTO: 168 THOUSANDS/UL (ref 149–390)
PMV BLD AUTO: 12.1 FL (ref 8.9–12.7)
POTASSIUM SERPL-SCNC: 4 MMOL/L (ref 3.5–5.3)
PROT UR STRIP-MCNC: ABNORMAL MG/DL
PROT UR-MCNC: 32.8 MG/DL
PROT/CREAT UR: 0.2 MG/G{CREAT} (ref 0–0.1)
PTH-INTACT SERPL-MCNC: 117.5 PG/ML (ref 12–88)
RBC # BLD AUTO: 4.24 MILLION/UL (ref 3.81–5.12)
RBC #/AREA URNS AUTO: ABNORMAL /HPF
SODIUM SERPL-SCNC: 140 MMOL/L (ref 135–147)
SP GR UR STRIP.AUTO: 1.02 (ref 1–1.03)
UROBILINOGEN UR STRIP-ACNC: 4 MG/DL
WBC # BLD AUTO: 5.88 THOUSAND/UL (ref 4.31–10.16)
WBC #/AREA URNS AUTO: ABNORMAL /HPF

## 2025-02-11 PROCEDURE — 84100 ASSAY OF PHOSPHORUS: CPT

## 2025-02-11 PROCEDURE — 83970 ASSAY OF PARATHORMONE: CPT

## 2025-02-11 PROCEDURE — 72070 X-RAY EXAM THORAC SPINE 2VWS: CPT

## 2025-02-11 PROCEDURE — 84156 ASSAY OF PROTEIN URINE: CPT

## 2025-02-11 PROCEDURE — 81001 URINALYSIS AUTO W/SCOPE: CPT

## 2025-02-11 PROCEDURE — 80048 BASIC METABOLIC PNL TOTAL CA: CPT

## 2025-02-11 PROCEDURE — 36415 COLL VENOUS BLD VENIPUNCTURE: CPT

## 2025-02-11 PROCEDURE — 83735 ASSAY OF MAGNESIUM: CPT

## 2025-02-11 PROCEDURE — 85027 COMPLETE CBC AUTOMATED: CPT

## 2025-02-11 PROCEDURE — 82570 ASSAY OF URINE CREATININE: CPT

## 2025-02-11 PROCEDURE — 72110 X-RAY EXAM L-2 SPINE 4/>VWS: CPT

## 2025-02-12 ENCOUNTER — OFFICE VISIT (OUTPATIENT)
Dept: NEPHROLOGY | Facility: CLINIC | Age: 50
End: 2025-02-12
Payer: COMMERCIAL

## 2025-02-12 VITALS
HEIGHT: 61 IN | BODY MASS INDEX: 26.62 KG/M2 | WEIGHT: 141 LBS | DIASTOLIC BLOOD PRESSURE: 82 MMHG | SYSTOLIC BLOOD PRESSURE: 130 MMHG

## 2025-02-12 DIAGNOSIS — E83.42 HYPOMAGNESEMIA: ICD-10-CM

## 2025-02-12 DIAGNOSIS — N18.30 BENIGN HYPERTENSION WITH CHRONIC KIDNEY DISEASE, STAGE III (HCC): ICD-10-CM

## 2025-02-12 DIAGNOSIS — E83.39 HYPERPHOSPHATEMIA: ICD-10-CM

## 2025-02-12 DIAGNOSIS — I12.9 BENIGN HYPERTENSION WITH CHRONIC KIDNEY DISEASE, STAGE III (HCC): ICD-10-CM

## 2025-02-12 DIAGNOSIS — E87.20 METABOLIC ACIDOSIS: ICD-10-CM

## 2025-02-12 DIAGNOSIS — R80.1 PERSISTENT PROTEINURIA: ICD-10-CM

## 2025-02-12 DIAGNOSIS — Z79.4 CONTROLLED TYPE 2 DIABETES MELLITUS WITH STAGE 3 CHRONIC KIDNEY DISEASE, WITH LONG-TERM CURRENT USE OF INSULIN (HCC): Primary | ICD-10-CM

## 2025-02-12 DIAGNOSIS — N31.9 NEUROGENIC BLADDER: ICD-10-CM

## 2025-02-12 DIAGNOSIS — E11.22 CONTROLLED TYPE 2 DIABETES MELLITUS WITH STAGE 3 CHRONIC KIDNEY DISEASE, WITH LONG-TERM CURRENT USE OF INSULIN (HCC): Primary | ICD-10-CM

## 2025-02-12 DIAGNOSIS — N18.30 CONTROLLED TYPE 2 DIABETES MELLITUS WITH STAGE 3 CHRONIC KIDNEY DISEASE, WITH LONG-TERM CURRENT USE OF INSULIN (HCC): Primary | ICD-10-CM

## 2025-02-12 DIAGNOSIS — E55.9 VITAMIN D DEFICIENCY: ICD-10-CM

## 2025-02-12 DIAGNOSIS — N25.81 SECONDARY HYPERPARATHYROIDISM OF RENAL ORIGIN (HCC): ICD-10-CM

## 2025-02-12 DIAGNOSIS — D50.9 IRON DEFICIENCY ANEMIA, UNSPECIFIED IRON DEFICIENCY ANEMIA TYPE: ICD-10-CM

## 2025-02-12 DIAGNOSIS — N18.32 STAGE 3B CHRONIC KIDNEY DISEASE (HCC): ICD-10-CM

## 2025-02-12 PROBLEM — N18.9 ACUTE ON CHRONIC KIDNEY FAILURE  (HCC): Status: RESOLVED | Noted: 2020-07-13 | Resolved: 2025-02-12

## 2025-02-12 PROBLEM — N17.9 ACUTE ON CHRONIC KIDNEY FAILURE  (HCC): Status: RESOLVED | Noted: 2020-07-13 | Resolved: 2025-02-12

## 2025-02-12 PROBLEM — N18.4 STAGE 4 CHRONIC KIDNEY DISEASE (HCC): Status: RESOLVED | Noted: 2023-02-14 | Resolved: 2025-02-12

## 2025-02-12 PROCEDURE — 99214 OFFICE O/P EST MOD 30 MIN: CPT | Performed by: INTERNAL MEDICINE

## 2025-02-12 RX ORDER — MAGNESIUM OXIDE 400 MG/1
400 TABLET ORAL DAILY
Qty: 90 TABLET | Refills: 1 | Status: SHIPPED | OUTPATIENT
Start: 2025-02-12

## 2025-02-12 NOTE — ASSESSMENT & PLAN NOTE
Lab Results   Component Value Date    EGFR 33 02/11/2025    EGFR 44 12/18/2024    EGFR 33 09/13/2024    CREATININE 1.76 (H) 02/11/2025    CREATININE 1.37 (H) 12/18/2024    CREATININE 1.76 (H) 09/13/2024   Blood pressure is stable and is at goal on repeat check.  Continue treatment with metoprolol 100 mg twice daily, losartan hydrochlorothiazide 100/12.5 mg daily and terazosin 1 mg at bedtime, on terazosin for neurogenic bladder  Continue current treatment.  Recommend home monitoring of blood pressure    Orders:    Basic metabolic panel; Future    Protein / creatinine ratio, urine; Future

## 2025-02-12 NOTE — ASSESSMENT & PLAN NOTE
As hemoglobin improved to 12.1 g/dL continue to monitor.  MCV 87.  Iron saturation was 30% in September 2024  -on iron tablet - decrease it to every 3rd as complain of constipation   Orders:    CBC; Future

## 2025-02-12 NOTE — ASSESSMENT & PLAN NOTE
Lab Results   Component Value Date    HGBA1C 4.7 12/18/2024     -recommend goal A1c less than 7.0 if possible  -stressed on diabetic diet and daily exercise as well as weight loss  -continue monitoring of A1c per PCP, management per PCP and follow-up with PCP  -discussed about risk of CKD progression if diabetes is not controlled well.  -currently on:Mounjaro, insulin   -Last A1c: 4.7  - renal function stable       Orders:    Basic metabolic panel; Future    Protein / creatinine ratio, urine; Future

## 2025-02-12 NOTE — ASSESSMENT & PLAN NOTE
Magnesium level 1.7   Started on mag oxide once daily 400 mg   Orders:    magnesium oxide (MAG-OX) 400 mg tablet; Take 1 tablet (400 mg total) by mouth daily    Magnesium; Future

## 2025-02-12 NOTE — PROGRESS NOTES
Name: Kinza Meza      : 1975      MRN: [id number]  Encounter Provider: Garret Palacios MD  Encounter Date: 2025   Encounter department: [facility]  :  Assessment & Plan  Controlled type 2 diabetes mellitus with stage 3 chronic kidney disease, with long-term current use of insulin ([facility])    Lab Results   Component Value Date    HGBA1C 4.7 2024     -recommend goal A1c less than 7.0 if possible  -stressed on diabetic diet and daily exercise as well as weight loss  -continue monitoring of A1c per PCP, management per PCP and follow-up with PCP  -discussed about risk of CKD progression if diabetes is not controlled well.  -currently on:Mounjaro, insulin   -Last A1c: 4.7  - renal function stable       Orders:    Basic metabolic panel; Future    Protein / creatinine ratio, urine; Future    Stage 3b chronic kidney disease ([facility])  Lab Results   Component Value Date    EGFR 33 2025    EGFR 44 2024    EGFR 33 2024    CREATININE 1.76 (H) 2025    CREATININE 1.37 (H) 2024    CREATININE 1.76 (H) 2024   Chronic Kidney Disease Stage  3b   -Baseline Creatinine: 1.7-2.0 mg/dl  -Renal Function has been  stable . Last Creatinine was  1.76 mg/dl  -renal function improved due to weight loss.  -Etiology: Chronic kidney disease likely due to hypertensive nephrosclerosis as well as diabetic nephropathy.  past history of NSAIDs use  -SPEP and UPEP was negative for monoclonal bands, kappa lambda ratio was 1.7 in the setting of CKD.  SPEP was checked again in 2021 and was found to be negative for monoclonal bands  -Plan:   Renal function stable. Continue to monitor renal function.  Avoid Nephrotoxins like NSAIDs and IV contrast.   CKD Education:  completed in    Follow up - 6 month  Orders:    Basic metabolic panel; Future    Protein / creatinine ratio, urine; Future    PTH, intact; Future    CBC; Future    Magnesium; Future    Phosphorus;  Future    Urinalysis with microscopic; Future    Benign hypertension with chronic kidney disease, stage III ([facility])  Lab Results   Component Value Date    EGFR 33 02/11/2025    EGFR 44 12/18/2024    EGFR 33 09/13/2024    CREATININE 1.76 (H) 02/11/2025    CREATININE 1.37 (H) 12/18/2024    CREATININE 1.76 (H) 09/13/2024   Blood pressure is stable and is at goal on repeat check.  Continue treatment with metoprolol 100 mg twice daily, losartan hydrochlorothiazide 100/12.5 mg daily and terazosin 1 mg at bedtime, on terazosin for neurogenic bladder  Continue current treatment.  Recommend home monitoring of blood pressure    Orders:    Basic metabolic panel; Future    Protein / creatinine ratio, urine; Future    Hypomagnesemia  Magnesium level 1.7   Started on mag oxide once daily 400 mg   Orders:    magnesium oxide (MAG-OX) 400 mg tablet; Take 1 tablet (400 mg total) by mouth daily    Magnesium; Future    Hyperphosphatemia  - level was 5.3  - stressed on decreasing cheese intake  Orders:    Phosphorus; Future    Metabolic acidosis  -bicarb level is 23  -continue sodium bicarb tablets  Orders:    Basic metabolic panel; Future    Secondary hyperparathyroidism of renal origin (HCC)  - PTH level improving to 117.5   -Continue calcitriol 0.25 mg by mouth daily  Orders:    PTH, intact; Future    Vitamin D deficiency  - was 61 in sept  -continue vitamin D   Orders:    Vitamin D 25 hydroxy; Future    Persistent proteinuria  - UPC ratio worsened to 0.2   - says recently treated for UTI . Had E coli on Jan 24th - took keflex for 5 days   -due to HTN and DM-2  -continue losartan   Orders:    Protein / creatinine ratio, urine; Future    Iron deficiency anemia, unspecified iron deficiency anemia type  As hemoglobin improved to 12.1 g/dL continue to monitor.  MCV 87.  Iron saturation was 30% in September 2024  -on iron tablet - decrease it to every 3rd as complain of constipation   Orders:    CBC; Future    Neurogenic bladder  -seen by  urology and reviewed note  -Stable diabetic neuropathy and possible ALS and history of cauda equina syndrome 2021 as per urology note.     - was started on Terazosin and doing better   - follow up with urology            History of Present Illness   HPI  Kinza Meza is a 49 y.o. female  history of diabetes mellitus type 2, hypertension, C6-C7 disc herniation presenting for follow-up of chronic kidney disease stage 3. .  She underwent anterior cervical diskectomy with fusion C4-C5, C5-C6, C6-C7 on January 22nd 2019. SPEP and UPEP negative for monoclonal protein in May 2019 with kappa lambda ratio 1.7  Since December 2023 baseline creatinine around 1.7-2.0.  And was at this range till September 2024 but recently has been around creatinine 1.76 mg/dL on blood work from 2/11/2025, magnesium was low at 1.7 and phosphorus was high at 5.3   C/o incomplete bladder emptying     Reviewed last PCP note         Review of Systems   Constitutional:  Negative for activity change, appetite change, chills, diaphoresis, fatigue and fever.   HENT:  Negative for congestion, facial swelling and nosebleeds.    Eyes:  Negative for pain and visual disturbance.   Respiratory:  Negative for cough, chest tightness and shortness of breath.    Cardiovascular:  Negative for chest pain and palpitations.   Gastrointestinal:  Negative for abdominal distention, abdominal pain, diarrhea, nausea and vomiting.   Genitourinary:  Negative for difficulty urinating, dysuria, flank pain, frequency and hematuria.   Musculoskeletal:  Negative for arthralgias, back pain and joint swelling.   Skin:  Negative for rash.   Neurological:  Negative for dizziness, seizures, syncope, weakness and headaches.   Psychiatric/Behavioral:  Negative for agitation and confusion. The patient is not nervous/anxious.      Pertinent Medical History    Chronic migraine headaches- following with neurology.  Status post Botox injection. Headaches better        Medical History  Reviewed by provider this encounter:  Tobacco  Allergies  Meds  Problems  Med Hx  Surg Hx  Fam Hx     .  Current Outpatient Medications on File Prior to Visit   Medication Sig Dispense Refill    acetaZOLAMIDE (DIAMOX) 250 mg tablet TAKE 1 TABLET(250 MG) BY MOUTH TWICE DAILY 180 tablet 1    atorvastatin (LIPITOR) 10 mg tablet TAKE 1 TABLET(10 MG) BY MOUTH DAILY AT BEDTIME 90 tablet 1    BD Pen Needle Glory U/F 32G X 4 MM MISC Inject under the skin 2 (two) times a day Use as directed 180 each 0    Blood Glucose Monitoring Suppl (ONE TOUCH ULTRA 2) w/Device KIT Use 1 each 2 (two) times a day Use as instructed 1 kit 0    calcitriol (ROCALTROL) 0.25 mcg capsule TAKE 1 CAPSULE BY MOUTH daily 90 capsule 1    cetirizine (ZyrTEC) oral solution Take 5 mL (5 mg total) by mouth daily 450 mL 1    Cholecalciferol (Vitamin D3) 50 MCG (2000 UT) CAPS TAKE 1 CAPSULE(2,000 UNITS TOTAL) BY MOUTH DAILY 90 capsule 1    clonazePAM (KlonoPIN) 0.5 mg tablet Take 1 tablet (0.5 mg total) by mouth 2 (two) times a day 180 tablet 0    clotrimazole-betamethasone (LOTRISONE) 1-0.05 % cream Apply topically 2 (two) times a day 45 g 0    Continuous Glucose Sensor (FreeStyle Filiberto 3 Sensor) [facility] Use 1 each every 14 (fourteen) days 6 each 1    cyproheptadine (PERIACTIN) 4 mg tablet Take 1 tablet (4 mg total) by mouth daily at bedtime 90 tablet 1    diphenhydrAMINE (BENADRYL) 25 mg tablet Take 1 tablet (25 mg total) by mouth every 6 (six) hours as needed for itching 30 tablet 0    divalproex sodium (Depakote) 500 mg DR tablet Take 1 tablet (500 mg total) by mouth daily at bedtime 90 tablet 3    docusate sodium (COLACE) 100 mg capsule TAKE 1 CAPSULE BY MOUTH TWICE A DAY 60 capsule 0    DULoxetine (CYMBALTA) 60 mg delayed release capsule Take 1 capsule (60 mg total) by mouth daily 90 capsule 1    ferrous sulfate 324 (65 Fe) mg Take 1 tablet (324 mg total) by mouth daily before breakfast 90 tablet 3    fremanezumab-vfrm (Ajovy) 225 MG/1.5ML  auto-injector Inject 1.5 mL (225 mg total) under the skin every 30 (thirty) days 1.5 mL 11    insulin aspart (NovoLOG FlexPen) 100 UNIT/ML injection pen Continue with sliding scale insulin regimen      Insulin Glargine Solostar (Lantus SoloStar) 100 UNIT/ML SOPN Inject 0.15 mL (15 Units total) under the skin daily at bedtime 15 mL 0    Lancets MISC Use 1 each 2 (two) times a day Use as instructed      lisdexamfetamine (VYVANSE) 30 MG capsule Take 1 capsule (30 mg total) by mouth every morning Max Daily Amount: 30 mg 90 capsule 0    losartan-hydrochlorothiazide (HYZAAR) 100-12.5 MG per tablet TAKE ONE TABLET BY MOUTH ONE TIME DAILY 90 tablet 0    methocarbamol (Robaxin-750) 750 mg tablet Take 1 tablet (750 mg total) by mouth every 6 (six) hours as needed for muscle spasms (back pain) 30 tablet 3    metoprolol tartrate (LOPRESSOR) 100 mg tablet TAKE 1 TABLET(100 MG) BY MOUTH EVERY 12 HOURS 180 tablet 1    Mounjaro 15 MG/0.5ML SOAJ Inject 15 mg as directed once a week 2 mL 3    Multiple Vitamins-Minerals (multivitamin with minerals) tablet Take 1 tablet by mouth daily      mupirocin (BACTROBAN) 2 % ointment Apply topically 3 (three) times a day 60 g 0    NON FORMULARY Botox injections for HA every 3months (LD 3/20/23)      nystatin (MYCOSTATIN) powder Apply topically 2 (two) times a day 120 g 3    ondansetron (ZOFRAN) 4 mg tablet Take 1 tablet (4 mg total) by mouth every 8 (eight) hours as needed for nausea or vomiting 120 tablet 0    Ophthalmic Irrigation Solution (OCUSOFT EYE WASH OP) Apply 1 Application to eye daily at bedtime      Polyethyl Glycol-Propyl Glycol (Systane) 0.4-0.3 % GEL Apply 1 drop to eye 2 (two) times a day Using Ivizia as an alternative      polyethylene glycol (MIRALAX) 17 g packet Take 17 g by mouth daily 1 each 0    pregabalin (LYRICA) 100 mg capsule Take 1 capsule (100 mg total) by mouth 3 (three) times a day 270 capsule 0    rimegepant sulfate (Nurtec) 75 mg TBDP Place one tab (75mg total)  "under the tongue as needed for migraine. 16 tablet 11    sodium bicarbonate 650 mg tablet Take 1 tablet (650 mg total) by mouth 3 (three) times a day 360 tablet 3    terazosin (HYTRIN) 1 mg capsule Take 1 capsule (1 mg total) by mouth daily at bedtime 30 capsule 5    tiZANidine (ZANAFLEX) 4 mg tablet Take 1 tablet (4 mg total) by mouth every 8 (eight) hours as needed for muscle spasms 90 tablet 0    [DISCONTINUED] cyanocobalamin 1,000 mcg/mL Inject 1 mL (1,000 mcg total) into a muscle once a week (Patient not taking: Reported on 9/13/2024) 4 mL 3    [DISCONTINUED] cycloSPORINE, PF, (Cequa) 0.09 % SOLN Administer 1 drop to both eyes 2 (two) times a day (Patient not taking: Reported on 12/4/2024)      [DISCONTINUED] magnesium (MAGTAB) 84 MG (7MEQ) TBCR Take 1 tablet (84 mg total) by mouth daily 90 tablet 3     Current Facility-Administered Medications on File Prior to Visit   Medication Dose Route Frequency Provider Last Rate Last Admin    cyanocobalamin injection 1,000 mcg  1,000 mcg Intramuscular Weekly Myla Norwood MD   1,000 mcg at 07/01/24 1007         Objective   /82   Ht 5' 1\" (1.549 m)   Wt 64 kg (141 lb)   LMP  (LMP Unknown)   BMI 26.64 kg/m²      Physical Exam  Constitutional:       General: She is not in acute distress.     Appearance: Normal appearance. She is well-developed.   HENT:      Head: Normocephalic and atraumatic.      Nose: Nose normal.      Mouth/Throat:      Mouth: Mucous membranes are moist.   Eyes:      Conjunctiva/sclera: Conjunctivae normal.      Pupils: Pupils are equal, round, and reactive to light.   Neck:      Thyroid: No thyromegaly.      Vascular: No JVD.   Cardiovascular:      Rate and Rhythm: Normal rate and regular rhythm.      Heart sounds: Normal heart sounds. No murmur heard.     No friction rub.   Pulmonary:      Effort: Pulmonary effort is normal.      Breath sounds: Normal breath sounds. No wheezing or rales.   Abdominal:      General: Abdomen is flat. There is no " distension.      Tenderness: There is no abdominal tenderness. There is no right CVA tenderness or left CVA tenderness.   Musculoskeletal:         General: No deformity.      Cervical back: Neck supple.      Right lower leg: No edema.      Left lower leg: No edema.   Skin:     General: Skin is warm and dry.      Coloration: Skin is not jaundiced.   Neurological:      Mental Status: She is alert and oriented to person, place, and time.   Psychiatric:         Mood and Affect: Mood normal.         Behavior: Behavior normal.         Thought Content: Thought content normal.

## 2025-02-12 NOTE — ASSESSMENT & PLAN NOTE
- PTH level improving to 117.5   -Continue calcitriol 0.25 mg by mouth daily  Orders:    PTH, intact; Future

## 2025-02-12 NOTE — PATIENT INSTRUCTIONS
-Renal Function is stable   -You have Chronic Kidney Disease Stage 3   -Avoid NSAIDs like Ibuprofen/Motrin, Naproxen/Aleve, Celebrex, meloxicam/Mobic, Diclofenac and other NSAIDs.  -Okay to take Acetaminophen/Tylenol if you do not have any liver problems  -Avoid IV contrast used for CT scan and cardiac catheterization.    -If plan for any study with IV contrast, please let me know so we could hydrate with fluids before and after IV contrast  -Dosage  of certain medications may need to be adjusted depending on Kidney function.     Started on magnesium oxide once daily    Follow up: 6  months with repeat Lab work within a week of the scheduled office visit. Will discuss the results of the previsit Labs during the office visit.

## 2025-02-12 NOTE — ASSESSMENT & PLAN NOTE
Lab Results   Component Value Date    EGFR 33 02/11/2025    EGFR 44 12/18/2024    EGFR 33 09/13/2024    CREATININE 1.76 (H) 02/11/2025    CREATININE 1.37 (H) 12/18/2024    CREATININE 1.76 (H) 09/13/2024   Chronic Kidney Disease Stage  3b   -Baseline Creatinine: 1.7-2.0 mg/dl  -Renal Function has been  stable . Last Creatinine was  1.76 mg/dl  -renal function improved due to weight loss.  -Etiology: Chronic kidney disease likely due to hypertensive nephrosclerosis as well as diabetic nephropathy.  past history of NSAIDs use  -SPEP and UPEP was negative for monoclonal bands, kappa lambda ratio was 1.7 in the setting of CKD.  SPEP was checked again in July 2021 and was found to be negative for monoclonal bands  -Plan:   Renal function stable. Continue to monitor renal function.  Avoid Nephrotoxins like NSAIDs and IV contrast.   CKD Education:  completed in 2020   Follow up - 6 month  Orders:    Basic metabolic panel; Future    Protein / creatinine ratio, urine; Future    PTH, intact; Future    CBC; Future    Magnesium; Future    Phosphorus; Future    Urinalysis with microscopic; Future

## 2025-02-12 NOTE — ASSESSMENT & PLAN NOTE
- UPC ratio worsened to 0.2   - says recently treated for UTI . Had E coli on Jan 24th - took keflex for 5 days   -due to HTN and DM-2  -continue losartan   Orders:    Protein / creatinine ratio, urine; Future

## 2025-02-12 NOTE — ASSESSMENT & PLAN NOTE
-seen by urology and reviewed note  -Stable diabetic neuropathy and possible ALS and history of cauda equina syndrome 2021 as per urology note.     - was started on Terazosin and doing better   - follow up with urology

## 2025-02-14 ENCOUNTER — OFFICE VISIT (OUTPATIENT)
Dept: INTERNAL MEDICINE CLINIC | Facility: CLINIC | Age: 50
End: 2025-02-14
Payer: COMMERCIAL

## 2025-02-14 VITALS
HEIGHT: 61 IN | BODY MASS INDEX: 26.73 KG/M2 | OXYGEN SATURATION: 98 % | HEART RATE: 77 BPM | TEMPERATURE: 97.3 F | SYSTOLIC BLOOD PRESSURE: 122 MMHG | WEIGHT: 141.6 LBS | DIASTOLIC BLOOD PRESSURE: 78 MMHG

## 2025-02-14 DIAGNOSIS — E11.40 TYPE 2 DIABETES MELLITUS WITH DIABETIC NEUROPATHY, WITH LONG-TERM CURRENT USE OF INSULIN (HCC): ICD-10-CM

## 2025-02-14 DIAGNOSIS — Z79.4 TYPE 2 DIABETES MELLITUS WITH DIABETIC NEUROPATHY, WITH LONG-TERM CURRENT USE OF INSULIN (HCC): ICD-10-CM

## 2025-02-14 DIAGNOSIS — J01.00 SUBACUTE MAXILLARY SINUSITIS: Primary | ICD-10-CM

## 2025-02-14 PROCEDURE — 99213 OFFICE O/P EST LOW 20 MIN: CPT | Performed by: INTERNAL MEDICINE

## 2025-02-14 RX ORDER — AZITHROMYCIN 250 MG/1
TABLET, FILM COATED ORAL
Qty: 6 TABLET | Refills: 0 | Status: SHIPPED | OUTPATIENT
Start: 2025-02-14 | End: 2025-02-18

## 2025-02-14 NOTE — PROGRESS NOTES
Assessment/Plan:           1. Subacute maxillary sinusitis  -     sodium chloride (OCEAN) 0.65 % nasal spray; 1 spray into each nostril every 2 (two) hours while awake  -     azithromycin (ZITHROMAX) 250 mg tablet; Take 2 tablets today then 1 tablet daily x 4 days         1. Subacute maxillary sinusitis (Primary)    - sodium chloride (OCEAN) 0.65 % nasal spray; 1 spray into each nostril every 2 (two) hours while awake  Dispense: 60 mL; Refill: 1  - azithromycin (ZITHROMAX) 250 mg tablet; Take 2 tablets today then 1 tablet daily x 4 days  Dispense: 6 tablet; Refill: 0       No problem-specific Assessment & Plan notes found for this encounter.           Subjective:      Patient ID: Kinza Meza is a 49 y.o. female.    HPI    The following portions of the patient's history were reviewed and updated as appropriate: She  has a past medical history of ADD (attention deficit disorder), Allergic rhinitis, ALS (amyotrophic lateral sclerosis) ([facility]) (11/22), Anemia (12/2021), Anxiety, Arthritis, Bipolar disorder ([facility]), Cervical disc disorder with radiculopathy of cervical region, Chronic depression, Chronic kidney disease, Chronic pain disorder, Cluster headache, Colitis, Colon polyp, Concussion (2018), Condyloma acuminatum, CTS (carpal tunnel syndrome) (2001), Depression, Diabetes mellitus ([facility]), Diabetic nephropathy ([facility]) (2001), Diabetic neuropathy ([facility]), Diabetic retinopathy ([facility]) (2001), Difficulty walking (07/22), Fibromyalgia, Fibromyalgia, primary, GERD (gastroesophageal reflux disease), Head injury (08/13/18), Headache(784.0) (1977), History of transfusion (12/2021), HTN (hypertension), Hyperlipidemia (07/22/2019), Hypertension, Intervertebral disc disorder with radiculopathy of lumbar region, Lupus (2003), Migraine, Miscarriage (1988), Obesity (1980), Obesity, unspecified, Open wound of back (05/30/2023), Opioid abuse, in remission ([facility]) (01/2022), Osteoporosis, Peripheral neuropathy, Postoperative wound  infection (12/2021), Preeclampsia, Rheumatoid arthritis ([facility]), and Wears dentures.  She   Patient Active Problem List    Diagnosis Date Noted    Hypomagnesemia 02/12/2025    Migraine without aura and without status migrainosus, not intractable 11/06/2024    Cellulitis of right toe 07/01/2024    Cellulitis of right shoulder 03/28/2024    Benign hypertension with chronic kidney disease, stage III ([facility]) 01/31/2024    Abnormal blood electrolyte level 10/30/2023    Thrombocytopenia ([facility]) 10/30/2023    Chest pain 10/29/2023    Metabolic acidosis 09/27/2023    Hyperphosphatemia 09/27/2023    Neurogenic bladder 09/26/2023    Bilateral carpal tunnel syndrome     IIH (idiopathic intracranial hypertension) 08/15/2023    Open wound of back 05/30/2023    Maceration of skin 02/14/2023    Cortical age-related cataract of left eye 01/04/2023    Bipolar affective disorder, currently manic, mild ([facility]) 03/30/2022    Seasonal allergic rhinitis 03/30/2022    Surgical wound, non healing 02/08/2022    Diabetic polyneuropathy associated with diabetes mellitus due to underlying condition ([facility]) 02/08/2022    Continuous opioid dependence ([facility]) 01/07/2022    Anemia 12/24/2021    Wound infection 12/09/2021    Cauda equina syndrome ([facility]) 12/08/2021    Depression 10/28/2021    Annual physical exam 08/27/2021    Other gastritis without bleeding 08/11/2021    Obesity, unspecified     Diabetes mellitus ([facility])     Primary hypertension     Dizziness 12/07/2020    Secondary hyperparathyroidism of renal origin ([facility]) 11/16/2020    Controlled type 2 diabetes mellitus with stage 3 chronic kidney disease, with long-term current use of insulin ([facility])     Intractable migraine with status migrainosus     Type 2 diabetes mellitus with diabetic neuropathy ([facility]) 09/11/2020    Ventral hernia without obstruction or gangrene 09/11/2020    Lumbar disc herniation with radiculopathy 09/11/2020    Concussion with moderate (1-24 hours) loss of consciousness 09/11/2020     Primary osteoarthritis of both knees 2020    Diabetic nephropathy associated with type 2 diabetes mellitus (HCC) 2020    Dysthymic disorder 2020    Hypokalemia 2020    Near syncope 2020    Weakness 2020    Status post gastric bypass for obesity 2020    Diarrhea 2020    Snores     Headache upon awakening     Chronic tension type headache 2019    Chronic migraine w/o aura w/o status migrainosus, not intractable 2019    Medical marijuana use 2019    Vitamin D deficiency 2019    Chronic kidney disease-mineral and bone disorder 2019    S/P cervical spinal fusion 2019    Mixed hyperlipidemia 2019    Cervical disc disorder with radiculopathy     Stage 3b chronic kidney disease ([facility]) 2019    Persistent proteinuria 2019    Hypertensive kidney disease with stage 3b chronic kidney disease ([facility]) 2019    Hyponatremia 2019    Intervertebral disc disorders with radiculopathy, lumbar region     Sacroiliitis ([facility])     Greater trochanteric bursitis of both hips     Herniated nucleus pulposus, L1-2 2017    Bilateral chronic knee pain 2017    Facet arthritis of lumbosacral region 2017    Fibromyalgia 2017    Lumbar stenosis with neurogenic claudication 2017    ADHD 2016    Uncontrolled type 2 diabetes mellitus with hyperglycemia ([facility]) 2016    Gastroesophageal reflux disease without esophagitis 2016    Morbid obesity with BMI of 40.0-44.9, adult ([facility]) 2016    Chronic renal insufficiency 2014    Endometriosis 2012     She  has a past surgical history that includes Appendectomy;  section; Hernia repair; Cervical fusion (N/A, 2019); Colonoscopy; Carpal tunnel release; Hemorrhoid surgery; Ventral hernia repair; Shelby-en-y procedure; Ulnar tunnel release; Mammo (historical) (); Colonoscopy; Lumbar fusion (N/A, 2021); LUMBAR WOUND EXPLORATION  (Bilateral, 12/22/2021); VAC DRESSING APPLICATION (Bilateral, 12/22/2021); VAC DRESSING APPLICATION (N/A, 12/25/2021); Bariatric Surgery (08/2016); Cataract extraction; and pr debridement bone 1st 20 sq cm/< (N/A, 5/30/2023).  Her family history includes ADD / ADHD in her cousin and cousin; Anemia in her maternal grandmother; Anxiety disorder in her father; Cancer in her mother; Cervical cancer in her mother; Colon cancer in her maternal grandmother; Completed Suicide  in her father; Depression in her father; Diabetes in her mother; Hypertension in her mother; Kidney disease in her mother; Mental illness in her daughter, daughter, and father; Neuropathy in her mother; No Known Problems in her family, maternal aunt, maternal grandfather, paternal grandfather, and sister; Ovarian cancer in her paternal aunt; Psychiatric Illness in her father; Suicidality in her father; Suicide Attempts in her father; Uterine cancer in her paternal grandmother.  She  reports that she has never smoked. She has never been exposed to tobacco smoke. She has never used smokeless tobacco. She reports that she does not currently use alcohol. She reports current drug use. Drug: Marijuana.  Current Outpatient Medications   Medication Sig Dispense Refill    acetaZOLAMIDE (DIAMOX) 250 mg tablet TAKE 1 TABLET(250 MG) BY MOUTH TWICE DAILY 180 tablet 1    atorvastatin (LIPITOR) 10 mg tablet TAKE 1 TABLET(10 MG) BY MOUTH DAILY AT BEDTIME 90 tablet 1    azithromycin (ZITHROMAX) 250 mg tablet Take 2 tablets today then 1 tablet daily x 4 days 6 tablet 0    BD Pen Needle Glory U/F 32G X 4 MM MISC Inject under the skin 2 (two) times a day Use as directed 180 each 0    Blood Glucose Monitoring Suppl (ONE TOUCH ULTRA 2) w/Device KIT Use 1 each 2 (two) times a day Use as instructed 1 kit 0    calcitriol (ROCALTROL) 0.25 mcg capsule TAKE 1 CAPSULE BY MOUTH daily 90 capsule 1    cetirizine (ZyrTEC) oral solution Take 5 mL (5 mg total) by mouth daily 450 mL 1     Cholecalciferol (Vitamin D3) 50 MCG (2000 UT) CAPS TAKE 1 CAPSULE(2,000 UNITS TOTAL) BY MOUTH DAILY 90 capsule 1    clonazePAM (KlonoPIN) 0.5 mg tablet Take 1 tablet (0.5 mg total) by mouth 2 (two) times a day 180 tablet 0    clotrimazole-betamethasone (LOTRISONE) 1-0.05 % cream Apply topically 2 (two) times a day 45 g 0    Continuous Glucose Sensor (FreeStyle Filiberto 3 Sensor) MISC Use 1 each every 14 (fourteen) days 6 each 1    cyproheptadine (PERIACTIN) 4 mg tablet Take 1 tablet (4 mg total) by mouth daily at bedtime 90 tablet 1    diphenhydrAMINE (BENADRYL) 25 mg tablet Take 1 tablet (25 mg total) by mouth every 6 (six) hours as needed for itching 30 tablet 0    divalproex sodium (Depakote) 500 mg DR tablet Take 1 tablet (500 mg total) by mouth daily at bedtime 90 tablet 3    docusate sodium (COLACE) 100 mg capsule TAKE 1 CAPSULE BY MOUTH TWICE A DAY 60 capsule 0    DULoxetine (CYMBALTA) 60 mg delayed release capsule Take 1 capsule (60 mg total) by mouth daily 90 capsule 1    ferrous sulfate 324 (65 Fe) mg Take 1 tablet (324 mg total) by mouth daily before breakfast 90 tablet 3    fremanezumab-vfrm (Ajovy) 225 MG/1.5ML auto-injector Inject 1.5 mL (225 mg total) under the skin every 30 (thirty) days 1.5 mL 11    insulin aspart (NovoLOG FlexPen) 100 UNIT/ML injection pen Continue with sliding scale insulin regimen      Insulin Glargine Solostar (Lantus SoloStar) 100 UNIT/ML SOPN Inject 0.15 mL (15 Units total) under the skin daily at bedtime 15 mL 0    Lancets MISC Use 1 each 2 (two) times a day Use as instructed      lisdexamfetamine (VYVANSE) 30 MG capsule Take 1 capsule (30 mg total) by mouth every morning Max Daily Amount: 30 mg 90 capsule 0    losartan-hydrochlorothiazide (HYZAAR) 100-12.5 MG per tablet TAKE ONE TABLET BY MOUTH ONE TIME DAILY 90 tablet 0    magnesium oxide (MAG-OX) 400 mg tablet Take 1 tablet (400 mg total) by mouth daily 90 tablet 1    methocarbamol (Robaxin-750) 750 mg tablet Take 1 tablet  (750 mg total) by mouth every 6 (six) hours as needed for muscle spasms (back pain) 30 tablet 3    metoprolol tartrate (LOPRESSOR) 100 mg tablet TAKE 1 TABLET(100 MG) BY MOUTH EVERY 12 HOURS 180 tablet 1    Mounjaro 15 MG/0.5ML SOAJ Inject 15 mg as directed once a week 2 mL 3    Multiple Vitamins-Minerals (multivitamin with minerals) tablet Take 1 tablet by mouth daily      mupirocin (BACTROBAN) 2 % ointment Apply topically 3 (three) times a day 60 g 0    NON FORMULARY Botox injections for HA every 3months (LD 3/20/23)      nystatin (MYCOSTATIN) powder Apply topically 2 (two) times a day 120 g 3    ondansetron (ZOFRAN) 4 mg tablet Take 1 tablet (4 mg total) by mouth every 8 (eight) hours as needed for nausea or vomiting 120 tablet 0    Ophthalmic Irrigation Solution (OCUSOFT EYE WASH OP) Apply 1 Application to eye daily at bedtime      Polyethyl Glycol-Propyl Glycol (Systane) 0.4-0.3 % GEL Apply 1 drop to eye 2 (two) times a day Using Ivizia as an alternative      polyethylene glycol (MIRALAX) 17 g packet Take 17 g by mouth daily 1 each 0    pregabalin (LYRICA) 100 mg capsule Take 1 capsule (100 mg total) by mouth 3 (three) times a day 270 capsule 0    rimegepant sulfate (Nurtec) 75 mg TBDP Place one tab (75mg total) under the tongue as needed for migraine. 16 tablet 11    sodium bicarbonate 650 mg tablet Take 1 tablet (650 mg total) by mouth 3 (three) times a day 360 tablet 3    sodium chloride (OCEAN) 0.65 % nasal spray 1 spray into each nostril every 2 (two) hours while awake 60 mL 1    terazosin (HYTRIN) 1 mg capsule Take 1 capsule (1 mg total) by mouth daily at bedtime 30 capsule 5    tiZANidine (ZANAFLEX) 4 mg tablet Take 1 tablet (4 mg total) by mouth every 8 (eight) hours as needed for muscle spasms 90 tablet 0     Current Facility-Administered Medications   Medication Dose Route Frequency Provider Last Rate Last Admin    cyanocobalamin injection 1,000 mcg  1,000 mcg Intramuscular Weekly Myla Norwood MD    1,000 mcg at 07/01/24 1007     Current Outpatient Medications on File Prior to Visit   Medication Sig    acetaZOLAMIDE (DIAMOX) 250 mg tablet TAKE 1 TABLET(250 MG) BY MOUTH TWICE DAILY    atorvastatin (LIPITOR) 10 mg tablet TAKE 1 TABLET(10 MG) BY MOUTH DAILY AT BEDTIME    BD Pen Needle Glory U/F 32G X 4 MM MISC Inject under the skin 2 (two) times a day Use as directed    Blood Glucose Monitoring Suppl (ONE TOUCH ULTRA 2) w/Device KIT Use 1 each 2 (two) times a day Use as instructed    calcitriol (ROCALTROL) 0.25 mcg capsule TAKE 1 CAPSULE BY MOUTH daily    cetirizine (ZyrTEC) oral solution Take 5 mL (5 mg total) by mouth daily    Cholecalciferol (Vitamin D3) 50 MCG (2000 UT) CAPS TAKE 1 CAPSULE(2,000 UNITS TOTAL) BY MOUTH DAILY    clonazePAM (KlonoPIN) 0.5 mg tablet Take 1 tablet (0.5 mg total) by mouth 2 (two) times a day    clotrimazole-betamethasone (LOTRISONE) 1-0.05 % cream Apply topically 2 (two) times a day    Continuous Glucose Sensor (FreeStyle Filiberto 3 Sensor) MISC Use 1 each every 14 (fourteen) days    cyproheptadine (PERIACTIN) 4 mg tablet Take 1 tablet (4 mg total) by mouth daily at bedtime    diphenhydrAMINE (BENADRYL) 25 mg tablet Take 1 tablet (25 mg total) by mouth every 6 (six) hours as needed for itching    divalproex sodium (Depakote) 500 mg DR tablet Take 1 tablet (500 mg total) by mouth daily at bedtime    docusate sodium (COLACE) 100 mg capsule TAKE 1 CAPSULE BY MOUTH TWICE A DAY    DULoxetine (CYMBALTA) 60 mg delayed release capsule Take 1 capsule (60 mg total) by mouth daily    ferrous sulfate 324 (65 Fe) mg Take 1 tablet (324 mg total) by mouth daily before breakfast    fremanezumab-vfrm (Ajovy) 225 MG/1.5ML auto-injector Inject 1.5 mL (225 mg total) under the skin every 30 (thirty) days    insulin aspart (NovoLOG FlexPen) 100 UNIT/ML injection pen Continue with sliding scale insulin regimen    Insulin Glargine Solostar (Lantus SoloStar) 100 UNIT/ML SOPN Inject 0.15 mL (15 Units total) under  the skin daily at bedtime    Lancets MISC Use 1 each 2 (two) times a day Use as instructed    lisdexamfetamine (VYVANSE) 30 MG capsule Take 1 capsule (30 mg total) by mouth every morning Max Daily Amount: 30 mg    losartan-hydrochlorothiazide (HYZAAR) 100-12.5 MG per tablet TAKE ONE TABLET BY MOUTH ONE TIME DAILY    magnesium oxide (MAG-OX) 400 mg tablet Take 1 tablet (400 mg total) by mouth daily    methocarbamol (Robaxin-750) 750 mg tablet Take 1 tablet (750 mg total) by mouth every 6 (six) hours as needed for muscle spasms (back pain)    metoprolol tartrate (LOPRESSOR) 100 mg tablet TAKE 1 TABLET(100 MG) BY MOUTH EVERY 12 HOURS    Mounjaro 15 MG/0.5ML SOAJ Inject 15 mg as directed once a week    Multiple Vitamins-Minerals (multivitamin with minerals) tablet Take 1 tablet by mouth daily    mupirocin (BACTROBAN) 2 % ointment Apply topically 3 (three) times a day    NON FORMULARY Botox injections for HA every 3months (LD 3/20/23)    nystatin (MYCOSTATIN) powder Apply topically 2 (two) times a day    ondansetron (ZOFRAN) 4 mg tablet Take 1 tablet (4 mg total) by mouth every 8 (eight) hours as needed for nausea or vomiting    Ophthalmic Irrigation Solution (OCUSOFT EYE WASH OP) Apply 1 Application to eye daily at bedtime    Polyethyl Glycol-Propyl Glycol (Systane) 0.4-0.3 % GEL Apply 1 drop to eye 2 (two) times a day Using Ivizia as an alternative    polyethylene glycol (MIRALAX) 17 g packet Take 17 g by mouth daily    pregabalin (LYRICA) 100 mg capsule Take 1 capsule (100 mg total) by mouth 3 (three) times a day    rimegepant sulfate (Nurtec) 75 mg TBDP Place one tab (75mg total) under the tongue as needed for migraine.    sodium bicarbonate 650 mg tablet Take 1 tablet (650 mg total) by mouth 3 (three) times a day    terazosin (HYTRIN) 1 mg capsule Take 1 capsule (1 mg total) by mouth daily at bedtime    tiZANidine (ZANAFLEX) 4 mg tablet Take 1 tablet (4 mg total) by mouth every 8 (eight) hours as needed for muscle  "spasms     Current Facility-Administered Medications on File Prior to Visit   Medication    cyanocobalamin injection 1,000 mcg     There are no discontinued medications.   She is allergic to ace inhibitors, pollen extract, short ragweed pollen ext, sodium hyaluronate (yoav), levonorgestrel-eth estradiol [levonorgestrel-ethinyl estrad], and latex..    Review of Systems   Constitutional:  Negative for appetite change, chills, fatigue and fever.   HENT:  Positive for congestion. Negative for sore throat and trouble swallowing.    Eyes:  Negative for redness.   Respiratory:  Positive for cough. Negative for shortness of breath.    Cardiovascular:  Negative for chest pain and palpitations.   Gastrointestinal:  Negative for abdominal pain, constipation and diarrhea.   Genitourinary:  Negative for dysuria and hematuria.   Musculoskeletal:  Positive for gait problem. Negative for back pain and neck pain.   Skin:  Negative for rash.   Neurological:  Negative for seizures, weakness and headaches.   Hematological:  Negative for adenopathy.   Psychiatric/Behavioral:  Negative for confusion. The patient is not nervous/anxious.          Objective:      /78 (Patient Position: Sitting, Cuff Size: Adult)   Pulse 77   Temp (!) 97.3 °F (36.3 °C) (Temporal)   Ht 5' 1\" (1.549 m)   Wt 64.2 kg (141 lb 9.6 oz)   LMP  (LMP Unknown)   SpO2 98%   BMI 26.76 kg/m²     Results Reviewed       None            Recent Results (from the past 8 weeks)   POCT urine dip    Collection Time: 01/24/25 12:11 PM   Result Value Ref Range    LEUKOCYTE ESTERASE,UA negative     NITRITE,UA positive     SL AMB POCT UROBILINOGEN 0.2mg/dL     POCT URINE PROTEIN negative      PH,UA 6.0     BLOOD,UA +- 10 [place]/uL     SPECIFIC GRAVITY,UA 1.015     KETONES,UA negative     BILIRUBIN,UA negative     GLUCOSE, UA negative      COLOR,UA dark yellow     CLARITY,UA cloudy    Urine culture    Collection Time: 01/24/25 12:17 PM    Specimen: Urine, Clean Catch "   Result Value Ref Range    Urine Culture >100,000 cfu/ml Escherichia coli (A)        Susceptibility    Escherichia coli - RAVI     ZID Performed Yes       Amoxicillin + Clavulanate <=8/4 Susceptible ug/ml     Ampicillin ($$) >16.00 Resistant ug/ml     Ampicillin + Sulbactam ($) 16/8 Intermediate ug/ml     Aztreonam ($$$)  <=4 Susceptible ug/ml     Cefazolin ($) 4.00 Susceptible ug/ml     Ciprofloxacin ($)  <=0.25 Susceptible ug/ml     Ertapenem ($$$) <=0.5 Susceptible ug/ml     Gentamicin ($$) >8 Resistant ug/ml     Levofloxacin ($) <=0.50 Susceptible ug/ml     Minocycline <=4 Susceptible ug/ml     Nitrofurantoin <=32 Susceptible ug/ml     Piperacillin + Tazobactam ($$$) <=8 Susceptible ug/ml     Tetracycline >8 Resistant ug/ml     Trimethoprim + Sulfamethoxazole ($$$) >2/38 Resistant ug/ml   Basic metabolic panel    Collection Time: 02/11/25  5:43 PM   Result Value Ref Range    Sodium 140 135 - 147 mmol/L    Potassium 4.0 3.5 - 5.3 mmol/L    Chloride 104 96 - 108 mmol/L    CO2 23 21 - 32 mmol/L    ANION GAP 13 4 - 13 mmol/L    BUN 22 5 - 25 mg/dL    Creatinine 1.76 (H) 0.60 - 1.30 mg/dL    Glucose, Fasting 93 65 - 99 mg/dL    Calcium 9.1 8.4 - 10.2 mg/dL    eGFR 33 ml/min/1.73sq m   Protein / creatinine ratio, urine    Collection Time: 02/11/25  5:43 PM   Result Value Ref Range    Creatinine, Ur 185.1 Reference range not established. mg/dL    Protein Urine Random 32.8 Reference range not established. mg/dL    Prot/Creat Ratio, Ur 0.2 (H) 0.0 - 0.1   PTH, intact    Collection Time: 02/11/25  5:43 PM   Result Value Ref Range    .5 (H) 12.0 - 88.0 pg/mL   Urinalysis with microscopic    Collection Time: 02/11/25  5:43 PM   Result Value Ref Range    Color, UA Yellow     Clarity, UA Turbid     Specific Gravity, UA 1.021 1.003 - 1.030    pH, UA 6.5 4.5, 5.0, 5.5, 6.0, 6.5, 7.0, 7.5, 8.0    Leukocytes, UA Moderate (A) Negative    Nitrite, UA Negative Negative    Protein, UA 50 (1+) (A) Negative mg/dl    Glucose, UA  Negative Negative mg/dl    Ketones, UA Trace (A) Negative mg/dl    Urobilinogen, UA 4.0 (A) <2.0 mg/dl mg/dl    Bilirubin, UA Negative Negative    Occult Blood, UA Negative Negative    RBC, UA 2-4 (A) None Seen, 1-2 /hpf    WBC, UA 4-10 (A) None Seen, 1-2 /hpf    Epithelial Cells Moderate (A) None Seen, Occasional /hpf    Bacteria, UA Occasional None Seen, Occasional /hpf    MUCUS THREADS Occasional (A) None Seen    Ca Oxalate Sandy, UA Innumerable (A) None Seen /hpf   Phosphorus    Collection Time: 02/11/25  5:43 PM   Result Value Ref Range    Phosphorus 5.3 (H) 2.7 - 4.5 mg/dL   Magnesium    Collection Time: 02/11/25  5:43 PM   Result Value Ref Range    Magnesium 1.7 (L) 1.9 - 2.7 mg/dL   CBC    Collection Time: 02/11/25  5:43 PM   Result Value Ref Range    WBC 5.88 4.31 - 10.16 Thousand/uL    RBC 4.24 3.81 - 5.12 Million/uL    Hemoglobin 12.1 11.5 - 15.4 g/dL    Hematocrit 36.9 34.8 - 46.1 %    MCV 87 82 - 98 fL    MCH 28.5 26.8 - 34.3 pg    MCHC 32.8 31.4 - 37.4 g/dL    RDW 13.5 11.6 - 15.1 %    Platelets 168 149 - 390 Thousands/uL    MPV 12.1 8.9 - 12.7 fL        Physical Exam  Constitutional:       Appearance: Normal appearance. She is normal weight.   HENT:      Head: Normocephalic and atraumatic.      Nose: Nose normal.      Mouth/Throat:      Mouth: Mucous membranes are moist.   Eyes:      Extraocular Movements: Extraocular movements intact.      Pupils: Pupils are equal, round, and reactive to light.   Pulmonary:      Effort: Pulmonary effort is normal.   Abdominal:      Palpations: Abdomen is soft.   Musculoskeletal:         General: Normal range of motion.      Cervical back: Normal range of motion and neck supple.   Neurological:      General: No focal deficit present.      Mental Status: She is alert and oriented to person, place, and time. Mental status is at baseline.      Gait: Gait abnormal.

## 2025-02-21 DIAGNOSIS — M51.16 LUMBAR DISC HERNIATION WITH RADICULOPATHY: ICD-10-CM

## 2025-02-21 DIAGNOSIS — B49 FUNGAL INFECTION: ICD-10-CM

## 2025-02-24 RX ORDER — CLOTRIMAZOLE AND BETAMETHASONE DIPROPIONATE 10; .64 MG/G; MG/G
CREAM TOPICAL 2 TIMES DAILY
Qty: 45 G | Refills: 0 | Status: SHIPPED | OUTPATIENT
Start: 2025-02-24

## 2025-02-28 ENCOUNTER — PROCEDURE VISIT (OUTPATIENT)
Age: 50
End: 2025-02-28
Payer: COMMERCIAL

## 2025-02-28 VITALS — HEIGHT: 61 IN | BODY MASS INDEX: 26.62 KG/M2 | WEIGHT: 141 LBS

## 2025-02-28 DIAGNOSIS — M48.062 SPINAL STENOSIS OF LUMBAR REGION WITH NEUROGENIC CLAUDICATION: Primary | ICD-10-CM

## 2025-02-28 DIAGNOSIS — M99.01 SEGMENTAL DYSFUNCTION OF CERVICAL REGION: ICD-10-CM

## 2025-02-28 DIAGNOSIS — M47.812 CERVICAL SPONDYLOSIS: ICD-10-CM

## 2025-02-28 DIAGNOSIS — M62.838 MUSCLE SPASM: ICD-10-CM

## 2025-02-28 DIAGNOSIS — M99.04 SEGMENTAL DYSFUNCTION OF SACRAL REGION: ICD-10-CM

## 2025-02-28 DIAGNOSIS — M99.03 SEGMENTAL DYSFUNCTION OF LUMBAR REGION: ICD-10-CM

## 2025-02-28 DIAGNOSIS — M99.02 SEGMENTAL DYSFUNCTION OF THORACIC REGION: ICD-10-CM

## 2025-02-28 PROCEDURE — 97110 THERAPEUTIC EXERCISES: CPT | Performed by: CHIROPRACTOR

## 2025-02-28 PROCEDURE — 98941 CHIROPRACT MANJ 3-4 REGIONS: CPT | Performed by: CHIROPRACTOR

## 2025-02-28 NOTE — PROGRESS NOTES
Initial date of service: 5/6/24    Diagnoses and all orders for this visit:    Spinal stenosis of lumbar region with neurogenic claudication    Segmental dysfunction of sacral region    Muscle spasm    Cervical spondylosis    Segmental dysfunction of lumbar region    Segmental dysfunction of thoracic region    Segmental dysfunction of cervical region    Reviewed xrays: no fx or instability. Chronic back pain in setting of spina stenosis and fusion sx    TREATMENT: [id number]  Ther-ex: IASTM; discussed post procedure soreness and/or ecchymosis for up to 36 hrs, applied to affected mm hypertonicities; supine hamstring stretch, supine gluteal stretch, single knee to chest stretch, upper trap stretch, transitional mvmt education, abdominal bracing; greater than 15 min spent performing above mentioned ther-ex to improve ROM/flexibility. Cervical supine graded mobilization and traction, Thoracic mobilization/manipulation: prone P-A mob; Lumbar mobilization/manipulation: diversified side laying graded HVLA, flexion-traction; SIJ Manipulation/Mobilization: R/L SIJ HVLA - long axis distraction, mederos drop table maneuver to affected SIJ    HPI:  Kinza Meza is a 49 y.o. female  Chief Complaint   Patient presents with    Neck - Follow-up     Neck is sore    Pain score 4    Back Pain     Lower lumbar is very sore.   Pain score   4     Pt presents for tx for exacerbation of chronic neck/back pain. Pt has had back and neck surgery. Pt was utilizing cane and now using walker occasionally. 2022 MRI demonstrates multilevel degenerative changes of postsurgical lumbar spine status post L1-L2 posterior spinal fixation with varying degrees of canal stenosis (moderate L4-L5) and foraminal narrowing (mild left L1-L2 and left L2-L3 ). 2024 fl/ext xrays unremarkable for instability. 2021 C/S MRI demonstrates s/p ACDF from C4 to C7. Multilevel degenerative spondylosis as described. Possible ossification of the posterior  longitudinal ligament at C2-3 and C6-7  2/27: pt reports feeling and moving better since last tx, but suffered flare-up    Neck Pain   This is a chronic problem. The current episode started more than 1 year ago. The problem occurs constantly. The problem has been waxing and waning. The pain is present in the left side, midline and right side. The quality of the pain is described as aching. The symptoms are aggravated by bending, position, stress and twisting. The pain is Worse during the day.   Back Pain  This is a chronic problem. The current episode started more than 1 year ago. The problem occurs constantly. The problem has been waxing and waning since onset. The pain is present in the gluteal, lumbar spine, sacro-iliac and thoracic spine. The quality of the pain is described as aching, burning and stabbing. The pain radiates to the left thigh, left knee and right thigh. Pain scale: 0-5/10. The pain is Worse during the day. The symptoms are aggravated by standing, twisting and bending (walking, laying supine, transitional mvmts; palliative includes sitting, massage). Pertinent negatives include no bladder incontinence or bowel incontinence. Risk factors include lack of exercise.     Past Medical History:   Diagnosis Date    ADD (attention deficit disorder)     Allergic rhinitis     ALS (amyotrophic lateral sclerosis) ([facility]) 11/22    Per neurological surgeon    Anemia 12/2021    Anxiety     Arthritis     Bipolar disorder ([facility])     Cervical disc disorder with radiculopathy of cervical region     Chronic depression     Chronic kidney disease     Stage 1    Chronic pain disorder     Cluster headache     Colitis     Last assessed - 11/25/14    Colon polyp     Concussion 2018    Condyloma acuminatum     Last assessed - 3/19/14    CTS (carpal tunnel syndrome) 2001    Depression     Diabetes mellitus ([facility])     Last assessed - 11/25/14    Diabetic nephropathy ([facility]) 2001    Diabetic neuropathy ([facility])     Diabetic retinopathy  ([facility])     Difficulty walking     Fibromyalgia     Fibromyalgia, primary     GERD (gastroesophageal reflux disease)     Head injury 18    Fall    Headache(784.0) 1977    History of transfusion 2021    HTN (hypertension)     Last assessed - 14    Hyperlipidemia 2019    Hypertension     Intervertebral disc disorder with radiculopathy of lumbar region     Lupus     Rhuematologist-Sheela Dasilva, [place]    Migraine     Miscarriage 1988    ,     Obesity 1980    Obesity, unspecified     Open wound of back 2023    Opioid abuse, in remission ([facility]) 2022    Only while in hospital and for about 10 days once home instead of one week.    Osteoporosis     Peripheral neuropathy     Postoperative wound infection 2021    Stop not healed    Preeclampsia     Last assessed - 14    Rheumatoid arthritis ([facility])     Wears dentures       Past Surgical History:   Procedure Laterality Date    APPENDECTOMY      Last assessed - 14    BARIATRIC SURGERY  2016    CARPAL TUNNEL RELEASE      CATARACT EXTRACTION      CERVICAL FUSION N/A 2019    Procedure: Anterior cervical discectomy w fusion C4-5, C5-6, C6-7; allograft and neuromonitoring;  Surgeon: Jose Gomez MD;  [facility];  Service: Orthopedics     SECTION      Last assessed - 14    COLONOSCOPY      COLONOSCOPY      HEMORRHOID SURGERY      HERNIA REPAIR      Last assessed - 14    LUMBAR FUSION N/A 2021    Procedure: L1/2 laminectomy, discectomy with L1/2 MIS posterior fixation using neuromonitoring and neuronavigation;  Surgeon: Suhas Akbar MD;  [facility];  Service: Neurosurgery    LUMBAR WOUND EXPLORATION Bilateral 2021    Procedure: Lumbar wound washout, debridement and intraoperative cultures;  Surgeon: Suhas Akbar MD;  [facility];  Service: Neurosurgery    MAMMO (HISTORICAL)      VA DEBRIDEMENT BONE 1ST 20 SQ CM/< N/A 2023    Procedure:  SURGICAL PREPARATION OF BACK  WOUND AND LOCAL FLAP, PREVENA PLACEMENT;  Surgeon: Ady Rocha MD;  Location:  MAIN OR;  Service: Plastics    WOOD-EN-Y PROCEDURE      ULNAR TUNNEL RELEASE      VAC DRESSING APPLICATION Bilateral 12/22/2021    Procedure: APPLICATION VAC DRESSING;  Surgeon: Elbert Hawley MD;  Location:  MAIN OR;  Service: General    VAC DRESSING APPLICATION N/A 12/25/2021    Procedure: APPLICATION VAC DRESSING ABDOMEN/TRUNK;  Surgeon: Mani Ross DO;  [facility];  Service: General    VENTRAL HERNIA REPAIR       The following portions of the patient's history were reviewed and updated as appropriate: allergies, past family history, past medical history, past social history, past surgical history, and problem list.  Review of Systems   Gastrointestinal:  Negative for bowel incontinence.   Genitourinary:  Negative for bladder incontinence.   Musculoskeletal:  Positive for back pain and neck pain.     Physical Exam  Neck:        Comments: Pnful and limited in Brot, Blf  Musculoskeletal:      Cervical back: Pain with movement and muscular tenderness present. Decreased range of motion.      Thoracic back: Spasms and tenderness present. Decreased range of motion.      Lumbar back: Spasms and tenderness present. Decreased range of motion. Negative right straight leg raise test and negative left straight leg raise test.        Back:       Comments: Pnful and limited in Brot, Blf, Ext centralizes    Lymphadenopathy:      Cervical: No cervical adenopathy.   Skin:     General: Skin is warm and dry.   Neurological:      Mental Status: She is alert and oriented to person, place, and time.      Gait: Gait is intact.   Psychiatric:         Mood and Affect: Mood and affect normal.         Behavior: Behavior normal.       SOFT TISSUE ASSESSMENT Hypertonicity and tenderness palpated B upper traps, B SCM, B T10-S1 erector spinae, glute med/min, QL, hamstring JOINT RESTRICTIONS: C4/5, T1/2,  T10-S1 and R/L SIJ    Return in about 2 weeks (around 3/14/2025) for Next scheduled follow up.

## 2025-03-07 ENCOUNTER — PROCEDURE VISIT (OUTPATIENT)
Age: 50
End: 2025-03-07
Payer: COMMERCIAL

## 2025-03-07 ENCOUNTER — TELEPHONE (OUTPATIENT)
Age: 50
End: 2025-03-07

## 2025-03-07 DIAGNOSIS — M47.812 CERVICAL SPONDYLOSIS: ICD-10-CM

## 2025-03-07 DIAGNOSIS — M99.03 SEGMENTAL DYSFUNCTION OF LUMBAR REGION: ICD-10-CM

## 2025-03-07 DIAGNOSIS — M99.04 SEGMENTAL DYSFUNCTION OF SACRAL REGION: ICD-10-CM

## 2025-03-07 DIAGNOSIS — M62.838 MUSCLE SPASM: ICD-10-CM

## 2025-03-07 DIAGNOSIS — M99.01 SEGMENTAL DYSFUNCTION OF CERVICAL REGION: ICD-10-CM

## 2025-03-07 DIAGNOSIS — M48.062 SPINAL STENOSIS OF LUMBAR REGION WITH NEUROGENIC CLAUDICATION: Primary | ICD-10-CM

## 2025-03-07 DIAGNOSIS — M99.02 SEGMENTAL DYSFUNCTION OF THORACIC REGION: ICD-10-CM

## 2025-03-07 PROCEDURE — 98941 CHIROPRACT MANJ 3-4 REGIONS: CPT | Performed by: CHIROPRACTOR

## 2025-03-07 PROCEDURE — 97110 THERAPEUTIC EXERCISES: CPT | Performed by: CHIROPRACTOR

## 2025-03-07 NOTE — PROGRESS NOTES
"Initial date of service: 5/6/24    Diagnoses and all orders for this visit:    Spinal stenosis of lumbar region with neurogenic claudication    Segmental dysfunction of sacral region    Muscle spasm    Cervical spondylosis    Segmental dysfunction of lumbar region    Segmental dysfunction of thoracic region    Segmental dysfunction of cervical region    Chronic back pain in setting of spinal stenosis and fusion sx. Reviewed proper biomechanics when bending, lifting    TREATMENT: [id number]  Ther-ex: IASTM; discussed post procedure soreness and/or ecchymosis for up to 36 hrs, applied to affected mm hypertonicities; supine hamstring stretch, supine gluteal stretch, single knee to chest stretch, upper trap stretch, transitional mvmt education, abdominal bracing; greater than 15 min spent performing above mentioned ther-ex to improve ROM/flexibility. Cervical supine graded mobilization and traction, Thoracic mobilization/manipulation: prone P-A mob; Lumbar mobilization/manipulation: diversified side laying graded HVLA, flexion-traction; SIJ Manipulation/Mobilization: R/L SIJ HVLA - long axis distraction, mederos drop table maneuver to affected SIJ    HPI:  Kinza Meza is a 49 y.o. female  Chief Complaint   Patient presents with    Neck - Follow-up     Neck is stiff and sore .  Pain score 8    Patient states feels like a \"rock\" with headaches       Back Pain     Lower lumbar is tight and sore .  Pain score    4-5     Pt presents for tx for exacerbation of chronic neck/back pain. Pt has had back and neck surgery. Pt was utilizing cane and now using walker occasionally. 2022 MRI demonstrates multilevel degenerative changes of postsurgical lumbar spine status post L1-L2 posterior spinal fixation with varying degrees of canal stenosis (moderate L4-L5) and foraminal narrowing (mild left L1-L2 and left L2-L3 ). 2024 fl/ext xrays unremarkable for instability. 2021 C/S MRI demonstrates s/p ACDF from C4 to C7. Multilevel " degenerative spondylosis as described. Possible ossification of the posterior longitudinal ligament at C2-3 and C6-7  3/7: pt reports feeling and moving better since last tx, but suffered flare-up    Neck Pain   This is a chronic problem. The current episode started more than 1 year ago. The problem occurs constantly. The problem has been waxing and waning. The pain is present in the left side, midline and right side. The quality of the pain is described as aching. The symptoms are aggravated by bending, position, stress and twisting. The pain is Worse during the day.   Back Pain  This is a chronic problem. The current episode started more than 1 year ago. The problem occurs constantly. The problem has been waxing and waning since onset. The pain is present in the gluteal, lumbar spine, sacro-iliac and thoracic spine. The quality of the pain is described as aching, burning and stabbing. The pain radiates to the left thigh, left knee and right thigh. Pain scale: 0-5/10. The pain is Worse during the day. The symptoms are aggravated by standing, twisting and bending (walking, laying supine, transitional mvmts; palliative includes sitting, massage). Pertinent negatives include no bladder incontinence or bowel incontinence. Risk factors include lack of exercise.     Past Medical History:   Diagnosis Date    ADD (attention deficit disorder)     Allergic rhinitis     ALS (amyotrophic lateral sclerosis) ([facility]) 11/22    Per neurological surgeon    Anemia 12/2021    Anxiety     Arthritis     Bipolar disorder ([facility])     Cervical disc disorder with radiculopathy of cervical region     Chronic depression     Chronic kidney disease     Stage 1    Chronic pain disorder     Cluster headache     Colitis     Last assessed - 11/25/14    Colon polyp     Concussion 2018    Condyloma acuminatum     Last assessed - 3/19/14    CTS (carpal tunnel syndrome) 2001    Depression     Diabetes mellitus ([facility])     Last assessed - 11/25/14    Diabetic  nephropathy ([facility])     Diabetic neuropathy ([facility])     Diabetic retinopathy ([facility])     Difficulty walking     Fibromyalgia     Fibromyalgia, primary     GERD (gastroesophageal reflux disease)     Head injury 18    Fall    Headache(784.0) 1977    History of transfusion 2021    HTN (hypertension)     Last assessed - 14    Hyperlipidemia 2019    Hypertension     Intervertebral disc disorder with radiculopathy of lumbar region     Lupus     Rhuematologist-Sheela Dasilva, PA    Migraine     Miscarriage 1988    ,     Obesity 1980    Obesity, unspecified     Open wound of back 2023    Opioid abuse, in remission ([facility]) 2022    Only while in hospital and for about 10 days once home instead of one week.    Osteoporosis     Peripheral neuropathy     Postoperative wound infection 2021    Stop not healed    Preeclampsia     Last assessed - 14    Rheumatoid arthritis ([facility])     Wears dentures       Past Surgical History:   Procedure Laterality Date    APPENDECTOMY      Last assessed - 14    BARIATRIC SURGERY  2016    CARPAL TUNNEL RELEASE      CATARACT EXTRACTION      CERVICAL FUSION N/A 2019    Procedure: Anterior cervical discectomy w fusion C4-5, C5-6, C6-7; allograft and neuromonitoring;  Surgeon: Jose Gomez MD;  [facility];  Service: Orthopedics     SECTION      Last assessed - 14    COLONOSCOPY      COLONOSCOPY      HEMORRHOID SURGERY      HERNIA REPAIR      Last assessed - 14    LUMBAR FUSION N/A 2021    Procedure: L1/2 laminectomy, discectomy with L1/2 MIS posterior fixation using neuromonitoring and neuronavigation;  Surgeon: Suhas Akbar MD;  [facility];  Service: Neurosurgery    LUMBAR WOUND EXPLORATION Bilateral 2021    Procedure: Lumbar wound washout, debridement and intraoperative cultures;  Surgeon: Suhas Akbar MD;  [facility];  Service: Neurosurgery    MAMMO  (HISTORICAL)  2016    AL DEBRIDEMENT BONE 1ST 20 SQ CM/< N/A 5/30/2023    Procedure: SURGICAL PREPARATION OF BACK  WOUND AND LOCAL FLAP, PREVENA PLACEMENT;  Surgeon: Ady Rocha MD;  Location:  MAIN OR;  Service: Plastics    WOOD-EN-Y PROCEDURE      ULNAR TUNNEL RELEASE      VAC DRESSING APPLICATION Bilateral 12/22/2021    Procedure: APPLICATION VAC DRESSING;  Surgeon: Elbert Hawley MD;  [facility];  Service: General    VAC DRESSING APPLICATION N/A 12/25/2021    Procedure: APPLICATION VAC DRESSING ABDOMEN/TRUNK;  Surgeon: Mani Ross DO;  [facility];  Service: General    VENTRAL HERNIA REPAIR       The following portions of the patient's history were reviewed and updated as appropriate: allergies, past family history, past medical history, past social history, past surgical history, and problem list.  Review of Systems   Gastrointestinal:  Negative for bowel incontinence.   Genitourinary:  Negative for bladder incontinence.   Musculoskeletal:  Positive for back pain and neck pain.     Physical Exam  Neck:        Comments: Pnful and limited in Brot, Blf  Musculoskeletal:      Cervical back: Pain with movement and muscular tenderness present. Decreased range of motion.      Thoracic back: Spasms and tenderness present. Decreased range of motion.      Lumbar back: Spasms and tenderness present. Decreased range of motion. Negative right straight leg raise test and negative left straight leg raise test.        Back:       Comments: Pnful and limited in Brot, Blf, Ext centralizes    Lymphadenopathy:      Cervical: No cervical adenopathy.   Skin:     General: Skin is warm and dry.   Neurological:      Mental Status: She is alert and oriented to person, place, and time.      Gait: Gait is intact.   Psychiatric:         Mood and Affect: Mood and affect normal.         Behavior: Behavior normal.       SOFT TISSUE ASSESSMENT Hypertonicity and tenderness palpated B upper traps, B SCM, B  T10-S1 erector spinae, glute med/min, QL, hamstring JOINT RESTRICTIONS: C4/5, T1/2, T10-S1 and R/L SIJ    Return in about 1 week (around 3/14/2025) for Next scheduled follow up.

## 2025-03-07 NOTE — TELEPHONE ENCOUNTER
Patient called and asked to speak to Nancy because she believes now she should be able to schedule.  If you can please call her back.  Thank you   Nancy Hernandez LG    11/21/24  3:45 PM  Note     Spoke to patient and reviewed criteria for panniculectomy consultation.  Has been on several courses of Keflex.  Needs documentation of being on prescription cream/powder for 3 months and will advise when complete.  Had wt loss sx 2016.

## 2025-03-12 ENCOUNTER — RA CDI HCC (OUTPATIENT)
Dept: OTHER | Facility: HOSPITAL | Age: 50
End: 2025-03-12

## 2025-03-12 ENCOUNTER — PROCEDURE VISIT (OUTPATIENT)
Age: 50
End: 2025-03-12
Payer: COMMERCIAL

## 2025-03-12 VITALS
HEART RATE: 81 BPM | DIASTOLIC BLOOD PRESSURE: 84 MMHG | BODY MASS INDEX: 26.62 KG/M2 | HEIGHT: 61 IN | WEIGHT: 141 LBS | SYSTOLIC BLOOD PRESSURE: 127 MMHG

## 2025-03-12 DIAGNOSIS — M99.03 SEGMENTAL DYSFUNCTION OF LUMBAR REGION: ICD-10-CM

## 2025-03-12 DIAGNOSIS — M62.838 MUSCLE SPASM: ICD-10-CM

## 2025-03-12 DIAGNOSIS — M99.04 SEGMENTAL DYSFUNCTION OF SACRAL REGION: ICD-10-CM

## 2025-03-12 DIAGNOSIS — M48.062 SPINAL STENOSIS OF LUMBAR REGION WITH NEUROGENIC CLAUDICATION: Primary | ICD-10-CM

## 2025-03-12 DIAGNOSIS — M99.01 SEGMENTAL DYSFUNCTION OF CERVICAL REGION: ICD-10-CM

## 2025-03-12 DIAGNOSIS — M47.812 CERVICAL SPONDYLOSIS: ICD-10-CM

## 2025-03-12 DIAGNOSIS — M99.02 SEGMENTAL DYSFUNCTION OF THORACIC REGION: ICD-10-CM

## 2025-03-12 PROCEDURE — 97110 THERAPEUTIC EXERCISES: CPT | Performed by: CHIROPRACTOR

## 2025-03-12 PROCEDURE — 98941 CHIROPRACT MANJ 3-4 REGIONS: CPT | Performed by: CHIROPRACTOR

## 2025-03-12 RX ORDER — DOXYCYCLINE 100 MG/1
100 CAPSULE ORAL EVERY 12 HOURS SCHEDULED
COMMUNITY
End: 2025-03-19

## 2025-03-12 NOTE — PROGRESS NOTES
Initial date of service: 5/6/24    Diagnoses and all orders for this visit:    Spinal stenosis of lumbar region with neurogenic claudication    Segmental dysfunction of sacral region    Muscle spasm    Cervical spondylosis    Segmental dysfunction of lumbar region    Segmental dysfunction of thoracic region    Segmental dysfunction of cervical region    Other orders  -     doxycycline hyclate (VIBRAMYCIN) 100 mg capsule; Take 100 mg by mouth every 12 (twelve) hours    Chronic back pain in setting of spinal stenosis and fusion sx. Pt suffered exacerbation but is improving. Pt reports she's been better with proper biomechanics when bending, lifting    TREATMENT: [id number]  Ther-ex: IASTM; discussed post procedure soreness and/or ecchymosis for up to 36 hrs, applied to affected mm hypertonicities; supine hamstring stretch, supine gluteal stretch, single knee to chest stretch, upper trap stretch, transitional mvmt education, abdominal bracing; greater than 15 min spent performing above mentioned ther-ex to improve ROM/flexibility. Cervical supine graded mobilization and traction, Thoracic mobilization/manipulation: prone P-A mob; Lumbar mobilization/manipulation: diversified side laying graded HVLA, flexion-traction; SIJ Manipulation/Mobilization: R/L SIJ HVLA - long axis distraction, mederos drop table maneuver to affected SIJ    HPI:  Kinza Meza is a 49 y.o. female  Chief Complaint   Patient presents with    Neck - Follow-up     Neck is feeling better. Pain score 2-3       Back Pain     Lower lumbar pain score 2-3          Pt presents for tx for exacerbation of chronic neck/back pain. Pt has had back and neck surgery. Pt was utilizing cane and now using walker occasionally. 2022 MRI demonstrates multilevel degenerative changes of postsurgical lumbar spine status post L1-L2 posterior spinal fixation with varying degrees of canal stenosis (moderate L4-L5) and foraminal narrowing (mild left L1-L2 and left L2-L3  ). 2024 fl/ext xrays unremarkable for instability. 2021 C/S MRI demonstrates s/p ACDF from C4 to C7. Multilevel degenerative spondylosis as described. Possible ossification of the posterior longitudinal ligament at C2-3 and C6-7  3/12: pt reports feeling and moving better since last tx    Neck Pain   This is a chronic problem. The current episode started more than 1 year ago. The problem occurs constantly. The problem has been waxing and waning. The pain is present in the left side, midline and right side. The quality of the pain is described as aching. The symptoms are aggravated by bending, position, stress and twisting. The pain is Worse during the day.   Back Pain  This is a chronic problem. The current episode started more than 1 year ago. The problem occurs constantly. The problem has been waxing and waning since onset. The pain is present in the gluteal, lumbar spine, sacro-iliac and thoracic spine. The quality of the pain is described as aching, burning and stabbing. The pain radiates to the left thigh, left knee and right thigh. Pain scale: 0-5/10. The pain is Worse during the day. The symptoms are aggravated by standing, twisting and bending (walking, laying supine, transitional mvmts; palliative includes sitting, massage). Pertinent negatives include no bladder incontinence or bowel incontinence. Risk factors include lack of exercise.     Past Medical History:   Diagnosis Date    ADD (attention deficit disorder)     Allergic rhinitis     ALS (amyotrophic lateral sclerosis) ([facility]) 11/22    Per neurological surgeon    Anemia 12/2021    Anxiety     Arthritis     Bipolar disorder ([facility])     Cervical disc disorder with radiculopathy of cervical region     Chronic depression     Chronic kidney disease     Stage 1    Chronic pain disorder     Cluster headache     Colitis     Last assessed - 11/25/14    Colon polyp     Concussion 2018    Condyloma acuminatum     Last assessed - 3/19/14    CTS (carpal tunnel  syndrome)     Depression     Diabetes mellitus (HCC)     Last assessed - 14    Diabetic nephropathy (HCC)     Diabetic neuropathy (HCC)     Diabetic retinopathy (HCC)     Difficulty walking     Fibromyalgia     Fibromyalgia, primary     GERD (gastroesophageal reflux disease)     Head injury 18    Fall    Headache(784.0) 1977    History of transfusion 2021    HTN (hypertension)     Last assessed - 14    Hyperlipidemia 2019    Hypertension     Intervertebral disc disorder with radiculopathy of lumbar region     Lupus     Rhuematologist-Sheela Dasilva, [place]    Migraine     Miscarriage 1988    ,     Obesity 1980    Obesity, unspecified     Open wound of back 2023    Opioid abuse, in remission ([facility]) 2022    Only while in hospital and for about 10 days once home instead of one week.    Osteoporosis     Peripheral neuropathy     Postoperative wound infection 2021    Stop not healed    Preeclampsia     Last assessed - 14    Rheumatoid arthritis (HCC)     Wears dentures       Past Surgical History:   Procedure Laterality Date    APPENDECTOMY      Last assessed - 14    BARIATRIC SURGERY  2016    CARPAL TUNNEL RELEASE      CATARACT EXTRACTION      CERVICAL FUSION N/A 2019    Procedure: Anterior cervical discectomy w fusion C4-5, C5-6, C6-7; allograft and neuromonitoring;  Surgeon: oJse Gomez MD;  [facility];  Service: Orthopedics     SECTION      Last assessed - 14    COLONOSCOPY      COLONOSCOPY      HEMORRHOID SURGERY      HERNIA REPAIR      Last assessed - 14    LUMBAR FUSION N/A 2021    Procedure: L1/2 laminectomy, discectomy with L1/2 MIS posterior fixation using neuromonitoring and neuronavigation;  Surgeon: Suhas Akbar MD;  [facility];  Service: Neurosurgery    LUMBAR WOUND EXPLORATION Bilateral 2021    Procedure: Lumbar wound washout, debridement and intraoperative  cultures;  Surgeon: Suhas Akbar MD;  [facility];  Service: Neurosurgery    MAMMO (HISTORICAL)  2016    NM DEBRIDEMENT BONE 1ST 20 SQ CM/< N/A 5/30/2023    Procedure: SURGICAL PREPARATION OF BACK  WOUND AND LOCAL FLAP, PREVENA PLACEMENT;  Surgeon: Ady Rocha MD;  Location:  MAIN OR;  Service: Plastics    WOOD-EN-Y PROCEDURE      ULNAR TUNNEL RELEASE      VAC DRESSING APPLICATION Bilateral 12/22/2021    Procedure: APPLICATION VAC DRESSING;  Surgeon: Elbert Hawley MD;  [facility];  Service: General    VAC DRESSING APPLICATION N/A 12/25/2021    Procedure: APPLICATION VAC DRESSING ABDOMEN/TRUNK;  Surgeon: Mani Ross DO;  [facility];  Service: General    VENTRAL HERNIA REPAIR       The following portions of the patient's history were reviewed and updated as appropriate: allergies, past family history, past medical history, past social history, past surgical history, and problem list.  Review of Systems   Gastrointestinal:  Negative for bowel incontinence.   Genitourinary:  Negative for bladder incontinence.   Musculoskeletal:  Positive for back pain and neck pain.     Physical Exam  Neck:        Comments: Pnful and limited in Brot, Blf  Musculoskeletal:      Cervical back: Pain with movement and muscular tenderness present. Decreased range of motion.      Thoracic back: Spasms and tenderness present. Decreased range of motion.      Lumbar back: Spasms and tenderness present. Decreased range of motion. Negative right straight leg raise test and negative left straight leg raise test.        Back:       Comments: Pnful and limited in Brot, Blf, Ext centralizes    Lymphadenopathy:      Cervical: No cervical adenopathy.   Skin:     General: Skin is warm and dry.   Neurological:      Mental Status: She is alert and oriented to person, place, and time.      Gait: Gait is intact.   Psychiatric:         Mood and Affect: Mood and affect normal.         Behavior: Behavior normal.        SOFT TISSUE ASSESSMENT Hypertonicity and tenderness palpated B upper traps, B SCM, B T10-S1 erector spinae, glute med/min, QL, hamstring JOINT RESTRICTIONS: C4/5, T1/2, T10-S1 and R/L SIJ    Return in about 2 weeks (around 3/26/2025) for Next scheduled follow up.

## 2025-03-19 ENCOUNTER — PROCEDURE VISIT (OUTPATIENT)
Age: 50
End: 2025-03-19
Payer: COMMERCIAL

## 2025-03-19 VITALS
HEIGHT: 61 IN | WEIGHT: 140 LBS | HEART RATE: 89 BPM | BODY MASS INDEX: 26.43 KG/M2 | DIASTOLIC BLOOD PRESSURE: 90 MMHG | SYSTOLIC BLOOD PRESSURE: 132 MMHG

## 2025-03-19 DIAGNOSIS — M99.04 SEGMENTAL DYSFUNCTION OF SACRAL REGION: ICD-10-CM

## 2025-03-19 DIAGNOSIS — M62.838 MUSCLE SPASM: ICD-10-CM

## 2025-03-19 DIAGNOSIS — M48.062 SPINAL STENOSIS OF LUMBAR REGION WITH NEUROGENIC CLAUDICATION: Primary | ICD-10-CM

## 2025-03-19 DIAGNOSIS — M99.02 SEGMENTAL DYSFUNCTION OF THORACIC REGION: ICD-10-CM

## 2025-03-19 DIAGNOSIS — M99.03 SEGMENTAL DYSFUNCTION OF LUMBAR REGION: ICD-10-CM

## 2025-03-19 DIAGNOSIS — M99.01 SEGMENTAL DYSFUNCTION OF CERVICAL REGION: ICD-10-CM

## 2025-03-19 DIAGNOSIS — M47.812 CERVICAL SPONDYLOSIS: ICD-10-CM

## 2025-03-19 PROCEDURE — 97110 THERAPEUTIC EXERCISES: CPT | Performed by: CHIROPRACTOR

## 2025-03-19 PROCEDURE — 98941 CHIROPRACT MANJ 3-4 REGIONS: CPT | Performed by: CHIROPRACTOR

## 2025-03-19 NOTE — PROGRESS NOTES
Initial date of service: 5/6/24    Diagnoses and all orders for this visit:    Spinal stenosis of lumbar region with neurogenic claudication    Segmental dysfunction of sacral region    Muscle spasm    Cervical spondylosis    Segmental dysfunction of lumbar region    Segmental dysfunction of thoracic region    Segmental dysfunction of cervical region    Chronic back pain in setting of spinal stenosis and fusion sx. Pt suffered exacerbation but is improving. Pt reports she's been better with proper biomechanics when bending, lifting    TREATMENT: [id number]  Ther-ex: IASTM; discussed post procedure soreness and/or ecchymosis for up to 36 hrs, applied to affected mm hypertonicities; supine hamstring stretch, supine gluteal stretch, single knee to chest stretch, upper trap stretch, transitional mvmt education, abdominal bracing; greater than 15 min spent performing above mentioned ther-ex to improve ROM/flexibility. Cervical supine graded mobilization and traction, Thoracic mobilization/manipulation: prone P-A mob; Lumbar mobilization/manipulation: diversified side laying graded HVLA, flexion-traction; SIJ Manipulation/Mobilization: R/L SIJ HVLA - long axis distraction, mederos drop table maneuver to affected SIJ    HPI:  Kinza Meza is a 49 y.o. female  Chief Complaint   Patient presents with    Neck - Follow-up     Neck pain score 2    Patient states doing well.     Back Pain     Lower lumbar on right side has intermittent sharp and shooting pain.   Pain score 4        Pt presents for tx for exacerbation of chronic neck/back pain. Pt has had back and neck surgery. Pt was utilizing cane and now using walker occasionally. 2022 MRI demonstrates multilevel degenerative changes of postsurgical lumbar spine status post L1-L2 posterior spinal fixation with varying degrees of canal stenosis (moderate L4-L5) and foraminal narrowing (mild left L1-L2 and left L2-L3 ). 2024 fl/ext xrays unremarkable for instability. 2021  C/S MRI demonstrates s/p ACDF from C4 to C7. Multilevel degenerative spondylosis as described. Possible ossification of the posterior longitudinal ligament at C2-3 and C6-7  3/19: pt reports feeling and moving better since last tx    Neck Pain   This is a chronic problem. The current episode started more than 1 year ago. The problem occurs constantly. The problem has been waxing and waning. The pain is present in the left side, midline and right side. The quality of the pain is described as aching. The symptoms are aggravated by bending, position, stress and twisting. The pain is Worse during the day.   Back Pain  This is a chronic problem. The current episode started more than 1 year ago. The problem occurs constantly. The problem has been waxing and waning since onset. The pain is present in the gluteal, lumbar spine, sacro-iliac and thoracic spine. The quality of the pain is described as aching, burning and stabbing. The pain radiates to the left thigh, left knee and right thigh. Pain scale: 0-5/10. The pain is Worse during the day. The symptoms are aggravated by standing, twisting and bending (walking, laying supine, transitional mvmts; palliative includes sitting, massage). Pertinent negatives include no bladder incontinence or bowel incontinence. Risk factors include lack of exercise.     Past Medical History:   Diagnosis Date    ADD (attention deficit disorder)     Allergic rhinitis     ALS (amyotrophic lateral sclerosis) ([facility]) 11/22    Per neurological surgeon    Anemia 12/2021    Anxiety     Arthritis     Bipolar disorder ([facility])     Cervical disc disorder with radiculopathy of cervical region     Chronic depression     Chronic kidney disease     Stage 1    Chronic pain disorder     Cluster headache     Colitis     Last assessed - 11/25/14    Colon polyp     Concussion 2018    Condyloma acuminatum     Last assessed - 3/19/14    CTS (carpal tunnel syndrome) 2001    Depression     Diabetes mellitus ([facility])      Last assessed - 14    Diabetic nephropathy (HCC)     Diabetic neuropathy (HCC)     Diabetic retinopathy (HCC)     Difficulty walking     Fibromyalgia     Fibromyalgia, primary     GERD (gastroesophageal reflux disease)     Head injury 18    Fall    Headache(784.0) 1977    History of transfusion 2021    HTN (hypertension)     Last assessed - 14    Hyperlipidemia 2019    Hypertension     Intervertebral disc disorder with radiculopathy of lumbar region     Lupus     Rhuematologist-Sheela Dasilva, PA    Migraine     Miscarriage 1988    ,     Obesity 1980    Obesity, unspecified     Open wound of back 2023    Opioid abuse, in remission (HCC) 2022    Only while in hospital and for about 10 days once home instead of one week.    Osteoporosis     Peripheral neuropathy     Postoperative wound infection 2021    Stop not healed    Preeclampsia     Last assessed - 14    Rheumatoid arthritis (HCC)     Wears dentures       Past Surgical History:   Procedure Laterality Date    APPENDECTOMY      Last assessed - 14    BARIATRIC SURGERY  2016    CARPAL TUNNEL RELEASE      CATARACT EXTRACTION      CERVICAL FUSION N/A 2019    Procedure: Anterior cervical discectomy w fusion C4-5, C5-6, C6-7; allograft and neuromonitoring;  Surgeon: Jose Gomez MD;  [facility];  Service: Orthopedics     SECTION      Last assessed - 14    COLONOSCOPY      COLONOSCOPY      HEMORRHOID SURGERY      HERNIA REPAIR      Last assessed - 14    LUMBAR FUSION N/A 2021    Procedure: L1/2 laminectomy, discectomy with L1/2 MIS posterior fixation using neuromonitoring and neuronavigation;  Surgeon: Suhas Akbar MD;  [facility];  Service: Neurosurgery    LUMBAR WOUND EXPLORATION Bilateral 2021    Procedure: Lumbar wound washout, debridement and intraoperative cultures;  Surgeon: Suhas Akbar MD;  [facility];   Service: Neurosurgery    MAMMO (HISTORICAL)  2016    TN DEBRIDEMENT BONE 1ST 20 SQ CM/< N/A 5/30/2023    Procedure: SURGICAL PREPARATION OF BACK  WOUND AND LOCAL FLAP, PREVENA PLACEMENT;  Surgeon: Ady Rocha MD;  Location:  MAIN OR;  Service: Plastics    WOOD-EN-Y PROCEDURE      ULNAR TUNNEL RELEASE      VAC DRESSING APPLICATION Bilateral 12/22/2021    Procedure: APPLICATION VAC DRESSING;  Surgeon: Elbert Hawley MD;  [facility];  Service: General    VAC DRESSING APPLICATION N/A 12/25/2021    Procedure: APPLICATION VAC DRESSING ABDOMEN/TRUNK;  Surgeon: Mani Ross DO;  [facility];  Service: General    VENTRAL HERNIA REPAIR       The following portions of the patient's history were reviewed and updated as appropriate: allergies, past family history, past medical history, past social history, past surgical history, and problem list.  Review of Systems   Gastrointestinal:  Negative for bowel incontinence.   Genitourinary:  Negative for bladder incontinence.   Musculoskeletal:  Positive for back pain and neck pain.     Physical Exam  Neck:        Comments: Pnful and limited in Brot, Blf  Musculoskeletal:      Cervical back: Pain with movement and muscular tenderness present. Decreased range of motion.      Thoracic back: Spasms and tenderness present. Decreased range of motion.      Lumbar back: Spasms and tenderness present. Decreased range of motion. Negative right straight leg raise test and negative left straight leg raise test.        Back:       Comments: Pnful and limited in Brot, Blf, Ext centralizes    Lymphadenopathy:      Cervical: No cervical adenopathy.   Skin:     General: Skin is warm and dry.   Neurological:      Mental Status: She is alert and oriented to person, place, and time.      Gait: Gait is intact.   Psychiatric:         Mood and Affect: Mood and affect normal.         Behavior: Behavior normal.       SOFT TISSUE ASSESSMENT Hypertonicity and tenderness  palpated B upper traps, B SCM, B T10-S1 erector spinae, glute med/min, QL, hamstring JOINT RESTRICTIONS: C4/5, T1/2, T10-S1 and R/L SIJ    Return in about 1 week (around 3/26/2025) for Next scheduled follow up.

## 2025-03-20 ENCOUNTER — OFFICE VISIT (OUTPATIENT)
Dept: INTERNAL MEDICINE CLINIC | Facility: CLINIC | Age: 50
End: 2025-03-20
Payer: COMMERCIAL

## 2025-03-20 VITALS
SYSTOLIC BLOOD PRESSURE: 130 MMHG | HEART RATE: 84 BPM | HEIGHT: 61 IN | WEIGHT: 137.2 LBS | TEMPERATURE: 97.8 F | DIASTOLIC BLOOD PRESSURE: 84 MMHG | OXYGEN SATURATION: 99 % | BODY MASS INDEX: 25.9 KG/M2

## 2025-03-20 DIAGNOSIS — Z79.4 TYPE 2 DIABETES MELLITUS WITH DIABETIC NEUROPATHY, WITH LONG-TERM CURRENT USE OF INSULIN (HCC): ICD-10-CM

## 2025-03-20 DIAGNOSIS — B49 FUNGAL INFECTION: ICD-10-CM

## 2025-03-20 DIAGNOSIS — L98.7 EXCESS SKIN OF ABDOMEN: Primary | ICD-10-CM

## 2025-03-20 DIAGNOSIS — E78.2 MIXED HYPERLIPIDEMIA: ICD-10-CM

## 2025-03-20 DIAGNOSIS — F31.11 BIPOLAR AFFECTIVE DISORDER, CURRENTLY MANIC, MILD (HCC): ICD-10-CM

## 2025-03-20 DIAGNOSIS — E83.42 HYPOMAGNESEMIA: ICD-10-CM

## 2025-03-20 DIAGNOSIS — G43.009 MIGRAINE WITHOUT AURA AND WITHOUT STATUS MIGRAINOSUS, NOT INTRACTABLE: ICD-10-CM

## 2025-03-20 DIAGNOSIS — N25.81 SECONDARY HYPERPARATHYROIDISM OF RENAL ORIGIN (HCC): ICD-10-CM

## 2025-03-20 DIAGNOSIS — E11.65 UNCONTROLLED TYPE 2 DIABETES MELLITUS WITH HYPERGLYCEMIA (HCC): ICD-10-CM

## 2025-03-20 DIAGNOSIS — F11.20 CONTINUOUS OPIOID DEPENDENCE (HCC): ICD-10-CM

## 2025-03-20 DIAGNOSIS — E11.40 TYPE 2 DIABETES MELLITUS WITH DIABETIC NEUROPATHY, WITH LONG-TERM CURRENT USE OF INSULIN (HCC): ICD-10-CM

## 2025-03-20 DIAGNOSIS — Z98.84 STATUS POST GASTRIC BYPASS FOR OBESITY: ICD-10-CM

## 2025-03-20 DIAGNOSIS — G83.4 CAUDA EQUINA SYNDROME (HCC): ICD-10-CM

## 2025-03-20 DIAGNOSIS — E53.8 B12 DEFICIENCY: ICD-10-CM

## 2025-03-20 DIAGNOSIS — F90.0 ATTENTION DEFICIT HYPERACTIVITY DISORDER (ADHD), PREDOMINANTLY INATTENTIVE TYPE: ICD-10-CM

## 2025-03-20 DIAGNOSIS — M17.0 PRIMARY OSTEOARTHRITIS OF BOTH KNEES: ICD-10-CM

## 2025-03-20 DIAGNOSIS — M51.16 LUMBAR DISC HERNIATION WITH RADICULOPATHY: ICD-10-CM

## 2025-03-20 DIAGNOSIS — L03.311 ABDOMINAL WALL CELLULITIS: ICD-10-CM

## 2025-03-20 DIAGNOSIS — F34.1 DYSTHYMIC DISORDER: ICD-10-CM

## 2025-03-20 DIAGNOSIS — E55.9 VITAMIN D DEFICIENCY: ICD-10-CM

## 2025-03-20 DIAGNOSIS — G44.229 CHRONIC TENSION-TYPE HEADACHE, NOT INTRACTABLE: ICD-10-CM

## 2025-03-20 PROCEDURE — 99214 OFFICE O/P EST MOD 30 MIN: CPT | Performed by: INTERNAL MEDICINE

## 2025-03-20 RX ORDER — LISDEXAMFETAMINE DIMESYLATE 30 MG/1
30 CAPSULE ORAL EVERY MORNING
Qty: 90 CAPSULE | Refills: 0 | Status: SHIPPED | OUTPATIENT
Start: 2025-03-20

## 2025-03-20 NOTE — PROGRESS NOTES
Diabetic Foot Exam    Patient's shoes and socks removed.    Right Foot/Ankle   Right Foot Inspection  Skin Exam: skin normal and skin intact. No dry skin, no warmth, no callus, no erythema, no maceration, no abnormal color, no pre-ulcer, no ulcer and no callus.     Toe Exam: ROM and strength within normal limits. No swelling, no tenderness, erythema and  no right toe deformity    Sensory   Monofilament testing: diminished    Vascular  Capillary refills: < 3 seconds  The right DP pulse is 2+. The right PT pulse is 2+.     Left Foot/Ankle  Left Foot Inspection  Skin Exam: skin normal and skin intact. No dry skin, no warmth, no erythema, no maceration, normal color, no pre-ulcer, no ulcer and no callus.     Toe Exam: ROM and strength within normal limits. No swelling, no tenderness, no erythema and no left toe deformity.     Sensory   Monofilament testing: diminished    Vascular  Capillary refills: < 3 seconds  The left DP pulse is 2+. The left PT pulse is 2+.     Assign Risk Category  No deformity present  Loss of protective sensation  No weak pulses  Risk: 1

## 2025-03-20 NOTE — PROGRESS NOTES
Assessment/Plan:             1. Excess skin of abdomen  Comments:  Excess skin present, part of the skin is moist and, superficial ulceration present, no active infection, no active discharge  2. Fungal infection  Comments:  Abdominal wall skin fungal infection stable  3. Abdominal wall cellulitis  Comments:  Stable, no active infection  4. Uncontrolled type 2 diabetes mellitus with hyperglycemia (HCC)  Comments:  Continue same medication  Orders:  -     Albumin / creatinine urine ratio; Future  -     CBC and differential; Future  -     Comprehensive metabolic panel; Future  -     Lipid Panel with Direct LDL reflex; Future  -     Hemoglobin A1C; Future  -     TSH, 3rd generation; Future  5. Attention deficit hyperactivity disorder (ADHD), predominantly inattentive type  Comments:  Continue same medication  Orders:  -     lisdexamfetamine (VYVANSE) 30 MG capsule; Take 1 capsule (30 mg total) by mouth every morning Max Daily Amount: 30 mg  6. Migraine without aura and without status migrainosus, not intractable  Comments:  Continue same medication  7. Secondary hyperparathyroidism of renal origin (HCC)  8. Type 2 diabetes mellitus with diabetic neuropathy, with long-term current use of insulin (HCC)  Comments:  Continue same [id number]  9. Lumbar disc herniation with radiculopathy  10. Primary osteoarthritis of both knees  11. Dysthymic disorder  Comments:  Continue same medication  12. Bipolar affective disorder, currently manic, mild ([facility])  Comments:  Continue same medication  13. Vitamin D deficiency  -     Vitamin D 25 hydroxy; Future  14. Mixed hyperlipidemia  Comments:  Continue same medication  Orders:  -     Comprehensive metabolic panel; Future  -     Lipid Panel with Direct LDL reflex; Future  -     TSH, 3rd generation; Future  15. Hypomagnesemia  16. Chronic tension-type headache, not intractable  17. Status post gastric bypass for obesity  18. B12 deficiency  -     Vitamin B12; Future  19. Cauda  equina syndrome ([facility])  20. Continuous opioid dependence ([facility])         Subjective:      Patient ID: [id number] Meza is a 49 y.o. female.    Follow-up on multiple medical problems ensure they are stable on current medication, excess abdominal skin, going to see surgeon, for removal, so far skin looks without infection, superficial area, no active discharge,    Rash  Pertinent negatives include no congestion, cough, fever, shortness of breath, sore throat or vomiting.       The following portions of the patient's history were reviewed and updated as appropriate: She  has a past medical history of ADD (attention deficit disorder), Allergic rhinitis, ALS (amyotrophic lateral sclerosis) ([facility]) (11/22), Anemia (12/2021), Anxiety, Arthritis, Bipolar disorder (HCC), Breast cyst (1998), Cervical disc disorder with radiculopathy of cervical region, Chronic depression, Chronic kidney disease, Chronic pain disorder, Cluster headache, Colitis, Colon polyp, Concussion (2018), Condyloma acuminatum, CTS (carpal tunnel syndrome) (2001), Depression, Diabetes mellitus ([facility]), Diabetic nephropathy ([facility]) (2001), Diabetic neuropathy ([facility]), Diabetic retinopathy ([facility]) (2001), Difficulty walking (07/22), Fibrocystic breast (age 21), Fibromyalgia, Fibromyalgia, primary, GERD (gastroesophageal reflux disease), Head injury (08/13/18), Headache(784.0) (1977), History of transfusion (12/2021), HTN (hypertension), Hyperlipidemia (07/22/2019), Hypertension, Intervertebral disc disorder with radiculopathy of lumbar region, Lupus (2003), Migraine, Miscarriage (1988), Obesity (1980), Obesity, unspecified, Open wound of back (05/30/2023), Opioid abuse, in remission ([facility]) (01/2022), Osteoporosis, Peripheral neuropathy, Postoperative wound infection (12/2021), Preeclampsia, Rheumatoid arthritis ([facility]), and Wears dentures.  She   Patient Active Problem List    Diagnosis Date Noted   • Hypomagnesemia 02/12/2025   • Migraine without aura and without status  migrainosus, not intractable 11/06/2024   • Cellulitis of right toe 07/01/2024   • Cellulitis of right shoulder 03/28/2024   • Benign hypertension with chronic kidney disease, stage III ([facility]) 01/31/2024   • Abnormal blood electrolyte level 10/30/2023   • Thrombocytopenia ([facility]) 10/30/2023   • Chest pain 10/29/2023   • Metabolic acidosis 09/27/2023   • Hyperphosphatemia 09/27/2023   • Neurogenic bladder 09/26/2023   • Bilateral carpal tunnel syndrome    • IIH (idiopathic intracranial hypertension) 08/15/2023   • Open wound of back 05/30/2023   • Maceration of skin 02/14/2023   • Cortical age-related cataract of left eye 01/04/2023   • Bipolar affective disorder, currently manic, mild ([facility]) 03/30/2022   • Seasonal allergic rhinitis 03/30/2022   • Surgical wound, non healing 02/08/2022   • Diabetic polyneuropathy associated with diabetes mellitus due to underlying condition ([facility]) 02/08/2022   • Continuous opioid dependence ([facility]) 01/07/2022   • Anemia 12/24/2021   • Wound infection 12/09/2021   • Cauda equina syndrome ([facility]) 12/08/2021   • Depression 10/28/2021   • Annual physical exam 08/27/2021   • Other gastritis without bleeding 08/11/2021   • Obesity, unspecified    • Diabetes mellitus ([facility])    • Primary hypertension    • Dizziness 12/07/2020   • Secondary hyperparathyroidism of renal origin ([facility]) 11/16/2020   • Controlled type 2 diabetes mellitus with stage 3 chronic kidney disease, with long-term current use of insulin ([facility])    • Intractable migraine with status migrainosus    • Type 2 diabetes mellitus with diabetic neuropathy ([facility]) 09/11/2020   • Ventral hernia without obstruction or gangrene 09/11/2020   • Lumbar disc herniation with radiculopathy 09/11/2020   • Concussion with moderate (1-24 hours) loss of consciousness 09/11/2020   • Primary osteoarthritis of both knees 09/11/2020   • Diabetic nephropathy associated with type 2 diabetes mellitus ([facility]) 09/11/2020   • Dysthymic disorder 09/11/2020   •  Hypokalemia 2020   • Near syncope 2020   • Weakness 2020   • Status post gastric bypass for obesity 2020   • Diarrhea 2020   • Snores    • Headache upon awakening    • Chronic tension type headache 2019   • Chronic migraine w/o aura w/o status migrainosus, not intractable 2019   • Medical marijuana use 2019   • Vitamin D deficiency 2019   • Chronic kidney disease-mineral and bone disorder 2019   • S/P cervical spinal fusion 2019   • Mixed hyperlipidemia 2019   • Cervical disc disorder with radiculopathy    • Stage 3b chronic kidney disease ([facility]) 2019   • Persistent proteinuria 2019   • Hypertensive kidney disease with stage 3b chronic kidney disease ([facility]) 2019   • Hyponatremia 2019   • Intervertebral disc disorders with radiculopathy, lumbar region    • Sacroiliitis ([facility])    • Greater trochanteric bursitis of both hips    • Herniated nucleus pulposus, L1-2 2017   • Bilateral chronic knee pain 2017   • Facet arthritis of lumbosacral region 2017   • Fibromyalgia 2017   • Lumbar stenosis with neurogenic claudication 2017   • Attention deficit hyperactivity disorder (ADHD), predominantly inattentive type 2016   • Uncontrolled type 2 diabetes mellitus with hyperglycemia ([facility]) 2016   • Gastroesophageal reflux disease without esophagitis 2016   • Morbid obesity with BMI of 40.0-44.9, adult ([facility]) 2016   • Chronic renal insufficiency 2014   • Endometriosis 2012     She  has a past surgical history that includes Appendectomy;  section; Hernia repair; Cervical fusion (N/A, 2019); Colonoscopy; Carpal tunnel release; Hemorrhoid surgery; Ventral hernia repair; Shelby-en-y procedure; Ulnar tunnel release; Mammo (historical) (); Colonoscopy; Lumbar fusion (N/A, 2021); LUMBAR WOUND EXPLORATION (Bilateral, 2021); VAC DRESSING APPLICATION  (Bilateral, 12/22/2021); VAC DRESSING APPLICATION (N/A, 12/25/2021); Bariatric Surgery (08/2016); Cataract extraction; and pr debridement bone 1st 20 sq cm/< (N/A, 5/30/2023).  Her family history includes ADD / ADHD in her cousin and cousin; Anemia in her maternal grandmother; Anxiety disorder in her father; Cancer in her mother; Cervical cancer in her mother; Colon cancer in her maternal grandmother; Completed Suicide  in her father; Depression in her father; Diabetes in her mother; Endometrial cancer in her paternal grandmother; Hypertension in her mother; Kidney disease in her mother; Mental illness in her daughter, daughter, and father; Neuropathy in her mother; No Known Problems in her family, maternal aunt, maternal grandfather, paternal grandfather, and sister; Ovarian cancer in her paternal aunt; Psychiatric Illness in her father; Suicidality in her father; Suicide Attempts in her father; Uterine cancer in her paternal grandmother.  She  reports that she has never smoked. She has never been exposed to tobacco smoke. She has never used smokeless tobacco. She reports that she does not currently use alcohol. She reports current drug use. Drug: Marijuana.  Current Outpatient Medications   Medication Sig Dispense Refill   • acetaZOLAMIDE (DIAMOX) 250 mg tablet TAKE 1 TABLET(250 MG) BY MOUTH TWICE DAILY 180 tablet 1   • atorvastatin (LIPITOR) 10 mg tablet TAKE 1 TABLET(10 MG) BY MOUTH DAILY AT BEDTIME 90 tablet 1   • BD Pen Needle Glory U/F 32G X 4 MM MISC Inject under the skin 2 (two) times a day Use as directed 180 each 0   • Blood Glucose Monitoring Suppl (ONE TOUCH ULTRA 2) w/Device KIT Use 1 each 2 (two) times a day Use as instructed 1 kit 0   • calcitriol (ROCALTROL) 0.25 mcg capsule TAKE 1 CAPSULE BY MOUTH daily 90 capsule 1   • cetirizine (ZyrTEC) oral solution Take 5 mL (5 mg total) by mouth daily 450 mL 1   • Cholecalciferol (Vitamin D3) 50 MCG (2000 UT) CAPS TAKE 1 CAPSULE(2,000 UNITS TOTAL) BY MOUTH  DAILY 90 capsule 1   • clonazePAM (KlonoPIN) 0.5 mg tablet Take 1 tablet (0.5 mg total) by mouth 2 (two) times a day 180 tablet 0   • clotrimazole-betamethasone (LOTRISONE) 1-0.05 % cream Apply topically 2 (two) times a day 45 g 0   • Continuous Glucose Sensor (FreeStyle Filiberto 3 Sensor) MISC Use 1 each every 14 (fourteen) days 6 each 1   • cyproheptadine (PERIACTIN) 4 mg tablet Take 1 tablet (4 mg total) by mouth daily at bedtime 90 tablet 1   • diphenhydrAMINE (BENADRYL) 25 mg tablet Take 1 tablet (25 mg total) by mouth every 6 (six) hours as needed for itching 30 tablet 0   • divalproex sodium (Depakote) 500 mg DR tablet Take 1 tablet (500 mg total) by mouth daily at bedtime 90 tablet 3   • docusate sodium (COLACE) 100 mg capsule TAKE 1 CAPSULE BY MOUTH TWICE A DAY 60 capsule 0   • DULoxetine (CYMBALTA) 60 mg delayed release capsule Take 1 capsule (60 mg total) by mouth daily 90 capsule 1   • ferrous sulfate 324 (65 Fe) mg Take 1 tablet (324 mg total) by mouth daily before breakfast 90 tablet 3   • fremanezumab-vfrm (Ajovy) 225 MG/1.5ML auto-injector Inject 1.5 mL (225 mg total) under the skin every 30 (thirty) days 1.5 mL 11   • insulin aspart (NovoLOG FlexPen) 100 UNIT/ML injection pen Continue with sliding scale insulin regimen     • Insulin Glargine Solostar (Lantus SoloStar) 100 UNIT/ML SOPN Inject 0.15 mL (15 Units total) under the skin daily at bedtime 15 mL 0   • Lancets MISC Use 1 each 2 (two) times a day Use as instructed     • lisdexamfetamine (VYVANSE) 30 MG capsule Take 1 capsule (30 mg total) by mouth every morning Max Daily Amount: 30 mg 90 capsule 0   • losartan-hydrochlorothiazide (HYZAAR) 100-12.5 MG per tablet TAKE ONE TABLET BY MOUTH ONE TIME DAILY 90 tablet 0   • magnesium oxide (MAG-OX) 400 mg tablet Take 1 tablet (400 mg total) by mouth daily 90 tablet 1   • methocarbamol (Robaxin-750) 750 mg tablet Take 1 tablet (750 mg total) by mouth every 6 (six) hours as needed for muscle spasms (back  pain) 30 tablet 3   • metoprolol tartrate (LOPRESSOR) 100 mg tablet TAKE 1 TABLET(100 MG) BY MOUTH EVERY 12 HOURS 180 tablet 1   • Mounjaro 15 MG/0.5ML SOAJ Inject 15 mg as directed once a week 2 mL 3   • Multiple Vitamins-Minerals (multivitamin with minerals) tablet Take 1 tablet by mouth daily     • mupirocin (BACTROBAN) 2 % ointment Apply topically 3 (three) times a day 60 g 0   • NON FORMULARY Botox injections for HA every 3months (LD 3/20/23)     • nystatin (MYCOSTATIN) powder Apply topically 2 (two) times a day 120 g 3   • ondansetron (ZOFRAN) 4 mg tablet Take 1 tablet (4 mg total) by mouth every 8 (eight) hours as needed for nausea or vomiting 120 tablet 0   • Ophthalmic Irrigation Solution (OCUSOFT EYE WASH OP) Apply 1 Application to eye daily at bedtime     • Polyethyl Glycol-Propyl Glycol (Systane) 0.4-0.3 % GEL Apply 1 drop to eye 2 (two) times a day Using Ivizia as an alternative     • polyethylene glycol (MIRALAX) 17 g packet Take 17 g by mouth daily 1 each 0   • pregabalin (LYRICA) 100 mg capsule Take 1 capsule (100 mg total) by mouth 3 (three) times a day 270 capsule 0   • rimegepant sulfate (Nurtec) 75 mg TBDP Place one tab (75mg total) under the tongue as needed for migraine. 16 tablet 11   • sodium bicarbonate 650 mg tablet Take 1 tablet (650 mg total) by mouth 3 (three) times a day 360 tablet 3   • sodium chloride (OCEAN) 0.65 % nasal spray 1 spray into each nostril every 2 (two) hours while awake 60 mL 1   • terazosin (HYTRIN) 1 mg capsule Take 1 capsule (1 mg total) by mouth daily at bedtime 30 capsule 5   • tiZANidine (ZANAFLEX) 4 mg tablet Take 1 tablet (4 mg total) by mouth every 8 (eight) hours as needed for muscle spasms 90 tablet 0     Current Facility-Administered Medications   Medication Dose Route Frequency Provider Last Rate Last Admin   • cyanocobalamin injection 1,000 mcg  1,000 mcg Intramuscular Weekly Myla Norwood MD   1,000 mcg at 07/01/24 1007     Current Outpatient Medications  on File Prior to Visit   Medication Sig   • acetaZOLAMIDE (DIAMOX) 250 mg tablet TAKE 1 TABLET(250 MG) BY MOUTH TWICE DAILY   • atorvastatin (LIPITOR) 10 mg tablet TAKE 1 TABLET(10 MG) BY MOUTH DAILY AT BEDTIME   • BD Pen Needle Glory U/F 32G X 4 MM MISC Inject under the skin 2 (two) times a day Use as directed   • Blood Glucose Monitoring Suppl (ONE TOUCH ULTRA 2) w/Device KIT Use 1 each 2 (two) times a day Use as instructed   • calcitriol (ROCALTROL) 0.25 mcg capsule TAKE 1 CAPSULE BY MOUTH daily   • cetirizine (ZyrTEC) oral solution Take 5 mL (5 mg total) by mouth daily   • Cholecalciferol (Vitamin D3) 50 MCG (2000 UT) CAPS TAKE 1 CAPSULE(2,000 UNITS TOTAL) BY MOUTH DAILY   • clonazePAM (KlonoPIN) 0.5 mg tablet Take 1 tablet (0.5 mg total) by mouth 2 (two) times a day   • clotrimazole-betamethasone (LOTRISONE) 1-0.05 % cream Apply topically 2 (two) times a day   • Continuous Glucose Sensor (FreeStyle Filiberto 3 Sensor) MISC Use 1 each every 14 (fourteen) days   • cyproheptadine (PERIACTIN) 4 mg tablet Take 1 tablet (4 mg total) by mouth daily at bedtime   • diphenhydrAMINE (BENADRYL) 25 mg tablet Take 1 tablet (25 mg total) by mouth every 6 (six) hours as needed for itching   • divalproex sodium (Depakote) 500 mg DR tablet Take 1 tablet (500 mg total) by mouth daily at bedtime   • docusate sodium (COLACE) 100 mg capsule TAKE 1 CAPSULE BY MOUTH TWICE A DAY   • DULoxetine (CYMBALTA) 60 mg delayed release capsule Take 1 capsule (60 mg total) by mouth daily   • ferrous sulfate 324 (65 Fe) mg Take 1 tablet (324 mg total) by mouth daily before breakfast   • fremanezumab-vfrm (Ajovy) 225 MG/1.5ML auto-injector Inject 1.5 mL (225 mg total) under the skin every 30 (thirty) days   • insulin aspart (NovoLOG FlexPen) 100 UNIT/ML injection pen Continue with sliding scale insulin regimen   • Insulin Glargine Solostar (Lantus SoloStar) 100 UNIT/ML SOPN Inject 0.15 mL (15 Units total) under the skin daily at bedtime   • Lancets MISC  Use 1 each 2 (two) times a day Use as instructed   • losartan-hydrochlorothiazide (HYZAAR) 100-12.5 MG per tablet TAKE ONE TABLET BY MOUTH ONE TIME DAILY   • magnesium oxide (MAG-OX) 400 mg tablet Take 1 tablet (400 mg total) by mouth daily   • methocarbamol (Robaxin-750) 750 mg tablet Take 1 tablet (750 mg total) by mouth every 6 (six) hours as needed for muscle spasms (back pain)   • metoprolol tartrate (LOPRESSOR) 100 mg tablet TAKE 1 TABLET(100 MG) BY MOUTH EVERY 12 HOURS   • Mounjaro 15 MG/0.5ML SOAJ Inject 15 mg as directed once a week   • Multiple Vitamins-Minerals (multivitamin with minerals) tablet Take 1 tablet by mouth daily   • mupirocin (BACTROBAN) 2 % ointment Apply topically 3 (three) times a day   • NON FORMULARY Botox injections for HA every 3months (LD 3/20/23)   • nystatin (MYCOSTATIN) powder Apply topically 2 (two) times a day   • ondansetron (ZOFRAN) 4 mg tablet Take 1 tablet (4 mg total) by mouth every 8 (eight) hours as needed for nausea or vomiting   • Ophthalmic Irrigation Solution (OCUSOFT EYE WASH OP) Apply 1 Application to eye daily at bedtime   • Polyethyl Glycol-Propyl Glycol (Systane) 0.4-0.3 % GEL Apply 1 drop to eye 2 (two) times a day Using Ivizia as an alternative   • polyethylene glycol (MIRALAX) 17 g packet Take 17 g by mouth daily   • pregabalin (LYRICA) 100 mg capsule Take 1 capsule (100 mg total) by mouth 3 (three) times a day   • rimegepant sulfate (Nurtec) 75 mg TBDP Place one tab (75mg total) under the tongue as needed for migraine.   • sodium bicarbonate 650 mg tablet Take 1 tablet (650 mg total) by mouth 3 (three) times a day   • sodium chloride (OCEAN) 0.65 % nasal spray 1 spray into each nostril every 2 (two) hours while awake   • terazosin (HYTRIN) 1 mg capsule Take 1 capsule (1 mg total) by mouth daily at bedtime   • tiZANidine (ZANAFLEX) 4 mg tablet Take 1 tablet (4 mg total) by mouth every 8 (eight) hours as needed for muscle spasms   • [DISCONTINUED]  "lisdexamfetamine (VYVANSE) 30 MG capsule Take 1 capsule (30 mg total) by mouth every morning Max Daily Amount: 30 mg     Current Facility-Administered Medications on File Prior to Visit   Medication   • cyanocobalamin injection 1,000 mcg     She is allergic to ace inhibitors, pollen extract, short ragweed pollen ext, sodium hyaluronate (yoav), levonorgestrel-eth estradiol [levonorgestrel-ethinyl estrad], and latex..    Review of Systems   Constitutional:  Negative for chills and fever.   HENT:  Negative for congestion, ear pain and sore throat.    Eyes:  Negative for pain.   Respiratory:  Negative for cough and shortness of breath.    Cardiovascular:  Negative for chest pain and leg swelling.   Gastrointestinal:  Negative for abdominal pain, nausea and vomiting.   Endocrine: Negative for polyuria.   Genitourinary:  Negative for difficulty urinating, frequency and urgency.   Musculoskeletal:  Positive for arthralgias and back pain.   Skin:  Positive for rash.   Neurological:  Negative for weakness and headaches.   Psychiatric/Behavioral:  Negative for sleep disturbance. The patient is not nervous/anxious.          Objective:      /84 (BP Location: Left arm, Patient Position: Sitting, Cuff Size: Standard)   Pulse 84   Temp 97.8 °F (36.6 °C) (Temporal)   Ht 5' 1\" (1.549 m)   Wt 62.2 kg (137 lb 3.2 oz)   LMP  (LMP Unknown)   SpO2 99%   BMI 25.92 kg/m²     Recent Results (from the past 8 weeks)   POCT urine dip    Collection Time: 01/24/25 12:11 PM   Result Value Ref Range    LEUKOCYTE ESTERASE,UA negative     NITRITE,UA positive     SL AMB POCT UROBILINOGEN 0.2mg/dL     POCT URINE PROTEIN negative      PH,UA 6.0     BLOOD,UA +- 10 Chase/uL     SPECIFIC GRAVITY,UA 1.015     KETONES,UA negative     BILIRUBIN,UA negative     GLUCOSE, UA negative      COLOR,UA dark yellow     CLARITY,UA cloudy    Urine culture    Collection Time: 01/24/25 12:17 PM    Specimen: Urine, Clean Catch   Result Value Ref Range    Urine " Culture >100,000 cfu/ml Escherichia coli (A)        Susceptibility    Escherichia coli - RAVI     ZID Performed Yes       Amoxicillin + Clavulanate <=8/4 Susceptible ug/ml     Ampicillin ($$) >16.00 Resistant ug/ml     Ampicillin + Sulbactam ($) 16/8 Intermediate ug/ml     Aztreonam ($$$)  <=4 Susceptible ug/ml     Cefazolin ($) 4.00 Susceptible ug/ml     Ciprofloxacin ($)  <=0.25 Susceptible ug/ml     Ertapenem ($$$) <=0.5 Susceptible ug/ml     Gentamicin ($$) >8 Resistant ug/ml     Levofloxacin ($) <=0.50 Susceptible ug/ml     Minocycline <=4 Susceptible ug/ml     Nitrofurantoin <=32 Susceptible ug/ml     Piperacillin + Tazobactam ($$$) <=8 Susceptible ug/ml     Tetracycline >8 Resistant ug/ml     Trimethoprim + Sulfamethoxazole ($$$) >2/38 Resistant ug/ml   Basic metabolic panel    Collection Time: 02/11/25  5:43 PM   Result Value Ref Range    Sodium 140 135 - 147 mmol/L    Potassium 4.0 3.5 - 5.3 mmol/L    Chloride 104 96 - 108 mmol/L    CO2 23 21 - 32 mmol/L    ANION GAP 13 4 - 13 mmol/L    BUN 22 5 - 25 mg/dL    Creatinine 1.76 (H) 0.60 - 1.30 mg/dL    Glucose, Fasting 93 65 - 99 mg/dL    Calcium 9.1 8.4 - 10.2 mg/dL    eGFR 33 ml/min/1.73sq m   Protein / creatinine ratio, urine    Collection Time: 02/11/25  5:43 PM   Result Value Ref Range    Creatinine, Ur 185.1 Reference range not established. mg/dL    Protein Urine Random 32.8 Reference range not established. mg/dL    Prot/Creat Ratio, Ur 0.2 (H) 0.0 - 0.1   PTH, intact    Collection Time: 02/11/25  5:43 PM   Result Value Ref Range    .5 (H) 12.0 - 88.0 pg/mL   Urinalysis with microscopic    Collection Time: 02/11/25  5:43 PM   Result Value Ref Range    Color, UA Yellow     Clarity, UA Turbid     Specific Gravity, UA 1.021 1.003 - 1.030    pH, UA 6.5 4.5, 5.0, 5.5, 6.0, 6.5, 7.0, 7.5, 8.0    Leukocytes, UA Moderate (A) Negative    Nitrite, UA Negative Negative    Protein, UA 50 (1+) (A) Negative mg/dl    Glucose, UA Negative Negative mg/dl     Ketones, UA Trace (A) Negative mg/dl    Urobilinogen, UA 4.0 (A) <2.0 mg/dl mg/dl    Bilirubin, UA Negative Negative    Occult Blood, UA Negative Negative    RBC, UA 2-4 (A) None Seen, 1-2 /hpf    WBC, UA 4-10 (A) None Seen, 1-2 /hpf    Epithelial Cells Moderate (A) None Seen, Occasional /hpf    Bacteria, UA Occasional None Seen, Occasional /hpf    MUCUS THREADS Occasional (A) None Seen    Ca Oxalate Sandy, UA Innumerable (A) None Seen /hpf   Phosphorus    Collection Time: 02/11/25  5:43 PM   Result Value Ref Range    Phosphorus 5.3 (H) 2.7 - 4.5 mg/dL   Magnesium    Collection Time: 02/11/25  5:43 PM   Result Value Ref Range    Magnesium 1.7 (L) 1.9 - 2.7 mg/dL   CBC    Collection Time: 02/11/25  5:43 PM   Result Value Ref Range    WBC 5.88 4.31 - 10.16 Thousand/uL    RBC 4.24 3.81 - 5.12 Million/uL    Hemoglobin 12.1 11.5 - 15.4 g/dL    Hematocrit 36.9 34.8 - 46.1 %    MCV 87 82 - 98 fL    MCH 28.5 26.8 - 34.3 pg    MCHC 32.8 31.4 - 37.4 g/dL    RDW 13.5 11.6 - 15.1 %    Platelets 168 149 - 390 Thousands/uL    MPV 12.1 8.9 - 12.7 fL        Physical Exam  Constitutional:       Appearance: Normal appearance.   HENT:      Head: Normocephalic.      Right Ear: External ear normal.      Left Ear: External ear normal.      Nose: Nose normal. No congestion.      Mouth/Throat:      Mouth: Mucous membranes are moist.      Pharynx: Oropharynx is clear. No oropharyngeal exudate or posterior oropharyngeal erythema.   Eyes:      Extraocular Movements: Extraocular movements intact.      Conjunctiva/sclera: Conjunctivae normal.   Cardiovascular:      Rate and Rhythm: Normal rate and regular rhythm.      Heart sounds: Normal heart sounds. No murmur heard.  Pulmonary:      Effort: Pulmonary effort is normal.      Breath sounds: Normal breath sounds. No wheezing or rales.   Abdominal:      General: Abdomen is flat. There is no distension.      Palpations: Abdomen is soft.      Tenderness: There is no abdominal tenderness.    Musculoskeletal:         General: Normal range of motion.      Cervical back: Normal range of motion and neck supple.      Right lower leg: No edema.      Left lower leg: No edema.   Lymphadenopathy:      Cervical: No cervical adenopathy.   Skin:     General: Skin is warm.   Neurological:      General: No focal deficit present.      Mental Status: She is alert and oriented to person, place, and time.

## 2025-03-24 ENCOUNTER — CONSULT (OUTPATIENT)
Dept: PLASTIC SURGERY | Facility: CLINIC | Age: 50
End: 2025-03-24
Payer: COMMERCIAL

## 2025-03-24 VITALS
WEIGHT: 137 LBS | HEART RATE: 104 BPM | SYSTOLIC BLOOD PRESSURE: 163 MMHG | DIASTOLIC BLOOD PRESSURE: 110 MMHG | TEMPERATURE: 97.8 F | BODY MASS INDEX: 25.86 KG/M2 | HEIGHT: 61 IN

## 2025-03-24 DIAGNOSIS — K29.60 OTHER GASTRITIS WITHOUT BLEEDING: ICD-10-CM

## 2025-03-24 DIAGNOSIS — E87.20 METABOLIC ACIDOSIS: ICD-10-CM

## 2025-03-24 DIAGNOSIS — E65 ABDOMINAL PANNICULUS, SYMPTOMATIC: Primary | ICD-10-CM

## 2025-03-24 DIAGNOSIS — I10 ESSENTIAL HYPERTENSION, BENIGN: ICD-10-CM

## 2025-03-24 DIAGNOSIS — N31.9 NEUROGENIC BLADDER: ICD-10-CM

## 2025-03-24 DIAGNOSIS — G43.009 MIGRAINE WITHOUT AURA AND WITHOUT STATUS MIGRAINOSUS, NOT INTRACTABLE: ICD-10-CM

## 2025-03-24 DIAGNOSIS — E55.9 VITAMIN D DEFICIENCY: ICD-10-CM

## 2025-03-24 DIAGNOSIS — N18.30 BENIGN HYPERTENSION WITH CHRONIC KIDNEY DISEASE, STAGE III (HCC): ICD-10-CM

## 2025-03-24 DIAGNOSIS — I12.9 BENIGN HYPERTENSION WITH CHRONIC KIDNEY DISEASE, STAGE III (HCC): ICD-10-CM

## 2025-03-24 DIAGNOSIS — M48.062 LUMBAR STENOSIS WITH NEUROGENIC CLAUDICATION: ICD-10-CM

## 2025-03-24 DIAGNOSIS — G83.4 CAUDA EQUINA SYNDROME (HCC): ICD-10-CM

## 2025-03-24 DIAGNOSIS — E78.5 HYPERLIPIDEMIA, UNSPECIFIED: ICD-10-CM

## 2025-03-24 DIAGNOSIS — F31.11 BIPOLAR AFFECTIVE DISORDER, CURRENTLY MANIC, MILD (HCC): ICD-10-CM

## 2025-03-24 DIAGNOSIS — G93.2 IIH (IDIOPATHIC INTRACRANIAL HYPERTENSION): ICD-10-CM

## 2025-03-24 DIAGNOSIS — D50.9 IRON DEFICIENCY ANEMIA: ICD-10-CM

## 2025-03-24 DIAGNOSIS — E83.42 HYPOMAGNESEMIA: ICD-10-CM

## 2025-03-24 PROCEDURE — 99214 OFFICE O/P EST MOD 30 MIN: CPT | Performed by: PLASTIC SURGERY

## 2025-03-24 RX ORDER — METHOCARBAMOL 750 MG/1
750 TABLET, FILM COATED ORAL EVERY 6 HOURS PRN
Qty: 30 TABLET | Refills: 0 | Status: SHIPPED | OUTPATIENT
Start: 2025-03-24

## 2025-03-24 NOTE — PROGRESS NOTES
[facility]   Plastic and Reconstructive Surgery   [address] [place] [id number]     HISTORY & PHYSICAL      Assessment & Plan  Abdominal panniculus, symptomatic  Kinza Meza is a 49-year-old female with a history of type 2 diabetes mellitus, chronic kidney disease and obesity who is status post gastric bypass in 2016 with a total weight loss of 200 pounds.  Patient is weight stable.  Patient experiences significant physical discomfort and difficulty performing daily activities due to her abdominal panniculus.  Additionally she is experiencing significant hygiene challenges with significant skin irritation and fungal infection beneath the abdominal panniculus.  Patient presents today to discuss surgical management.    I discussed with her the option of panniculectomy, including the nature of panniculectomy, the nature of rectus plication, we discussed the risk of bleeding, infection, scarring, poor wound healing, damage underlying structures, need for further surgery, need for multiple procedures, loss of umbilicus, poor aesthetic result, scar migration, need for revision, contour deformity, the risk of seroma, DVT, poor aesthetic result, need for revision, need for multiple procedures.  I discussed with the patient the option of vertical and horizontal panniculectomy, discussed with her the risks, benefits, alternatives of that procedure including the above with increased risk of wound healing complication particularly at the triple point, we discussed the risk of scar migration, relapse of skin, asymmetry, need for further surgery, need for multiple procedures, we discussed the risk of poor scarring, distortion of the umbilical structures, distortion of the labial structures, I discussed with her that panniculectomy may be approved by insurance but rectus plication is traditionally not.  All the patient's questions were answered to her satisfaction, because of the constellation chronicity of her  symptoms I do believe this is a medically necessary procedure.  The patient is an excellent candidate for vertical horizontal panniculectomy.  Pictures taken, will inquire as to insurance authorization.  Given abdominal exam findings of central abdominal bulge, will obtain a CT to assess for hernia.     I have spent a total time of 45 minutes in caring for this patient on the day of the visit/encounter including Risks and benefits of tx options, Impressions, Documenting in the medical record, and Obtaining or reviewing history  .          History of Present Illness  Kinza Meza is a 49 y.o. female who presents with significant excess abdominal skin.    She has experienced a significant total weight loss of approximately 200 pounds over the past decade, initially through diet and exercise, and subsequently through gastric bypass surgery in 2016. She also started on Trulicity last year, which resulted in additional weight loss. She is now currently on Mounjaro. She reports no recent weight loss.  Her current weight fluctuates by about 4 pounds, with a plateau around 142 pounds. Her weight has remained stable for the past 3 to 4 months. She maintains a regular exercise regimen, including swimming once a week. She has a history of diabetic ketoacidosis, which occurred during the summer due to insulin non-compliance. This resulted in hospitalization and loss of consciousness. Following this incident, she made dietary modifications, including reducing carbohydrate intake and consulting with a nurse from [facility] regarding nutrition.  She is uncertain about the presence of a hernia or scar tissue but reports discomfort when eating. She has a history of laparoscopic surgery for gastric bypass and emergency exploratory surgery for appendicitis. She has a history of  and hernia surgery, both performed through the same incision.    She experiences rashes under her abdomen, which blister and  enlarge, with some skin sloughing. She uses an antibiotic cream for her infections and takes oral antibiotics as needed. She reports that her excess abdominal skin interfere with urination, walking, and exercising. She uses a pregnancy pillow for sleeping due to discomfort in her tailbone.    Supplemental Information  She has a history of back surgery with subsequent plastic surgery to close the back wound. Despite ongoing physical therapy, her back never fully fused. She has a history of cauda equina syndrome and recurrent infections, including infections in her toes. She has a lot of arthritis.          Review of Systems    Past Medical History:   Diagnosis Date    ADD (attention deficit disorder)     Allergic rhinitis     ALS (amyotrophic lateral sclerosis) (Formerly [facility]) 11/22    Per neurological surgeon    Anemia 12/2021    Anxiety     Arthritis     Bipolar disorder (Formerly [facility])     Breast cyst 1998    Cervical disc disorder with radiculopathy of cervical region     Chronic depression     Chronic kidney disease     Stage 1    Chronic pain disorder     Cluster headache     Colitis     Last assessed - 11/25/14    Colon polyp     Concussion 2018    Condyloma acuminatum     Last assessed - 3/19/14    CTS (carpal tunnel syndrome) 2001    Depression     Diabetes mellitus ([facility])     Last assessed - 11/25/14    Diabetic nephropathy ([facility]) 2001    Diabetic neuropathy ([facility])     Diabetic retinopathy ([facility]) 2001    Difficulty walking 07/22    Fibrocystic breast age 21    Fibromyalgia     Fibromyalgia, primary     GERD (gastroesophageal reflux disease)     Head injury 08/13/18    Fall    Headache(784.0) 1977    History of transfusion 12/2021    HTN (hypertension)     Last assessed - 11/25/14    Hyperlipidemia 07/22/2019    Hypertension     Intervertebral disc disorder with radiculopathy of lumbar region     Lupus 2003    Rhuematologist-Sheela Dasilva, [place]    Migraine     Miscarriage 1988    2001, 2004    Obesity 1980    Obesity,  unspecified     Open wound of back 2023    Opioid abuse, in remission (HCC) 2022    Only while in hospital and for about 10 days once home instead of one week.    Osteoporosis     Peripheral neuropathy     Postoperative wound infection 2021    Stop not healed    Preeclampsia     Last assessed - 14    Rheumatoid arthritis (HCC)     Wears dentures         Past Surgical History:   Procedure Laterality Date    APPENDECTOMY      Last assessed - 14    BARIATRIC SURGERY  2016    CARPAL TUNNEL RELEASE      CATARACT EXTRACTION      CERVICAL FUSION N/A 2019    Procedure: Anterior cervical discectomy w fusion C4-5, C5-6, C6-7; allograft and neuromonitoring;  Surgeon: Jose Gomez MD;  Location: [facility];  Service: Orthopedics     SECTION      Last assessed - 14    COLONOSCOPY      COLONOSCOPY      HEMORRHOID SURGERY      HERNIA REPAIR      Last assessed - 14    LUMBAR FUSION N/A 2021    Procedure: L1/2 laminectomy, discectomy with L1/2 MIS posterior fixation using neuromonitoring and neuronavigation;  Surgeon: Suhas Akbar MD;  Location: [facility];  Service: Neurosurgery    LUMBAR WOUND EXPLORATION Bilateral 2021    Procedure: Lumbar wound washout, debridement and intraoperative cultures;  Surgeon: Suhas Akbar MD;  [facility]: [facility];  Service: Neurosurgery    MAMMO (HISTORICAL)      RI DEBRIDEMENT BONE 1ST 20 SQ CM/< N/A 2023    Procedure: SURGICAL PREPARATION OF BACK  WOUND AND LOCAL FLAP, PREVENA PLACEMENT;  Surgeon: Ady Rocha MD;  Location:  MAIN OR;  Service: Plastics    OWOD-EN-Y PROCEDURE      ULNAR TUNNEL RELEASE      VAC DRESSING APPLICATION Bilateral 2021    Procedure: APPLICATION VAC DRESSING;  Surgeon: Elbert Hawley MD;  [facility]: [facility];  Service: General    VAC DRESSING APPLICATION N/A 2021    Procedure: APPLICATION VAC DRESSING ABDOMEN/TRUNK;  Surgeon: Mani Ross DO;  [facility]: [facility];   Service: General    VENTRAL HERNIA REPAIR         Current Outpatient Medications on File Prior to Visit   Medication Sig Dispense Refill    acetaZOLAMIDE (DIAMOX) 250 mg tablet TAKE 1 TABLET(250 MG) BY MOUTH TWICE DAILY 180 tablet 1    atorvastatin (LIPITOR) 10 mg tablet TAKE 1 TABLET(10 MG) BY MOUTH DAILY AT BEDTIME 90 tablet 1    BD Pen Needle Glory U/F 32G X 4 MM MISC Inject under the skin 2 (two) times a day Use as directed 180 each 0    Blood Glucose Monitoring Suppl (ONE TOUCH ULTRA 2) w/Device KIT Use 1 each 2 (two) times a day Use as instructed 1 kit 0    calcitriol (ROCALTROL) 0.25 mcg capsule TAKE 1 CAPSULE BY MOUTH daily 90 capsule 1    cetirizine (ZyrTEC) oral solution Take 5 mL (5 mg total) by mouth daily 450 mL 1    Cholecalciferol (Vitamin D3) 50 MCG (2000 UT) CAPS TAKE 1 CAPSULE(2,000 UNITS TOTAL) BY MOUTH DAILY 90 capsule 1    clonazePAM (KlonoPIN) 0.5 mg tablet Take 1 tablet (0.5 mg total) by mouth 2 (two) times a day 180 tablet 0    clotrimazole-betamethasone (LOTRISONE) 1-0.05 % cream Apply topically 2 (two) times a day 45 g 0    Continuous Glucose Sensor (FreeStyle Filiberto 3 Sensor) MISC Use 1 each every 14 (fourteen) days 6 each 1    cyproheptadine (PERIACTIN) 4 mg tablet Take 1 tablet (4 mg total) by mouth daily at bedtime 90 tablet 1    diphenhydrAMINE (BENADRYL) 25 mg tablet Take 1 tablet (25 mg total) by mouth every 6 (six) hours as needed for itching 30 tablet 0    divalproex sodium (Depakote) 500 mg DR tablet Take 1 tablet (500 mg total) by mouth daily at bedtime 90 tablet 3    docusate sodium (COLACE) 100 mg capsule TAKE 1 CAPSULE BY MOUTH TWICE A DAY 60 capsule 0    DULoxetine (CYMBALTA) 60 mg delayed release capsule Take 1 capsule (60 mg total) by mouth daily 90 capsule 1    ferrous sulfate 324 (65 Fe) mg Take 1 tablet (324 mg total) by mouth daily before breakfast 90 tablet 3    fremanezumab-vfrm (Ajovy) 225 MG/1.5ML auto-injector Inject 1.5 mL (225 mg total) under the skin every 30  (thirty) days 1.5 mL 11    insulin aspart (NovoLOG FlexPen) 100 UNIT/ML injection pen Continue with sliding scale insulin regimen      Insulin Glargine Solostar (Lantus SoloStar) 100 UNIT/ML SOPN Inject 0.15 mL (15 Units total) under the skin daily at bedtime 15 mL 0    Lancets MISC Use 1 each 2 (two) times a day Use as instructed      lisdexamfetamine (VYVANSE) 30 MG capsule Take 1 capsule (30 mg total) by mouth every morning Max Daily Amount: 30 mg 90 capsule 0    losartan-hydrochlorothiazide (HYZAAR) 100-12.5 MG per tablet TAKE ONE TABLET BY MOUTH ONE TIME DAILY 90 tablet 0    magnesium oxide (MAG-OX) 400 mg tablet Take 1 tablet (400 mg total) by mouth daily 90 tablet 1    methocarbamol (Robaxin-750) 750 mg tablet Take 1 tablet (750 mg total) by mouth every 6 (six) hours as needed for muscle spasms (back pain) 30 tablet 3    metoprolol tartrate (LOPRESSOR) 100 mg tablet TAKE 1 TABLET(100 MG) BY MOUTH EVERY 12 HOURS 180 tablet 1    Mounjaro 15 MG/0.5ML SOAJ Inject 15 mg as directed once a week 2 mL 3    Multiple Vitamins-Minerals (multivitamin with minerals) tablet Take 1 tablet by mouth daily      mupirocin (BACTROBAN) 2 % ointment Apply topically 3 (three) times a day 60 g 0    NON FORMULARY Botox injections for HA every 3months (LD 3/20/23)      nystatin (MYCOSTATIN) powder Apply topically 2 (two) times a day 120 g 3    ondansetron (ZOFRAN) 4 mg tablet Take 1 tablet (4 mg total) by mouth every 8 (eight) hours as needed for nausea or vomiting 120 tablet 0    Ophthalmic Irrigation Solution (OCUSOFT EYE WASH OP) Apply 1 Application to eye daily at bedtime      Polyethyl Glycol-Propyl Glycol (Systane) 0.4-0.3 % GEL Apply 1 drop to eye 2 (two) times a day Using Ivizia as an alternative      polyethylene glycol (MIRALAX) 17 g packet Take 17 g by mouth daily 1 each 0    pregabalin (LYRICA) 100 mg capsule Take 1 capsule (100 mg total) by mouth 3 (three) times a day 270 capsule 0    rimegepant sulfate (Nurtec) 75 mg  TBDP Place one tab (75mg total) under the tongue as needed for migraine. 16 tablet 11    sodium bicarbonate 650 mg tablet Take 1 tablet (650 mg total) by mouth 3 (three) times a day 360 tablet 3    sodium chloride (OCEAN) 0.65 % nasal spray 1 spray into each nostril every 2 (two) hours while awake 60 mL 1    terazosin (HYTRIN) 1 mg capsule Take 1 capsule (1 mg total) by mouth daily at bedtime 30 capsule 5    tiZANidine (ZANAFLEX) 4 mg tablet Take 1 tablet (4 mg total) by mouth every 8 (eight) hours as needed for muscle spasms 90 tablet 0     Current Facility-Administered Medications on File Prior to Visit   Medication Dose Route Frequency Provider Last Rate Last Admin    cyanocobalamin injection 1,000 mcg  1,000 mcg Intramuscular Weekly Myla Norwood MD   1,000 mcg at 07/01/24 1007       Allergies   Allergen Reactions    Ace Inhibitors Cough    Pollen Extract Other (See Comments)     SNEEZING, COUGHING    Short Ragweed Pollen Ext Cough    Sodium Hyaluronate (Eron) Other (See Comments)     Edema at injection site  Edema at injection site    Levonorgestrel-Eth Estradiol [Levonorgestrel-Ethinyl Estrad] Other (See Comments)     burning    Latex Rash       Social History     Socioeconomic History    Marital status: /Civil Union     Spouse name: Not on file    Number of children: Not on file    Years of education: Not on file    Highest education level: Not on file   Occupational History    Not on file   Tobacco Use    Smoking status: Never     Passive exposure: Never    Smokeless tobacco: Never    Tobacco comments:     Never used tabaco, don’t care to   Vaping Use    Vaping status: Never Used   Substance and Sexual Activity    Alcohol use: Not Currently    Drug use: Yes     Types: Marijuana     Comment: Medicinal    Sexual activity: Not Currently     Partners: Male     Birth control/protection: I.U.D.     Comment: Mirena 4/5/2017   Other Topics Concern    Not on file   Social History Narrative    Daily  caffeinated consumption     Exercise - Walking     Social Drivers of Health     Financial Resource Strain: Not on file   Food Insecurity: No Food Insecurity (7/28/2024)    Nursing - Inadequate Food Risk Classification     Worried About Running Out of Food in the Last Year: Never true     Ran Out of Food in the Last Year: Never true     Ran Out of Food in the Last Year: Not on file   Transportation Needs: No Transportation Needs (7/28/2024)    PRAPARE - Transportation     Lack of Transportation (Medical): No     Lack of Transportation (Non-Medical): No   Physical Activity: Not on file   Stress: Not on file   Social Connections: Not on file   Intimate Partner Violence: Not on file   Housing Stability: Low Risk  (7/28/2024)    Housing Stability Vital Sign     Unable to Pay for Housing in the Last Year: No     Number of Times Moved in the Last Year: 0     Homeless in the Last Year: No           Physical Exam   Alert, oriented, NAD  Breathing comfortably on room air  Abdomen with significant excess skin of the supraumbilical region, significant excess skin of the infraumbilical abdomen.  Evidence of skin irritation in the periumbilical skin and within the skin folds of the abdomen.  Palpable fullness of the central abdomen just above the umbilicus

## 2025-03-25 ENCOUNTER — TELEPHONE (OUTPATIENT)
Age: 50
End: 2025-03-25

## 2025-03-25 DIAGNOSIS — N31.9 NEUROGENIC BLADDER: ICD-10-CM

## 2025-03-25 RX ORDER — DIVALPROEX SODIUM 500 MG/1
500 TABLET, DELAYED RELEASE ORAL
Qty: 90 TABLET | Refills: 3 | Status: SHIPPED | OUTPATIENT
Start: 2025-03-25

## 2025-03-25 RX ORDER — METOPROLOL TARTRATE 100 MG/1
100 TABLET ORAL 2 TIMES DAILY
Qty: 180 TABLET | Refills: 1 | Status: SHIPPED | OUTPATIENT
Start: 2025-03-25

## 2025-03-25 RX ORDER — LOSARTAN POTASSIUM AND HYDROCHLOROTHIAZIDE 12.5; 1 MG/1; MG/1
1 TABLET ORAL DAILY
Qty: 90 TABLET | Refills: 1 | Status: SHIPPED | OUTPATIENT
Start: 2025-03-25

## 2025-03-25 RX ORDER — DIVALPROEX SODIUM 500 MG/1
500 TABLET, DELAYED RELEASE ORAL
Qty: 90 TABLET | Refills: 1 | Status: SHIPPED | OUTPATIENT
Start: 2025-03-25

## 2025-03-25 RX ORDER — TERAZOSIN 1 MG/1
1 CAPSULE ORAL
Qty: 30 CAPSULE | Refills: 0 | Status: SHIPPED | OUTPATIENT
Start: 2025-03-25 | End: 2025-03-25

## 2025-03-25 RX ORDER — ACETAMINOPHEN 160 MG
2000 TABLET,DISINTEGRATING ORAL DAILY
Qty: 90 CAPSULE | Refills: 0 | Status: SHIPPED | OUTPATIENT
Start: 2025-03-25

## 2025-03-25 RX ORDER — ACETAZOLAMIDE 250 MG/1
250 TABLET ORAL 2 TIMES DAILY
Qty: 180 TABLET | Refills: 3 | Status: SHIPPED | OUTPATIENT
Start: 2025-03-25

## 2025-03-25 RX ORDER — FERROUS SULFATE 324(65)MG
324 TABLET, DELAYED RELEASE (ENTERIC COATED) ORAL
Qty: 90 TABLET | Refills: 0 | Status: SHIPPED | OUTPATIENT
Start: 2025-03-25

## 2025-03-25 RX ORDER — METOPROLOL TARTRATE 100 MG/1
100 TABLET ORAL EVERY 12 HOURS
Qty: 180 TABLET | Refills: 1 | Status: SHIPPED | OUTPATIENT
Start: 2025-03-25

## 2025-03-25 RX ORDER — TERAZOSIN 1 MG/1
CAPSULE ORAL
Qty: 90 CAPSULE | Refills: 3 | Status: SHIPPED | OUTPATIENT
Start: 2025-03-25

## 2025-03-25 RX ORDER — DULOXETIN HYDROCHLORIDE 60 MG/1
60 CAPSULE, DELAYED RELEASE ORAL DAILY
Qty: 90 CAPSULE | Refills: 1 | Status: SHIPPED | OUTPATIENT
Start: 2025-03-25

## 2025-03-25 RX ORDER — ONDANSETRON 4 MG/1
4 TABLET, FILM COATED ORAL EVERY 8 HOURS PRN
Qty: 120 TABLET | Refills: 0 | Status: SHIPPED | OUTPATIENT
Start: 2025-03-25

## 2025-03-25 RX ORDER — MAGNESIUM OXIDE 400 MG/1
400 TABLET ORAL DAILY
Qty: 90 TABLET | Refills: 0 | Status: SHIPPED | OUTPATIENT
Start: 2025-03-25

## 2025-03-25 RX ORDER — ATORVASTATIN CALCIUM 10 MG/1
10 TABLET, FILM COATED ORAL
Qty: 90 TABLET | Refills: 1 | Status: SHIPPED | OUTPATIENT
Start: 2025-03-25

## 2025-03-25 RX ORDER — SODIUM BICARBONATE 650 MG/1
650 TABLET ORAL 3 TIMES DAILY
Qty: 360 TABLET | Refills: 0 | Status: SHIPPED | OUTPATIENT
Start: 2025-03-25

## 2025-03-25 RX ORDER — PREGABALIN 100 MG/1
100 CAPSULE ORAL 3 TIMES DAILY
Qty: 270 CAPSULE | Refills: 0 | Status: SHIPPED | OUTPATIENT
Start: 2025-03-25

## 2025-03-25 NOTE — TELEPHONE ENCOUNTER
PA for VITAMIN D3 SUBMITTED to [facility]    via    []CMM-KEY:   [x]Surescripts-Case ID #   []Availity-Auth ID # NDC #   []Faxed to plan   []Other website   []Phone call Case ID #     PA sent as URGENT    All office notes, labs and other pertaining documents and studies sent. Clinical questions answered. Awaiting determination from insurance company.     Turnaround time for your insurance to make a decision on your Prior Authorization can take 7-21 business days.

## 2025-03-26 ENCOUNTER — TELEPHONE (OUTPATIENT)
Age: 50
End: 2025-03-26

## 2025-03-26 DIAGNOSIS — K43.9 VENTRAL HERNIA WITHOUT OBSTRUCTION OR GANGRENE: Primary | ICD-10-CM

## 2025-03-26 DIAGNOSIS — K42.9 UMBILICAL HERNIA WITHOUT OBSTRUCTION AND WITHOUT GANGRENE: ICD-10-CM

## 2025-03-26 DIAGNOSIS — R10.84 GENERALIZED ABDOMINAL PAIN: ICD-10-CM

## 2025-03-26 NOTE — TELEPHONE ENCOUNTER
Pt called. Seen by PCP on 3/20 and referred to Plastic Surgeon for excess abdominal skin. Pt had consult recently and was told by Surgeon that a hernia is suspected. Pt believes it is an umbilical hernia as there is firmness around the belly button. Pt reports history of hernias with mesh and is unsure if this is a new hernia or complication of previous repair. Pt discussed CT scan with Surgeon and is requesting that PCP enter order for the same.

## 2025-03-27 NOTE — TELEPHONE ENCOUNTER
PA for VITAMINE D3 DENIED    Reason:(Screenshot if applicable)        Message sent to office clinical pool Yes    Denial letter scanned into Media Yes    Appeal started No (Provider will need to decide if appeal is warranted and send clinical documentation to Prior Authorization Team for initiation.)    **Please follow up with your patient regarding denial and next steps**    CKD (chronic kidney disease)    Depression    Diabetes    HTN (hypertension)

## 2025-04-03 ENCOUNTER — HOSPITAL ENCOUNTER (OUTPATIENT)
Dept: RADIOLOGY | Facility: HOSPITAL | Age: 50
Discharge: HOME | End: 2025-04-03
Attending: INTERNAL MEDICINE
Payer: COMMERCIAL

## 2025-04-03 DIAGNOSIS — K42.9 UMBILICAL HERNIA WITHOUT OBSTRUCTION AND WITHOUT GANGRENE: ICD-10-CM

## 2025-04-03 DIAGNOSIS — K43.9 VENTRAL HERNIA WITHOUT OBSTRUCTION OR GANGRENE: ICD-10-CM

## 2025-04-03 DIAGNOSIS — R10.84 GENERALIZED ABDOMINAL PAIN: ICD-10-CM

## 2025-04-03 PROCEDURE — 74150 CT ABDOMEN W/O CONTRAST: CPT

## 2025-04-08 ENCOUNTER — TELEPHONE (OUTPATIENT)
Dept: PLASTIC SURGERY | Facility: CLINIC | Age: 50
End: 2025-04-08

## 2025-04-08 ENCOUNTER — PROCEDURE VISIT (OUTPATIENT)
Dept: NEUROLOGY | Facility: CLINIC | Age: 50
End: 2025-04-08
Payer: COMMERCIAL

## 2025-04-08 VITALS — TEMPERATURE: 97.2 F | HEART RATE: 70 BPM | DIASTOLIC BLOOD PRESSURE: 94 MMHG | SYSTOLIC BLOOD PRESSURE: 157 MMHG

## 2025-04-08 DIAGNOSIS — G93.2 IIH (IDIOPATHIC INTRACRANIAL HYPERTENSION): ICD-10-CM

## 2025-04-08 DIAGNOSIS — G43.709 CHRONIC MIGRAINE W/O AURA W/O STATUS MIGRAINOSUS, NOT INTRACTABLE: Primary | ICD-10-CM

## 2025-04-08 PROCEDURE — 64615 CHEMODENERV MUSC MIGRAINE: CPT | Performed by: PHYSICIAN ASSISTANT

## 2025-04-08 RX ORDER — ACETAZOLAMIDE 250 MG/1
250 TABLET ORAL 2 TIMES DAILY
Qty: 180 TABLET | Refills: 3 | Status: SHIPPED | OUTPATIENT
Start: 2025-04-08

## 2025-04-08 NOTE — TELEPHONE ENCOUNTER
Received call from Patient that she received the results for the CT scan for a hernia. Patient verbalized that Dr. Barrios told her to call back to schedule for Pann and Hernia after CT Scan results.     Maury RICKS/Nancy FAULKNER/[facility] [place]: Please call Patient back to schedule/coordinate. Patient: [phone]

## 2025-04-08 NOTE — PROGRESS NOTES
"Universal Protocol   procedure performed by consultantConsent: Verbal consent obtained. Written consent obtained.  Risks and benefits: risks, benefits and alternatives were discussed  Consent given by: patient  Time out: Immediately prior to procedure a \"time out\" was called to verify the correct patient, procedure, equipment, support staff and site/side marked as required.  Patient understanding: patient states understanding of the procedure being performed  Patient consent: the patient's understanding of the procedure matches consent given  Procedure consent: procedure consent matches procedure scheduled  Relevant documents: relevant documents present and verified  Patient identity confirmed: verbally with patient      Chemodenervation     Date/Time  4/8/2025 9:30 AM     Performed by  Desiree Grigsby PA-C   Authorized by  Desiree Grigsby PA-C     Pre-procedure details      Prepped With: Alcohol     Anesthesia  (see MAR for exact dosages):     Anesthesia method:  None   Procedure details      Position:  Upright   Botox      Botox Type:  Type A    Brand:  Botox    mL's of Botulinum Toxin:  200    Final Concentration per CC:  50 units    Needle Gauge:  30 G 2.5 inch   Procedures      Botox Procedures: chronic headache      Indications: migraines     Injection Location      Head / Face:  L superior cervical paraspinal, R superior cervical paraspinal, L , R , L frontalis, R frontalis, L medial occipitalis, R medial occipitalis, procerus, R temporalis, L temporalis, R superior trapezius and L superior trapezius    L  injection amount:  5 unit(s)    R  injection amount:  5 unit(s)    L lateral frontalis:  5 unit(s)    R lateral frontalis:  5 unit(s)    L medial frontalis:  5 unit(s)    R medial frontalis:  5 unit(s)    L temporalis injection amount:  20 unit(s)    R temporalis injection amount:  20 unit(s)    Procerus injection amount:  5 unit(s)    L medial occipitalis injection " amount:  15 unit(s)    R medial occipitalis injection amount:  15 unit(s)    L superior cervical paraspinal injection amount:  10 unit(s)    R superior cervical paraspinal injection amount:  10 unit(s)    L superior trapezius injection amount:  15 unit(s)    R superior trapezius injection amount:  15 unit(s)   Total Units      Total units used:  200    Total units discarded:  0   Post-procedure details      Chemodenervation:  Chronic migraine    Facial Nerve Location::  Bilateral facial nerve    Patient tolerance of procedure:  Tolerated well, no immediate complications   Comments       5 units trapezius bilaterally   5 units splenius capitis bilaterally   25 units occipitalis   All medically necessary

## 2025-04-09 ENCOUNTER — TELEPHONE (OUTPATIENT)
Age: 50
End: 2025-04-09

## 2025-04-09 DIAGNOSIS — L98.7 EXCESS SKIN OF ABDOMEN: Primary | ICD-10-CM

## 2025-04-09 NOTE — TELEPHONE ENCOUNTER
Patient called to ask PCP for 2nd opinion surgery request referral.  She has appointment with [facility] for 2nd opinion 4/17/25 for excess skin removal and hernia repair.

## 2025-04-10 ENCOUNTER — TELEPHONE (OUTPATIENT)
Dept: UROLOGY | Facility: AMBULATORY SURGERY CENTER | Age: 50
End: 2025-04-10

## 2025-04-10 NOTE — TELEPHONE ENCOUNTER
Provider out sick:    LVM appt needs to be rescheduled. Please call the office and we will assist. # given

## 2025-04-11 NOTE — TELEPHONE ENCOUNTER
Rec'd call from patient stating she hasn't received a call from no one regarding her CT scan and to see what is the next step .      Please reach out to patient asap

## 2025-04-14 ENCOUNTER — OFFICE VISIT (OUTPATIENT)
Dept: UROLOGY | Facility: AMBULATORY SURGERY CENTER | Age: 50
End: 2025-04-14
Payer: COMMERCIAL

## 2025-04-14 VITALS
OXYGEN SATURATION: 100 % | SYSTOLIC BLOOD PRESSURE: 140 MMHG | DIASTOLIC BLOOD PRESSURE: 80 MMHG | HEIGHT: 61 IN | HEART RATE: 92 BPM | WEIGHT: 137 LBS | BODY MASS INDEX: 25.86 KG/M2

## 2025-04-14 DIAGNOSIS — N31.9 NEUROGENIC BLADDER: Primary | ICD-10-CM

## 2025-04-14 LAB — POST-VOID RESIDUAL VOLUME, ML POC: 9 ML

## 2025-04-14 PROCEDURE — 51798 US URINE CAPACITY MEASURE: CPT

## 2025-04-14 PROCEDURE — 99213 OFFICE O/P EST LOW 20 MIN: CPT

## 2025-04-14 RX ORDER — TERAZOSIN 1 MG/1
1 CAPSULE ORAL
Qty: 90 CAPSULE | Refills: 3 | Status: SHIPPED | OUTPATIENT
Start: 2025-04-14

## 2025-04-14 NOTE — ASSESSMENT & PLAN NOTE
Patient with history of diabetic neuropathy, possible ALS and history of cauda equina syndrome in 2021. Currently managed on Hytrin 1 mg with good benefit for bladder neck relaxation with improvement of symptoms, refills provided.     Underwent urodynamic testing with evidence of adequate bladder emptying and no signs of overactivity, decreased  bladder sensation noted.    CT of the abdomen on 4/3/2025 did not demonstrate any hydronephrosis.    Follow up in 1 year with PVR.     Orders:    POCT Measure PVR    terazosin (HYTRIN) 1 mg capsule; Take 1 capsule (1 mg total) by mouth daily at bedtime

## 2025-04-14 NOTE — PROGRESS NOTES
Name: Kinza Meza      : 1975      MRN: [id number]  Encounter Provider: JONNY Jensen  Encounter Date: 2025   Encounter department: [facility] [place]  :  Assessment & Plan  Neurogenic bladder  Patient with history of diabetic neuropathy, possible ALS and history of cauda equina syndrome in . Currently managed on Hytrin 1 mg with good benefit for bladder neck relaxation with improvement of symptoms, refills provided.     Underwent urodynamic testing with evidence of adequate bladder emptying and no signs of overactivity, decreased  bladder sensation noted.    CT of the abdomen on 4/3/2025 did not demonstrate any hydronephrosis.    Follow up in 1 year with PVR.     Orders:    POCT Measure PVR    terazosin (HYTRIN) 1 mg capsule; Take 1 capsule (1 mg total) by mouth daily at bedtime        History of Present Illness   Kinza Meza is a 49 y.o. female who presents today to the office for follow-up of neurogenic bladder.    Today in the office she states that she is overall doing very well.  She is very satisfied on the Hytrin and has noticed significant improvement in her urinary habits.  She states that she feels as though she is able to empty out her bladder more effectively.  She states that she still has decreased sensation in her bladder and pelvic area but it is not worsening.  She denies any other urinary/urological complaints.    Review of Systems   Constitutional:  Negative for chills and fever.   Respiratory: Negative.  Negative for cough and shortness of breath.    Cardiovascular:  Negative for chest pain and leg swelling.   Genitourinary:  Negative for dyspareunia, dysuria, flank pain, frequency, hematuria, menstrual problem, pelvic pain, urgency, vaginal bleeding, vaginal discharge and vaginal pain.   Skin:  Negative for rash.   Neurological: Negative.    Hematological:  Negative for adenopathy. Does not bruise/bleed easily.          Objective   BP  "140/80 (BP Location: Left arm, Patient Position: Sitting, Cuff Size: Adult)   Pulse 92   Ht 5' 1\" (1.549 m)   Wt 62.1 kg (137 lb)   LMP  (LMP Unknown)   SpO2 100%   BMI 25.89 kg/m²     Physical Exam  Vitals reviewed.   Constitutional:       Appearance: Normal appearance.   HENT:      Head: Normocephalic and atraumatic.   Eyes:      Pupils: Pupils are equal, round, and reactive to light.   Cardiovascular:      Rate and Rhythm: Normal rate.   Pulmonary:      Effort: Pulmonary effort is normal.   Abdominal:      General: Abdomen is flat.      Palpations: Abdomen is soft.   Musculoskeletal:      Cervical back: Normal range of motion.   Skin:     General: Skin is warm and dry.   Neurological:      General: No focal deficit present.      Mental Status: She is alert and oriented to person, place, and time. Mental status is at baseline.   Psychiatric:         Mood and Affect: Mood normal.         Behavior: Behavior normal.         Thought Content: Thought content normal.         Judgment: Judgment normal.           Results   No results found for: \"PSA\"  Lab Results   Component Value Date    GLUCOSE 72 07/27/2024    CALCIUM 9.1 02/11/2025     (L) 10/28/2014    K 4.0 02/11/2025    CO2 23 02/11/2025     02/11/2025    BUN 22 02/11/2025    CREATININE 1.76 (H) 02/11/2025     Lab Results   Component Value Date    WBC 5.88 02/11/2025    HGB 12.1 02/11/2025    HCT 36.9 02/11/2025    MCV 87 02/11/2025     02/11/2025       Office Urine Dip  No results found for this or any previous visit (from the past hour).      "

## 2025-04-16 ENCOUNTER — PROCEDURE VISIT (OUTPATIENT)
Age: 50
End: 2025-04-16
Payer: COMMERCIAL

## 2025-04-16 VITALS — BODY MASS INDEX: 25.86 KG/M2 | WEIGHT: 137 LBS | HEIGHT: 61 IN

## 2025-04-16 DIAGNOSIS — M99.03 SEGMENTAL DYSFUNCTION OF LUMBAR REGION: ICD-10-CM

## 2025-04-16 DIAGNOSIS — M48.062 SPINAL STENOSIS OF LUMBAR REGION WITH NEUROGENIC CLAUDICATION: ICD-10-CM

## 2025-04-16 DIAGNOSIS — M62.838 MUSCLE SPASM: ICD-10-CM

## 2025-04-16 DIAGNOSIS — M99.01 SEGMENTAL DYSFUNCTION OF CERVICAL REGION: ICD-10-CM

## 2025-04-16 DIAGNOSIS — M99.04 SEGMENTAL DYSFUNCTION OF SACRAL REGION: Primary | ICD-10-CM

## 2025-04-16 DIAGNOSIS — M47.812 CERVICAL SPONDYLOSIS: ICD-10-CM

## 2025-04-16 DIAGNOSIS — M99.02 SEGMENTAL DYSFUNCTION OF THORACIC REGION: ICD-10-CM

## 2025-04-16 PROCEDURE — 98941 CHIROPRACT MANJ 3-4 REGIONS: CPT | Performed by: CHIROPRACTOR

## 2025-04-16 PROCEDURE — 97110 THERAPEUTIC EXERCISES: CPT | Performed by: CHIROPRACTOR

## 2025-04-16 NOTE — PROGRESS NOTES
Initial date of service: 5/6/24    Diagnoses and all orders for this visit:    Segmental dysfunction of sacral region    Muscle spasm    Cervical spondylosis    Segmental dysfunction of lumbar region    Segmental dysfunction of thoracic region    Spinal stenosis of lumbar region with neurogenic claudication    Segmental dysfunction of cervical region    Chronic back pain in setting of spinal stenosis and fusion sx. Pt suffered exacerbation but is improving. Pt reports she's been better with proper biomechanics when bending, lifting    TREATMENT: 12010,17146  Ther-ex: IASTM; discussed post procedure soreness and/or ecchymosis for up to 36 hrs, applied to affected mm hypertonicities; supine hamstring stretch, supine gluteal stretch, single knee to chest stretch, upper trap stretch, transitional mvmt education, abdominal bracing; greater than 15 min spent performing above mentioned ther-ex to improve ROM/flexibility. Cervical supine graded mobilization and traction, Thoracic mobilization/manipulation: prone P-A mob; Lumbar mobilization/manipulation: diversified side laying graded HVLA, flexion-traction; SIJ Manipulation/Mobilization: R/L SIJ HVLA - long axis distraction, mederos drop table maneuver to affected SIJ    HPI:  Kinza Meza is a 49 y.o. female  Chief Complaint   Patient presents with    Neck Pain     Neck pain about a 3     Back Pain     Low back about as 5      Pt presents for tx for exacerbation of chronic neck/back pain. Pt has had back and neck surgery. Pt was utilizing cane and now using walker occasionally. 2022 MRI demonstrates multilevel degenerative changes of postsurgical lumbar spine status post L1-L2 posterior spinal fixation with varying degrees of canal stenosis (moderate L4-L5) and foraminal narrowing (mild left L1-L2 and left L2-L3 ). 2024 fl/ext xrays unremarkable for instability. 2021 C/S MRI demonstrates s/p ACDF from C4 to C7. Multilevel degenerative spondylosis as described.  Possible ossification of the posterior longitudinal ligament at C2-3 and C6-7  4/16: pt reports feeling and moving better since last tx, but suffered flare-up about a week ago    Neck Pain   This is a chronic problem. The current episode started more than 1 year ago. The problem occurs constantly. The problem has been waxing and waning. The pain is present in the left side, midline and right side. The quality of the pain is described as aching. The symptoms are aggravated by bending, position, stress and twisting. The pain is Worse during the day.   Back Pain  This is a chronic problem. The current episode started more than 1 year ago. The problem occurs constantly. The problem has been waxing and waning since onset. The pain is present in the gluteal, lumbar spine, sacro-iliac and thoracic spine. The quality of the pain is described as aching, burning and stabbing. The pain radiates to the left thigh, left knee and right thigh. Pain scale: 0-5/10. The pain is Worse during the day. The symptoms are aggravated by standing, twisting and bending (walking, laying supine, transitional mvmts; palliative includes sitting, massage). Pertinent negatives include no bladder incontinence or bowel incontinence. Risk factors include lack of exercise.     Past Medical History:   Diagnosis Date    ADD (attention deficit disorder)     Allergic rhinitis     ALS (amyotrophic lateral sclerosis) ([facility]) 11/22    Per neurological surgeon    Anemia 12/2021    Anxiety     Arthritis     Bipolar disorder ([facility])     Breast cyst 1998    Cervical disc disorder with radiculopathy of cervical region     Chronic depression     Chronic kidney disease     Stage 1    Chronic pain disorder     Cluster headache     Colitis     Last assessed - 11/25/14    Colon polyp     Concussion 2018    Condyloma acuminatum     Last assessed - 3/19/14    CTS (carpal tunnel syndrome) 2001    Depression     Diabetes mellitus ([facility])     Last assessed - 11/25/14    Diabetic  nephropathy (HCC)     Diabetic neuropathy (HCC)     Diabetic retinopathy ([facility])     Difficulty walking     Fibrocystic breast age 21    Fibromyalgia     Fibromyalgia, primary     GERD (gastroesophageal reflux disease)     Head injury 16    Fall    Headache(784.0) 1977    History of transfusion 2021    HTN (hypertension)     Last assessed - 14    Hyperlipidemia 2019    Hypertension     Intervertebral disc disorder with radiculopathy of lumbar region     Lupus     Rhuematologist-Sheela Dasilva, [place]    Migraine     Miscarriage 1988    ,     Obesity 1980    Obesity, unspecified     Open wound of back 2023    Opioid abuse, in remission ([facility]) 2022    Only while in hospital and for about 10 days once home instead of one week.    Osteoporosis     Peripheral neuropathy     Postoperative wound infection 2021    Stop not healed    Preeclampsia     Last assessed - 14    Rheumatoid arthritis ([facility])     Wears dentures       Past Surgical History:   Procedure Laterality Date    APPENDECTOMY      Last assessed - 14    BARIATRIC SURGERY  2016    CARPAL TUNNEL RELEASE      CATARACT EXTRACTION      CERVICAL FUSION N/A 2019    Procedure: Anterior cervical discectomy w fusion C4-5, C5-6, C6-7; allograft and neuromonitoring;  Surgeon: Jose Gomez MD;  [facility];  Service: Orthopedics     SECTION      Last assessed - 14    COLONOSCOPY      COLONOSCOPY      HEMORRHOID SURGERY      HERNIA REPAIR      Last assessed - 14    LUMBAR FUSION N/A 2021    Procedure: L1/2 laminectomy, discectomy with L1/2 MIS posterior fixation using neuromonitoring and neuronavigation;  Surgeon: Suhas Akbar MD;  [facility];  Service: Neurosurgery    LUMBAR WOUND EXPLORATION Bilateral 2021    Procedure: Lumbar wound washout, debridement and intraoperative cultures;  Surgeon: Suhas Akbar MD;  [facility];  Service:  Neurosurgery    MAMMO (HISTORICAL)  2016    [place] DEBRIDEMENT BONE 1ST 20 SQ CM/< N/A 5/30/2023    Procedure: SURGICAL PREPARATION OF BACK  WOUND AND LOCAL FLAP, PREVENA PLACEMENT;  Surgeon: Ady Rocha MD;  Location: [facility];  Service: Plastics    WOOD-EN-Y PROCEDURE      ULNAR TUNNEL RELEASE      VAC DRESSING APPLICATION Bilateral 12/22/2021    Procedure: APPLICATION VAC DRESSING;  Surgeon: Elbert Hawley MD;  [facility];  Service: General    VAC DRESSING APPLICATION N/A 12/25/2021    Procedure: APPLICATION VAC DRESSING ABDOMEN/TRUNK;  Surgeon: Mani Ross DO;  [facility];  Service: General    VENTRAL HERNIA REPAIR       The following portions of the patient's history were reviewed and updated as appropriate: allergies, past family history, past medical history, past social history, past surgical history, and problem list.  Review of Systems   Gastrointestinal:  Negative for bowel incontinence.   Genitourinary:  Negative for bladder incontinence.   Musculoskeletal:  Positive for back pain and neck pain.     Physical Exam  Neck:        Comments: Pnful and limited in Brot, Blf  Musculoskeletal:      Cervical back: Pain with movement and muscular tenderness present. Decreased range of motion.      Thoracic back: Spasms and tenderness present. Decreased range of motion.      Lumbar back: Spasms and tenderness present. Decreased range of motion. Negative right straight leg raise test and negative left straight leg raise test.        Back:       Comments: Pnful and limited in Brot, Blf, Ext centralizes    Lymphadenopathy:      Cervical: No cervical adenopathy.   Skin:     General: Skin is warm and dry.   Neurological:      Mental Status: She is alert and oriented to person, place, and time.      Gait: Gait is intact.   Psychiatric:         Mood and Affect: Mood and affect normal.         Behavior: Behavior normal.       SOFT TISSUE ASSESSMENT Hypertonicity and tenderness palpated B  upper traps, B SCM, B T10-S1 erector spinae, glute med/min, QL, hamstring JOINT RESTRICTIONS: C4/5, T1/2, T10-S1 and R/L SIJ    Return in about 3 weeks (around 5/7/2025) for Next scheduled follow up.

## 2025-04-30 ENCOUNTER — PROCEDURE VISIT (OUTPATIENT)
Age: 50
End: 2025-04-30
Payer: COMMERCIAL

## 2025-04-30 VITALS — BODY MASS INDEX: 25.86 KG/M2 | WEIGHT: 137 LBS | HEIGHT: 61 IN

## 2025-04-30 DIAGNOSIS — M99.04 SEGMENTAL DYSFUNCTION OF SACRAL REGION: Primary | ICD-10-CM

## 2025-04-30 DIAGNOSIS — M62.838 MUSCLE SPASM: ICD-10-CM

## 2025-04-30 DIAGNOSIS — M99.01 SEGMENTAL DYSFUNCTION OF CERVICAL REGION: ICD-10-CM

## 2025-04-30 DIAGNOSIS — M47.812 CERVICAL SPONDYLOSIS: ICD-10-CM

## 2025-04-30 DIAGNOSIS — M48.062 SPINAL STENOSIS OF LUMBAR REGION WITH NEUROGENIC CLAUDICATION: ICD-10-CM

## 2025-04-30 DIAGNOSIS — M99.02 SEGMENTAL DYSFUNCTION OF THORACIC REGION: ICD-10-CM

## 2025-04-30 DIAGNOSIS — M99.03 SEGMENTAL DYSFUNCTION OF LUMBAR REGION: ICD-10-CM

## 2025-04-30 PROCEDURE — 97110 THERAPEUTIC EXERCISES: CPT | Performed by: CHIROPRACTOR

## 2025-04-30 PROCEDURE — 98941 CHIROPRACT MANJ 3-4 REGIONS: CPT | Performed by: CHIROPRACTOR

## 2025-04-30 NOTE — PROGRESS NOTES
Initial date of service: 5/6/24    Diagnoses and all orders for this visit:    Segmental dysfunction of sacral region    Muscle spasm    Cervical spondylosis    Segmental dysfunction of lumbar region    Segmental dysfunction of thoracic region    Spinal stenosis of lumbar region with neurogenic claudication    Segmental dysfunction of cervical region    Chronic back pain in setting of spinal stenosis and fusion sx. Pt suffered exacerbation but is improving. Pt reports she's been better with proper biomechanics when bending, lifting but needs to schedule more rest time inbetween caring for grandchildren    TREATMENT: [id number]  Ther-ex: IASTM; discussed post procedure soreness and/or ecchymosis for up to 36 hrs, applied to affected mm hypertonicities; supine hamstring stretch, supine gluteal stretch, single knee to chest stretch, upper trap stretch, transitional mvmt education, abdominal bracing; greater than 15 min spent performing above mentioned ther-ex to improve ROM/flexibility. Cervical supine graded mobilization and traction, Thoracic mobilization/manipulation: prone P-A mob; Lumbar mobilization/manipulation: diversified side laying graded HVLA, flexion-traction; SIJ Manipulation/Mobilization: R/L SIJ HVLA - long axis distraction, mederos drop table maneuver to affected SIJ    HPI:  Kinza Meza is a 49 y.o. female  Chief Complaint   Patient presents with    Neck - Follow-up     Neck pain score 5   Patient states neck is stuck     Back Pain     Lower lumbar pain score 2        Pt presents for tx for exacerbation of chronic neck/back pain. Pt has had back and neck surgery. Pt was utilizing cane and now using walker occasionally. 2022 MRI demonstrates multilevel degenerative changes of postsurgical lumbar spine status post L1-L2 posterior spinal fixation with varying degrees of canal stenosis (moderate L4-L5) and foraminal narrowing (mild left L1-L2 and left L2-L3 ). 2024 fl/ext xrays unremarkable for  instability. 2021 C/S MRI demonstrates s/p ACDF from C4 to C7. Multilevel degenerative spondylosis as described. Possible ossification of the posterior longitudinal ligament at C2-3 and C6-7  4/30: pt reports feeling and moving better since last tx, but suffered flare-up caring for grandkids all the time    Neck Pain   This is a chronic problem. The current episode started more than 1 year ago. The problem occurs constantly. The problem has been waxing and waning. The pain is present in the left side, midline and right side. The quality of the pain is described as aching. The symptoms are aggravated by bending, position, stress and twisting. The pain is Worse during the day.   Back Pain  This is a chronic problem. The current episode started more than 1 year ago. The problem occurs constantly. The problem has been waxing and waning since onset. The pain is present in the gluteal, lumbar spine, sacro-iliac and thoracic spine. The quality of the pain is described as aching, burning and stabbing. The pain radiates to the left thigh, left knee and right thigh. Pain scale: 0-5/10. The pain is Worse during the day. The symptoms are aggravated by standing, twisting and bending (walking, laying supine, transitional mvmts; palliative includes sitting, massage). Pertinent negatives include no bladder incontinence or bowel incontinence. Risk factors include lack of exercise.     Past Medical History:   Diagnosis Date    ADD (attention deficit disorder)     Allergic rhinitis     ALS (amyotrophic lateral sclerosis) ([facility]) 11/22    Per neurological surgeon    Anemia 12/2021    Anxiety     Arthritis     Bipolar disorder ([facility])     Breast cyst 1998    Cervical disc disorder with radiculopathy of cervical region     Chronic depression     Chronic kidney disease     Stage 1    Chronic pain disorder     Cluster headache     Colitis     Last assessed - 11/25/14    Colon polyp     Concussion 2018    Condyloma acuminatum     Last assessed  - 3/19/14    CTS (carpal tunnel syndrome)     Depression     Diabetes mellitus (HCC)     Last assessed - 14    Diabetic nephropathy (HCC)     Diabetic neuropathy (HCC)     Diabetic retinopathy (HCC)     Difficulty walking     Fibrocystic breast age 21    Fibromyalgia     Fibromyalgia, primary     GERD (gastroesophageal reflux disease)     Head injury 18    Fall    Headache(784.0) 1977    History of transfusion 2021    HTN (hypertension)     Last assessed - 14    Hyperlipidemia 2019    Hypertension     Intervertebral disc disorder with radiculopathy of lumbar region     Lupus     Rhuematologist-Sheela Dasilva PA    Migraine     Miscarriage 1988    ,     Obesity 1980    Obesity, unspecified     Open wound of back 2023    Opioid abuse, in remission ([facility]) 2022    Only while in hospital and for about 10 days once home instead of one week.    Osteoporosis     Peripheral neuropathy     Postoperative wound infection 2021    Stop not healed    Preeclampsia     Last assessed - 14    Rheumatoid arthritis ([facility])     Wears dentures       Past Surgical History:   Procedure Laterality Date    APPENDECTOMY      Last assessed - 14    BARIATRIC SURGERY  2016    CARPAL TUNNEL RELEASE      CATARACT EXTRACTION      CERVICAL FUSION N/A 2019    Procedure: Anterior cervical discectomy w fusion C4-5, C5-6, C6-7; allograft and neuromonitoring;  Surgeon: Jose Gomez MD;  [facility];  Service: Orthopedics     SECTION      Last assessed - 14    COLONOSCOPY      COLONOSCOPY      HEMORRHOID SURGERY      HERNIA REPAIR      Last assessed - 14    LUMBAR FUSION N/A 2021    Procedure: L1/2 laminectomy, discectomy with L1/2 MIS posterior fixation using neuromonitoring and neuronavigation;  Surgeon: Suhas Akbar MD;  [facility];  Service: Neurosurgery    LUMBAR WOUND EXPLORATION Bilateral 2021     Procedure: Lumbar wound washout, debridement and intraoperative cultures;  Surgeon: Suhas Akbar MD;  [facility];  Service: Neurosurgery    MAMMO (HISTORICAL)  2016    [place] DEBRIDEMENT BONE 1ST 20 SQ CM/< N/A 5/30/2023    Procedure: SURGICAL PREPARATION OF BACK  WOUND AND LOCAL FLAP, PREVENA PLACEMENT;  Surgeon: Ady Rocha MD;  Location:  MAIN OR;  Service: Plastics    WOOD-EN-Y PROCEDURE      ULNAR TUNNEL RELEASE      VAC DRESSING APPLICATION Bilateral 12/22/2021    Procedure: APPLICATION VAC DRESSING;  Surgeon: Elbert Hawley MD;  [facility];  Service: General    VAC DRESSING APPLICATION N/A 12/25/2021    Procedure: APPLICATION VAC DRESSING ABDOMEN/TRUNK;  Surgeon: Mani Ross DO;  [facility];  Service: General    VENTRAL HERNIA REPAIR       The following portions of the patient's history were reviewed and updated as appropriate: allergies, past family history, past medical history, past social history, past surgical history, and problem list.  Review of Systems   Gastrointestinal:  Negative for bowel incontinence.   Genitourinary:  Negative for bladder incontinence.   Musculoskeletal:  Positive for back pain and neck pain.     Physical Exam  Neck:        Comments: Pnful and limited in Brot, Blf  Musculoskeletal:      Cervical back: Pain with movement and muscular tenderness present. Decreased range of motion.      Thoracic back: Spasms and tenderness present. Decreased range of motion.      Lumbar back: Spasms and tenderness present. Decreased range of motion. Negative right straight leg raise test and negative left straight leg raise test.        Back:       Comments: Pnful and limited in Brot, Blf, Ext centralizes    Lymphadenopathy:      Cervical: No cervical adenopathy.   Skin:     General: Skin is warm and dry.   Neurological:      Mental Status: She is alert and oriented to person, place, and time.      Gait: Gait is intact.   Psychiatric:         Mood and  Affect: Mood and affect normal.         Behavior: Behavior normal.       SOFT TISSUE ASSESSMENT Hypertonicity and tenderness palpated B upper traps, B SCM, B T10-S1 erector spinae, glute med/min, QL, hamstring JOINT RESTRICTIONS: C4/5, T1/2, T10-S1 and R/L SIJ    Return in about 2 weeks (around 5/14/2025) for Next scheduled follow up.

## 2025-05-08 ENCOUNTER — OFFICE VISIT (OUTPATIENT)
Age: 50
End: 2025-05-08
Attending: INTERNAL MEDICINE
Payer: COMMERCIAL

## 2025-05-08 DIAGNOSIS — L98.7 EXCESS SKIN OF ABDOMEN: ICD-10-CM

## 2025-05-08 DIAGNOSIS — L30.4 ERYTHEMA INTERTRIGO: Primary | ICD-10-CM

## 2025-05-08 PROCEDURE — 99214 OFFICE O/P EST MOD 30 MIN: CPT | Performed by: PLASTIC SURGERY

## 2025-05-12 ENCOUNTER — PROCEDURE VISIT (OUTPATIENT)
Age: 50
End: 2025-05-12
Payer: COMMERCIAL

## 2025-05-12 VITALS — HEIGHT: 61 IN | BODY MASS INDEX: 25.86 KG/M2 | WEIGHT: 137 LBS

## 2025-05-12 DIAGNOSIS — M62.838 MUSCLE SPASM: ICD-10-CM

## 2025-05-12 DIAGNOSIS — M99.02 SEGMENTAL DYSFUNCTION OF THORACIC REGION: ICD-10-CM

## 2025-05-12 DIAGNOSIS — M48.062 SPINAL STENOSIS OF LUMBAR REGION WITH NEUROGENIC CLAUDICATION: ICD-10-CM

## 2025-05-12 DIAGNOSIS — M99.04 SEGMENTAL DYSFUNCTION OF SACRAL REGION: Primary | ICD-10-CM

## 2025-05-12 DIAGNOSIS — M47.812 CERVICAL SPONDYLOSIS: ICD-10-CM

## 2025-05-12 DIAGNOSIS — M99.01 SEGMENTAL DYSFUNCTION OF CERVICAL REGION: ICD-10-CM

## 2025-05-12 DIAGNOSIS — M99.03 SEGMENTAL DYSFUNCTION OF LUMBAR REGION: ICD-10-CM

## 2025-05-12 PROCEDURE — 97110 THERAPEUTIC EXERCISES: CPT | Performed by: CHIROPRACTOR

## 2025-05-12 PROCEDURE — 98941 CHIROPRACT MANJ 3-4 REGIONS: CPT | Performed by: CHIROPRACTOR

## 2025-05-12 NOTE — PROGRESS NOTES
Initial date of service: 5/6/24    Diagnoses and all orders for this visit:    Segmental dysfunction of sacral region    Muscle spasm    Cervical spondylosis    Segmental dysfunction of lumbar region    Segmental dysfunction of thoracic region    Spinal stenosis of lumbar region with neurogenic claudication    Segmental dysfunction of cervical region    Chronic back pain in setting of spinal stenosis and fusion sx. Pt suffered exacerbation but is improving. Pt reports she's been better with proper biomechanics when bending, lifting but needs to schedule more rest time inbetween caring for grandchildren. F/up 2 wks    TREATMENT: [id number]  Ther-ex: IASTM; discussed post procedure soreness and/or ecchymosis for up to 36 hrs, applied to affected mm hypertonicities; supine hamstring stretch, supine gluteal stretch, single knee to chest stretch, upper trap stretch, transitional mvmt education, abdominal bracing; greater than 15 min spent performing above mentioned ther-ex to improve ROM/flexibility. Cervical supine graded mobilization and traction, Thoracic mobilization/manipulation: prone P-A mob; Lumbar mobilization/manipulation: diversified side laying graded HVLA, flexion-traction; SIJ Manipulation/Mobilization: R/L SIJ HVLA - long axis distraction, mederos drop table maneuver to affected SIJ    HPI:  Kinza Meza is a 49 y.o. female  Chief Complaint   Patient presents with    Neck - Follow-up     Neck is tingling and sore. Pain score 7       Back Pain     Lower lumbar is sore . Pain score 3     Pt presents for tx for exacerbation of chronic neck/back pain. Pt has had back and neck surgery. Pt was utilizing cane and now using walker occasionally. 2022 MRI demonstrates multilevel degenerative changes of postsurgical lumbar spine status post L1-L2 posterior spinal fixation with varying degrees of canal stenosis (moderate L4-L5) and foraminal narrowing (mild left L1-L2 and left L2-L3 ). 2024 fl/ext xrays  unremarkable for instability. 2021 C/S MRI demonstrates s/p ACDF from C4 to C7. Multilevel degenerative spondylosis as described. Possible ossification of the posterior longitudinal ligament at C2-3 and C6-7  5/12: pt reports feeling and moving better since last tx, but again suffered flare-up caring for grandkids all the time    Neck Pain   This is a chronic problem. The current episode started more than 1 year ago. The problem occurs constantly. The problem has been waxing and waning. The pain is present in the left side, midline and right side. The quality of the pain is described as aching. The symptoms are aggravated by bending, position, stress and twisting. The pain is Worse during the day.   Back Pain  This is a chronic problem. The current episode started more than 1 year ago. The problem occurs constantly. The problem has been waxing and waning since onset. The pain is present in the gluteal, lumbar spine, sacro-iliac and thoracic spine. The quality of the pain is described as aching, burning and stabbing. The pain radiates to the left thigh, left knee and right thigh. Pain scale: 0-5/10. The pain is Worse during the day. The symptoms are aggravated by standing, twisting and bending (walking, laying supine, transitional mvmts; palliative includes sitting, massage). Pertinent negatives include no bladder incontinence or bowel incontinence. Risk factors include lack of exercise.     Past Medical History:   Diagnosis Date    ADD (attention deficit disorder)     Allergic rhinitis     ALS (amyotrophic lateral sclerosis) ([facility]) 11/22    Per neurological surgeon    Anemia 12/2021    Anxiety     Arthritis     Bipolar disorder ([facility])     Breast cyst 1998    Cervical disc disorder with radiculopathy of cervical region     Chronic depression     Chronic kidney disease     Stage 1    Chronic pain disorder     Cluster headache     Colitis     Last assessed - 11/25/14    Colon polyp     Concussion 2018    Condyloma  acuminatum     Last assessed - 3/19/14    CTS (carpal tunnel syndrome)     Depression     Diabetes mellitus (HCC)     Last assessed - 14    Diabetic nephropathy ([facility])     Diabetic neuropathy ([facility])     Diabetic retinopathy ([facility])     Difficulty walking     Fibrocystic breast age 21    Fibromyalgia     Fibromyalgia, primary     GERD (gastroesophageal reflux disease)     Head injury 16    Fall    Headache(784.0) 1977    History of transfusion 2021    HTN (hypertension)     Last assessed - 14    Hyperlipidemia 2019    Hypertension     Intervertebral disc disorder with radiculopathy of lumbar region     Lupus     Rhuematologist-Sheela Dasilva, PA    Migraine     Miscarriage 1988,     Obesity 1980    Obesity, unspecified     Open wound of back 2023    Opioid abuse, in remission ([facility]) 2022    Only while in hospital and for about 10 days once home instead of one week.    Osteoporosis     Peripheral neuropathy     Postoperative wound infection 2021    Stop not healed    Preeclampsia     Last assessed - 14    Rheumatoid arthritis ([facility])     Wears dentures       Past Surgical History:   Procedure Laterality Date    APPENDECTOMY      Last assessed - 14    BARIATRIC SURGERY  2016    CARPAL TUNNEL RELEASE      CATARACT EXTRACTION      CERVICAL FUSION N/A 2019    Procedure: Anterior cervical discectomy w fusion C4-5, C5-6, C6-7; allograft and neuromonitoring;  Surgeon: Jose Gomez MD;  Location: [facility];  Service: Orthopedics     SECTION      Last assessed - 14    COLONOSCOPY      COLONOSCOPY      HEMORRHOID SURGERY      HERNIA REPAIR      Last assessed - 14    LUMBAR FUSION N/A 2021    Procedure: L1/2 laminectomy, discectomy with L1/2 MIS posterior fixation using neuromonitoring and neuronavigation;  Surgeon: Suhas Akbar MD;  Location: [facility];  Service: Neurosurgery    LUMBAR WOUND EXPLORATION  Bilateral 12/22/2021    Procedure: Lumbar wound washout, debridement and intraoperative cultures;  Surgeon: Suhas Akbar MD;  Location: [facility];  Service: Neurosurgery    MAMMO (HISTORICAL)  2016    IL DEBRIDEMENT BONE 1ST 20 SQ CM/< N/A 5/30/2023    Procedure: SURGICAL PREPARATION OF BACK  WOUND AND LOCAL FLAP, PREVENA PLACEMENT;  Surgeon: Ady Rocha MD;  Location:  MAIN OR;  Service: Plastics    WOOD-EN-Y PROCEDURE      ULNAR TUNNEL RELEASE      VAC DRESSING APPLICATION Bilateral 12/22/2021    Procedure: APPLICATION VAC DRESSING;  Surgeon: Elbert Hawley MD;  Location: [facility];  Service: General    VAC DRESSING APPLICATION N/A 12/25/2021    Procedure: APPLICATION VAC DRESSING ABDOMEN/TRUNK;  Surgeon: Mani Ross DO;  Location: [facility];  Service: General    VENTRAL HERNIA REPAIR       The following portions of the patient's history were reviewed and updated as appropriate: allergies, past family history, past medical history, past social history, past surgical history, and problem list.  Review of Systems   Gastrointestinal:  Negative for bowel incontinence.   Genitourinary:  Negative for bladder incontinence.   Musculoskeletal:  Positive for back pain and neck pain.     Physical Exam  Neck:        Comments: Pnful and limited in Brot, Blf  Musculoskeletal:      Cervical back: Pain with movement and muscular tenderness present. Decreased range of motion.      Thoracic back: Spasms and tenderness present. Decreased range of motion.      Lumbar back: Spasms and tenderness present. Decreased range of motion. Negative right straight leg raise test and negative left straight leg raise test.        Back:       Comments: Pnful and limited in Brot, Blf, Ext centralizes    Lymphadenopathy:      Cervical: No cervical adenopathy.   Skin:     General: Skin is warm and dry.   Neurological:      Mental Status: She is alert and oriented to person, place, and time.      Gait: Gait is intact.    Psychiatric:         Mood and Affect: Mood and affect normal.         Behavior: Behavior normal.       SOFT TISSUE ASSESSMENT Hypertonicity and tenderness palpated B upper traps, B SCM, B T10-S1 erector spinae, glute med/min, QL, hamstring JOINT RESTRICTIONS: C4/5, T1/2, T10-S1 and R/L SIJ    Return in about 2 weeks (around 5/26/2025) for Next scheduled follow up.

## 2025-05-14 DIAGNOSIS — Z79.4 TYPE 2 DIABETES MELLITUS WITH DIABETIC NEUROPATHY, WITH LONG-TERM CURRENT USE OF INSULIN (HCC): ICD-10-CM

## 2025-05-14 DIAGNOSIS — E11.40 TYPE 2 DIABETES MELLITUS WITH DIABETIC NEUROPATHY, WITH LONG-TERM CURRENT USE OF INSULIN (HCC): ICD-10-CM

## 2025-05-15 ENCOUNTER — APPOINTMENT (OUTPATIENT)
Dept: LAB | Facility: CLINIC | Age: 50
End: 2025-05-15
Payer: COMMERCIAL

## 2025-05-15 DIAGNOSIS — B49 FUNGAL INFECTION: ICD-10-CM

## 2025-05-15 DIAGNOSIS — M48.062 LUMBAR STENOSIS WITH NEUROGENIC CLAUDICATION: ICD-10-CM

## 2025-05-15 DIAGNOSIS — E11.65 UNCONTROLLED TYPE 2 DIABETES MELLITUS WITH HYPERGLYCEMIA (HCC): ICD-10-CM

## 2025-05-15 DIAGNOSIS — M51.16 LUMBAR DISC HERNIATION WITH RADICULOPATHY: ICD-10-CM

## 2025-05-15 DIAGNOSIS — E53.8 B12 DEFICIENCY: ICD-10-CM

## 2025-05-15 DIAGNOSIS — L08.9 SKIN INFECTION: ICD-10-CM

## 2025-05-15 DIAGNOSIS — N31.9 NEUROGENIC BLADDER: ICD-10-CM

## 2025-05-15 DIAGNOSIS — F90.0 ATTENTION DEFICIT HYPERACTIVITY DISORDER (ADHD), PREDOMINANTLY INATTENTIVE TYPE: ICD-10-CM

## 2025-05-15 DIAGNOSIS — E78.5 HYPERLIPIDEMIA, UNSPECIFIED: ICD-10-CM

## 2025-05-15 DIAGNOSIS — E11.40 TYPE 2 DIABETES MELLITUS WITH DIABETIC NEUROPATHY, WITH LONG-TERM CURRENT USE OF INSULIN (HCC): ICD-10-CM

## 2025-05-15 DIAGNOSIS — I12.9 BENIGN HYPERTENSION WITH CHRONIC KIDNEY DISEASE, STAGE III (HCC): ICD-10-CM

## 2025-05-15 DIAGNOSIS — F31.11 BIPOLAR AFFECTIVE DISORDER, CURRENTLY MANIC, MILD (HCC): ICD-10-CM

## 2025-05-15 DIAGNOSIS — N18.30 BENIGN HYPERTENSION WITH CHRONIC KIDNEY DISEASE, STAGE III (HCC): ICD-10-CM

## 2025-05-15 DIAGNOSIS — F34.1 DYSTHYMIC DISORDER: ICD-10-CM

## 2025-05-15 DIAGNOSIS — E78.2 MIXED HYPERLIPIDEMIA: ICD-10-CM

## 2025-05-15 DIAGNOSIS — K29.60 OTHER GASTRITIS WITHOUT BLEEDING: ICD-10-CM

## 2025-05-15 DIAGNOSIS — E55.9 VITAMIN D DEFICIENCY: ICD-10-CM

## 2025-05-15 DIAGNOSIS — Z79.4 TYPE 2 DIABETES MELLITUS WITH DIABETIC NEUROPATHY, WITH LONG-TERM CURRENT USE OF INSULIN (HCC): ICD-10-CM

## 2025-05-15 LAB
25(OH)D3 SERPL-MCNC: 56.5 NG/ML (ref 30–100)
ALBUMIN SERPL BCG-MCNC: 3.9 G/DL (ref 3.5–5)
ALP SERPL-CCNC: 51 U/L (ref 34–104)
ALT SERPL W P-5'-P-CCNC: 19 U/L (ref 7–52)
ANION GAP SERPL CALCULATED.3IONS-SCNC: 8 MMOL/L (ref 4–13)
AST SERPL W P-5'-P-CCNC: 13 U/L (ref 13–39)
BASOPHILS # BLD AUTO: 0.03 THOUSANDS/ÂΜL (ref 0–0.1)
BASOPHILS NFR BLD AUTO: 1 % (ref 0–1)
BILIRUB SERPL-MCNC: 0.54 MG/DL (ref 0.2–1)
BUN SERPL-MCNC: 27 MG/DL (ref 5–25)
CALCIUM SERPL-MCNC: 8.5 MG/DL (ref 8.4–10.2)
CHLORIDE SERPL-SCNC: 112 MMOL/L (ref 96–108)
CHOLEST SERPL-MCNC: 108 MG/DL (ref ?–200)
CO2 SERPL-SCNC: 24 MMOL/L (ref 21–32)
CREAT SERPL-MCNC: 1.47 MG/DL (ref 0.6–1.3)
CREAT UR-MCNC: 141.7 MG/DL
EOSINOPHIL # BLD AUTO: 0.03 THOUSAND/ÂΜL (ref 0–0.61)
EOSINOPHIL NFR BLD AUTO: 1 % (ref 0–6)
ERYTHROCYTE [DISTWIDTH] IN BLOOD BY AUTOMATED COUNT: 13.7 % (ref 11.6–15.1)
EST. AVERAGE GLUCOSE BLD GHB EST-MCNC: 97 MG/DL
GFR SERPL CREATININE-BSD FRML MDRD: 41 ML/MIN/1.73SQ M
GLUCOSE P FAST SERPL-MCNC: 81 MG/DL (ref 65–99)
HBA1C MFR BLD: 5 %
HCT VFR BLD AUTO: 34.2 % (ref 34.8–46.1)
HDLC SERPL-MCNC: 51 MG/DL
HGB BLD-MCNC: 10.9 G/DL (ref 11.5–15.4)
IMM GRANULOCYTES # BLD AUTO: 0.01 THOUSAND/UL (ref 0–0.2)
IMM GRANULOCYTES NFR BLD AUTO: 0 % (ref 0–2)
LDLC SERPL CALC-MCNC: 36 MG/DL (ref 0–100)
LYMPHOCYTES # BLD AUTO: 1.32 THOUSANDS/ÂΜL (ref 0.6–4.47)
LYMPHOCYTES NFR BLD AUTO: 37 % (ref 14–44)
MCH RBC QN AUTO: 28.2 PG (ref 26.8–34.3)
MCHC RBC AUTO-ENTMCNC: 31.9 G/DL (ref 31.4–37.4)
MCV RBC AUTO: 89 FL (ref 82–98)
MICROALBUMIN UR-MCNC: 57.8 MG/L
MICROALBUMIN/CREAT 24H UR: 41 MG/G CREATININE (ref 0–30)
MONOCYTES # BLD AUTO: 0.17 THOUSAND/ÂΜL (ref 0.17–1.22)
MONOCYTES NFR BLD AUTO: 5 % (ref 4–12)
NEUTROPHILS # BLD AUTO: 2.05 THOUSANDS/ÂΜL (ref 1.85–7.62)
NEUTS SEG NFR BLD AUTO: 56 % (ref 43–75)
NRBC BLD AUTO-RTO: 0 /100 WBCS
PLATELET # BLD AUTO: 118 THOUSANDS/UL (ref 149–390)
PMV BLD AUTO: 12.1 FL (ref 8.9–12.7)
POTASSIUM SERPL-SCNC: 3.5 MMOL/L (ref 3.5–5.3)
PROT SERPL-MCNC: 5.9 G/DL (ref 6.4–8.4)
RBC # BLD AUTO: 3.86 MILLION/UL (ref 3.81–5.12)
SODIUM SERPL-SCNC: 144 MMOL/L (ref 135–147)
TRIGL SERPL-MCNC: 104 MG/DL (ref ?–150)
TSH SERPL DL<=0.05 MIU/L-ACNC: 1.71 UIU/ML (ref 0.45–4.5)
VIT B12 SERPL-MCNC: 257 PG/ML (ref 180–914)
WBC # BLD AUTO: 3.61 THOUSAND/UL (ref 4.31–10.16)

## 2025-05-15 PROCEDURE — 82607 VITAMIN B-12: CPT

## 2025-05-15 PROCEDURE — 80053 COMPREHEN METABOLIC PANEL: CPT

## 2025-05-15 PROCEDURE — 84443 ASSAY THYROID STIM HORMONE: CPT

## 2025-05-15 PROCEDURE — 82570 ASSAY OF URINE CREATININE: CPT

## 2025-05-15 PROCEDURE — 82043 UR ALBUMIN QUANTITATIVE: CPT

## 2025-05-15 PROCEDURE — 36415 COLL VENOUS BLD VENIPUNCTURE: CPT

## 2025-05-15 PROCEDURE — 83036 HEMOGLOBIN GLYCOSYLATED A1C: CPT

## 2025-05-15 PROCEDURE — 82306 VITAMIN D 25 HYDROXY: CPT

## 2025-05-15 PROCEDURE — 80061 LIPID PANEL: CPT

## 2025-05-15 PROCEDURE — 85025 COMPLETE CBC W/AUTO DIFF WBC: CPT

## 2025-05-15 RX ORDER — TIRZEPATIDE 15 MG/.5ML
INJECTION, SOLUTION SUBCUTANEOUS
Qty: 2 ML | Refills: 5 | Status: SHIPPED | OUTPATIENT
Start: 2025-05-15

## 2025-05-15 RX ORDER — TERAZOSIN 1 MG/1
1 CAPSULE ORAL
Qty: 90 CAPSULE | Refills: 1 | Status: SHIPPED | OUTPATIENT
Start: 2025-05-15

## 2025-05-16 ENCOUNTER — TELEPHONE (OUTPATIENT)
Dept: NEUROLOGY | Facility: CLINIC | Age: 50
End: 2025-05-16

## 2025-05-16 ENCOUNTER — OFFICE VISIT (OUTPATIENT)
Dept: NEUROLOGY | Facility: CLINIC | Age: 50
End: 2025-05-16
Payer: COMMERCIAL

## 2025-05-16 VITALS
HEART RATE: 77 BPM | WEIGHT: 137 LBS | HEIGHT: 61 IN | SYSTOLIC BLOOD PRESSURE: 122 MMHG | BODY MASS INDEX: 25.86 KG/M2 | DIASTOLIC BLOOD PRESSURE: 83 MMHG | TEMPERATURE: 97.9 F

## 2025-05-16 DIAGNOSIS — G43.011 INTRACTABLE MIGRAINE WITHOUT AURA AND WITH STATUS MIGRAINOSUS: ICD-10-CM

## 2025-05-16 DIAGNOSIS — G43.009 MIGRAINE WITHOUT AURA AND WITHOUT STATUS MIGRAINOSUS, NOT INTRACTABLE: ICD-10-CM

## 2025-05-16 DIAGNOSIS — G43.709 CHRONIC MIGRAINE W/O AURA W/O STATUS MIGRAINOSUS, NOT INTRACTABLE: Primary | ICD-10-CM

## 2025-05-16 PROCEDURE — 99214 OFFICE O/P EST MOD 30 MIN: CPT | Performed by: PHYSICIAN ASSISTANT

## 2025-05-16 RX ORDER — DULOXETIN HYDROCHLORIDE 60 MG/1
60 CAPSULE, DELAYED RELEASE ORAL DAILY
Qty: 90 CAPSULE | Refills: 1 | Status: SHIPPED | OUTPATIENT
Start: 2025-05-16

## 2025-05-16 RX ORDER — TIRZEPATIDE 15 MG/.5ML
15 INJECTION, SOLUTION SUBCUTANEOUS WEEKLY
Qty: 2 ML | Refills: 0 | OUTPATIENT
Start: 2025-05-16

## 2025-05-16 RX ORDER — NYSTATIN 100000 [USP'U]/G
POWDER TOPICAL 2 TIMES DAILY
Qty: 120 G | Refills: 0 | Status: SHIPPED | OUTPATIENT
Start: 2025-05-16

## 2025-05-16 RX ORDER — LISDEXAMFETAMINE DIMESYLATE 30 MG/1
30 CAPSULE ORAL EVERY MORNING
Qty: 90 CAPSULE | Refills: 0 | Status: SHIPPED | OUTPATIENT
Start: 2025-05-16

## 2025-05-16 RX ORDER — PREGABALIN 100 MG/1
100 CAPSULE ORAL 3 TIMES DAILY
Qty: 270 CAPSULE | Refills: 0 | Status: SHIPPED | OUTPATIENT
Start: 2025-05-16

## 2025-05-16 RX ORDER — ONDANSETRON 4 MG/1
4 TABLET, FILM COATED ORAL EVERY 8 HOURS PRN
Qty: 120 TABLET | Refills: 0 | Status: SHIPPED | OUTPATIENT
Start: 2025-05-16

## 2025-05-16 RX ORDER — ATORVASTATIN CALCIUM 10 MG/1
10 TABLET, FILM COATED ORAL
Qty: 90 TABLET | Refills: 1 | Status: SHIPPED | OUTPATIENT
Start: 2025-05-16

## 2025-05-16 RX ORDER — CYPROHEPTADINE HYDROCHLORIDE 4 MG/1
4 TABLET ORAL
Qty: 90 TABLET | Refills: 1 | Status: SHIPPED | OUTPATIENT
Start: 2025-05-16

## 2025-05-16 RX ORDER — CLOTRIMAZOLE AND BETAMETHASONE DIPROPIONATE 10; .64 MG/G; MG/G
CREAM TOPICAL 2 TIMES DAILY
Qty: 45 G | Refills: 0 | Status: SHIPPED | OUTPATIENT
Start: 2025-05-16

## 2025-05-16 RX ORDER — RIMEGEPANT SULFATE 75 MG/75MG
TABLET, ORALLY DISINTEGRATING ORAL
Qty: 16 TABLET | Refills: 11 | Status: SHIPPED | OUTPATIENT
Start: 2025-05-16

## 2025-05-16 RX ORDER — LOSARTAN POTASSIUM AND HYDROCHLOROTHIAZIDE 12.5; 1 MG/1; MG/1
1 TABLET ORAL DAILY
Qty: 90 TABLET | Refills: 1 | Status: SHIPPED | OUTPATIENT
Start: 2025-05-16

## 2025-05-16 RX ORDER — MUPIROCIN 20 MG/G
OINTMENT TOPICAL 3 TIMES DAILY
Qty: 60 G | Refills: 0 | Status: SHIPPED | OUTPATIENT
Start: 2025-05-16

## 2025-05-16 RX ORDER — CLONAZEPAM 0.5 MG/1
0.5 TABLET ORAL 2 TIMES DAILY
Qty: 180 TABLET | Refills: 0 | Status: SHIPPED | OUTPATIENT
Start: 2025-05-16

## 2025-05-16 NOTE — ASSESSMENT & PLAN NOTE
Orders:  •  cyproheptadine (PERIACTIN) 4 mg tablet; Take 1 tablet (4 mg total) by mouth daily at bedtime

## 2025-05-16 NOTE — ASSESSMENT & PLAN NOTE
Orders:  •  lasmiditan succinate (REYVOW) 100 mg tablet; Take 2 tablets (200 mg total) by mouth if needed (migraine) Once in 24 hours.  DO not drive within 8 hours of taking  •  rimegepant sulfate (Nurtec) 75 mg TBDP; Place one tab (75mg total) under the tongue as needed for migraine.

## 2025-05-16 NOTE — TELEPHONE ENCOUNTER
Per kiersten 10/2/24, Cee PEDRAZA approved through 10/3/25    Dispenses     Dispensed Days Supply Quantity Provider Pharmacy   AJOVY 225 MG/1.5ML SOAJ 04/23/2025 30 1.5 each [facility] [place]        Nurtec PA started on CMM. (Key: [id number])     One of the question from the plan-  Will the patient be using the requested agent in combination with another acute migraine therapy (i.e., 5HT-1F, acute use CGRP, ergotamine, triptan) for the requested indication?     If I answered yes, PA might be denied. Do you prefer pt to take Nurtec or Reyvow?

## 2025-05-16 NOTE — PATIENT INSTRUCTIONS
1. Chronic migraine w/o aura w/o status migrainosus, not intractable  Assessment & Plan:  Preventive therapy for headaches:   Reproductive age women: Should take folic acid daily when taking anti-seizure drugs especially Depakote.  Over-the-counter supplements: to decrease intensity and frequency of migraines  Magnesium Oxide 400mg a day.  If any diarrhea or upset stomach, decrease dose  as tolerated  Vitamin B2 (riboflavin) 200 mg twice a day. May cause the urine to turn yellow which is normal for B 2 to do and is not a sign that you are dehydrated.   multivitamin use  Continue Botox 200 units for chronic migraines every  12 weeks  Depakote 500 mg at bedtime  Ajovy continue for now 1 injection monthly.  May change at a later time to 3 injections every 3 months  Acetazolamide 250 mg  twice a day  Continue all medication per other providers    Abortive therapy for headaches:   At onset of migraine take Nurtec 75 mg.  Limit of 1 in 24 hours  If severe or Nurtec is not effective, may use Reyvow 200 mg.   Limit of 1 dose in 24 hours.  Do not drive within 8 hours  May use tylenol but less than 3 doses  2. Migraine without aura and without status migrainosus, not intractable  -     lasmiditan succinate (REYVOW) 100 mg tablet; Take 2 tablets (200 mg total) by mouth if needed (migraine) Once in 24 hours.  DO not drive within 8 hours of taking  -     rimegepant sulfate (Nurtec) 75 mg TBDP; Place one tab (75mg total) under the tongue as needed for migraine.  3. Intractable migraine without aura and with status migrainosus  -     cyproheptadine (PERIACTIN) 4 mg tablet; Take 1 tablet (4 mg total) by mouth daily at bedtime

## 2025-05-16 NOTE — PROGRESS NOTES
Name: Kinza Meza      : 1975      MRN: [id number]  Encounter Provider: Desiree Grigsby PA-C  Encounter Date: 2025   Encounter department: NEUROLOGY [facility]  :  Assessment & Plan  Chronic migraine w/o aura w/o status migrainosus, not intractable  Preventive therapy for headaches:   Reproductive age women: Should take folic acid daily when taking anti-seizure drugs especially Depakote.  Over-the-counter supplements: to decrease intensity and frequency of migraines  Magnesium Oxide 400mg a day.  If any diarrhea or upset stomach, decrease dose  as tolerated  Vitamin B2 (riboflavin) 200 mg twice a day. May cause the urine to turn yellow which is normal for B 2 to do and is not a sign that you are dehydrated.   multivitamin use  Continue Botox 200 units for chronic migraines every  12 weeks  Depakote 500 mg at bedtime  Ajovy continue for now 1 injection monthly.  May change at a later time to 3 injections every 3 months  Acetazolamide 250 mg  twice a day  Continue all medication per other providers    Abortive therapy for headaches:   At onset of migraine take Nurtec 75 mg.  Limit of 1 in 24 hours  If severe or Nurtec is not effective, may use Reyvow 200 mg.   Limit of 1 dose in 24 hours.  Do not drive within 8 hours  May use tylenol but less than 3 doses       Migraine without aura and without status migrainosus, not intractable    Orders:  •  lasmiditan succinate (REYVOW) 100 mg tablet; Take 2 tablets (200 mg total) by mouth if needed (migraine) Once in 24 hours.  DO not drive within 8 hours of taking  •  rimegepant sulfate (Nurtec) 75 mg TBDP; Place one tab (75mg total) under the tongue as needed for migraine.    Intractable migraine without aura and with status migrainosus    Orders:  •  cyproheptadine (PERIACTIN) 4 mg tablet; Take 1 tablet (4 mg total) by mouth daily at bedtime          History of Present Illness   HPI   Kinza Meza is a 49 y.o. 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  She is here today for evaluation of her headaches. was getting her master's degree in clinical counseling but had to stop.       Other presenting illness:  QTC:7/13/2010 - 409 ms  Tobacco use: never    Mood:   Depression: Yes  Anxiety: No   Seeing a psychiatrist/ How often ? Yes, will be seeing one in [place]   Seeing at therapist/ how often? Yes and will see them twice a week     Headaches:   What medications do you take or have you taken for your headaches?      Current Preventative:  Acetazolamide  Ajovy  Vitamin D, B12, iron, multivitamin  Cyproheptadine  Depakote  Duloxetine, Vyvanse clonazepam  Losartan-hydrochlorothiazide, metoprolol  Methocarbamol  Pregabalin  Botox     Current Abortive:  Nurtec  reyvow 200 mg prn has not had  Zofran     Prior preventative:   calcium citrate,  gabapentin 300 mg,Topamax 100 mg BID, zonisamide  amlodipine 10 mg daily, losartan 100 mg daily, verapamil  Wellbutrin 150 mg twice a day, Citalopram  Aimovig 3/31/2020, Ajovy 6/2020     Abortive Therapy:   Decadron 10 mg, methylprednisolone 80 mg,  Tylenol 975,  Haldol  Reglan, Zofran, prochlorperazine  Sumatriptan        Headaches started at what age?   Yes when she was younger and remembers having a bad headaches when she was 2 and recall her last nan at age 17.   Head injury: 8/13/2018- she was at work when she fell and hit her head and LOC. Since then she has had daily headaches.       What is your current pain level? 4/10     How often do the headaches occur?    Mild headaches: 1-2 a week  (since has not been able to obtain her Ajovy); prior was daily    Moderate to severe headaches: 3-4 a month (with Botox and Ajovy) , improved from daily, continuous       Are you ever headache free? No, previously yes when did the injectable (Aimovig/Ajovy) with Botox     Aura/Warning and how long does it last?   -zonisamide has helped to decrease the consistency of these, will now only get them with a severe headache, felt this aura sensation was most constant prior  - diffuse weakness for seconds to minutes  - may have flashing light in her vision either right before or hours before. Will last for seconds to minutes     What time of the day do the headaches start?   Mild headaches: evening time (dusk) or occasionally wakes up with one  Moderate to severe headaches: varies but usually 2-5 pm     How long do the headaches last?   Mild headaches: into the next day  Moderate to severe headaches: varies-sometimes severe pain will last 15 mins, usually 1-2 days     Describe your usual headache?  Mild headaches: pressure, achy senstion  Moderate to severe headaches: throbbing, burning, searing, shooting, electrical, stabbing     Where is your headache located?   Mild headaches: tight band on her crown, base of her skull (mostly)  Moderate to severe headaches: frontal, temples, base of her skull (mostly), will typically wrap around her head on her crown     What is the intensity of pain?   Mild headaches: 3/10  Moderate to severe headaches:  6-8/10     Associated symptoms:   - Decreased appetite, Nausea, Vomiting, Diarrhea  - Photophobia, Phonophobia, Osmophobia  - Lacrimation, Nasal congestion/rhinorrhea, Flushing of face,Red ear   - Stiff or sore neck   - Dizziness, light headed  - Problems with concentration  - Blurred vision, Change in pupil size     - Facial droop, Hands or feet tingle or feel numb/paresthesias  - Tinnitus   - Insomnia  - Worse with lying down   better with lying down  - Prefer to be in a cool,  quiet, dark room     Number of days missed per month because of headaches:  Work (or school) days: not working  Social or Family activities: missing less!!!     Headache are worse if the patient: cough, sneeze, bending over, exertion  Headache triggers:  Chewing, stress, sexual intercourse, coughing, weather changes, talking sunlight, fatigue, certain foods  What time of the year do headaches occur more frequently? Change in weather, worse after rain storm     Have you had trigger point injection performed and how often? Yes helps with pressure headache  Have you had Botox injection performed and how often? Yes, every 3 months  Have you had epidural injections or transforaminal injections performed? Yes but to lumbar spine     Alternative therapies used in the past for headaches?   Massage, physical therapy, chiropractor, biofeedback,  Have you used CBD or THC for your headaches and how often? Yes and has a card. Helps with sleep and headache  How many caffeine products to drink a day? 24 oz tea or coffee  How much water to drink a day? 48 oz a day     Are you current pregnant or planning on getting pregnant? IUD in place      Imagin2019 MRI C-spine  Multilevel degenerative cervical spondylosis, consistent with x-rays     Straightening of normal lordosis suspicious for muscle spasm     Mild central canal stenosis, moderate right foraminal stenosis, small right-sided disc protrusion C4-5 and C5-6     Small left central disc protrusion, mild central canal stenosis C6-7     2021 MRI C-spine  Status post ACDF from C4 to C7. Multilevel degenerative spondylosis as described. Possible ossification of the posterior longitudinal ligament at C2-3 and C6-7 that could be further evaluated with noncontrast CT     2019 MRI brain  No mass effect, acute intracranial hemorrhage or evidence of recent infarction.  Mild nonspecific scattered white matter FLAIR signal hyperintensity can be seen in setting of  "migraines and/or chronic microvascular ischemic change.  No involvement of the callosal septal interface     08/03/2021 MRI brain  There are a few nonspecific white matter foci identified unchanged from the prior study.  Upon review with patient today on 8/15 2023, there appears to be pointed cerebellar tonsils as well as slight flattening on the right globe and slightly paler optic nerve     I personally reviewed these images     Reviewed old notes from physician seen in the past- see above HPI for summary of previous encounters.      With botox has had a reduction of at least 7 migraine days with less abortive medication, less ER visits which correlates to headache diary     Review of Systems I have personally reviewed the MA's review of systems and made changes as necessary.         Objective   /83 (BP Location: Right arm, Patient Position: Sitting, Cuff Size: Standard)   Pulse 77   Temp 97.9 °F (36.6 °C) (Temporal)   Ht 5' 1\" (1.549 m)   Wt 62.1 kg (137 lb)   LMP  (LMP Unknown)   BMI 25.89 kg/m²     Physical Exam  Neurological Exam  CONSTITUTIONAL: Well developed, well nourished, well groomed. No dysmorphic features.     HEENT:  Normocephalic atraumatic.    Chest:  Respirations regular and unlabored.    Psychiatric:  Normal behavior and appropriate affect      MENTAL STATUS  Orientation: Alert and oriented x 3  Fund of knowledge: Intact.        Administrative Statements   I have spent a total time of 33 minutes in caring for this patient on the day of the visit/encounter including Prognosis, Instructions for management, Patient and family education, Impressions, Counseling / Coordination of care, Documenting in the medical record, Reviewing/placing orders in the medical record (including tests, medications, and/or procedures), and Obtaining or reviewing history  .  "

## 2025-05-16 NOTE — ASSESSMENT & PLAN NOTE
Preventive therapy for headaches:   Reproductive age women: Should take folic acid daily when taking anti-seizure drugs especially Depakote.  Over-the-counter supplements: to decrease intensity and frequency of migraines  Magnesium Oxide 400mg a day.  If any diarrhea or upset stomach, decrease dose  as tolerated  Vitamin B2 (riboflavin) 200 mg twice a day. May cause the urine to turn yellow which is normal for B 2 to do and is not a sign that you are dehydrated.   multivitamin use  Continue Botox 200 units for chronic migraines every  12 weeks  Depakote 500 mg at bedtime  Ajovy continue for now 1 injection monthly.  May change at a later time to 3 injections every 3 months  Acetazolamide 250 mg  twice a day  Continue all medication per other providers    Abortive therapy for headaches:   At onset of migraine take Nurtec 75 mg.  Limit of 1 in 24 hours  If severe or Nurtec is not effective, may use Reyvow 200 mg.   Limit of 1 dose in 24 hours.  Do not drive within 8 hours  May use tylenol but less than 3 doses

## 2025-05-17 PROBLEM — E65 ABDOMINAL PANNICULUS, SYMPTOMATIC: Status: ACTIVE | Noted: 2025-05-17

## 2025-05-17 NOTE — ASSESSMENT & PLAN NOTE
Kinza Meza is a 49-year-old female with a history of type 2 diabetes mellitus, chronic kidney disease and obesity who is status post gastric bypass in 2016 with a total weight loss of 200 pounds.  Patient is weight stable.  Patient experiences significant physical discomfort and difficulty performing daily activities due to her abdominal panniculus.  Additionally she is experiencing significant hygiene challenges with significant skin irritation and fungal infection beneath the abdominal panniculus.  Patient presents today to discuss surgical management.    I discussed with her the option of panniculectomy, including the nature of panniculectomy, the nature of rectus plication, we discussed the risk of bleeding, infection, scarring, poor wound healing, damage underlying structures, need for further surgery, need for multiple procedures, loss of umbilicus, poor aesthetic result, scar migration, need for revision, contour deformity, the risk of seroma, DVT, poor aesthetic result, need for revision, need for multiple procedures.  I discussed with the patient the option of vertical and horizontal panniculectomy, discussed with her the risks, benefits, alternatives of that procedure including the above with increased risk of wound healing complication particularly at the triple point, we discussed the risk of scar migration, relapse of skin, asymmetry, need for further surgery, need for multiple procedures, we discussed the risk of poor scarring, distortion of the umbilical structures, distortion of the labial structures, I discussed with her that panniculectomy may be approved by insurance but rectus plication is traditionally not.  All the patient's questions were answered to her satisfaction, because of the constellation chronicity of her symptoms I do believe this is a medically necessary procedure.  The patient is an excellent candidate for vertical horizontal panniculectomy.  Pictures taken, will inquire as to  insurance authorization.  Given abdominal exam findings of central abdominal bulge, will obtain a CT to assess for hernia.     I have spent a total time of 45 minutes in caring for this patient on the day of the visit/encounter including Risks and benefits of tx options, Impressions, Documenting in the medical record, and Obtaining or reviewing history  .

## 2025-05-19 NOTE — TELEPHONE ENCOUNTER
Called and advised pt of all of the below. She verbalized understanding. Pt prefers to take Nurtec instead of Reyvow.    Per [facility]-  ZEYAD QUIROZ (Key: [id number])    The questions associated with this PA have  and can no longer be submitted to [email or web address]. To create a new request, select the 'Renew' button on the left.    Will create new key    Nurtec PA initiated on [facility]. (Key: [id number])     Awaiting determination

## 2025-05-19 NOTE — TELEPHONE ENCOUNTER
Please reach out to patient to see whether she would like the [facility] or the reyv authorized

## 2025-05-22 ENCOUNTER — OFFICE VISIT (OUTPATIENT)
Dept: INTERNAL MEDICINE CLINIC | Facility: CLINIC | Age: 50
End: 2025-05-22
Payer: COMMERCIAL

## 2025-05-22 VITALS
OXYGEN SATURATION: 100 % | BODY MASS INDEX: 24.17 KG/M2 | DIASTOLIC BLOOD PRESSURE: 72 MMHG | HEART RATE: 86 BPM | SYSTOLIC BLOOD PRESSURE: 118 MMHG | WEIGHT: 128 LBS | HEIGHT: 61 IN | TEMPERATURE: 98.2 F

## 2025-05-22 DIAGNOSIS — E11.22 CONTROLLED TYPE 2 DIABETES MELLITUS WITH STAGE 3 CHRONIC KIDNEY DISEASE, WITH LONG-TERM CURRENT USE OF INSULIN (HCC): ICD-10-CM

## 2025-05-22 DIAGNOSIS — F90.0 ATTENTION DEFICIT HYPERACTIVITY DISORDER (ADHD), PREDOMINANTLY INATTENTIVE TYPE: ICD-10-CM

## 2025-05-22 DIAGNOSIS — N18.32 STAGE 3B CHRONIC KIDNEY DISEASE (HCC): ICD-10-CM

## 2025-05-22 DIAGNOSIS — N18.30 CONTROLLED TYPE 2 DIABETES MELLITUS WITH STAGE 3 CHRONIC KIDNEY DISEASE, WITH LONG-TERM CURRENT USE OF INSULIN (HCC): ICD-10-CM

## 2025-05-22 DIAGNOSIS — I10 PRIMARY HYPERTENSION: ICD-10-CM

## 2025-05-22 DIAGNOSIS — Z79.4 TYPE 2 DIABETES MELLITUS WITH DIABETIC NEUROPATHY, WITH LONG-TERM CURRENT USE OF INSULIN (HCC): ICD-10-CM

## 2025-05-22 DIAGNOSIS — E11.40 TYPE 2 DIABETES MELLITUS WITH DIABETIC NEUROPATHY, WITH LONG-TERM CURRENT USE OF INSULIN (HCC): ICD-10-CM

## 2025-05-22 DIAGNOSIS — E11.65 UNCONTROLLED TYPE 2 DIABETES MELLITUS WITH HYPERGLYCEMIA (HCC): Primary | ICD-10-CM

## 2025-05-22 DIAGNOSIS — G43.009 MIGRAINE WITHOUT AURA AND WITHOUT STATUS MIGRAINOSUS, NOT INTRACTABLE: ICD-10-CM

## 2025-05-22 DIAGNOSIS — E78.2 MIXED HYPERLIPIDEMIA: ICD-10-CM

## 2025-05-22 DIAGNOSIS — Z79.4 CONTROLLED TYPE 2 DIABETES MELLITUS WITH STAGE 3 CHRONIC KIDNEY DISEASE, WITH LONG-TERM CURRENT USE OF INSULIN (HCC): ICD-10-CM

## 2025-05-22 PROCEDURE — 99214 OFFICE O/P EST MOD 30 MIN: CPT | Performed by: INTERNAL MEDICINE

## 2025-05-22 NOTE — TELEPHONE ENCOUNTER
[facility]    Called [facility] pharmacy and left a message making them aware of the approval and for a cb if any questions.

## 2025-05-22 NOTE — ASSESSMENT & PLAN NOTE
Lab Results   Component Value Date    EGFR 41 05/15/2025    EGFR 33 02/11/2025    EGFR 44 12/18/2024    CREATININE 1.47 (H) 05/15/2025    CREATININE 1.76 (H) 02/11/2025    CREATININE 1.37 (H) 12/18/2024

## 2025-05-22 NOTE — PROGRESS NOTES
Name: Kinza Meza      : 1975      MRN: [id number]  Encounter Provider: Myla Norwood MD  Encounter Date: 2025   Encounter department: [facility] [address]  :  Assessment & Plan  Uncontrolled type 2 diabetes mellitus with hyperglycemia (HCC)    Lab Results   Component Value Date    HGBA1C 5.0 05/15/2025   Continue same med       Primary hypertension  Continue same med       Migraine without aura and without status migrainosus, not intractable  Continue same med       Type 2 diabetes mellitus with diabetic neuropathy, with long-term current use of insulin (HCC)    Lab Results   Component Value Date    HGBA1C 5.0 05/15/2025   Continue same med       Controlled type 2 diabetes mellitus with stage 3 chronic kidney disease, with long-term current use of insulin (HCC)    Lab Results   Component Value Date    HGBA1C 5.0 05/15/2025            Stage 3b chronic kidney disease (HCC)  Lab Results   Component Value Date    EGFR 41 05/15/2025    EGFR 33 2025    EGFR 44 2024    CREATININE 1.47 (H) 05/15/2025    CREATININE 1.76 (H) 2025    CREATININE 1.37 (H) 2024            Attention deficit hyperactivity disorder (ADHD), predominantly inattentive type  Continue same med       Mixed hyperlipidemia  Continue same med              History of Present Illness   Follow-up on blood test on 5/15/2025 test discussed with her      Review of Systems   Constitutional:  Negative for chills and fever.   HENT:  Negative for congestion, ear pain and sore throat.    Eyes:  Negative for pain.   Respiratory:  Negative for cough and shortness of breath.    Cardiovascular:  Negative for chest pain and leg swelling.   Gastrointestinal:  Negative for abdominal pain, nausea and vomiting.   Endocrine: Negative for polyuria.   Genitourinary:  Negative for difficulty urinating, frequency and urgency.   Musculoskeletal:  Positive for arthralgias and back pain.   Skin:  Negative for  "rash.   Neurological:  Negative for weakness and headaches.   Psychiatric/Behavioral:  Negative for sleep disturbance. The patient is not nervous/anxious.        Objective   /72 (BP Location: Right arm, Patient Position: Sitting, Cuff Size: Standard)   Pulse 86   Temp 98.2 °F (36.8 °C) (Temporal)   Ht 5' 1\" (1.549 m)   Wt 58.1 kg (128 lb)   LMP  (LMP Unknown)   SpO2 100%   BMI 24.19 kg/m²      Physical Exam  Vitals and nursing note reviewed.   Constitutional:       General: She is not in acute distress.     Appearance: She is well-developed.   HENT:      Head: Normocephalic and atraumatic.      Right Ear: External ear normal.      Left Ear: External ear normal.      Mouth/Throat:      Mouth: Mucous membranes are moist.      Pharynx: Oropharynx is clear.     Eyes:      Extraocular Movements: Extraocular movements intact.      Conjunctiva/sclera: Conjunctivae normal.       Cardiovascular:      Rate and Rhythm: Normal rate and regular rhythm.      Heart sounds: Normal heart sounds. No murmur heard.  Pulmonary:      Effort: Pulmonary effort is normal. No respiratory distress.      Breath sounds: Normal breath sounds. No wheezing or rales.   Abdominal:      General: Abdomen is flat. There is no distension.      Palpations: Abdomen is soft.      Tenderness: There is no abdominal tenderness.     Musculoskeletal:         General: No swelling.      Cervical back: Neck supple.      Right lower leg: No edema.      Left lower leg: No edema.     Skin:     General: Skin is warm and dry.      Capillary Refill: Capillary refill takes less than 2 seconds.     Neurological:      General: No focal deficit present.      Mental Status: She is alert and oriented to person, place, and time.     Psychiatric:         Mood and Affect: Mood normal.         "

## 2025-05-26 DIAGNOSIS — I10 ESSENTIAL HYPERTENSION, BENIGN: ICD-10-CM

## 2025-05-26 RX ORDER — METOPROLOL TARTRATE 100 MG/1
100 TABLET ORAL 2 TIMES DAILY
Qty: 180 TABLET | Refills: 1 | Status: SHIPPED | OUTPATIENT
Start: 2025-05-26

## 2025-06-05 ENCOUNTER — TELEPHONE (OUTPATIENT)
Dept: PLASTIC SURGERY | Facility: CLINIC | Age: 50
End: 2025-06-05

## 2025-06-06 ENCOUNTER — OFFICE VISIT (OUTPATIENT)
Dept: PLASTIC SURGERY | Facility: CLINIC | Age: 50
End: 2025-06-06
Payer: COMMERCIAL

## 2025-06-06 DIAGNOSIS — L30.4 ERYTHEMA INTERTRIGO: Primary | ICD-10-CM

## 2025-06-06 DIAGNOSIS — L98.7 EXCESS SKIN OF ABDOMEN: ICD-10-CM

## 2025-06-06 PROCEDURE — 99213 OFFICE O/P EST LOW 20 MIN: CPT | Performed by: PLASTIC SURGERY

## 2025-06-09 ENCOUNTER — PROCEDURE VISIT (OUTPATIENT)
Age: 50
End: 2025-06-09
Payer: COMMERCIAL

## 2025-06-09 VITALS
DIASTOLIC BLOOD PRESSURE: 90 MMHG | HEIGHT: 61 IN | HEART RATE: 88 BPM | WEIGHT: 128 LBS | SYSTOLIC BLOOD PRESSURE: 136 MMHG | BODY MASS INDEX: 24.17 KG/M2

## 2025-06-09 DIAGNOSIS — M99.04 SEGMENTAL DYSFUNCTION OF SACRAL REGION: Primary | ICD-10-CM

## 2025-06-09 DIAGNOSIS — M47.812 CERVICAL SPONDYLOSIS: ICD-10-CM

## 2025-06-09 DIAGNOSIS — M48.062 SPINAL STENOSIS OF LUMBAR REGION WITH NEUROGENIC CLAUDICATION: ICD-10-CM

## 2025-06-09 DIAGNOSIS — M62.838 MUSCLE SPASM: ICD-10-CM

## 2025-06-09 DIAGNOSIS — M99.02 SEGMENTAL DYSFUNCTION OF THORACIC REGION: ICD-10-CM

## 2025-06-09 DIAGNOSIS — M99.03 SEGMENTAL DYSFUNCTION OF LUMBAR REGION: ICD-10-CM

## 2025-06-09 DIAGNOSIS — M99.01 SEGMENTAL DYSFUNCTION OF CERVICAL REGION: ICD-10-CM

## 2025-06-09 PROCEDURE — 98941 CHIROPRACT MANJ 3-4 REGIONS: CPT | Performed by: CHIROPRACTOR

## 2025-06-09 PROCEDURE — 97110 THERAPEUTIC EXERCISES: CPT | Performed by: CHIROPRACTOR

## 2025-06-09 NOTE — PROGRESS NOTES
Initial date of service: 5/6/24    Diagnoses and all orders for this visit:    Segmental dysfunction of sacral region    Muscle spasm    Cervical spondylosis    Segmental dysfunction of lumbar region    Segmental dysfunction of thoracic region    Spinal stenosis of lumbar region with neurogenic claudication    Segmental dysfunction of cervical region    Chronic back pain in setting of spinal stenosis and fusion sx. Pt suffered exacerbation but is improving. F/up 2-3 wks    TREATMENT: [id number]  Ther-ex: IASTM; discussed post procedure soreness and/or ecchymosis for up to 36 hrs, applied to affected mm hypertonicities; supine hamstring stretch, supine gluteal stretch, single knee to chest stretch, upper trap stretch, transitional mvmt education, abdominal bracing; greater than 15 min spent performing above mentioned ther-ex to improve ROM/flexibility. Cervical supine graded mobilization and traction, Thoracic mobilization/manipulation: prone P-A mob; Lumbar mobilization/manipulation: diversified side laying graded HVLA, flexion-traction; SIJ Manipulation/Mobilization: R/L SIJ HVLA - long axis distraction, mederos drop table maneuver to affected SIJ    HPI:  Kinza Meza is a 49 y.o. female  Chief Complaint   Patient presents with    Neck - Follow-up     Neck is slightly better pain score 2        Back Pain     Mid to lower lumbar pain score    4     Pt presents for tx for exacerbation of chronic neck/back pain. Pt has had back and neck surgery. Pt was utilizing cane and now using walker occasionally. 2022 MRI demonstrates multilevel degenerative changes of postsurgical lumbar spine status post L1-L2 posterior spinal fixation with varying degrees of canal stenosis (moderate L4-L5) and foraminal narrowing (mild left L1-L2 and left L2-L3 ). 2024 fl/ext xrays unremarkable for instability. 2021 C/S MRI demonstrates s/p ACDF from C4 to C7. Multilevel degenerative spondylosis as described. Possible ossification of  the posterior longitudinal ligament at C2-3 and C6-7  6/9: pt reports feeling and moving better since last tx; has been better about balancing helping with grandkids; mild flare-up    Neck Pain   This is a chronic problem. The current episode started more than 1 year ago. The problem occurs constantly. The problem has been waxing and waning. The pain is present in the left side, midline and right side. The quality of the pain is described as aching. The symptoms are aggravated by bending, position, stress and twisting. The pain is Worse during the day.   Back Pain  This is a chronic problem. The current episode started more than 1 year ago. The problem occurs constantly. The problem has been waxing and waning since onset. The pain is present in the gluteal, lumbar spine, sacro-iliac and thoracic spine. The quality of the pain is described as aching, burning and stabbing. The pain radiates to the left thigh, left knee and right thigh. Pain scale: 0-5/10. The pain is Worse during the day. The symptoms are aggravated by standing, twisting and bending (walking, laying supine, transitional mvmts; palliative includes sitting, massage). Pertinent negatives include no bladder incontinence or bowel incontinence. Risk factors include lack of exercise.     Past Medical History:   Diagnosis Date    Abnormal Pap smear of cervix Are 21    Recurrence in early 30’s    ADD (attention deficit disorder)     Allergic rhinitis     ALS (amyotrophic lateral sclerosis) ([facility]) 11/22    Per neurological surgeon    Anemia 12/2021    Anxiety     Arthritis     Bipolar disorder ([facility])     Bladder problem 06/2021    Cassidy Auquina Syndrome    Brain concussion 08/22    Breast cyst 1998    Callus     Cervical disc disorder with radiculopathy of cervical region     Chronic depression     Chronic diarrhea     Cassidy Aquina Syndrome    Chronic kidney disease     Stage 1    Chronic pain disorder     Cluster headache     Colitis     Last assessed -  11/25/14    Colon polyp     Concussion 2018    Condyloma acuminatum     Last assessed - 3/19/14    CTS (carpal tunnel syndrome) 2001    Depression     Diabetes mellitus ([facility])     Last assessed - 11/25/14    Diabetic nephropathy ([facility]) 2001    Diabetic neuropathy ([facility])     Diabetic retinopathy ([facility]) 2001    Difficulty walking 07/22    Environmental and seasonal allergies     Fallen arches     I think    Fibrocystic breast age 21    Fibroid 1994    Lots of fibroids in my breast at least.    Fibromyalgia     Fibromyalgia, primary     Foot ulcer ([facility])     GERD (gastroesophageal reflux disease)     Head injury 08/13/18    Fall    Headache(784.0) 1977    History of transfusion 12/2021    HTN (hypertension)     Last assessed - 11/25/14    Hyperlipidemia 07/22/2019    Hypertension     Ingrown toenail     Always    Intervertebral disc disorder with radiculopathy of lumbar region     Kidney problem 06/2021    Low back pain 2001    Lumbosacral disc disease Unknown    Lupus 2003    Rhuematologist-Dr Rashid [place], [place]    Migraine     Miscarriage 1988    2001, 2004    Morning headache     Obesity 1980    Obesity, unspecified     Open wound of back 05/30/2023    Opioid abuse, in remission ([facility]) 01/2022    Only while in hospital and for about 10 days once home instead of one week.    Osteoarthritis 2001    Osteoporosis     Peripheral neuropathy     Postoperative wound infection 12/2021    Stop not healed    Preeclampsia     Last assessed - 11/25/14    Rheumatic disease 05/2001    Fibromyalgia, rheumatoid arthritis    Rheumatoid arthritis ([facility])     Shin splints     Younger    Thoracic disc disorder 2001    Urinary retention Going on 3 years    In the last 3-4 weeks it got to the point of constant, at tones stabbing pain    Urinary tract infection     In the last year    Vaginal infection     I’m diabetic, this is a frequent occurrence    Verruca Six months ago    Current big toe    Wears dentures       Past Surgical History:    Procedure Laterality Date    ABDOMINAL SURGERY  , , ,     Appendix burst, , hernia repairs, full gastric bypass    APPENDECTOMY      Last assessed - 14    BACK SURGERY  2021    Caudia Aqina, plus post op infection and wound care for 11 months    BARIATRIC SURGERY  2016    CARPAL TUNNEL RELEASE      CATARACT EXTRACTION      CERVICAL FUSION N/A 2019    Procedure: Anterior cervical discectomy w fusion C4-5, C5-6, C6-7; allograft and neuromonitoring;  Surgeon: Jose Gomez MD;  Location: [facility];  Service: Orthopedics     SECTION      Last assessed - 14    COLONOSCOPY      COLONOSCOPY      GASTRIC BYPASS  16    Not very successful as far as weight loss, only about 80 pounds start to finish. However, portion control and foods i Can no longer eat have been very beneficial to me. I’ll just never p    HAND SURGERY  [id number]    Right hand 3 surgeries- carpal tunnel, revision, other fingers,, left hand carpal t towel    HEMORRHOID SURGERY      HERNIA REPAIR      Last assessed - 14    LUMBAR FUSION N/A 2021    Procedure: L1/2 laminectomy, discectomy with L1/2 MIS posterior fixation using neuromonitoring and neuronavigation;  Surgeon: Suhas Akbar MD;  Location: [facility];  Service: Neurosurgery    LUMBAR WOUND EXPLORATION Bilateral 2021    Procedure: Lumbar wound washout, debridement and intraoperative cultures;  Surgeon: Suhas Akbar MD;  Location:  MAIN OR;  Service: Neurosurgery    MAMMO (HISTORICAL)  2016    NECK SURGERY  2019    Cervical fusion    IA DEBRIDEMENT BONE 1ST 20 SQ CM/< N/A 2023    Procedure: SURGICAL PREPARATION OF BACK  WOUND AND LOCAL FLAP, PREVENA PLACEMENT;  Surgeon: Ady Rocha MD;  Location:  MAIN OR;  Service: Plastics    PROSTATE CRYOABLATION      WOOD-EN-Y PROCEDURE      SPINAL FUSION  2021    Done sitting back surgery    ULNAR TUNNEL RELEASE      VAC DRESSING APPLICATION Bilateral  12/22/2021    Procedure: APPLICATION VAC DRESSING;  Surgeon: Elbert Hawley MD;  Location: [facility];  Service: General    VAC DRESSING APPLICATION N/A 12/25/2021    Procedure: APPLICATION VAC DRESSING ABDOMEN/TRUNK;  Surgeon: Mani Ross DO;  Location: [facility];  Service: General    VENTRAL HERNIA REPAIR       The following portions of the patient's history were reviewed and updated as appropriate: allergies, past family history, past medical history, past social history, past surgical history, and problem list.  Review of Systems   Gastrointestinal:  Negative for bowel incontinence.   Genitourinary:  Negative for bladder incontinence.   Musculoskeletal:  Positive for back pain and neck pain.     Physical Exam  Neck:        Comments: Pnful and limited in Brot, Blf  Musculoskeletal:      Cervical back: Pain with movement and muscular tenderness present. Decreased range of motion.      Thoracic back: Spasms and tenderness present. Decreased range of motion.      Lumbar back: Spasms and tenderness present. Decreased range of motion. Negative right straight leg raise test and negative left straight leg raise test.        Back:       Comments: Pnful and limited in Brot, Blf, Ext centralizes    Lymphadenopathy:      Cervical: No cervical adenopathy.     Skin:     General: Skin is warm and dry.     Neurological:      Mental Status: She is alert and oriented to person, place, and time.      Gait: Gait is intact.     Psychiatric:         Mood and Affect: Mood and affect normal.         Behavior: Behavior normal.       SOFT TISSUE ASSESSMENT Hypertonicity and tenderness palpated B upper traps, B SCM, B T10-S1 erector spinae, glute med/min, QL, hamstring JOINT RESTRICTIONS: C4/5, T1/2, T10-S1 and R/L SIJ    Return in about 3 weeks (around 6/30/2025) for Next scheduled follow up.

## 2025-06-22 NOTE — PROGRESS NOTES
[facility]   Plastic and Reconstructive Surgery   [address] [place] [id number]       CLINIC NOTE      Assessment & Plan  Excess skin of abdomen    Erythema intertrigo  Kinza Meza is a 49-year-old female with a history of type 2 diabetes mellitus, chronic kidney disease and obesity who is status post gastric bypass in 2016 with a total weight loss of 200 pounds.  Patient is weight stable.  Patient experiences significant physical discomfort and difficulty performing daily activities due to her abdominal panniculus.  Additionally she is experiencing significant hygiene challenges with significant skin irritation and fungal infection beneath the abdominal panniculus.  She does have a history of ventral hernia repair.  She cannot remember if mesh was placed at that time.  CT shows chronic 5.7x2.4cm subcutaneous fluid collection of the central abdomen.  No appreciable ventral hernia on CT scan.  Will plan to discuss with general surgery to see if any special measures need to be taken to address the chronic fluid collection if mesh happens to be present.    Reviewed the plan for panniculectomy with the patient. I discussed with her the nature of a panniculectomy.  We also discussed the risk of bleeding, infection, scarring, poor wound healing, damage underlying structures, need for further surgery, need for multiple procedures, loss of umbilicus, poor aesthetic result, scar migration, need for revision, contour deformity, the risk of seroma, DVT, poor aesthetic result, need for revision, need for multiple procedures.  I also discussed with the patient the possibility of vertical and horizontal panniculectomy, discussed with her the risks, benefits, alternatives of that procedure including the above with increased risk of wound healing complication particularly at the triple point, we discussed the risk of scar migration, relapse of skin, asymmetry, need for further surgery, need for multiple procedures, we  discussed the risk of poor scarring, distortion of the umbilical structures and distortion of the labial structures.  All the patient's questions were answered to her satisfaction.    Patient expresses understanding of the proposed procedure and associated risks.  Patient elects to proceed with abdominal panniculectomy.  Verbal and written consent was obtained at today's visit.  Our office will work the patient on scheduling an OR date    -Medical clearance  -CBC, BMP, nutrition labs      Interval History:  Kinza notes no acute changes in her status.  She is feeling well overall and eating a balanced diet.  Kinza notes no significant changes in her weight. Patient denies fevers, chills, nausea, vomiting or skin problems.        Physical Exam   Alert, oriented, NAD  Breathing comfortably on room air  Significant excess skin with redundancy within the upper half of the abdomen.  Central fullness stable.  No overlying skin changes.  Significant excess skin overlying the lower half of the abdomen.  No appreciable hernias or bulges.    Irritation of skin present in skin folds and periumbilical skin

## 2025-06-25 ENCOUNTER — OFFICE VISIT (OUTPATIENT)
Age: 50
End: 2025-06-25
Payer: COMMERCIAL

## 2025-06-25 VITALS
SYSTOLIC BLOOD PRESSURE: 104 MMHG | HEIGHT: 61 IN | BODY MASS INDEX: 24.17 KG/M2 | DIASTOLIC BLOOD PRESSURE: 75 MMHG | WEIGHT: 128 LBS

## 2025-06-25 DIAGNOSIS — M99.02 SEGMENTAL DYSFUNCTION OF THORACIC REGION: ICD-10-CM

## 2025-06-25 DIAGNOSIS — M48.062 SPINAL STENOSIS OF LUMBAR REGION WITH NEUROGENIC CLAUDICATION: ICD-10-CM

## 2025-06-25 DIAGNOSIS — M99.03 SEGMENTAL DYSFUNCTION OF LUMBAR REGION: ICD-10-CM

## 2025-06-25 DIAGNOSIS — M99.04 SEGMENTAL DYSFUNCTION OF SACRAL REGION: Primary | ICD-10-CM

## 2025-06-25 DIAGNOSIS — M62.838 MUSCLE SPASM: ICD-10-CM

## 2025-06-25 DIAGNOSIS — M47.812 CERVICAL SPONDYLOSIS: ICD-10-CM

## 2025-06-25 DIAGNOSIS — M99.01 SEGMENTAL DYSFUNCTION OF CERVICAL REGION: ICD-10-CM

## 2025-06-25 PROCEDURE — 98941 CHIROPRACT MANJ 3-4 REGIONS: CPT | Performed by: CHIROPRACTOR

## 2025-06-25 PROCEDURE — 97110 THERAPEUTIC EXERCISES: CPT | Performed by: CHIROPRACTOR

## 2025-06-26 NOTE — PROGRESS NOTES
Initial date of service: 5/6/24    Diagnoses and all orders for this visit:    Segmental dysfunction of sacral region    Muscle spasm    Cervical spondylosis    Segmental dysfunction of lumbar region    Segmental dysfunction of thoracic region    Spinal stenosis of lumbar region with neurogenic claudication    Segmental dysfunction of cervical region    Chronic back pain in setting of spinal stenosis and fusion sx. Pt suffered exacerbation but is improving. F/up 2-3 wks    TREATMENT: [id number]  Ther-ex: IASTM; discussed post procedure soreness and/or ecchymosis for up to 36 hrs, applied to affected mm hypertonicities; supine hamstring stretch, supine gluteal stretch, single knee to chest stretch, upper trap stretch, transitional mvmt education, abdominal bracing; greater than 15 min spent performing above mentioned ther-ex to improve ROM/flexibility. Cervical supine graded mobilization and traction, Thoracic mobilization/manipulation: prone P-A mob; Lumbar mobilization/manipulation: diversified side laying graded HVLA, flexion-traction; SIJ Manipulation/Mobilization: R/L SIJ HVLA - long axis distraction, mederos drop table maneuver to affected SIJ    HPI:  Kinza Meza is a 49 y.o. female  Chief Complaint   Patient presents with    Neck Pain     Neck pain stiff 3     Back Pain     Low back about as well right side      Pt presents for tx for exacerbation of chronic neck/back pain. Pt has had back and neck surgery. Pt was utilizing cane and now using walker occasionally. 2022 MRI demonstrates multilevel degenerative changes of postsurgical lumbar spine status post L1-L2 posterior spinal fixation with varying degrees of canal stenosis (moderate L4-L5) and foraminal narrowing (mild left L1-L2 and left L2-L3 ). 2024 fl/ext xrays unremarkable for instability. 2021 C/S MRI demonstrates s/p ACDF from C4 to C7. Multilevel degenerative spondylosis as described. Possible ossification of the posterior longitudinal  ligament at C2-3 and C6-7  6/25: pt reports feeling and moving better since last tx    Neck Pain   This is a chronic problem. The current episode started more than 1 year ago. The problem occurs constantly. The problem has been waxing and waning. The pain is present in the left side, midline and right side. The quality of the pain is described as aching. The symptoms are aggravated by bending, position, stress and twisting. The pain is Worse during the day.   Back Pain  This is a chronic problem. The current episode started more than 1 year ago. The problem occurs constantly. The problem has been waxing and waning since onset. The pain is present in the gluteal, lumbar spine, sacro-iliac and thoracic spine. The quality of the pain is described as aching, burning and stabbing. The pain radiates to the left thigh, left knee and right thigh. Pain scale: 0-5/10. The pain is Worse during the day. The symptoms are aggravated by standing, twisting and bending (walking, laying supine, transitional mvmts; palliative includes sitting, massage). Pertinent negatives include no bladder incontinence or bowel incontinence. Risk factors include lack of exercise.     Past Medical History:   Diagnosis Date    Abnormal Pap smear of cervix Are 21    Recurrence in early 30’s    ADD (attention deficit disorder)     Allergic rhinitis     ALS (amyotrophic lateral sclerosis) ([facility]) 11/22    Per neurological surgeon    Anemia 12/2021    Anxiety     Arthritis     Bipolar disorder ([facility])     Bladder problem 06/2021    Cassidy Auquina Syndrome    Brain concussion 08/22    Breast cyst 1998    Callus     Cervical disc disorder with radiculopathy of cervical region     Chronic depression     Chronic diarrhea     Cassidy Aquina Syndrome    Chronic kidney disease     Stage 1    Chronic pain disorder     Cluster headache     Colitis     Last assessed - 11/25/14    Colon polyp     Concussion 2018    Condyloma acuminatum     Last assessed - 3/19/14     CTS (carpal tunnel syndrome)     Depression     Diabetes mellitus (HCC)     Last assessed - 14    Diabetic nephropathy ([facility]) 2001    Diabetic neuropathy ([facility])     Diabetic retinopathy ([facility])     Difficulty walking     Environmental and seasonal allergies     Fallen arches     I think    Fibrocystic breast age 21    Fibroid 1994    Lots of fibroids in my breast at least.    Fibromyalgia     Fibromyalgia, primary     Foot ulcer (HCC)     GERD (gastroesophageal reflux disease)     Head injury 16    Fall    Headache(784.0) 1977    History of transfusion 2021    HTN (hypertension)     Last assessed - 14    Hyperlipidemia 2019    Hypertension     Ingrown toenail     Always    Intervertebral disc disorder with radiculopathy of lumbar region     Kidney problem 2021    Low back pain     Lumbosacral disc disease Unknown    Lupus     Rhuematologist-Sheela Dasilva, PA    Migraine     Miscarriage 1988    ,     Morning headache     Obesity 1980    Obesity, unspecified     Open wound of back 2023    Opioid abuse, in remission ([facility]) 2022    Only while in hospital and for about 10 days once home instead of one week.    Osteoarthritis     Osteoporosis     Peripheral neuropathy     Postoperative wound infection 2021    Stop not healed    Preeclampsia     Last assessed - 14    Rheumatic disease 2001    Fibromyalgia, rheumatoid arthritis    Rheumatoid arthritis ([facility])     Shin splints     Younger    Thoracic disc disorder     Urinary retention Going on 3 years    In the last 3-4 weeks it got to the point of constant, at tones stabbing pain    Urinary tract infection     In the last year    Vaginal infection     I’m diabetic, this is a frequent occurrence    Verruca Six months ago    Current big toe    Wears dentures       Past Surgical History:   Procedure Laterality Date    ABDOMINAL SURGERY  , , ,     Appendix burst, ,  hernia repairs, full gastric bypass    APPENDECTOMY      Last assessed - 14    BACK SURGERY  2021    Caudia Aqina, plus post op infection and wound care for 11 months    BARIATRIC SURGERY  2016    CARPAL TUNNEL RELEASE      CATARACT EXTRACTION      CERVICAL FUSION N/A 2019    Procedure: Anterior cervical discectomy w fusion C4-5, C5-6, C6-7; allograft and neuromonitoring;  Surgeon: Jose Gomez MD;  Location:  MAIN OR;  Service: Orthopedics     SECTION      Last assessed - 14    COLONOSCOPY      COLONOSCOPY      GASTRIC BYPASS  16    Not very successful as far as weight loss, only about 80 pounds start to finish. However, portion control and foods i Can no longer eat have been very beneficial to me. I’ll just never p    HAND SURGERY  [id number]    Right hand 3 surgeries- carpal tunnel, revision, other fingers,, left hand carpal t towel    HEMORRHOID SURGERY      HERNIA REPAIR      Last assessed - 14    LUMBAR FUSION N/A 2021    Procedure: L1/2 laminectomy, discectomy with L1/2 MIS posterior fixation using neuromonitoring and neuronavigation;  Surgeon: Suhas Akbar MD;  Location: [facility];  Service: Neurosurgery    LUMBAR WOUND EXPLORATION Bilateral 2021    Procedure: Lumbar wound washout, debridement and intraoperative cultures;  Surgeon: Suhas Akbar MD;  Location:  MAIN OR;  Service: Neurosurgery    MAMMO (HISTORICAL)  2016    NECK SURGERY  2019    Cervical fusion    MD DEBRIDEMENT BONE 1ST 20 SQ CM/< N/A 2023    Procedure: SURGICAL PREPARATION OF BACK  WOUND AND LOCAL FLAP, PREVENA PLACEMENT;  Surgeon: Ady Rocha MD;  Location:  MAIN OR;  Service: Plastics    PROSTATE CRYOABLATION      WOOD-EN-Y PROCEDURE      SPINAL FUSION  2021    Done sitting back surgery    ULNAR TUNNEL RELEASE      VAC DRESSING APPLICATION Bilateral 2021    Procedure: APPLICATION VAC DRESSING;  Surgeon: Elbert Hawley MD;  Location:  MAIN OR;   Service: General    VAC DRESSING APPLICATION N/A 12/25/2021    Procedure: APPLICATION VAC DRESSING ABDOMEN/TRUNK;  Surgeon: Mani Ross DO;  Location: [facility];  Service: General    VENTRAL HERNIA REPAIR       The following portions of the patient's history were reviewed and updated as appropriate: allergies, past family history, past medical history, past social history, past surgical history, and problem list.  Review of Systems   Gastrointestinal:  Negative for bowel incontinence.   Genitourinary:  Negative for bladder incontinence.   Musculoskeletal:  Positive for back pain and neck pain.     Physical Exam  Neck:        Comments: Pnful and limited in Brot, Blf  Musculoskeletal:      Cervical back: Pain with movement and muscular tenderness present. Decreased range of motion.      Thoracic back: Spasms and tenderness present. Decreased range of motion.      Lumbar back: Spasms and tenderness present. Decreased range of motion. Negative right straight leg raise test and negative left straight leg raise test.        Back:       Comments: Pnful and limited in Brot, Blf, Ext centralizes    Lymphadenopathy:      Cervical: No cervical adenopathy.     Skin:     General: Skin is warm and dry.     Neurological:      Mental Status: She is alert and oriented to person, place, and time.      Gait: Gait is intact.     Psychiatric:         Mood and Affect: Mood and affect normal.         Behavior: Behavior normal.       SOFT TISSUE ASSESSMENT Hypertonicity and tenderness palpated B upper traps, B SCM, B T10-S1 erector spinae, glute med/min, QL, hamstring JOINT RESTRICTIONS: C4/5, T1/2, T10-S1 and R/L SIJ    Return in about 2 weeks (around 7/9/2025) for Next scheduled follow up.

## 2025-07-03 ENCOUNTER — OFFICE VISIT (OUTPATIENT)
Dept: INTERNAL MEDICINE CLINIC | Facility: CLINIC | Age: 50
End: 2025-07-03
Payer: COMMERCIAL

## 2025-07-03 VITALS
HEART RATE: 92 BPM | TEMPERATURE: 98.6 F | OXYGEN SATURATION: 99 % | HEIGHT: 61 IN | WEIGHT: 127.2 LBS | SYSTOLIC BLOOD PRESSURE: 122 MMHG | DIASTOLIC BLOOD PRESSURE: 80 MMHG | BODY MASS INDEX: 24.02 KG/M2

## 2025-07-03 DIAGNOSIS — N18.32 STAGE 3B CHRONIC KIDNEY DISEASE (HCC): ICD-10-CM

## 2025-07-03 DIAGNOSIS — L60.0 PARONYCHIA OF TOE OF LEFT FOOT DUE TO INGROWN TOENAIL: Primary | ICD-10-CM

## 2025-07-03 DIAGNOSIS — F34.1 DYSTHYMIC DISORDER: ICD-10-CM

## 2025-07-03 DIAGNOSIS — M17.0 PRIMARY OSTEOARTHRITIS OF BOTH KNEES: ICD-10-CM

## 2025-07-03 DIAGNOSIS — E78.2 MIXED HYPERLIPIDEMIA: ICD-10-CM

## 2025-07-03 DIAGNOSIS — Z01.00 DIABETIC EYE EXAM (HCC): ICD-10-CM

## 2025-07-03 DIAGNOSIS — L03.032 PARONYCHIA OF TOE OF LEFT FOOT DUE TO INGROWN TOENAIL: Primary | ICD-10-CM

## 2025-07-03 DIAGNOSIS — E11.65 UNCONTROLLED TYPE 2 DIABETES MELLITUS WITH HYPERGLYCEMIA (HCC): ICD-10-CM

## 2025-07-03 DIAGNOSIS — E11.40 TYPE 2 DIABETES MELLITUS WITH DIABETIC NEUROPATHY, WITH LONG-TERM CURRENT USE OF INSULIN (HCC): ICD-10-CM

## 2025-07-03 DIAGNOSIS — Z00.00 ANNUAL PHYSICAL EXAM: ICD-10-CM

## 2025-07-03 DIAGNOSIS — E11.9 DIABETIC EYE EXAM (HCC): ICD-10-CM

## 2025-07-03 DIAGNOSIS — Z79.4 TYPE 2 DIABETES MELLITUS WITH DIABETIC NEUROPATHY, WITH LONG-TERM CURRENT USE OF INSULIN (HCC): ICD-10-CM

## 2025-07-03 PROCEDURE — 99396 PREV VISIT EST AGE 40-64: CPT | Performed by: INTERNAL MEDICINE

## 2025-07-03 PROCEDURE — 99214 OFFICE O/P EST MOD 30 MIN: CPT | Performed by: INTERNAL MEDICINE

## 2025-07-03 RX ORDER — TIRZEPATIDE 15 MG/.5ML
15 INJECTION, SOLUTION SUBCUTANEOUS WEEKLY
Qty: 2 ML | Refills: 5 | Status: SHIPPED | OUTPATIENT
Start: 2025-07-03

## 2025-07-03 RX ORDER — CEPHALEXIN 500 MG/1
500 CAPSULE ORAL EVERY 8 HOURS SCHEDULED
Qty: 21 CAPSULE | Refills: 0 | Status: SHIPPED | OUTPATIENT
Start: 2025-07-03 | End: 2025-07-10

## 2025-07-03 NOTE — ASSESSMENT & PLAN NOTE
Lab Results   Component Value Date    EGFR 41 05/15/2025    EGFR 33 02/11/2025    EGFR 44 12/18/2024    CREATININE 1.47 (H) 05/15/2025    CREATININE 1.76 (H) 02/11/2025    CREATININE 1.37 (H) 12/18/2024   Continue same med

## 2025-07-03 NOTE — PROGRESS NOTES
Adult Annual Physical  Name: Kinza Meza      : 1975      MRN: [id number]  Encounter Provider: Myla Norwood MD  Encounter Date: 7/3/2025   Encounter department: [facility]INE [address]    :  Assessment & Plan  Paronychia of toe of left foot due to ingrown toenail    Orders:  •  cephalexin (KEFLEX) 500 mg capsule; Take 1 capsule (500 mg total) by mouth every 8 (eight) hours for 7 days    Diabetic eye exam (HCC)    Orders:  •  Ambulatory Referral to Ophthalmology; Future    Type 2 diabetes mellitus with diabetic neuropathy, with long-term current use of insulin ([facility])    Lab Results   Component Value Date    HGBA1C 5.0 05/15/2025       Orders:  •  Mounjaro 15 MG/0.5ML SOAJ; Inject 15 mg under the skin once a week    Uncontrolled type 2 diabetes mellitus with hyperglycemia ([facility])    Lab Results   Component Value Date    HGBA1C 5.0 05/15/2025            Primary osteoarthritis of both knees         Stage 3b chronic kidney disease (HCC)  Lab Results   Component Value Date    EGFR 41 05/15/2025    EGFR 33 2025    EGFR 44 2024    CREATININE 1.47 (H) 05/15/2025    CREATININE 1.76 (H) 2025    CREATININE 1.37 (H) 2024            Dysthymic disorder  Depression Screening Follow-up Plan: Patient's depression screening was positive with a PHQ-9 score of 8. Patient with underlying depression and was advised to continue current medications as prescribed.         Mixed hyperlipidemia         Annual physical exam         Paronychia of toe of left foot due to ingrown toenail         Diabetic eye exam (HCC)         Type 2 diabetes mellitus with diabetic neuropathy, with long-term current use of insulin ([facility])    Lab Results   Component Value Date    HGBA1C 5.0 05/15/2025          Uncontrolled type 2 diabetes mellitus with hyperglycemia ([facility])    Lab Results   Component Value Date    HGBA1C 5.0 05/15/2025   Continue same med       Primary osteoarthritis of both knees          Stage 3b chronic kidney disease (HCC)  Lab Results   Component Value Date    EGFR 41 05/15/2025    EGFR 33 02/11/2025    EGFR 44 12/18/2024    CREATININE 1.47 (H) 05/15/2025    CREATININE 1.76 (H) 02/11/2025    CREATININE 1.37 (H) 12/18/2024   Continue same med       Dysthymic disorder  Continue same med       Mixed hyperlipidemia  Continue same med       Annual physical exam               Preventive Screenings:    - Cervical cancer screening: screening up-to-date   - Breast cancer screening: screening up-to-date     Immunizations:  - Immunizations due: Tdap and Zoster (Shingrix)         History of Present Illness     Adult Annual Physical:  Patient presents for annual physical. physical.     Diet and Physical Activity:  - Diet/Nutrition: no special diet, well balanced diet, portion control, diabetic diet, heart healthy (low sodium) diet, limited junk food, low fat diet, low carb diet, consuming 3-5 servings of fruits/vegetables daily, adequate fiber intake and adequate whole grain intake. Hospitalized for diabetic ketoacidosis due to stomach issues and not eating following a death in the family 11 months ago resolved  - Exercise: walking, strength training exercises, 1-2 times a week on average and less than 30 minutes on average. Swimming about two laps a week at my own pace    Depression Screening:    - PHQ-9 Score: 8    General Health:  - Sleep: 7-8 hours of sleep on average.  - Hearing: normal hearing right ear and normal hearing left ear.  - Vision: no vision problems and most recent eye exam < 1 year ago. Cataract surgery done in both left and right eyes  - Dental: regular dental visits, brushes teeth once daily and does not floss.    /GYN Health:  - Follows with GYN: yes.   - Menopause: postmenopausal.   - History of STDs: no    Advanced Care Planning:  - Has an advanced directive?: no    - Has a durable medical POA?: yes    - ACP document given to patient?: yes      Review of Systems   Constitutional:  " Negative for chills and fever.   HENT:  Negative for congestion, ear pain and sore throat.    Eyes:  Negative for pain.   Respiratory:  Negative for cough and shortness of breath.    Cardiovascular:  Negative for chest pain and leg swelling.   Gastrointestinal:  Negative for abdominal pain, nausea and vomiting.   Endocrine: Negative for polyuria.   Genitourinary:  Negative for difficulty urinating, frequency and urgency.   Musculoskeletal:  Positive for arthralgias and back pain.   Skin:  Negative for rash.   Neurological:  Negative for weakness and headaches.   Psychiatric/Behavioral:  Negative for sleep disturbance. The patient is not nervous/anxious.      Medications Ordered Prior to Encounter[1]   Social History[2]    Objective   /80 (BP Location: Right arm, Patient Position: Sitting, Cuff Size: Standard)   Pulse 92   Temp 98.6 °F (37 °C) (Temporal)   Ht 5' 1\" (1.549 m)   Wt 57.7 kg (127 lb 3.2 oz)   LMP  (LMP Unknown)   SpO2 99%   BMI 24.03 kg/m²     Physical Exam  Vitals and nursing note reviewed.   Constitutional:       General: She is not in acute distress.     Appearance: She is well-developed.   HENT:      Head: Normocephalic and atraumatic.      Right Ear: External ear normal.      Left Ear: External ear normal.      Mouth/Throat:      Mouth: Mucous membranes are moist.      Pharynx: Oropharynx is clear.     Eyes:      Extraocular Movements: Extraocular movements intact.      Conjunctiva/sclera: Conjunctivae normal.       Cardiovascular:      Rate and Rhythm: Normal rate and regular rhythm.      Heart sounds: Normal heart sounds. No murmur heard.  Pulmonary:      Effort: Pulmonary effort is normal. No respiratory distress.      Breath sounds: Normal breath sounds. No wheezing or rales.   Abdominal:      General: Abdomen is flat. There is no distension.      Palpations: Abdomen is soft.      Tenderness: There is no abdominal tenderness.     Musculoskeletal:         General: No swelling.      " Cervical back: Neck supple.      Right lower leg: No edema.      Left lower leg: No edema.     Skin:     General: Skin is warm and dry.      Capillary Refill: Capillary refill takes less than 2 seconds.     Neurological:      General: No focal deficit present.      Mental Status: She is alert and oriented to person, place, and time.     Psychiatric:         Mood and Affect: Mood normal.                [1]  Current Outpatient Medications on File Prior to Visit   Medication Sig Dispense Refill   • acetaZOLAMIDE (DIAMOX) 250 mg tablet Take 1 tablet (250 mg total) by mouth 2 (two) times a day 180 tablet 3   • atorvastatin (LIPITOR) 10 mg tablet Take 1 tablet (10 mg total) by mouth daily at bedtime 90 tablet 1   • BD Pen Needle Glory U/F 32G X 4 MM MISC Inject under the skin 2 (two) times a day Use as directed 180 each 0   • Blood Glucose Monitoring Suppl (ONE TOUCH ULTRA 2) w/Device KIT Use 1 each 2 (two) times a day Use as instructed 1 kit 0   • calcitriol (ROCALTROL) 0.25 mcg capsule TAKE 1 CAPSULE BY MOUTH daily 90 capsule 1   • cetirizine (ZyrTEC) oral solution Take 5 mL (5 mg total) by mouth daily 450 mL 1   • Cholecalciferol (Vitamin D3) 50 MCG (2000 UT) capsule Take 1 capsule (2,000 Units total) by mouth daily 90 capsule 0   • clonazePAM (KlonoPIN) 0.5 mg tablet Take 1 tablet (0.5 mg total) by mouth 2 (two) times a day 180 tablet 0   • clotrimazole-betamethasone (LOTRISONE) 1-0.05 % cream Apply topically 2 (two) times a day 45 g 0   • Continuous Glucose Sensor (FreeStyle Filiberto 3 Sensor) [facility] [place] Use 1 each every 14 (fourteen) days 6 each 1   • cyproheptadine (PERIACTIN) 4 mg tablet Take 1 tablet (4 mg total) by mouth daily at bedtime 90 tablet 1   • diphenhydrAMINE (BENADRYL) 25 mg tablet Take 1 tablet (25 mg total) by mouth every 6 (six) hours as needed for itching 30 tablet 0   • divalproex sodium (DEPAKOTE) 500 mg DR tablet TAKE ONE TABLET BY MOUTH DAILY AT BEDTIME 90 tablet 1   • docusate sodium (COLACE) 100 mg  capsule TAKE 1 CAPSULE BY MOUTH TWICE A DAY 60 capsule 0   • DULoxetine (CYMBALTA) 60 mg delayed release capsule Take 1 capsule (60 mg total) by mouth daily 90 capsule 1   • ferrous sulfate 324 (65 Fe) mg Take 1 tablet (324 mg total) by mouth daily before breakfast 90 tablet 0   • fremanezumab-vfrm (Ajovy) 225 MG/1.5ML auto-injector Inject 1.5 mL (225 mg total) under the skin every 30 (thirty) days 1.5 mL 11   • insulin aspart (NovoLOG FlexPen) 100 UNIT/ML injection pen Continue with sliding scale insulin regimen     • Insulin Glargine Solostar (Lantus SoloStar) 100 UNIT/ML SOPN Inject 0.15 mL (15 Units total) under the skin daily at bedtime 15 mL 0   • Lancets MISC Use 1 each in the morning and 1 each before bedtime. Use as instructed .     • lasmiditan succinate (REYVOW) 100 mg tablet Take 2 tablets (200 mg total) by mouth if needed (migraine) Once in 24 hours.  DO not drive within 8 hours of taking 16 tablet 3   • lisdexamfetamine (VYVANSE) 30 MG capsule Take 1 capsule (30 mg total) by mouth every morning Max Daily Amount: 30 mg 90 capsule 0   • losartan-hydrochlorothiazide (HYZAAR) 100-12.5 MG per tablet Take 1 tablet by mouth daily 90 tablet 1   • magnesium oxide (MAG-OX) 400 mg tablet Take 1 tablet (400 mg total) by mouth daily 90 tablet 0   • methocarbamol (Robaxin-750) 750 mg tablet Take 1 tablet (750 mg total) by mouth every 6 (six) hours as needed for muscle spasms (back pain) 30 tablet 0   • metoprolol tartrate (LOPRESSOR) 100 mg tablet TAKE 1 TABLET(100 MG) BY MOUTH EVERY 12 HOURS 180 tablet 1   • Multiple Vitamins-Minerals (multivitamin with minerals) tablet Take 1 tablet by mouth in the morning.     • NON FORMULARY Botox injections for HA every 3months (LD 3/20/23)     • nystatin (MYCOSTATIN) powder Apply topically 2 (two) times a day 120 g 0   • ondansetron (ZOFRAN) 4 mg tablet Take 1 tablet (4 mg total) by mouth every 8 (eight) hours as needed for nausea or vomiting 120 tablet 0   • Ophthalmic  Irrigation Solution (OCUSOFT EYE WASH OP) Apply 1 Application to eye daily at bedtime     • Polyethyl Glycol-Propyl Glycol (Systane) 0.4-0.3 % GEL Apply 1 drop to eye in the morning and 1 drop in the evening. Using Ivizia as an alternative .     • polyethylene glycol (MIRALAX) 17 g packet Take 17 g by mouth daily 1 each 0   • pregabalin (LYRICA) 100 mg capsule Take 1 capsule (100 mg total) by mouth 3 (three) times a day 270 capsule 0   • rimegepant sulfate (Nurtec) 75 mg TBDP Place one tab (75mg total) under the tongue as needed for migraine. 16 tablet 11   • sodium bicarbonate 650 mg tablet Take 1 tablet (650 mg total) by mouth 3 (three) times a day (Patient taking differently: Take 650 mg by mouth 2 (two) times a day) 360 tablet 0   • sodium chloride (OCEAN) 0.65 % nasal spray 1 spray into each nostril every 2 (two) hours while awake 60 mL 1   • terazosin (HYTRIN) 1 mg capsule Take 1 capsule (1 mg total) by mouth daily at bedtime 90 capsule 1   • tiZANidine (ZANAFLEX) 4 mg tablet Take 1 tablet (4 mg total) by mouth every 8 (eight) hours as needed for muscle spasms 90 tablet 0   • [DISCONTINUED] Mounjaro 15 MG/0.5ML SOAJ Inject 15 mg under the skin once a week 2 mL 5   • mupirocin (BACTROBAN) 2 % ointment Apply topically 3 (three) times a day (Patient not taking: Reported on 5/22/2025) 60 g 0     Current Facility-Administered Medications on File Prior to Visit   Medication Dose Route Frequency Provider Last Rate Last Admin   • Botulinum Toxin Type A SOLR 200 Units  200 Units Intramuscular Q3 Months    200 Units at 04/08/25 0956   • cyanocobalamin injection 1,000 mcg  1,000 mcg Intramuscular Weekly Myla Norwood MD   1,000 mcg at 07/01/24 1007   [2]  Social History  Tobacco Use   • Smoking status: Never     Passive exposure: Never   • Smokeless tobacco: Never   • Tobacco comments:     Never used tabaco, don’t care to   Vaping Use   • Vaping status: Never Used   Substance and Sexual Activity   • Alcohol use: Not  Currently   • Drug use: Yes     Types: Marijuana     Comment: Medicinal   • Sexual activity: Not Currently     Partners: Male     Birth control/protection: I.U.D.     Comment: Mirena 4/5/2017

## 2025-07-03 NOTE — ASSESSMENT & PLAN NOTE
Lab Results   Component Value Date    HGBA1C 5.0 05/15/2025       Orders:  •  Mounjaro 15 MG/0.5ML SOAJ; Inject 15 mg under the skin once a week

## 2025-07-03 NOTE — PROGRESS NOTES
Name: Kinza Meza      : 1975      MRN: [id number]  Encounter Provider: Myla Norwood MD  Encounter Date: 7/3/2025   Encounter department: [facility], [facility] INTERNAL MEDICINE [address]  :  Assessment & Plan  Paronychia of toe of left foot due to ingrown toenail    Orders:  •  cephalexin (KEFLEX) 500 mg capsule; Take 1 capsule (500 mg total) by mouth every 8 (eight) hours for 7 days    Diabetic eye exam (HCC)    Orders:  •  Ambulatory Referral to Ophthalmology; Future    Type 2 diabetes mellitus with diabetic neuropathy, with long-term current use of insulin (HCC)    Lab Results   Component Value Date    HGBA1C 5.0 05/15/2025       Orders:  •  Mounjaro 15 MG/0.5ML SOAJ; Inject 15 mg under the skin once a week    Uncontrolled type 2 diabetes mellitus with hyperglycemia (HCC)    Lab Results   Component Value Date    HGBA1C 5.0 05/15/2025   Continue same med       Primary osteoarthritis of both knees         Stage 3b chronic kidney disease (HCC)  Lab Results   Component Value Date    EGFR 41 05/15/2025    EGFR 33 2025    EGFR 44 2024    CREATININE 1.47 (H) 05/15/2025    CREATININE 1.76 (H) 2025    CREATININE 1.37 (H) 2024   Continue same med       Dysthymic disorder  Continue same med       Mixed hyperlipidemia  Continue same med       Annual physical exam                History of Present Illness   Follow-up on multiple medical problems to ensure they are stable on current medication, also physical, left big toe pain, skin redness      Review of Systems   Constitutional:  Negative for chills and fever.   HENT:  Negative for congestion, ear pain and sore throat.    Eyes:  Negative for pain.   Respiratory:  Negative for cough and shortness of breath.    Cardiovascular:  Negative for chest pain and leg swelling.   Gastrointestinal:  Negative for abdominal pain, nausea and vomiting.   Endocrine: Negative for polyuria.   Genitourinary:  Negative for difficulty urinating,  "frequency and urgency.   Musculoskeletal:  Positive for arthralgias and back pain.   Skin:  Negative for rash.   Neurological:  Negative for weakness and headaches.   Psychiatric/Behavioral:  Negative for sleep disturbance. The patient is not nervous/anxious.        Objective   /80 (BP Location: Right arm, Patient Position: Sitting, Cuff Size: Standard)   Pulse 92   Temp 98.6 °F (37 °C) (Temporal)   Ht 5' 1\" (1.549 m)   Wt 57.7 kg (127 lb 3.2 oz)   LMP  (LMP Unknown)   SpO2 99%   BMI 24.03 kg/m²      Physical Exam  Vitals and nursing note reviewed.   Constitutional:       General: She is not in acute distress.     Appearance: She is well-developed.   HENT:      Head: Normocephalic and atraumatic.      Right Ear: External ear normal.      Left Ear: External ear normal.      Mouth/Throat:      Mouth: Mucous membranes are moist.      Pharynx: Oropharynx is clear.     Eyes:      Extraocular Movements: Extraocular movements intact.      Conjunctiva/sclera: Conjunctivae normal.       Cardiovascular:      Rate and Rhythm: Normal rate and regular rhythm.      Heart sounds: Normal heart sounds. No murmur heard.  Pulmonary:      Effort: Pulmonary effort is normal. No respiratory distress.      Breath sounds: Normal breath sounds. No wheezing or rales.   Abdominal:      General: Abdomen is flat. There is no distension.      Palpations: Abdomen is soft.      Tenderness: There is no abdominal tenderness.     Musculoskeletal:         General: No swelling.      Cervical back: Neck supple.      Right lower leg: No edema.      Left lower leg: No edema.      Comments: Left big toe-nail skin redness present, no active discharge,     Skin:     General: Skin is warm and dry.      Capillary Refill: Capillary refill takes less than 2 seconds.     Neurological:      General: No focal deficit present.      Mental Status: She is alert and oriented to person, place, and time.     Psychiatric:         Mood and Affect: Mood normal. "         Answers submitted by the patient for this visit:  Annual Physical (Submitted on 7/1/2025)  Diet/Nutrition choices: no special diet, well balanced diet, portion control, diabetic diet, heart healthy (low sodium) diet, limited junk food, low fat diet, low carb diet, consuming 3-5 servings of fruits/vegetables daily, adequate fiber intake, adequate whole grain intake  Diet/Nutrition additional comments: Hospitalized for diabetic ketoacidosis due to stomach issues and not eating following a death in the family 11 months ago resolved  Exercise choices: walking, strength training exercises, 1-2 times a week on average, less than 30 minutes on average  Exercise additional comments: Swimming about two laps a week at my own pace  Sleep choices: 4-6 hours of sleep on average, 7-8 hours of sleep on average, > 8 hours of sleep on average, snores loudly  Hearing choices: normal hearing right ear, normal hearing left ear  Vision choices: no vision problems, most recent eye exam < 1 year ago  Vision additional comments: Cataract surgery done in both left and right eyes  Dental choices: regular dental visits, brushes teeth once daily, does not floss  Do you currently have an OB/GYN provider that you routinely follow with?: Yes  Menopausal status: postmenopausal  Any history of sexual transmitted disease/infection?: Yes  Do you have an advance directive/living will?: No  Do you have a durable power of  (POA)?: No

## 2025-07-03 NOTE — PATIENT INSTRUCTIONS
"Patient Education     Routine physical for adults   The Basics   Written by the doctors and editors at [facility]   What is a physical? -- A physical is a routine visit, or \"check-up,\" with your doctor. You might also hear it called a \"wellness visit\" or \"preventive visit.\"  During each visit, the doctor will:   Ask about your physical and mental health   Ask about your habits, behaviors, and lifestyle   Do an exam   Give you vaccines if needed   Talk to you about any medicines you take   Give advice about your health   Answer your questions  Getting regular check-ups is an important part of taking care of your health. It can help your doctor find and treat any problems you have. But it's also important for preventing health problems.  A routine physical is different from a \"sick visit.\" A sick visit is when you see a doctor because of a health concern or problem. Since physicals are scheduled ahead of time, you can think about what you want to ask the doctor.  How often should I get a physical? -- It depends on your age and health. In general, for people age 21 years and older:   If you are younger than 50 years, you might be able to get a physical every 3 years.   If you are 50 years or older, your doctor might recommend a physical every year.  If you have an ongoing health condition, like diabetes or high blood pressure, your doctor will probably want to see you more often.  What happens during a physical? -- In general, each visit will include:   Physical exam - The doctor or nurse will check your height, weight, heart rate, and blood pressure. They will also look at your eyes and ears. They will ask about how you are feeling and whether you have any symptoms that bother you.   Medicines - It's a good idea to bring a list of all the medicines you take to each doctor visit. Your doctor will talk to you about your medicines and answer any questions. Tell them if you are having any side effects that bother you. You " "should also tell them if you are having trouble paying for any of your medicines.   Habits and behaviors - This includes:   Your diet   Your exercise habits   Whether you smoke, drink alcohol, or use drugs   Whether you are sexually active   Whether you feel safe at home  Your doctor will talk to you about things you can do to improve your health and lower your risk of health problems. They will also offer help and support. For example, if you want to quit smoking, they can give you advice and might prescribe medicines. If you want to improve your diet or get more physical activity, they can help you with this, too.   Lab tests, if needed - The tests you get will depend on your age and situation. For example, your doctor might want to check your:   Cholesterol   Blood sugar   Iron level   Vaccines - The recommended vaccines will depend on your age, health, and what vaccines you already had. Vaccines are very important because they can prevent certain serious or deadly infections.   Discussion of screening - \"Screening\" means checking for diseases or other health problems before they cause symptoms. Your doctor can recommend screening based on your age, risk, and preferences. This might include tests to check for:   Cancer, such as breast, prostate, cervical, ovarian, colorectal, prostate, lung, or skin cancer   Sexually transmitted infections, such as chlamydia and gonorrhea   Mental health conditions like depression and anxiety  Your doctor will talk to you about the different types of screening tests. They can help you decide which screenings to have. They can also explain what the results might mean.   Answering questions - The physical is a good time to ask the doctor or nurse questions about your health. If needed, they can refer you to other doctors or specialists, too.  Adults older than 65 years often need other care, too. As you get older, your doctor will talk to you about:   How to prevent falling at " home   Hearing or vision tests   Memory testing   How to take your medicines safely   Making sure that you have the help and support you need at home  All topics are updated as new evidence becomes available and our peer review process is complete.  This topic retrieved from [employer] on: May 02, 2024.  Topic 689011 Version 1.0  Release: 32.4.3 - C32.122  © 2024 UpToDate, Inc. and/or its affiliates. All rights reserved.  Consumer Information Use and Disclaimer   Disclaimer: This generalized information is a limited summary of diagnosis, treatment, and/or medication information. It is not meant to be comprehensive and should be used as a tool to help the user understand and/or assess potential diagnostic and treatment options. It does NOT include all information about conditions, treatments, medications, side effects, or risks that may apply to a specific patient. It is not intended to be medical advice or a substitute for the medical advice, diagnosis, or treatment of a health care provider based on the health care provider's examination and assessment of a patient's specific and unique circumstances. Patients must speak with a health care provider for complete information about their health, medical questions, and treatment options, including any risks or benefits regarding use of medications. This information does not endorse any treatments or medications as safe, effective, or approved for treating a specific patient. UpToDate, Inc. and its affiliates disclaim any warranty or liability relating to this information or the use thereof.The use of this information is governed by the Terms of Use, available at https://www.[employer].com/en/know/clinical-effectiveness-terms. 2024© UpToDate, Inc. and its affiliates and/or licensors. All rights reserved.  Copyright   © 2024 UpToDate, Inc. and/or its affiliates. All rights reserved.

## 2025-07-10 ENCOUNTER — VBI (OUTPATIENT)
Dept: ADMINISTRATIVE | Facility: OTHER | Age: 50
End: 2025-07-10

## 2025-07-10 NOTE — TELEPHONE ENCOUNTER
07/10/25 12:28 PM     Chart reviewed for   Comprehensive Diabetes Care - Eye Exam    ; nothing is submitted to the patient's insurance at this time.     AMRITA POPE MA   [facility]

## 2025-07-14 ENCOUNTER — PROCEDURE VISIT (OUTPATIENT)
Dept: NEUROLOGY | Facility: CLINIC | Age: 50
End: 2025-07-14
Payer: COMMERCIAL

## 2025-07-14 VITALS — SYSTOLIC BLOOD PRESSURE: 104 MMHG | TEMPERATURE: 97.7 F | DIASTOLIC BLOOD PRESSURE: 62 MMHG

## 2025-07-14 DIAGNOSIS — G43.709 CHRONIC MIGRAINE W/O AURA W/O STATUS MIGRAINOSUS, NOT INTRACTABLE: Primary | ICD-10-CM

## 2025-07-14 PROCEDURE — 64615 CHEMODENERV MUSC MIGRAINE: CPT | Performed by: PHYSICIAN ASSISTANT

## 2025-07-14 NOTE — PROGRESS NOTES
"Universal Protocol   procedure performed by consultantConsent: Verbal consent obtained. Written consent obtained  Risks and benefits: risks, benefits and alternatives were discussed  Consent given by: patient  Time out: Immediately prior to procedure a \"time out\" was called to verify the correct patient, procedure, equipment, support staff and site/side marked as required.  Patient understanding: patient states understanding of the procedure being performed  Patient consent: the patient's understanding of the procedure matches consent given  Procedure consent: procedure consent matches procedure scheduled  Relevant documents: relevant documents present and verified  Patient identity confirmed: verbally with patient      Chemodenervation     Date/Time  7/14/2025 10:30 AM     Performed by  Desiree Grigsby PA-C   Authorized by  Desiree Grigsby PA-C     Pre-procedure details      Prepped With: Alcohol     Anesthesia  (see MAR for exact dosages):     Anesthesia method:  None   Procedure details      Position:  Upright   Botox      Botox Type:  Type A    Brand:  Botox    mL's of Botulinum Toxin:  200    Final Concentration per CC:  50 units    Needle Gauge:  30 G 2.5 inch   Procedures      Botox Procedures: chronic headache      Indications: migraines     Injection Location      Head / Face:  L superior cervical paraspinal, R superior cervical paraspinal, L , R , L frontalis, R frontalis, L medial occipitalis, R medial occipitalis, procerus, R temporalis, L temporalis, R superior trapezius and L superior trapezius    L  injection amount:  5 unit(s)    R  injection amount:  5 unit(s)    L lateral frontalis:  5 unit(s)    R lateral frontalis:  5 unit(s)    L medial frontalis:  5 unit(s)    R medial frontalis:  5 unit(s)    L temporalis injection amount:  20 unit(s)    R temporalis injection amount:  20 unit(s)    Procerus injection amount:  5 unit(s)    L medial occipitalis injection " amount:  15 unit(s)    R medial occipitalis injection amount:  15 unit(s)    L superior cervical paraspinal injection amount:  10 unit(s)    R superior cervical paraspinal injection amount:  10 unit(s)    L superior trapezius injection amount:  15 unit(s)    R superior trapezius injection amount:  15 unit(s)   Total Units      Total units used:  200    Total units discarded:  0   Post-procedure details      Chemodenervation:  Chronic migraine    Facial Nerve Location::  Bilateral facial nerve    Patient tolerance of procedure:  Tolerated well, no immediate complications   Comments       5 units trapezius bilaterally   5 units splenius capitis bilaterally   25 units occipitalis   All medically necessary

## 2025-07-17 NOTE — PROGRESS NOTES
[facility]   Plastic and Reconstructive Surgery   [address] [place] [id number]       CLINIC NOTE      Assessment & Plan  Erythema intertrigo    Excess skin of abdomen      Kinza Meza is a 49-year-old female with a history of type 2 diabetes mellitus, chronic kidney disease and obesity who is status post gastric bypass in 2016 with a total weight loss of 200 pounds. Patient is weight stable. Patient experiences significant physical discomfort and difficulty performing daily activities due to her abdominal panniculus. Additionally she is experiencing significant hygiene challenges with significant skin irritation and fungal infection beneath the abdominal panniculus. She does have a history of ventral hernia repair with possible mesh. CT shows chronic 5.7x2.4cm subcutaneous fluid collection of the central abdomen. No appreciable ventral hernia on CT scan. Labs are within normal limits.  HgbA1c is 5.0.    -Plan for panniculectomy   -Medical clearance      Interval History:  Kinza reports no significant changes.  Patient notes that she wanted to review the plan for surgery.  No complains. Feeling well overall. Denies fevers chills or recent illnesses.       Physical Exam   Deferred

## 2025-07-25 DIAGNOSIS — Z79.4 TYPE 2 DIABETES MELLITUS WITH DIABETIC NEUROPATHY, WITH LONG-TERM CURRENT USE OF INSULIN (HCC): ICD-10-CM

## 2025-07-25 DIAGNOSIS — E11.40 TYPE 2 DIABETES MELLITUS WITH DIABETIC NEUROPATHY, WITH LONG-TERM CURRENT USE OF INSULIN (HCC): ICD-10-CM

## 2025-07-25 RX ORDER — TIRZEPATIDE 15 MG/.5ML
15 INJECTION, SOLUTION SUBCUTANEOUS WEEKLY
Qty: 2 ML | Refills: 0 | Status: CANCELLED | OUTPATIENT
Start: 2025-07-25

## 2025-07-29 RX ORDER — TIRZEPATIDE 12.5 MG/.5ML
12.5 INJECTION, SOLUTION SUBCUTANEOUS WEEKLY
Qty: 2 ML | Refills: 0 | Status: SHIPPED | OUTPATIENT
Start: 2025-07-29

## 2025-08-12 ENCOUNTER — OFFICE VISIT (OUTPATIENT)
Age: 50
End: 2025-08-12
Attending: INTERNAL MEDICINE
Payer: COMMERCIAL

## 2025-08-13 ENCOUNTER — TELEPHONE (OUTPATIENT)
Dept: NEPHROLOGY | Facility: CLINIC | Age: 50
End: 2025-08-13

## 2025-08-15 ENCOUNTER — RESULTS FOLLOW-UP (OUTPATIENT)
Dept: OTHER | Facility: HOSPITAL | Age: 50
End: 2025-08-15

## 2025-08-15 ENCOUNTER — APPOINTMENT (OUTPATIENT)
Dept: LAB | Facility: CLINIC | Age: 50
End: 2025-08-15
Payer: COMMERCIAL

## 2025-08-19 ENCOUNTER — OFFICE VISIT (OUTPATIENT)
Dept: NEPHROLOGY | Facility: CLINIC | Age: 50
End: 2025-08-19
Payer: COMMERCIAL

## 2025-08-19 VITALS
SYSTOLIC BLOOD PRESSURE: 128 MMHG | HEART RATE: 81 BPM | HEIGHT: 61 IN | BODY MASS INDEX: 24.73 KG/M2 | WEIGHT: 131 LBS | DIASTOLIC BLOOD PRESSURE: 82 MMHG

## 2025-08-19 DIAGNOSIS — E83.9 CHRONIC KIDNEY DISEASE-MINERAL AND BONE DISORDER: ICD-10-CM

## 2025-08-19 DIAGNOSIS — N25.81 SECONDARY HYPERPARATHYROIDISM OF RENAL ORIGIN (HCC): ICD-10-CM

## 2025-08-19 DIAGNOSIS — D64.9 ANEMIA, UNSPECIFIED TYPE: ICD-10-CM

## 2025-08-19 DIAGNOSIS — E83.42 HYPOMAGNESEMIA: ICD-10-CM

## 2025-08-19 DIAGNOSIS — N18.30 CONTROLLED TYPE 2 DIABETES MELLITUS WITH STAGE 3 CHRONIC KIDNEY DISEASE, WITH LONG-TERM CURRENT USE OF INSULIN (HCC): Primary | ICD-10-CM

## 2025-08-19 DIAGNOSIS — N18.9 CHRONIC KIDNEY DISEASE-MINERAL AND BONE DISORDER: ICD-10-CM

## 2025-08-19 DIAGNOSIS — M89.9 CHRONIC KIDNEY DISEASE-MINERAL AND BONE DISORDER: ICD-10-CM

## 2025-08-19 DIAGNOSIS — R80.1 PERSISTENT PROTEINURIA: ICD-10-CM

## 2025-08-19 DIAGNOSIS — E87.20 METABOLIC ACIDOSIS: ICD-10-CM

## 2025-08-19 DIAGNOSIS — E55.9 VITAMIN D DEFICIENCY: ICD-10-CM

## 2025-08-19 DIAGNOSIS — N18.32 STAGE 3B CHRONIC KIDNEY DISEASE (HCC): ICD-10-CM

## 2025-08-19 DIAGNOSIS — I12.9 BENIGN HYPERTENSION WITH CHRONIC KIDNEY DISEASE, STAGE III (HCC): ICD-10-CM

## 2025-08-19 DIAGNOSIS — E83.39 HYPERPHOSPHATEMIA: ICD-10-CM

## 2025-08-19 DIAGNOSIS — N18.30 BENIGN HYPERTENSION WITH CHRONIC KIDNEY DISEASE, STAGE III (HCC): ICD-10-CM

## 2025-08-19 DIAGNOSIS — E11.22 CONTROLLED TYPE 2 DIABETES MELLITUS WITH STAGE 3 CHRONIC KIDNEY DISEASE, WITH LONG-TERM CURRENT USE OF INSULIN (HCC): Primary | ICD-10-CM

## 2025-08-19 DIAGNOSIS — N31.9 NEUROGENIC BLADDER: ICD-10-CM

## 2025-08-19 DIAGNOSIS — Z79.4 CONTROLLED TYPE 2 DIABETES MELLITUS WITH STAGE 3 CHRONIC KIDNEY DISEASE, WITH LONG-TERM CURRENT USE OF INSULIN (HCC): Primary | ICD-10-CM

## 2025-08-19 PROCEDURE — 99214 OFFICE O/P EST MOD 30 MIN: CPT | Performed by: NURSE PRACTITIONER

## 2025-08-19 RX ORDER — CALCITRIOL 0.25 UG/1
0.25 CAPSULE, LIQUID FILLED ORAL 3 TIMES WEEKLY
Start: 2025-08-20